# Patient Record
Sex: FEMALE | Race: WHITE | Employment: OTHER | URBAN - METROPOLITAN AREA
[De-identification: names, ages, dates, MRNs, and addresses within clinical notes are randomized per-mention and may not be internally consistent; named-entity substitution may affect disease eponyms.]

---

## 2017-11-06 ENCOUNTER — HOSPITAL ENCOUNTER (INPATIENT)
Age: 82
LOS: 9 days | Discharge: NURSING, REHAB OR LONG-TERM CARE | DRG: 469 | End: 2017-11-15
Attending: EMERGENCY MEDICINE | Admitting: INTERNAL MEDICINE
Payer: MEDICARE

## 2017-11-06 ENCOUNTER — APPOINTMENT (OUTPATIENT)
Dept: GENERAL RADIOLOGY | Age: 82
DRG: 469 | End: 2017-11-06
Attending: PHYSICIAN ASSISTANT
Payer: MEDICARE

## 2017-11-06 ENCOUNTER — ANESTHESIA EVENT (OUTPATIENT)
Dept: SURGERY | Age: 82
DRG: 469 | End: 2017-11-06
Payer: MEDICARE

## 2017-11-06 DIAGNOSIS — D61.818 PANCYTOPENIA (HCC): ICD-10-CM

## 2017-11-06 DIAGNOSIS — S72.001A CLOSED FRACTURE OF RIGHT HIP, INITIAL ENCOUNTER (HCC): Primary | ICD-10-CM

## 2017-11-06 PROBLEM — S72.009A HIP FRACTURE (HCC): Status: ACTIVE | Noted: 2017-11-06

## 2017-11-06 LAB
ALBUMIN SERPL-MCNC: 3.2 G/DL (ref 3.5–5)
ALBUMIN/GLOB SERPL: 0.8 {RATIO} (ref 1.1–2.2)
ALP SERPL-CCNC: 95 U/L (ref 45–117)
ALT SERPL-CCNC: 16 U/L (ref 12–78)
ANION GAP SERPL CALC-SCNC: 8 MMOL/L (ref 5–15)
APPEARANCE UR: CLEAR
AST SERPL-CCNC: 8 U/L (ref 15–37)
BACTERIA URNS QL MICRO: NEGATIVE /HPF
BASOPHILS # BLD: 0 K/UL
BASOPHILS NFR BLD: 0 %
BILIRUB SERPL-MCNC: 0.3 MG/DL (ref 0.2–1)
BILIRUB UR QL: NEGATIVE
BUN SERPL-MCNC: 21 MG/DL (ref 6–20)
BUN/CREAT SERPL: 20 (ref 12–20)
CALCIUM SERPL-MCNC: 8.5 MG/DL (ref 8.5–10.1)
CHLORIDE SERPL-SCNC: 104 MMOL/L (ref 97–108)
CO2 SERPL-SCNC: 24 MMOL/L (ref 21–32)
COLOR UR: ABNORMAL
CREAT SERPL-MCNC: 1.06 MG/DL (ref 0.55–1.02)
EOSINOPHIL # BLD: 0 K/UL
EOSINOPHIL NFR BLD: 0 %
EPITH CASTS URNS QL MICRO: ABNORMAL /LPF
ERYTHROCYTE [DISTWIDTH] IN BLOOD BY AUTOMATED COUNT: 16.2 % (ref 11.5–14.5)
GLOBULIN SER CALC-MCNC: 4.1 G/DL (ref 2–4)
GLUCOSE BLD STRIP.AUTO-MCNC: 144 MG/DL (ref 65–100)
GLUCOSE BLD STRIP.AUTO-MCNC: 174 MG/DL (ref 65–100)
GLUCOSE BLD STRIP.AUTO-MCNC: 270 MG/DL (ref 65–100)
GLUCOSE BLD STRIP.AUTO-MCNC: 284 MG/DL (ref 65–100)
GLUCOSE SERPL-MCNC: 274 MG/DL (ref 65–100)
GLUCOSE UR STRIP.AUTO-MCNC: >1000 MG/DL
HCT VFR BLD AUTO: 23.8 % (ref 35–47)
HGB BLD-MCNC: 8 G/DL (ref 11.5–16)
HGB UR QL STRIP: ABNORMAL
HYALINE CASTS URNS QL MICRO: ABNORMAL /LPF (ref 0–5)
KETONES UR QL STRIP.AUTO: NEGATIVE MG/DL
LEUKOCYTE ESTERASE UR QL STRIP.AUTO: NEGATIVE
LYMPHOCYTES # BLD: 0.3 K/UL
LYMPHOCYTES NFR BLD: 28 %
MCH RBC QN AUTO: 27.4 PG (ref 26–34)
MCHC RBC AUTO-ENTMCNC: 33.6 G/DL (ref 30–36.5)
MCV RBC AUTO: 81.5 FL (ref 80–99)
MONOCYTES # BLD: 0.2 K/UL
MONOCYTES NFR BLD: 17 %
NEUTS BAND NFR BLD MANUAL: 11 %
NEUTS SEG # BLD: 0.5 K/UL
NEUTS SEG NFR BLD: 44 %
NITRITE UR QL STRIP.AUTO: NEGATIVE
PATH REV BLD -IMP: NORMAL
PH UR STRIP: 6 [PH] (ref 5–8)
PLATELET # BLD AUTO: 105 K/UL (ref 150–400)
POTASSIUM SERPL-SCNC: 4 MMOL/L (ref 3.5–5.1)
PROT SERPL-MCNC: 7.3 G/DL (ref 6.4–8.2)
PROT UR STRIP-MCNC: NEGATIVE MG/DL
RBC # BLD AUTO: 2.92 M/UL (ref 3.8–5.2)
RBC #/AREA URNS HPF: ABNORMAL /HPF (ref 0–5)
RBC MORPH BLD: ABNORMAL
SERVICE CMNT-IMP: ABNORMAL
SODIUM SERPL-SCNC: 136 MMOL/L (ref 136–145)
SP GR UR REFRACTOMETRY: 1.02 (ref 1–1.03)
UA: UC IF INDICATED,UAUC: ABNORMAL
UROBILINOGEN UR QL STRIP.AUTO: 0.2 EU/DL (ref 0.2–1)
WBC # BLD AUTO: 1 K/UL (ref 3.6–11)
WBC MORPH BLD: ABNORMAL
WBC URNS QL MICRO: ABNORMAL /HPF (ref 0–4)

## 2017-11-06 PROCEDURE — 73560 X-RAY EXAM OF KNEE 1 OR 2: CPT

## 2017-11-06 PROCEDURE — 74011250636 HC RX REV CODE- 250/636: Performed by: EMERGENCY MEDICINE

## 2017-11-06 PROCEDURE — 65660000000 HC RM CCU STEPDOWN

## 2017-11-06 PROCEDURE — 74011250636 HC RX REV CODE- 250/636: Performed by: INTERNAL MEDICINE

## 2017-11-06 PROCEDURE — 74011250637 HC RX REV CODE- 250/637: Performed by: INTERNAL MEDICINE

## 2017-11-06 PROCEDURE — 74011636637 HC RX REV CODE- 636/637: Performed by: INTERNAL MEDICINE

## 2017-11-06 PROCEDURE — 94762 N-INVAS EAR/PLS OXIMTRY CONT: CPT

## 2017-11-06 PROCEDURE — 85025 COMPLETE CBC W/AUTO DIFF WBC: CPT | Performed by: EMERGENCY MEDICINE

## 2017-11-06 PROCEDURE — 82962 GLUCOSE BLOOD TEST: CPT

## 2017-11-06 PROCEDURE — 81001 URINALYSIS AUTO W/SCOPE: CPT | Performed by: EMERGENCY MEDICINE

## 2017-11-06 PROCEDURE — 96360 HYDRATION IV INFUSION INIT: CPT

## 2017-11-06 PROCEDURE — 74011250637 HC RX REV CODE- 250/637: Performed by: PHYSICIAN ASSISTANT

## 2017-11-06 PROCEDURE — 86923 COMPATIBILITY TEST ELECTRIC: CPT | Performed by: EMERGENCY MEDICINE

## 2017-11-06 PROCEDURE — 51702 INSERT TEMP BLADDER CATH: CPT

## 2017-11-06 PROCEDURE — 93005 ELECTROCARDIOGRAM TRACING: CPT

## 2017-11-06 PROCEDURE — 71010 XR CHEST PORT: CPT

## 2017-11-06 PROCEDURE — 99285 EMERGENCY DEPT VISIT HI MDM: CPT

## 2017-11-06 PROCEDURE — 86900 BLOOD TYPING SEROLOGIC ABO: CPT | Performed by: EMERGENCY MEDICINE

## 2017-11-06 PROCEDURE — 77030005514 HC CATH URETH FOL14 BARD -A

## 2017-11-06 PROCEDURE — 80053 COMPREHEN METABOLIC PANEL: CPT | Performed by: EMERGENCY MEDICINE

## 2017-11-06 PROCEDURE — 36415 COLL VENOUS BLD VENIPUNCTURE: CPT | Performed by: EMERGENCY MEDICINE

## 2017-11-06 RX ORDER — INSULIN LISPRO 100 [IU]/ML
INJECTION, SOLUTION INTRAVENOUS; SUBCUTANEOUS
Status: DISCONTINUED | OUTPATIENT
Start: 2017-11-06 | End: 2017-11-15 | Stop reason: HOSPADM

## 2017-11-06 RX ORDER — AMOXICILLIN 250 MG
2 CAPSULE ORAL 2 TIMES DAILY
Status: DISCONTINUED | OUTPATIENT
Start: 2017-11-06 | End: 2017-11-08

## 2017-11-06 RX ORDER — OXYCODONE HYDROCHLORIDE 5 MG/1
2.5 TABLET ORAL
Status: DISCONTINUED | OUTPATIENT
Start: 2017-11-06 | End: 2017-11-09

## 2017-11-06 RX ORDER — OXYCODONE HYDROCHLORIDE 5 MG/1
5 TABLET ORAL
Status: DISCONTINUED | OUTPATIENT
Start: 2017-11-06 | End: 2017-11-08

## 2017-11-06 RX ORDER — MORPHINE SULFATE 4 MG/ML
2 INJECTION INTRAVENOUS
Status: DISCONTINUED | OUTPATIENT
Start: 2017-11-06 | End: 2017-11-15 | Stop reason: HOSPADM

## 2017-11-06 RX ORDER — PREDNISONE 1 MG/1
TABLET ORAL
Status: ON HOLD | COMMUNITY
End: 2018-06-14

## 2017-11-06 RX ORDER — CARVEDILOL 6.25 MG/1
6.25 TABLET ORAL 2 TIMES DAILY WITH MEALS
COMMUNITY

## 2017-11-06 RX ORDER — DEXTROSE 50 % IN WATER (D50W) INTRAVENOUS SYRINGE
12.5-25 AS NEEDED
Status: DISCONTINUED | OUTPATIENT
Start: 2017-11-06 | End: 2017-11-15 | Stop reason: HOSPADM

## 2017-11-06 RX ORDER — ONDANSETRON 2 MG/ML
4 INJECTION INTRAMUSCULAR; INTRAVENOUS
Status: DISCONTINUED | OUTPATIENT
Start: 2017-11-06 | End: 2017-11-08

## 2017-11-06 RX ORDER — HYDRALAZINE HYDROCHLORIDE 20 MG/ML
10 INJECTION INTRAMUSCULAR; INTRAVENOUS
Status: DISCONTINUED | OUTPATIENT
Start: 2017-11-06 | End: 2017-11-15 | Stop reason: HOSPADM

## 2017-11-06 RX ORDER — SODIUM CHLORIDE 0.9 % (FLUSH) 0.9 %
5-10 SYRINGE (ML) INJECTION AS NEEDED
Status: DISCONTINUED | OUTPATIENT
Start: 2017-11-06 | End: 2017-11-08

## 2017-11-06 RX ORDER — CARVEDILOL 6.25 MG/1
6.25 TABLET ORAL 2 TIMES DAILY WITH MEALS
Status: DISCONTINUED | OUTPATIENT
Start: 2017-11-06 | End: 2017-11-15 | Stop reason: HOSPADM

## 2017-11-06 RX ORDER — FLUOXETINE HYDROCHLORIDE 20 MG/1
20 CAPSULE ORAL DAILY
Status: DISCONTINUED | OUTPATIENT
Start: 2017-11-06 | End: 2017-11-15 | Stop reason: HOSPADM

## 2017-11-06 RX ORDER — LEVOTHYROXINE SODIUM 88 UG/1
88 TABLET ORAL
Status: DISCONTINUED | OUTPATIENT
Start: 2017-11-06 | End: 2017-11-15 | Stop reason: HOSPADM

## 2017-11-06 RX ORDER — ACETAMINOPHEN 325 MG/1
650 TABLET ORAL EVERY 6 HOURS
Status: DISCONTINUED | OUTPATIENT
Start: 2017-11-06 | End: 2017-11-08

## 2017-11-06 RX ORDER — MAGNESIUM SULFATE 100 %
4 CRYSTALS MISCELLANEOUS AS NEEDED
Status: DISCONTINUED | OUTPATIENT
Start: 2017-11-06 | End: 2017-11-15 | Stop reason: HOSPADM

## 2017-11-06 RX ORDER — PANTOPRAZOLE SODIUM 40 MG/1
40 TABLET, DELAYED RELEASE ORAL
Status: DISCONTINUED | OUTPATIENT
Start: 2017-11-06 | End: 2017-11-15 | Stop reason: HOSPADM

## 2017-11-06 RX ORDER — SODIUM CHLORIDE 9 MG/ML
25 INJECTION, SOLUTION INTRAVENOUS CONTINUOUS
Status: DISCONTINUED | OUTPATIENT
Start: 2017-11-06 | End: 2017-11-08

## 2017-11-06 RX ORDER — CEFAZOLIN SODIUM IN 0.9 % NACL 2 G/100 ML
2 PLASTIC BAG, INJECTION (ML) INTRAVENOUS
Status: DISCONTINUED | OUTPATIENT
Start: 2017-11-06 | End: 2017-11-06

## 2017-11-06 RX ORDER — NALOXONE HYDROCHLORIDE 0.4 MG/ML
0.4 INJECTION, SOLUTION INTRAMUSCULAR; INTRAVENOUS; SUBCUTANEOUS AS NEEDED
Status: DISCONTINUED | OUTPATIENT
Start: 2017-11-06 | End: 2017-11-08

## 2017-11-06 RX ORDER — SODIUM CHLORIDE 0.9 % (FLUSH) 0.9 %
5-10 SYRINGE (ML) INJECTION EVERY 8 HOURS
Status: DISCONTINUED | OUTPATIENT
Start: 2017-11-06 | End: 2017-11-08

## 2017-11-06 RX ORDER — DOCUSATE SODIUM 100 MG/1
100 CAPSULE, LIQUID FILLED ORAL
Status: DISCONTINUED | OUTPATIENT
Start: 2017-11-06 | End: 2017-11-06 | Stop reason: SDUPTHER

## 2017-11-06 RX ADMIN — DOCUSATE SODIUM AND SENNOSIDES 2 TABLET: 8.6; 5 TABLET, FILM COATED ORAL at 09:28

## 2017-11-06 RX ADMIN — SODIUM CHLORIDE 50 ML/HR: 900 INJECTION, SOLUTION INTRAVENOUS at 23:28

## 2017-11-06 RX ADMIN — ACETAMINOPHEN 650 MG: 325 TABLET ORAL at 06:24

## 2017-11-06 RX ADMIN — LEVOTHYROXINE SODIUM 88 MCG: 100 TABLET ORAL at 09:29

## 2017-11-06 RX ADMIN — FLUOXETINE HYDROCHLORIDE 20 MG: 20 CAPSULE ORAL at 09:28

## 2017-11-06 RX ADMIN — PANTOPRAZOLE SODIUM 40 MG: 40 TABLET, DELAYED RELEASE ORAL at 09:28

## 2017-11-06 RX ADMIN — DOCUSATE SODIUM AND SENNOSIDES 2 TABLET: 8.6; 5 TABLET, FILM COATED ORAL at 17:47

## 2017-11-06 RX ADMIN — INSULIN LISPRO 5 UNITS: 100 INJECTION, SOLUTION INTRAVENOUS; SUBCUTANEOUS at 08:28

## 2017-11-06 RX ADMIN — OXYCODONE HYDROCHLORIDE 5 MG: 5 TABLET ORAL at 17:47

## 2017-11-06 RX ADMIN — CARVEDILOL 6.25 MG: 6.25 TABLET, FILM COATED ORAL at 09:28

## 2017-11-06 RX ADMIN — MORPHINE SULFATE 2 MG: 4 INJECTION INTRAVENOUS at 17:47

## 2017-11-06 RX ADMIN — ACETAMINOPHEN 650 MG: 325 TABLET ORAL at 17:47

## 2017-11-06 RX ADMIN — OXYCODONE HYDROCHLORIDE 5 MG: 5 TABLET ORAL at 07:20

## 2017-11-06 RX ADMIN — Medication 10 ML: at 22:57

## 2017-11-06 RX ADMIN — SODIUM CHLORIDE 75 ML/HR: 900 INJECTION, SOLUTION INTRAVENOUS at 04:29

## 2017-11-06 RX ADMIN — ACETAMINOPHEN 650 MG: 325 TABLET ORAL at 22:57

## 2017-11-06 RX ADMIN — INSULIN LISPRO 2 UNITS: 100 INJECTION, SOLUTION INTRAVENOUS; SUBCUTANEOUS at 17:48

## 2017-11-06 RX ADMIN — CARVEDILOL 6.25 MG: 6.25 TABLET, FILM COATED ORAL at 17:46

## 2017-11-06 RX ADMIN — TBO-FILGRASTIM 300 MCG: 300 INJECTION, SOLUTION SUBCUTANEOUS at 14:17

## 2017-11-06 RX ADMIN — INSULIN LISPRO 5 UNITS: 100 INJECTION, SOLUTION INTRAVENOUS; SUBCUTANEOUS at 11:27

## 2017-11-06 RX ADMIN — OXYCODONE HYDROCHLORIDE 5 MG: 5 TABLET ORAL at 11:33

## 2017-11-06 NOTE — CDMP QUERY
1.   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 Could this be further specified as possible cardiomyopathy due to chemotherapy? =>Cardiomyopathy due to chemotherapy in the setting of patient who has received chemo for Non-Hodgkins lymphoma and now reports her \"heart is weak due to chemo\". Being treated with continue po Coreg as per home regimen  =>Other Explanation of clinical findings  =>Unable to Determine (no explanation of clinical findings)    The medical record reflects the following clinical findings, treatment, and risk factors:    79 y/o female admitted for R hip fracture s/p fall at home. She reports a 2 year history of being treated for Non-Hodgkins lymphoma with chemo. She also reports that her \"heart muscles are  weak due to chemo\". No current or remote echocardiogram on chart. She is being continued on her home medication of coreg. Please clarify and document your clinical opinion in the progress notes and discharge summary including the definitive and/or presumptive diagnosis, (suspected or probable), related to the above clinical findings. Please include clinical findings supporting your diagnosis. Thanks for your time.     Eben Real RN, BSN  [phone]  [phone]

## 2017-11-06 NOTE — ED NOTES
TRANSFER - OUT REPORT:    Verbal report given to [facility] (name) on Elio Childs  being transferred to Ortho (unit) for routine progression of care       Report consisted of patients Situation, Background, Assessment and   Recommendations(SBAR). Information from the following report(s) SBAR, Kardex, ED Summary and MAR was reviewed with the receiving nurse. Lines:       Opportunity for questions and clarification was provided.       Patient transported with:   O2 @ 2 liters

## 2017-11-06 NOTE — H&P
Hospitalist Admission Note    NAME: Nayla Carpenter   :  1935   MRN:  [id number]     Date/Time:  2017 4:33 AM    Patient PCP: Hawa Barker MD  ________________________________________________________________________    My assessment of this patient's clinical condition and my plan of care is as follows. Assessment / Plan:  Hip Fracture after ground level fall  -ortho has been consulted    Pre-op cardiac risk assessment:  Pt evaluated using revised cardiac risk index and is felt to be moderate cardiovascular risk for intermediate risk surgery with a 1-2.4 risk for major complications based on these criteria. This risk has been discussed with the patient and pt wishes to proceed. Plan for surgery without further cardiac testing if this risk is acceptable per surgery and anesthesia. -patient's neutropenia puts her at high risk for infection perioperatively, will order a dose of neupogen now and will consult hematology for further monitoring perioperatively  Further risk reduction will involve medical management of other comorbid conditions in the perioperative period.     Non Hodgkin's Lymphoma complicated by neutropenia and anemia  -patient is receiving chemotherapy, last one a few weeks ago  -  -neutropenia does put patient at high risk for infections  -will order one dose of neupogen today - discussed with pharmacist they will try to find out what is on the formulary  -will consult hematology for further recommendations perioperatively    Chronic CHF due to chemotherapy  -patient says her \"heart muscles are weak\" thought to be due to chemotherapy, no history of CAD per patient, Cr>2, no cerebrovasc disease, no insulin use  -has never had episodes of exacerbation  -continue coreg    HTN  -coreg    DM2  -holding oral meds, SSI for now    Hypothyroidism  -continue levothyroxine    Code Status: full  Surrogate Decision Maker: tawny Irene [phone]    DVT Prophylaxis: per ortho  Baseline: independent        Subjective:   CHIEF COMPLAINT:   Chief Complaint   Patient presents with    Fracture     Pt transfered from [facility]. Pt had a GLF and has a fractured right hip. Pt transferred to [facility] because [facility] does not have Ortho.  Fall       HISTORY OF PRESENT ILLNESS:     Dorothy Musa is a 80 y.o.  female with history of non hodgkins lymphoma, CHF as a result of chemotherapy, DM who presents after ground level fall. Patient was trying to hang her clock when she fell onto her right side. She was unable to get up and was able to contact her grandson who lives on her property for help. XR showed R fem neck fracture she was transferred for orthopedic evaluation. Patient's most recent chemotherapy was a few weeks ago. Per patient her NHL is \"stable\"    We were asked to admit for work up and evaluation of the above problems. Past Medical History:   Diagnosis Date    CHF (congestive heart failure) (HCC)     thought to be result of chemotherapy    Depression     Diabetes ([address]     GERD (gastroesophageal reflux disease)     HTN (hypertension)     Non Hodgkin's lymphoma (HCC)     Rheumatoid arthritis(714.0)     Thyroid disease         Past Surgical History:   Procedure Laterality Date    HX APPENDECTOMY      HX CATARACT REMOVAL Bilateral     HX HYSTERECTOMY      HX UROLOGICAL      bladder tack x 2    VASCULAR SURGERY PROCEDURE UNLIST      varicose vein surgery x 2       Social History   Substance Use Topics    Smoking status: Never Smoker    Smokeless tobacco: Not on file    Alcohol use Yes      Comment: rare        No family hx of malignancy    Allergies   Allergen Reactions    Cephalexin Myalgia     Painful joints    Lisinopril Hives    Celebrex [Celecoxib] Hives    Ibuprofen Hives        Prior to Admission medications    Medication Sig Start Date End Date Taking?  Authorizing Provider   carvedilol (COREG) 6.25 mg tablet Take 6.25 mg by mouth two (2) times daily (with meals). Yes Historical Provider   predniSONE (DELTASONE) 1 mg tablet Take  by mouth daily (with breakfast). Yes Historical Provider   aspirin delayed-release 81 mg tablet Take  by mouth daily. Yes Historical Provider   metFORMIN (GLUCOPHAGE) 500 mg tablet Take 1,000 mg by mouth two (2) times daily (with meals). Yes Historical Provider   pantoprazole (PROTONIX) 40 mg tablet Take 40 mg by mouth daily. Yes Historical Provider   FLUoxetine (PROZAC) 20 mg capsule Take 20 mg by mouth daily. Yes Historical Provider   levothyroxine (SYNTHROID) 100 mcg tablet Take 88 mcg by mouth Daily (before breakfast).    Yes Historical Provider       REVIEW OF SYSTEMS:       Total of 12 systems reviewed as follows:       POSITIVE= underlined text  Negative = text not underlined  General:  fever, chills, sweats, generalized weakness, weight loss/gain,      loss of appetite   Eyes:    blurred vision, eye pain, loss of vision, double vision  ENT:    rhinorrhea, pharyngitis   Respiratory:   cough, sputum production, SOB, MUSE, wheezing, pleuritic pain   Cardiology:   chest pain, palpitations, orthopnea, PND, edema, syncope   Gastrointestinal:  abdominal pain , N/V, diarrhea, dysphagia, constipation, bleeding   Genitourinary:  frequency, urgency, dysuria, hematuria, incontinence   Muskuloskeletal :  arthralgia, myalgia, back pain  Hematology:  easy bruising, nose or gum bleeding, lymphadenopathy   Dermatological: rash, ulceration, pruritis, color change / jaundice  Endocrine:   hot flashes or polydipsia   Neurological:  headache, dizziness, confusion, focal weakness, paresthesia,     Speech difficulties, memory loss, gait difficulty  Psychological: Feelings of anxiety, depression, agitation    Objective:   VITALS:    Patient Vitals for the past 12 hrs:   Temp Pulse Resp BP SpO2   11/06/17 0407 98 °F (36.7 °C) 86 14 166/70 97 %         PHYSICAL EXAM:    General:    Alert, cooperative, no distress, appears stated age. HEENT: Atraumatic, anicteric sclerae, pink conjunctivae     No oral ulcers, oral mucosa very dry, throat clear, dentition fair  Neck:  Supple, symmetrical  Lungs:   Clear to auscultation bilaterally, no wheezing, rhonchi, or rales. Chest wall:  No tenderness, no accessory muscle use. Heart:   Regular rhythm, no murmur, no edema  Abdomen:   Soft, non-tender, not distended, bowel sounds normal  Extremities: No cyanosis, no clubbing, warm, well perfused, radial pulse 2+  Skin:     Not pale, not jaundiced, no rashes   Psych:  Good insight, not depressed, not anxious or agitated. Neurologic: EOMs intact, face symmetric, no aphasia or slurred speech, sensation grossly intact, alert and oriented x 4.     _______________________________________________________________________  Care Plan discussed with:    Comments   Patient x    Family      RN x    Care Manager                    Consultant:      _______________________________________________________________________  Expected  Disposition:   Home with Family    HH/PT/OT/RN    SNF/LTC x   [facility]    ________________________________________________________________________  TOTAL TIME:  61 Minutes    Critical Care Provided     Minutes non procedure based      Comments     Reviewed previous records   >50% of visit spent in counseling and coordination of care  Discussion with patient and/or family and questions answered       ________________________________________________________________________  Cuauhtemoc Shane MD    Procedures: see electronic medical records for all procedures/Xrays and details which were not copied into this note but were reviewed prior to creation of Plan. LAB DATA REVIEWED:    No results found for this or any previous visit (from the past 24 hour(s)).

## 2017-11-06 NOTE — IP AVS SNAPSHOT
[address] [facility] 83. 
[phone] Patient: Sadiq Pina MRN: [id number] :1935 About your hospitalization You were admitted on:  2017 You last received care in the:  [facility] 3 ORTHOPEDICS You were discharged on:  November 15, 2017 Why you were hospitalized Your primary diagnosis was:  Closed Right Hip Fracture (Hcc) Your diagnoses also included:  Hip Fracture (Hcc) Things You Need To Do (next 8 weeks) Follow up with Ulises Rankin DO in 2 week(s) Phone:  [phone] Where:  [place], [address] [facility] [address], [facility] [address] [address] Follow up with Osmar Burroughs MD in 1 week(s) Phone:  [phone] Where:  [address] [facility], [address] [place] [address] Monday Dec 11, 2017 Follow up with Lili Cooley MD  
2;00pm  hospital follow-up Phone:  [phone] Where:  [address], [address] [facility] Discharge Orders None A check tyrese indicates which time of day the medication should be taken. My Medications STOP taking these medications   
 aspirin delayed-release 81 mg tablet TAKE these medications as instructed Instructions Each Dose to Equal  
 Morning Noon Evening Bedtime  
 calcium-vitamin D 500 mg(1,250mg) -200 unit per tablet Commonly known as:  OYSTER SHELL Your last dose was: Your next dose is: Take 1 Tab by mouth three (3) times daily (with meals). 1 Tab  
    
   
   
   
  
 cefdinir 300 mg capsule Commonly known as:  OMNICEF Your last dose was: Your next dose is: Take 1 Cap by mouth two (2) times a day for 7 days. 300 mg COREG 6.25 mg tablet Generic drug:  carvedilol Your last dose was: Your next dose is: Take 6.25 mg by mouth two (2) times daily (with meals). 6.25 mg  
    
   
   
   
  
 enoxaparin 40 mg/0.4 mL Commonly known as:  LOVENOX Your last dose was: Your next dose is: 0.4 mL by SubCUTAneous route daily for 20 days. 40 mg FLUoxetine 20 mg capsule Commonly known as:  PROzac Your last dose was: Your next dose is: Take 20 mg by mouth daily. 20 mg HYDROcodone-acetaminophen 7.5-325 mg per tablet Commonly known as:  Benetta Pock Your last dose was: Your next dose is: Take 1 Tab by mouth every six (6) hours as needed. Max Daily Amount: 4 Tabs. 1 Tab  
    
   
   
   
  
 L. acidoph & paracasei- S therm- Bifido 8 billion cell Cap cap Commonly known as:  HERNANDO-Q/RISAQUAD Your last dose was: Your next dose is: Take 1 Cap by mouth daily. 1 Cap  
    
   
   
   
  
 levothyroxine 100 mcg tablet Commonly known as:  SYNTHROID Your last dose was: Your next dose is: Take 88 mcg by mouth Daily (before breakfast). 88 mcg  
    
   
   
   
  
 metFORMIN 500 mg tablet Commonly known as:  GLUCOPHAGE Your last dose was: Your next dose is: Take 1,000 mg by mouth two (2) times daily (with meals). 1000 mg  
    
   
   
   
  
 pantoprazole 40 mg tablet Commonly known as:  PROTONIX Your last dose was: Your next dose is: Take 40 mg by mouth daily. 40 mg  
    
   
   
   
  
 polyethylene glycol 17 gram packet Commonly known as:  Kaykay Clause Your last dose was: Your next dose is: Take 1 Packet by mouth daily as needed (constipation) for up to 15 days. 17 g  
    
   
   
   
  
 predniSONE 1 mg tablet Commonly known as:  Doc Okeefe Your last dose was: Your next dose is: Take  by mouth daily (with breakfast). * senna-docusate 8.6-50 mg per tablet Commonly known as:  Magdalene Folks Your last dose was: Your next dose is: Take 1 Tab by mouth two (2) times a day. 1 Tab * senna-docusate 8.6-50 mg per tablet Commonly known as:  Magdalene Folks Your last dose was: Your next dose is: Take 1 Tab by mouth daily. 1 Tab * Notice: This list has 2 medication(s) that are the same as other medications prescribed for you. Read the directions carefully, and ask your doctor or other care provider to review them with you. Where to Get Your Medications Information on where to get these meds will be given to you by the nurse or doctor. ! Ask your nurse or doctor about these medications  
  calcium-vitamin D 500 mg(1,250mg) -200 unit per tablet  
 cefdinir 300 mg capsule  
 enoxaparin 40 mg/0.4 mL HYDROcodone-acetaminophen 7.5-325 mg per tablet L. acidoph & paracasei- S therm- Bifido 8 billion cell Cap cap  
 polyethylene glycol 17 gram packet  
 senna-docusate 8.6-50 mg per tablet  
 senna-docusate 8.6-50 mg per tablet Discharge Instructions Follow up appointments Call [facility] at [phone] to schedule follow up appt with Dr. Ender Salas in  10 - 14 days. When to call your Orthopaedic Surgeon: 
-unrelieved pain 
-Signs of infection-if your incision is red; continues to have drainage; drainage has a foul odor or if you have a persistent fever over 101 degrees 
-Signs of a blood clot in your leg-calf pain, tenderness, redness, swelling of lower leg When to call your Primary Care Physician: 
-Concerns about medical conditions such as diabetes, high blood pressure, asthma, congestive heart failure 
-Call if blood sugars are elevated, persistent headache or dizziness, coughing or congestion, constipation or diarrhea, burning with urination, abnormal heart rate When to call 911and go to the nearest emergency room 
-acute onset of chest pain, shortness of breath, difficulty breathing Activity - weight bear as tolerated right lower extremity, maintain hip precautions. Incision Care- may remove dressing and shower in  3 days if there is no drainage, change dressing daily until then Preventing blood clots - Lovenox as directed. Pain management 
-take pain medication as prescribed; decrease the amount you use as your pain lessens 
-avoid alcoholic beverages while taking pain medication 
-Please be aware that many medications contain Tylenol. We do not want you to over medicate so please read the information below as a guide. Do not take more than 4 Grams of Tylenol in a 24 hour period. (There are 1000 milligrams in one Gram) Percocet contains 325 mg of Tylenol per tablet (do not take more than 12 tablets in 24 hours) Lortab contains 500 mg of Tylenol per tablet (do not take more than 8 tablets in 24 hours) Norco contains 325 mg of Tylenol per tablet (do not take more than 12 tablets in 24 hours). -You may place an ice bag on your affected extremity Diet 
-resume usual diet; drink plenty of fluids; eat foods high in fiber 
-you may want to take a stool softener (such as Senokot-S or Colace) to prevent constipation while you are taking pain medication. If constipation occurs, take a laxative (such as Dulcolax tablets, Milk of Magnesia, or a suppository) HOSPITALIST DISCHARGE INSTRUCTIONS 
 
NAME: Marleny Youngblood :  1935 MRN:  [id number] Date/Time:  11/15/2017 8:58 AM 
 
ADMIT DATE: 2017 DISCHARGE DATE: 11/15/2017 Attending Physician: Fred Ramos MD 
 
DISCHARGE DIAGNOSIS: 
 
Hip fracture DM HTN 
HF 
NHL on chemotherapy Medications: Per above medication reconciliation. Pain Management: per above medications Recommended diet: Diabetic Diet Recommended activity: Activity as tolerated and PT/OT Eval and Treat Wound care: See surgical/procedure care instructions Remove staples from back of the head 11/17-18 Indwelling devices: Larios catheter, voiding trial per SNF with care per routine in 1 week Supplemental Oxygen: None Required Lab work: Weekly BMP and Weekly CBC Glucose management:  Accucheck ACHS with sliding scale per SNF protocol Code status: Full Outside physician follow up: Follow-up Information Follow up With Details Comments Contact Info Anjel Portillo MD In 5 days  [address] [facility] [address] [place] [address] 
[phone] Ulises Rankin DO In 2 weeks  [facility] [address] [address] 
[phone] Osmar Burroughs MD In 1 week  [address] [facility] [address] [address] 
[phone] [address] [address] 
[phone] Skilled nursing facility/ SNF MD responsible for above on discharge. Information obtained by : 
I understand that if any problems occur once I am at home I am to contact my physician. I understand and acknowledge receipt of the instructions indicated above. Physician's or R.N.'s Signature                                                                  Date/Time Patient or Repres [facility] Announcement We are excited to announce that we are making your provider's discharge notes available to you in [facility].   You will see these notes when they are completed and signed by the physician that discharged you from your recent hospital stay. If you have any questions or concerns about any information you see in [employer], please call the Health Information Department where you were seen or reach out to your Primary Care Provider for more information about your plan of care. Introducing [facility] [place] [facility]! Meryl Garcia introduces [employer] patient portal. Now you can access parts of your medical record, email your doctor's office, and request medication refills online. 1. In your internet browser, go to [email or web address] 2. Click on the First Time User? Click Here link in the Sign In box. You will see the New Member Sign Up page. 3. Enter your Me[employer] Access Code exactly as it appears below. You will not need to use this code after youve completed the sign-up process. If you do not sign up before the expiration date, you must request a new code. · Me[employer] Access Code: [email or web address] Expires: 2/4/2018  4:21 AM 
 
4. Enter the last four digits of your Social Security Number (xxxx) and Date of Birth (mm/dd/yyyy) as indicated and click Submit. You will be taken to the next sign-up page. 5. Create a Meilishuo ID. This will be your Me[employer] login ID and cannot be changed, so think of one that is secure and easy to remember. 6. Create a Me[employer]uo password. You can change your password at any time. 7. Enter your Password Reset Question and Answer. This can be used at a later time if you forget your password. 8. Enter your e-mail address. You will receive e-mail notification when new information is available in [address] 9. Click Sign Up. You can now view and download portions of your medical record. 10. Click the Download Summary menu link to download a portable copy of your medical information. If you have questions, please visit the Frequently Asked Questions section of the Me[employer] website. Remember, Me[employer] is NOT to be used for urgent needs. For medical emergencies, dial 911. Now available from your iPhone and Android! Unresulted Labs-Please follow up with your PCP about these lab tests Order Current Status CULTURE, BLOOD Preliminary result Providers Seen During Your Hospitalization Provider Specialty Primary office phone April Bradley Mendieta MD Emergency Medicine [phone] Eldonna Bernheim, MD Internal Medicine [phone] Ritchie Winkler MD Internal Medicine [phone] Narendra Harrington MD Internal Medicine [phone] Immunizations Administered for This Admission Name Date Influenza Vaccine (Quad) PF 11/10/2017 Your Primary Care Physician (PCP) Primary Care Physician Office Phone Office Fax [address] [phone] You are allergic to the following Allergen Reactions Cephalexin Myalgia Painful joints Lisinopril Hives Celebrex (Celecoxib) Hives Ibuprofen Hives Recent Documentation Height Weight BMI OB Status Smoking Status 1.803 m 76.7 kg 23.57 kg/m2 Postmenopausal Never Smoker Emergency Contacts Name Discharge Info Relation Home Work Mobile JoelleMarielos N/A  AT THIS TIME [6] Child [2] [phone] Shantell Pompa  Other Relative [6] [phone] Patient Belongings The following personal items are in your possession at time of discharge: 
  Dental Appliances: None  Visual Aid: Glasses      Home Medications: None   Jewelry: Earrings  Clothing: At bedside    Other Valuables: None Discharge Instructions Attachments/References HEART FAILURE ZONES: GENERAL INFO (ENGLISH) Patient Handouts Learning About Heart Failure Zones What are heart failure zones? Heart failure zones give you an easy way to see changes in your heart failure symptoms. They also tell you when you need to get help. Check every day to see which zone you are in. Green zone. You are doing well. This is where you want to be. · Your weight is stable. This means it is not going up or down. · You breathe easily. · You are sleeping well. You are able to lie flat without shortness of breath. · You can do your usual activities. Yellow zone. Be careful. Your symptoms are changing. Call your doctor. · You have new or increased shortness of breath. · You are dizzy or lightheaded, or you feel like you may faint. · You have sudden weight gain, such as more than 2 to 3 pounds in a day or 5 pounds in a week. (Your doctor may suggest a different range of weight gain.) · You have increased swelling in your legs, ankles, or feet. · You are so tired or weak that you cannot do your usual activities. · You are not sleeping well. Shortness of breath wakes you up at night. You need extra pillows. Your doctor's name: ____________________________________________________________ Your doctor's contact information: _________________________________________________ Red zone. This is an emergency. Call 911. You have symptoms of sudden heart failure, such as: 
· You have severe trouble breathing. · You cough up pink, foamy mucus. · You have a new irregular or fast heartbeat. You have symptoms of a heart attack. These may include: · Chest pain or pressure, or a strange feeling in the chest. 
· Sweating. · Shortness of breath. · Nausea or vomiting. · Pain, pressure, or a strange feeling in the back, neck, jaw, or upper belly or in one or both shoulders or arms. · Lightheadedness or sudden weakness. · A fast or irregular heartbeat. If you have symptoms of a heart attack: After you call 911, the  may tell you to chew 1 adult-strength or 2 to 4 low-dose aspirin. Wait for an ambulance. Do not try to drive yourself. Follow-up care is a key part of your treatment and safety.  Be sure to make and go to all appointments, and call your doctor if you are having problems. It's also a good idea to know your test results and keep a list of the medicines you take. Where can you learn more? Go to http://pamela-nila.info/. Enter T174 in the search box to learn more about \"Learning About Heart Failure Zones. \" Current as of: September 21, 2016 Content Version: 11.4 © 7680-9297 Healthwise, Incorporated. Care instructions adapted under license by [employer]) (which disclaims liability or warranty for this information). If you have questions about a medical condition or this instruction, always ask your healthcare professional. [place] [address] any warranty or liability for your use of this information. Please provide this summary of care documentation to your next provider. Signatures-by signing, you are acknowledging that this After Visit Summary has been reviewed with you and you have received a copy. Patient Signature:  ____________________________________________________________ Date:  ____________________________________________________________  
  
Shazia Orozco Provider Signature:  ____________________________________________________________ Date:  ____________________________________________________________

## 2017-11-06 NOTE — ED TRIAGE NOTES
Pt transfered from [facility]. Pt had a GLF and has a fractured right hip.  Pt transferred to [facility] because [facility] does not have Ortho

## 2017-11-06 NOTE — PROGRESS NOTES
Hospitalist Progress Note    NAME: Sang Goldstein   :  1935   MRN:  [id number]       Assessment / Plan:  SUMMARY:     Sherif Quintana is a 80 y.o.  female with Hx HTN, Non Hodgkins lymphoma, s/p Chemo-Rx, CHF (due to chemotherapy), DM-2, Hypothyroidism, RA, Depression, who presented after ground level fall. Patient was trying to hang up her clock, when she fell onto her right side. She was unable to get up, but was able to contact her grandson, who lives on her property, via cell phone, for help. Initially taken via EMS to [address] [place] [address] ED, where X-Ray showed right femoral neck fracture. Transferred to ED [address] Admitted for further evaluation and management. Patient's most recent chemotherapy was ~ weeks ago. Per patient her NHL is \"stable\". She is s/p removal of \"mole left malar region on 17. PROBLEMS:     1. Mechanical fall/Right hip fracture: Patient reportedly, fell, while trying to hang up a clock. X-ray done at [address] [place] [address], confirmed right femoral neck Fx. Dr Nyla Aceves provided orthopedic consult, and right hip hemiarthroplasty is planned. Managing with analgesics. Otherwise, per orthopedics.      2. Non Hodgkin's Lymphoma: Patient is on active chemotherapy. Last chemo-Rx was a few weeks ago. Stable. Under care of Dr Kunal Wall, at [address] [place] [address]     3. Bicytopenia/Neurtopenia: Patient has anemia (HB 8.0), due to malignant disease, and Neutropenia ([address]), due to chemotherapy, on admission. Neupogen ordered. Placed on neutropenic precautions. Hem-Onc consult requested.     4. Chronic CHF: EF unknown at this time. 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 Fortunately, no clinical evidence of CHF decompensation at this time. Patient has no chest pain or SOB. CXR-acute findings. Continued on Coreg.     5. HTN: managing with Coreg/prn IV Hydralazine.      6. DM-2: Random glucose 274 on admission. Managing with Diet/SSI.      7. Hypothyroidism: On Synthroid.      8. Mild dehydration: Bun 21, creatinine 1.06 on admission (elevated BUN/creat ratio). Managing with gentle IV fluids. meticulous attention to fluid status, to avoid fluid overload, in view of # 4.      9. Recent skin lesion: Patient is s/p \"mole\" excision left malar region on 11/1/17. Healing nicely. Comment: Continue current care.        Code Status: full  Surrogate Decision Maker: tawny Larsen [phone]     DVT Prophylaxis: per ortho  Baseline: independent      Body mass index is 23.58 kg/(m^2). Subjective:     Chief Complaint / Reason for Physician Visit  Has mld right hip pain. Discussed with RN events overnight. Review of Systems:  Symptom Y/N Comments  Symptom Y/N Comments   Fever/Chills N   Chest Pain N    Poor Appetite N   Edema N    Cough N   Abdominal Pain N    Sputum N   Joint Pain Y    SOB/MUSE N   Pruritis/Rash N    Nausea/vomit N   Tolerating PT/OT     Diarrhea N   Tolerating Diet     Constipation N   Other       Could NOT obtain due to:      Objective:     VITALS:   Last 24hrs VS reviewed since prior progress note. Most recent are:  Patient Vitals for the past 24 hrs:   Temp Pulse Resp BP SpO2   11/06/17 0652 98.8 °F (37.1 °C) 79 18 170/81 99 %   11/06/17 0615 - - - 140/81 96 %   11/06/17 0600 - - - 139/71 96 %   11/06/17 0545 - - - 142/73 95 %   11/06/17 0530 - - - 143/70 95 %   11/06/17 0515 - - - 143/68 94 %   11/06/17 0500 - - - 131/70 94 %   11/06/17 0445 - - - 139/76 99 %   11/06/17 0430 - - - 135/72 94 %   11/06/17 0415 - - - 150/69 100 %   11/06/17 0409 - - - 166/70 -   11/06/17 0407 98 °F (36.7 °C) 86 14 166/70 97 %     No intake or output data in the 24 hours ending 11/06/17 0731     PHYSICAL EXAM:  General: WD, WN. Alert, cooperative, no acute distress    EENT:  EOMI. Anicteric sclerae. MMM  Resp:  CTA bilaterally, no wheezing or rales. No accessory muscle use  CV:  Heart sounds, normal, regular, no murmurs.  No edema  GI:  Soft, Non distended, Non tender.  +Bowel sounds  Neurologic:  Alert and oriented X 3, normal speech,   Psych:   Good insight. Not anxious nor agitated  Skin:  No rashes. No jaundice. MSK:               Faint bruising right hip./Shortening of right leg. Reviewed most current lab test results and cultures  YES  Reviewed most current radiology test results   YES  Review and summation of old records today    NO  Reviewed patient's current orders and MAR    YES  PMH/SH reviewed - no change compared to H&P  ________________________________________________________________________  Care Plan discussed with:    Comments   Patient X    Family      RN X    Care Manager     Consultant  X                      Multidiciplinary team rounds were held today with , nursing, pharmacist and clinical coordinator. Patient's plan of care was discussed; medications were reviewed and discharge planning was addressed. ________________________________________________________________________  Total NON critical care TIME:  <30   Minutes    Total CRITICAL CARE TIME Spent:   Minutes non procedure based      Comments   >50% of visit spent in counseling and coordination of care     ________________________________________________________________________  Lindsey Melendrez MD     Procedures: see electronic medical records for all procedures/Xrays and details which were not copied into this note but were reviewed prior to creation of Plan. LABS:  I reviewed today's most current labs and imaging studies.   Pertinent labs include:  Recent Labs      11/06/17 0428   WBC  1.0*   HGB  8.0*   HCT  23.8*   PLT  105*     Recent Labs      11/06/17 0428   NA  136   K  4.0   CL  104   CO2  24   GLU  274*   BUN  21*   CREA  1.06*   CA  8.5   ALB  3.2*   TBILI  0.3   SGOT  8*   ALT  16       Signed: Lindsey Melendrez MD

## 2017-11-06 NOTE — CONSULTS
Orthopedic CONSULT NOTE    Subjective:     Date of Consultation:  November 6, 2017      Elio Childs is a 80 y.o. female who is being seen for right hip pain and inability to ambulate after fall. Injury occurred  yesterday while Pt. was trying to hang a clock. Lost balance and fell . Workup has revealed right hip fracture . Patient's ambulatory status includes Cane, Type: Single [employer] and their living environment is home. Pt. Denies head trauma/LOC during injury. pmh significant for non hodgkin's lymphoma DX 2 years ago  Recent trouble with low wbc. Patient Active Problem List    Diagnosis Date Noted    Closed right hip fracture ([facility] [address] 11/06/2017    Osteoarthritis of CMC joint of thumb(s) 10/07/2014    Diabetic neuropathy ([facility] [facility] 09/15/2014    S/P lymph node biopsy 09/15/2014    Osteoarthritis of both knees 09/15/2014    Osteoarthritis, shoulder 09/15/2014    Rotator cuff arthropathy 09/15/2014    PMR (polymyalgia rheumatica) (recurrent) 07/14/2014    H/O polymyalgia rheumatica 06/03/2014    OA (osteoarthritis) 06/03/2014    Anemia, unspecified 06/03/2014    DM2 (diabetes mellitus, type 2) ([address] 06/03/2014    Hypovitaminosis D 06/03/2014    Gastroesophageal reflux disease with hiatal hernia 06/03/2014    Depression 06/03/2014     No family history on file. Social History   Substance Use Topics    Smoking status: Never Smoker    Smokeless tobacco: Not on file    Alcohol use Yes      Comment: rare     Past Medical History:   Diagnosis Date    Depression     Diabetes ([facility] [address] [facility]     GERD (gastroesophageal reflux disease)     Rheumatoid arthritis ([facility] [address] [facility]     Thyroid disease       Past Surgical History:   Procedure Laterality Date    HX APPENDECTOMY      HX CATARACT REMOVAL Bilateral     HX HYSTERECTOMY      HX UROLOGICAL      bladder tack x 2    VASCULAR SURGERY PROCEDURE UNLIST      varicose vein surgery x 2      Prior to Admission medications    Medication Sig Start Date End Date Taking? Authorizing Provider   lovastatin (MEVACOR) 10 mg tablet Take  by mouth nightly. Historical Provider   predniSONE (DELTASONE) 1 mg tablet Take 3 mg by mouth daily (with breakfast). Historical Provider   traMADol (ULTRAM) 50 mg tablet Take 1 Tab by mouth three (3) times daily as needed for Pain. 7/14/14   Marleen Ervin MD   cholecalciferol, vitamin D3, (VITAMIN D3) 2,000 unit tab Take  by mouth. Historical Provider   aspirin delayed-release 81 mg tablet Take  by mouth daily. Historical Provider   predniSONE (DELTASONE) 5 mg tablet Take 3 tabs each AM for 1 week, then 2 tabs each AM until return 6/3/14   Marleen Ervin MD   metFORMIN (GLUCOPHAGE) 500 mg tablet Take 1,000 mg by mouth two (2) times daily (with meals). Historical Provider   pantoprazole (PROTONIX) 40 mg tablet Take 40 mg by mouth daily. Historical Provider   FLUoxetine (PROZAC) 20 mg capsule Take 20 mg by mouth daily. Historical Provider   levothyroxine (SYNTHROID) 100 mcg tablet Take 88 mcg by mouth Daily (before breakfast). Historical Provider     Current Facility-Administered Medications   Medication Dose Route Frequency    0.9% sodium chloride infusion  75 mL/hr IntraVENous CONTINUOUS    acetaminophen (TYLENOL) tablet 650 mg  650 mg Oral Q6H    oxyCODONE IR (ROXICODONE) tablet 2.5 mg  2.5 mg Oral Q4H PRN    oxyCODONE IR (ROXICODONE) tablet 5 mg  5 mg Oral Q4H PRN    naloxone (NARCAN) injection 0.4 mg  0.4 mg IntraVENous PRN    senna-docusate (PERICOLACE) 8.6-50 mg per tablet 2 Tab  2 Tab Oral BID    ceFAZolin (ANCEF) 2g in 100 ml 0.9% NS IVPB  2 g IntraVENous ON CALL TO OR     Current Outpatient Prescriptions   Medication Sig    lovastatin (MEVACOR) 10 mg tablet Take  by mouth nightly.  predniSONE (DELTASONE) 1 mg tablet Take 3 mg by mouth daily (with breakfast).  traMADol (ULTRAM) 50 mg tablet Take 1 Tab by mouth three (3) times daily as needed for Pain.     cholecalciferol, vitamin D3, (VITAMIN D3) 2,000 unit tab Take  by mouth.  aspirin delayed-release 81 mg tablet Take  by mouth daily.  predniSONE (DELTASONE) 5 mg tablet Take 3 tabs each AM for 1 week, then 2 tabs each AM until return    metFORMIN (GLUCOPHAGE) 500 mg tablet Take 1,000 mg by mouth two (2) times daily (with meals).  pantoprazole (PROTONIX) 40 mg tablet Take 40 mg by mouth daily.  FLUoxetine (PROZAC) 20 mg capsule Take 20 mg by mouth daily.  levothyroxine (SYNTHROID) 100 mcg tablet Take 88 mcg by mouth Daily (before breakfast). Allergies   Allergen Reactions    Celebrex [Celecoxib] Hives    Ibuprofen Hives        Review of Systems:  A comprehensive review of systems was negative except for that written in the HPI. Mental Status: no dementia    Objective:     Patient Vitals for the past 8 hrs:   BP Temp Pulse Resp SpO2 Height Weight   17 0407 166/70 98 °F (36.7 °C) 86 14 97 % 5' 11\" (1.803 m) 76.7 kg (169 lb 1.5 oz)     Temp (24hrs), Av °F (36.7 °C), Min:98 °F (36.7 °C), Max:98 °F (36.7 °C)      EXAM: alert, cooperative, no distress, oriented, normal, normal mood,   Pt. Is TTP over right hip, ROM not done due to pain. Spine and Scalp w/o step off TTP or deformity. Capillary refill <2 secs in bilateral  Fingers and toes bilaterally, Sensation intact in bilateralFingers and toes bilaterally. Pulses 2+ in upper/lower extremities. Imaging Review: XRright femoral neck fracture        Labs:   Recent Results (from the past 24 hour(s))   CBC WITH AUTOMATED DIFF    Collection Time: 17  4:28 AM   Result Value Ref Range    WBC 1.0 (L) 3.6 - 11.0 K/uL    RBC 2.92 (L) 3.80 - 5.20 M/uL    HGB 8.0 (L) 11.5 - 16.0 g/dL    HCT 23.8 (L) 35.0 - 47.0 %    MCV 81.5 80.0 - 99.0 FL    MCH 27.4 26.0 - 34.0 PG    MCHC 33.6 30.0 - 36.5 g/dL    RDW 16.2 (H) 11.5 - 14.5 %    PLATELET 144 (L) 687 - 400 K/uL    NEUTROPHILS PENDING %    LYMPHOCYTES PENDING %    MONOCYTES PENDING %    EOSINOPHILS PENDING %    BASOPHILS PENDING %    ABS. NEUTROPHILS PENDING K/UL    ABS. LYMPHOCYTES PENDING K/UL    ABS. MONOCYTES PENDING K/UL    ABS. EOSINOPHILS PENDING K/UL    ABS. BASOPHILS PENDING K/UL    DF PENDING          Impression:     Patient Active Problem List    Diagnosis Date Noted    Closed right hip fracture ([facility] [address] 11/06/2017    Osteoarthritis of CMC joint of thumb(s) 10/07/2014    Diabetic neuropathy ([facility] [address] 09/15/2014    S/P lymph node biopsy 09/15/2014    Osteoarthritis of both knees 09/15/2014    Osteoarthritis, shoulder 09/15/2014    Rotator cuff arthropathy 09/15/2014    PMR (polymyalgia rheumatica) (recurrent) 07/14/2014    H/O polymyalgia rheumatica 06/03/2014    OA (osteoarthritis) 06/03/2014    Anemia, unspecified 06/03/2014    DM2 (diabetes mellitus, type 2) ([facility] [address] 06/03/2014    Hypovitaminosis D 06/03/2014    Gastroesophageal reflux disease with hiatal hernia 06/03/2014    Depression 06/03/2014     Principal Problem:    Closed right hip fracture ([facility] [address] (11/6/2017)        Plan:   Admitted to medicine  Npo  Consent for right hip rodrigo today if cleared  Wbc is 1 with 44%poly . medicine has ordered Neupogen and hematology to see, will recheck anc this afternoon, may need to delay surgery. Dr. Fabrice Montesinos aware and agree with above plan.       Sandro Jones [place]

## 2017-11-06 NOTE — CONSULTS
Impression: angioimmunoblastic lymphoma (T cell) - followed in the past by Dr. Hardeep Flor but had transportation issues and is currently receiving her oncologic care at [facility]. She is indeed neutropenic and I will go ahead and order daily granix in an effort to get her wbc to an acceptable level. Of note, her EF was 35% prior to starting chemotherapy and she never was given cardiotoxic drugs as a consequence. Thanks for the consult!  Tasia López MD FACP

## 2017-11-06 NOTE — CONSULTS
[address]   [address] [place] [address] Road       Name:  Dory Landau   MR#:  [id number]   :  1935   Account #:  [de-identified]    Date of Consultation:  2017   Date of Adm:  2017       REASON FOR CONSULTATION: Right hip fracture. CHIEF COMPLAINT: Right hip pain. HISTORY OF CHIEF COMPLAINT: The patient is an 80-year-old   female who presented to the hospital after a ground-level fall. She was   found to have right hip pain, and inability to bear weight on her right   lower extremity. Workup including x-rays revealed evidence of a   displaced femoral neck fracture. She has a history of lymphoma, and   was diagnosed approximately 2 years ago. She has been on   chemotherapy up until 3 weeks ago. She has had trouble with   neutropenia recently. She follows with an oncologist in the   [place]. She normally ambulates with a cane. She lives at   home with family. She denies any other injuries with the fall. REVIEW OF SYSTEMS: The patient denies any recent fever, chills,   nausea, vomiting, chest pain or shortness of breath. PAST MEDICAL HISTORY: Includes non-Hodgkin lymphoma,   depression, diabetes, gastroesophageal reflux disease, rheumatoid   arthritis and hypothyroidism. PAST SURGICAL HISTORY: Appendectomy, cataracts, hysterectomy,   urological procedure and vascular procedure. MEDICATIONS   1. Lovastatin. 2. Prednisone. 3. Tramadol. 4. Vitamin D supplement. 5. Metformin. 6. Protonix. 7. Prozac. 8. Synthroid. ALLERGIES   1. CELEBREX CAUSES HIVES. 2. IBUPROFEN CAUSES HIVES. FAMILY HISTORY: Noncontributory. SOCIAL HISTORY: Lives with family. PHYSICAL EXAMINATION   VITAL SIGNS: The patient is currently afebrile and vital signs are   stable. She is mildly tachycardic and hypertensive. GENERAL: The patient is awake, alert and oriented. She is in no acute   distress.    CHEST: Normal respirations. ABDOMEN: Soft, nontender to palpation. EXTREMITIES/MUSCULOSKELETAL: There is pain with attempted   range of motion of the right hip. Positive log roll. Positive Stinchfield   exam. She has mild tenderness to palpation at the knee. Previous total   knee incision is intact, with no evidence of erythema or drainage. No   pain with range of motion of the ankle. Mild swelling and ecchymosis of   the hip. LABORATORY DATA: White blood cell count 1, hemoglobin 8,   hematocrit 23.8, platelets 455. IMAGING: X-rays of the right hip show evidence of a displaced   transcervical femoral neck fracture. ASSESSMENT: An 80-year-old female with a right femoral neck   fracture. PLAN: The patient was admitted to the medical team for medical   optimization and clearance. She is currently n.p.o. However, after   discussion with the hospitalist, we will allow her to eat today as we   attempt to medically optimize her in preparation for a right hip   hemiarthroplasty. She has a very low white blood cell count, and this   neutropenia will put her at high risk for infection. She has been started   on Neupogen. We will have the hematologist/oncologist see her as   well. We will plan for the surgery tomorrow, and make her n.p.o. after   midnight, after she is medically optimized. We discussed risks and   benefits of right hip hemiarthroplasty. Risks of infection, blood loss,   neurovascular injury, anesthesia risk and risks secondary to the   patient's comorbidities, were described. She is currently bed rest, and   we will monitor her closely as we prepare for this procedure.         Paige Bhatti DO DD / [facility]   D:  11/06/2017   08:59   T:  11/06/2017   09:18   Job #:  [id number]

## 2017-11-06 NOTE — INTERDISCIPLINARY ROUNDS
Bedside interdisciplinary rounds were held today to discuss patient plan of care and outcomes. The following members were present: [address] [place] [address], Pharmacy, and Case Management. Plan:  Hematology consult pending. Plan for OR tomorrow to allow for additional optimization. Neutropenic precautions.

## 2017-11-06 NOTE — PROGRESS NOTES
Pharmacy review of tbo-filgrastim    Indication:  Neutropenia    Recent Labs      11/06/17   0428   WBC  1.0*   ANEU  0.5         Impression/Plan:   Pharmacy consult to dose neupogen 5 mcg/kg subcutaneous x 1 dose. Will substitute Granix per protocol (same dose). Have ordered Granix 360 mcg once. DASHA VargheseD    [email or web address] [email or web address]. [email or web address]

## 2017-11-06 NOTE — PROGRESS NOTES
OK to proceed with scheduled surgical procedure tomorrow, assuming no acute changes in clinical status or lab derangements overnight and patient remains NPO overnight. Do not se an EKG in [facility] - will order.

## 2017-11-06 NOTE — ED PROVIDER NOTES
[address]  EMERGENCY DEPARTMENT HISTORY AND PHYSICAL EXAM       Date of Service: 11/6/2017   Patient Name: Sadiq Pina   YOB: 1935  Medical Record Number: [id number]    History of Presenting Illness     Chief Complaint   Patient presents with    Fracture     Pt transfered from [facility]. Pt had a GLF and has a fractured right hip. Pt transferred to [facility] because [facility] does not have Ortho.  Fall        History Provided By:  patient and EMS    Additional History: Sadiq Pina is a 80 y.o. female, who presents via EMS as transferred from [facility] with R hip fracture and pancytopenia secondary to non-hodgkins lymphoma. Pt states she was attempting to hand her clock on the wall when she fell landing on her right side. Pt had x-[facility] at [facility] that notes a R femoral neck fracture. Pt transferred to [facility] ED for \"blood bank capabilities. \" Will continue to monitor. Given pt is currently asymptomatic and without complaint there is no applicable quality, duration, severity, timing, context, associated sxs, additional context, or modifying factors. PMHx: Significant for DM, RA, GERD, depression, thyroid disease  PSHx: Significant for hysterectomy, bladder tack, appendectomy  Social Hx: -tobacco, +EtOH (rarely), -Illicit Drugs    There are no other complaints, changes, or physical findings at this time.      Primary Care Provider: Lili Cooley MD     Specialist:  Oncology: Dr. Mynor Ramírez  Cardiology: Dr. Yary Matute    Past History       Past Medical History:   Past Medical History:   Diagnosis Date    Depression     Diabetes ([facility]     GERD (gastroesophageal reflux disease)     Rheumatoid arthritis ([facility]     Thyroid disease         Past Surgical History:   Past Surgical History:   Procedure Laterality Date    HX APPENDECTOMY      HX CATARACT REMOVAL Bilateral     HX HYSTERECTOMY      HX UROLOGICAL      bladder tack x 2    VASCULAR SURGERY PROCEDURE UNLIST      varicose vein surgery x 2        Family History:   No family history on file. Social History:   Social History   Substance Use Topics    Smoking status: Never Smoker    Smokeless tobacco: Not on file    Alcohol use Yes      Comment: rare        Allergies: Allergies   Allergen Reactions    Celebrex [Celecoxib] Hives    Ibuprofen Hives          Review of Systems     Review of Systems   Constitutional: Negative for chills and fever. HENT: Negative for congestion, ear pain, rhinorrhea and sore throat. Respiratory: Negative for cough and shortness of breath. Cardiovascular: Negative for chest pain, palpitations and leg swelling. Gastrointestinal: Negative for abdominal pain, constipation, diarrhea, nausea and vomiting. No melena  No hematochezia   Endocrine: Negative for polyuria. Genitourinary: Negative for dysuria, frequency and hematuria. Musculoskeletal: Positive for arthralgias (known R hip fracture). Neurological: Negative for dizziness, weakness, light-headedness, numbness and headaches. No tingling   All other systems reviewed and are negative.       Physical Exam    General appearance - well nourished, well appearing, and in no distress  Eyes - pupils equal and reactive, extraocular eye movements intact  ENT - mucous membranes moist, pharynx normal without lesions  Neck - supple, no significant adenopathy; non-tender to palpation  Chest - clear to auscultation, no wheezes, rales or rhonchi; non-tender to palpation  Heart - normal rate and regular rhythm, S1 and S2 normal, no murmurs noted  Abdomen - soft, nontender, nondistended, no masses or organomegaly  Musculoskeletal - no joint swelling; tenderness over her R hip which is shortened and externally rotated  Extremities - peripheral pulses normal, no pedal edema  Skin - normal coloration and turgor, no rashes  Neurological - alert, oriented x3, normal speech, no focal findings or movement disorder noted  Written by Dimas Essex, ED Scribe, as dictated by Arnaud Toure MD      Provider Notes / MDM:     DDx: fracture, pancytopenia      Medical Decision Making    I am the first provider for this patient. I reviewed the vital signs, available nursing notes, past medical history, past surgical history, family history and social history. ED Course:   4:10 AM   Initial assessment performed. The patients presenting problems have been discussed, and they are in agreement with the care plan formulated and outlined with them. I have encouraged them to ask questions as they arise throughout their visit. 4:10 AM  Pulse Oximetry Analysis - 96% on RA    Cardiac Monitor:   Rate: 90bpm   Rhythm: Normal Sinus Rhythm      Progress Notes:    CONSULT NOTE:   4:27 AM  Rizwana Horan MD spoke with Dr. Santo Sanders,   Specialty: Orthopedic Surgery  Discussed pt's hx, disposition, and available diagnostic and imaging results. Reviewed care plans. Consultant states orthopedic PA will assess the pt. Written by Dimas Essex, ED Scribaracelis, as dictated by Arnaud Toure MD.    CONSULT NOTE:   4:46 AM  Arnaud Toure MD spoke with Dr. Iraida Chairez,   Specialty: Hospitalist  Discussed pt's hx, disposition, and available diagnostic and imaging results. Reviewed care plans. Consultant will evaluate pt for admission.   Written by Dimas Essex, ED Scribe, as dictated by Rizwana Coppola MD      Diagnostic Study Results    Labs        Recent Results (from the past 12 hour(s))   URINALYSIS W/ REFLEX CULTURE    Collection Time: 11/06/17  4:28 AM   Result Value Ref Range    Color YELLOW/STRAW      Appearance CLEAR CLEAR      Specific gravity 1.025 1.003 - 1.030      pH (UA) 6.0 5.0 - 8.0      Protein NEGATIVE  NEG mg/dL    Glucose >1000 (A) NEG mg/dL    Ketone NEGATIVE  NEG mg/dL    Bilirubin NEGATIVE  NEG      Blood MODERATE (A) NEG      Urobilinogen 0.2 0.2 - 1.0 EU/dL    Nitrites NEGATIVE  NEG      Leukocyte Esterase NEGATIVE  NEG      WBC 0-4 0 - 4 /hpf    RBC 10-20 0 - 5 /hpf    Epithelial cells FEW FEW /lpf    Bacteria NEGATIVE  NEG /hpf    UA:UC IF INDICATED CULTURE NOT INDICATED BY UA RESULT CNI      Hyaline cast 0-2 0 - 5 /lpf   CBC WITH AUTOMATED DIFF    Collection Time: 11/06/17  4:28 AM   Result Value Ref Range    WBC 1.0 (L) 3.6 - 11.0 K/uL    RBC 2.92 (L) 3.80 - 5.20 M/uL    HGB 8.0 (L) 11.5 - 16.0 g/dL    HCT 23.8 (L) 35.0 - 47.0 %    MCV 81.5 80.0 - 99.0 FL    MCH 27.4 26.0 - 34.0 PG    MCHC 33.6 30.0 - 36.5 g/dL    RDW 16.2 (H) 11.5 - 14.5 %    PLATELET 500 (L) 082 - 400 K/uL    NEUTROPHILS 44 %    BAND NEUTROPHILS 11 %    LYMPHOCYTES 28 %    MONOCYTES 17 %    EOSINOPHILS 0 %    BASOPHILS 0 %    ABS. NEUTROPHILS 0.5 K/UL    ABS. LYMPHOCYTES 0.3 K/UL    ABS. MONOCYTES 0.2 K/UL    ABS. EOSINOPHILS 0.0 K/UL    ABS. BASOPHILS 0.0 K/UL    RBC COMMENTS NORMOCYTIC, NORMOCHROMIC      WBC COMMENTS TOXIC GRANULATION     METABOLIC PANEL, COMPREHENSIVE    Collection Time: 11/06/17  4:28 AM   Result Value Ref Range    Sodium 136 136 - 145 mmol/L    Potassium 4.0 3.5 - 5.1 mmol/L    Chloride 104 97 - 108 mmol/L    CO2 24 21 - 32 mmol/L    Anion gap 8 5 - 15 mmol/L    Glucose 274 (H) 65 - 100 mg/dL    BUN 21 (H) 6 - 20 MG/DL    Creatinine 1.06 (H) 0.55 - 1.02 MG/DL    BUN/Creatinine ratio 20 12 - 20      GFR est AA >60 >60 ml/min/1.73m2    GFR est non-AA 50 (L) >60 ml/min/1.73m2    Calcium 8.5 8.5 - 10.1 MG/DL    Bilirubin, total 0.3 0.2 - 1.0 MG/DL    ALT (SGPT) 16 12 - 78 U/L    AST (SGOT) 8 (L) 15 - 37 U/L    Alk. phosphatase 95 45 - 117 U/L    Protein, total 7.3 6.4 - 8.2 g/dL    Albumin 3.2 (L) 3.5 - 5.0 g/dL    Globulin 4.1 (H) 2.0 - 4.0 g/dL    A-G Ratio 0.8 (L) 1.1 - 2.2           Radiology - The following have been ordered and reviewed:    CXR Results  (Last 48 hours)               11/06/17 0511  XR CHEST PORT Final result    Impression:  IMPRESSION:   No acute process.             Narrative:  INDICATION:   pre op       EXAM:  AP CHEST RADIOGRAPH       COMPARISON: April 10, 2009       FINDINGS:       AP portable view of the chest demonstrates a right internal jugular Port-A-Cath   with tip over the distal SVC. Heart size upper limits of normal. The lungs are   adequately expanded. There is no edema, effusion, consolidation, or   pneumothorax. The osseous structures are unremarkable. Vital Signs - Reviewed the patient's vital signs. Patient Vitals for the past 12 hrs:   Temp Pulse Resp BP SpO2   11/06/17 0445 - - - 139/76 99 %   11/06/17 0430 - - - 135/72 94 %   11/06/17 0415 - - - 150/69 100 %   11/06/17 0409 - - - 166/70 -   11/06/17 0407 98 °F (36.7 °C) 86 14 166/70 97 %       Medications Given in the ED:    Medications   0.9% sodium chloride infusion (75 mL/hr IntraVENous New Bag 11/6/17 0429)   acetaminophen (TYLENOL) tablet 650 mg (not administered)   oxyCODONE IR (ROXICODONE) tablet 2.5 mg (not administered)   oxyCODONE IR (ROXICODONE) tablet 5 mg (not administered)   naloxone (NARCAN) injection 0.4 mg (not administered)   senna-docusate (PERICOLACE) 8.6-50 mg per tablet 2 Tab (not administered)   ceFAZolin (ANCEF) 2g in 100 ml 0.9% NS IVPB (not administered)         Diagnosis:  Clinical Impression:     1. Closed fracture of right hip, initial encounter ([facility]    2. Pancytopenia ([facility] [address]         Plan:  1. Admit to hospitalist    Disposition:    ADMISSION NOTE:  4:46 AM  Patient is being admitted to the hospital by Dr. Angelina Quiles. The results of their tests and reasons for their admission have been discussed with them and/or available family. They convey agreement and understanding for the need to be admitted and for their admission diagnosis.    Written by Ramon Nair, ED Scribe, as dictated by Lucio Gupta MD.            This note is prepared by Ramo Figueroa, acting as Scribe for MD Rizwana Dumont MD : The scribe's documentation has been prepared under my direction and personally reviewed by me in its entirety. I confirm that the note above accurately reflects all work, treatment, procedures, and medical decision making performed by me. This note will not be viewable in [address]

## 2017-11-07 ENCOUNTER — ANESTHESIA (OUTPATIENT)
Dept: SURGERY | Age: 82
DRG: 469 | End: 2017-11-07
Payer: MEDICARE

## 2017-11-07 ENCOUNTER — APPOINTMENT (OUTPATIENT)
Dept: GENERAL RADIOLOGY | Age: 82
DRG: 469 | End: 2017-11-07
Attending: ORTHOPAEDIC SURGERY
Payer: MEDICARE

## 2017-11-07 LAB
ANION GAP SERPL CALC-SCNC: 6 MMOL/L (ref 5–15)
ATRIAL RATE: 89 BPM
BASOPHILS # BLD: 0 K/UL
BASOPHILS NFR BLD: 0 %
BUN SERPL-MCNC: 15 MG/DL (ref 6–20)
BUN/CREAT SERPL: 18 (ref 12–20)
CALCIUM SERPL-MCNC: 8.3 MG/DL (ref 8.5–10.1)
CALCULATED P AXIS, ECG09: -14 DEGREES
CALCULATED R AXIS, ECG10: -21 DEGREES
CALCULATED T AXIS, ECG11: 42 DEGREES
CHLORIDE SERPL-SCNC: 103 MMOL/L (ref 97–108)
CO2 SERPL-SCNC: 26 MMOL/L (ref 21–32)
CREAT SERPL-MCNC: 0.82 MG/DL (ref 0.55–1.02)
DIAGNOSIS, 93000: NORMAL
DIFFERENTIAL METHOD BLD: ABNORMAL
EOSINOPHIL # BLD: 0 K/UL
EOSINOPHIL NFR BLD: 0 %
ERYTHROCYTE [DISTWIDTH] IN BLOOD BY AUTOMATED COUNT: 16.6 % (ref 11.5–14.5)
GLUCOSE BLD STRIP.AUTO-MCNC: 155 MG/DL (ref 65–100)
GLUCOSE BLD STRIP.AUTO-MCNC: 188 MG/DL (ref 65–100)
GLUCOSE BLD STRIP.AUTO-MCNC: 202 MG/DL (ref 65–100)
GLUCOSE BLD STRIP.AUTO-MCNC: 275 MG/DL (ref 65–100)
GLUCOSE BLD STRIP.AUTO-MCNC: 294 MG/DL (ref 65–100)
GLUCOSE SERPL-MCNC: 237 MG/DL (ref 65–100)
HCT VFR BLD AUTO: 25.8 % (ref 35–47)
HGB BLD-MCNC: 8.5 G/DL (ref 11.5–16)
LYMPHOCYTES # BLD: 0.2 K/UL
LYMPHOCYTES NFR BLD: 12 %
MCH RBC QN AUTO: 27.1 PG (ref 26–34)
MCHC RBC AUTO-ENTMCNC: 32.9 G/DL (ref 30–36.5)
MCV RBC AUTO: 82.2 FL (ref 80–99)
METAMYELOCYTES NFR BLD MANUAL: 1 %
MONOCYTES # BLD: 0.2 K/UL
MONOCYTES NFR BLD: 13 %
NEUTS BAND NFR BLD MANUAL: 13 %
NEUTS SEG # BLD: 1.3 K/UL
NEUTS SEG NFR BLD: 61 %
P-R INTERVAL, ECG05: 162 MS
PLATELET # BLD AUTO: 89 K/UL (ref 150–400)
POTASSIUM SERPL-SCNC: 3.9 MMOL/L (ref 3.5–5.1)
Q-T INTERVAL, ECG07: 362 MS
QRS DURATION, ECG06: 88 MS
QTC CALCULATION (BEZET), ECG08: 440 MS
RBC # BLD AUTO: 3.14 M/UL (ref 3.8–5.2)
RBC MORPH BLD: ABNORMAL
RBC MORPH BLD: ABNORMAL
SERVICE CMNT-IMP: ABNORMAL
SODIUM SERPL-SCNC: 135 MMOL/L (ref 136–145)
VENTRICULAR RATE, ECG03: 89 BPM
WBC # BLD AUTO: 1.7 K/UL (ref 3.6–11)

## 2017-11-07 PROCEDURE — 74011250636 HC RX REV CODE- 250/636

## 2017-11-07 PROCEDURE — 74011250637 HC RX REV CODE- 250/637: Performed by: INTERNAL MEDICINE

## 2017-11-07 PROCEDURE — 74011250637 HC RX REV CODE- 250/637: Performed by: ORTHOPAEDIC SURGERY

## 2017-11-07 PROCEDURE — 77030020061 HC IV BLD WRMR ADMIN SET 3M -B: Performed by: ANESTHESIOLOGY

## 2017-11-07 PROCEDURE — 74011000272 HC RX REV CODE- 272: Performed by: ORTHOPAEDIC SURGERY

## 2017-11-07 PROCEDURE — 77030008467 HC STPLR SKN COVD -B: Performed by: ORTHOPAEDIC SURGERY

## 2017-11-07 PROCEDURE — 74011636637 HC RX REV CODE- 636/637: Performed by: INTERNAL MEDICINE

## 2017-11-07 PROCEDURE — 0SRR0J9 REPLACEMENT OF RIGHT HIP JOINT, FEMORAL SURFACE WITH SYNTHETIC SUBSTITUTE, CEMENTED, OPEN APPROACH: ICD-10-PCS | Performed by: ORTHOPAEDIC SURGERY

## 2017-11-07 PROCEDURE — 77030018547 HC SUT ETHBND1 J&J -B: Performed by: ORTHOPAEDIC SURGERY

## 2017-11-07 PROCEDURE — 88311 DECALCIFY TISSUE: CPT | Performed by: ORTHOPAEDIC SURGERY

## 2017-11-07 PROCEDURE — 77030032490 HC SLV COMPR SCD KNE COVD -B: Performed by: ORTHOPAEDIC SURGERY

## 2017-11-07 PROCEDURE — 73501 X-RAY EXAM HIP UNI 1 VIEW: CPT

## 2017-11-07 PROCEDURE — C1713 ANCHOR/SCREW BN/BN,TIS/BN: HCPCS | Performed by: ORTHOPAEDIC SURGERY

## 2017-11-07 PROCEDURE — 77030011640 HC PAD GRND REM COVD -A: Performed by: ORTHOPAEDIC SURGERY

## 2017-11-07 PROCEDURE — 77010033678 HC OXYGEN DAILY

## 2017-11-07 PROCEDURE — 77030031139 HC SUT VCRL2 J&J -A: Performed by: ORTHOPAEDIC SURGERY

## 2017-11-07 PROCEDURE — 77030018846 HC SOL IRR STRL H20 ICUM -A: Performed by: ORTHOPAEDIC SURGERY

## 2017-11-07 PROCEDURE — 80048 BASIC METABOLIC PNL TOTAL CA: CPT | Performed by: INTERNAL MEDICINE

## 2017-11-07 PROCEDURE — 77030013079 HC BLNKT BAIR HGGR 3M -A: Performed by: ANESTHESIOLOGY

## 2017-11-07 PROCEDURE — 74011000250 HC RX REV CODE- 250

## 2017-11-07 PROCEDURE — 85025 COMPLETE CBC W/AUTO DIFF WBC: CPT | Performed by: INTERNAL MEDICINE

## 2017-11-07 PROCEDURE — 74011250637 HC RX REV CODE- 250/637: Performed by: PHYSICIAN ASSISTANT

## 2017-11-07 PROCEDURE — 88305 TISSUE EXAM BY PATHOLOGIST: CPT | Performed by: ORTHOPAEDIC SURGERY

## 2017-11-07 PROCEDURE — 76010000131 HC OR TIME 2 TO 2.5 HR: Performed by: ORTHOPAEDIC SURGERY

## 2017-11-07 PROCEDURE — 76210000016 HC OR PH I REC 1 TO 1.5 HR: Performed by: ORTHOPAEDIC SURGERY

## 2017-11-07 PROCEDURE — 77030013708 HC HNDPC SUC IRR PULS STRY –B: Performed by: ORTHOPAEDIC SURGERY

## 2017-11-07 PROCEDURE — 36415 COLL VENOUS BLD VENIPUNCTURE: CPT | Performed by: INTERNAL MEDICINE

## 2017-11-07 PROCEDURE — 77030026438 HC STYL ET INTUB CARD -A: Performed by: NURSE ANESTHETIST, CERTIFIED REGISTERED

## 2017-11-07 PROCEDURE — 77030037470 HC CNST HIP BSV SMMT DEPY-H-FRAC J&J: Performed by: ORTHOPAEDIC SURGERY

## 2017-11-07 PROCEDURE — 74011250636 HC RX REV CODE- 250/636: Performed by: INTERNAL MEDICINE

## 2017-11-07 PROCEDURE — C1776 JOINT DEVICE (IMPLANTABLE): HCPCS | Performed by: ORTHOPAEDIC SURGERY

## 2017-11-07 PROCEDURE — 77030006835 HC BLD SAW SAG STRY -B: Performed by: ORTHOPAEDIC SURGERY

## 2017-11-07 PROCEDURE — 74011000250 HC RX REV CODE- 250: Performed by: ORTHOPAEDIC SURGERY

## 2017-11-07 PROCEDURE — 76060000035 HC ANESTHESIA 2 TO 2.5 HR: Performed by: ORTHOPAEDIC SURGERY

## 2017-11-07 PROCEDURE — P9045 ALBUMIN (HUMAN), 5%, 250 ML: HCPCS

## 2017-11-07 PROCEDURE — 77030018779 HC MIX CEM PRSM J&J -B: Performed by: ORTHOPAEDIC SURGERY

## 2017-11-07 PROCEDURE — 77030018836 HC SOL IRR NACL ICUM -A: Performed by: ORTHOPAEDIC SURGERY

## 2017-11-07 PROCEDURE — 77030020788: Performed by: ORTHOPAEDIC SURGERY

## 2017-11-07 PROCEDURE — 65660000000 HC RM CCU STEPDOWN

## 2017-11-07 PROCEDURE — 74011250636 HC RX REV CODE- 250/636: Performed by: ORTHOPAEDIC SURGERY

## 2017-11-07 PROCEDURE — 77030008684 HC TU ET CUF COVD -B: Performed by: NURSE ANESTHETIST, CERTIFIED REGISTERED

## 2017-11-07 PROCEDURE — 77030035236 HC SUT PDS STRATFX BARB J&J -B: Performed by: ORTHOPAEDIC SURGERY

## 2017-11-07 PROCEDURE — 82962 GLUCOSE BLOOD TEST: CPT

## 2017-11-07 DEVICE — STEM FEM SZ 4 L114MM NK L34MM 40MM OFFSET 130DEG CALCAR HIP: Type: IMPLANTABLE DEVICE | Site: FEMUR | Status: FUNCTIONAL

## 2017-11-07 DEVICE — CENTRALIZER STEM SZ 5 L120MM DIA13MM DST FEM HIP CEM MOLD: Type: IMPLANTABLE DEVICE | Site: FEMUR | Status: FUNCTIONAL

## 2017-11-07 DEVICE — HEAD FEM DIA28MM +8.5MM OFFSET HIP ULT STD ARTC EZ 12/14: Type: IMPLANTABLE DEVICE | Site: FEMUR | Status: FUNCTIONAL

## 2017-11-07 DEVICE — CENTRALIZER STEM CEM HIP: Type: IMPLANTABLE DEVICE | Status: FUNCTIONAL

## 2017-11-07 DEVICE — STEM FEM CEM HIP UPLR HD PART POROUS HA SUMMIT: Type: IMPLANTABLE DEVICE | Status: FUNCTIONAL

## 2017-11-07 DEVICE — CEMENT BNE GENTAMICIN 40 GM SMARTSET GMV: Type: IMPLANTABLE DEVICE | Site: FEMUR | Status: FUNCTIONAL

## 2017-11-07 DEVICE — HEAD BPLR OD48MM ID28MM FEM HIP SELF CNTR: Type: IMPLANTABLE DEVICE | Site: FEMUR | Status: FUNCTIONAL

## 2017-11-07 DEVICE — KIT BNE CEM PREP PLUG BRSH CURET SPNG W/ RESTRIC FOR FEM: Type: IMPLANTABLE DEVICE | Site: FEMUR | Status: FUNCTIONAL

## 2017-11-07 RX ORDER — ONDANSETRON 2 MG/ML
INJECTION INTRAMUSCULAR; INTRAVENOUS AS NEEDED
Status: DISCONTINUED | OUTPATIENT
Start: 2017-11-07 | End: 2017-11-07 | Stop reason: HOSPADM

## 2017-11-07 RX ORDER — GLYCOPYRROLATE 0.2 MG/ML
INJECTION INTRAMUSCULAR; INTRAVENOUS AS NEEDED
Status: DISCONTINUED | OUTPATIENT
Start: 2017-11-07 | End: 2017-11-07 | Stop reason: HOSPADM

## 2017-11-07 RX ORDER — FERROUS SULFATE, DRIED 160(50) MG
1 TABLET, EXTENDED RELEASE ORAL
Status: DISCONTINUED | OUTPATIENT
Start: 2017-11-08 | End: 2017-11-15 | Stop reason: HOSPADM

## 2017-11-07 RX ORDER — CLINDAMYCIN PHOSPHATE 900 MG/50ML
900 INJECTION INTRAVENOUS ONCE
Status: DISCONTINUED | OUTPATIENT
Start: 2017-11-07 | End: 2017-11-07 | Stop reason: HOSPADM

## 2017-11-07 RX ORDER — ALBUMIN HUMAN 50 G/1000ML
SOLUTION INTRAVENOUS AS NEEDED
Status: DISCONTINUED | OUTPATIENT
Start: 2017-11-07 | End: 2017-11-07 | Stop reason: HOSPADM

## 2017-11-07 RX ORDER — SODIUM CHLORIDE 9 MG/ML
INJECTION, SOLUTION INTRAVENOUS
Status: DISCONTINUED | OUTPATIENT
Start: 2017-11-07 | End: 2017-11-07 | Stop reason: HOSPADM

## 2017-11-07 RX ORDER — ROCURONIUM BROMIDE 10 MG/ML
INJECTION, SOLUTION INTRAVENOUS AS NEEDED
Status: DISCONTINUED | OUTPATIENT
Start: 2017-11-07 | End: 2017-11-07 | Stop reason: HOSPADM

## 2017-11-07 RX ORDER — SODIUM CHLORIDE 0.9 % (FLUSH) 0.9 %
5-10 SYRINGE (ML) INJECTION AS NEEDED
Status: DISCONTINUED | OUTPATIENT
Start: 2017-11-07 | End: 2017-11-15 | Stop reason: HOSPADM

## 2017-11-07 RX ORDER — ACETAMINOPHEN 325 MG/1
650 TABLET ORAL EVERY 6 HOURS
Status: DISCONTINUED | OUTPATIENT
Start: 2017-11-08 | End: 2017-11-15 | Stop reason: HOSPADM

## 2017-11-07 RX ORDER — SODIUM CHLORIDE, SODIUM LACTATE, POTASSIUM CHLORIDE, CALCIUM CHLORIDE 600; 310; 30; 20 MG/100ML; MG/100ML; MG/100ML; MG/100ML
25 INJECTION, SOLUTION INTRAVENOUS CONTINUOUS
Status: DISCONTINUED | OUTPATIENT
Start: 2017-11-07 | End: 2017-11-07 | Stop reason: HOSPADM

## 2017-11-07 RX ORDER — PROPOFOL 10 MG/ML
INJECTION, EMULSION INTRAVENOUS AS NEEDED
Status: DISCONTINUED | OUTPATIENT
Start: 2017-11-07 | End: 2017-11-07 | Stop reason: HOSPADM

## 2017-11-07 RX ORDER — LIDOCAINE HYDROCHLORIDE 10 MG/ML
0.1 INJECTION, SOLUTION EPIDURAL; INFILTRATION; INTRACAUDAL; PERINEURAL AS NEEDED
Status: DISCONTINUED | OUTPATIENT
Start: 2017-11-07 | End: 2017-11-07 | Stop reason: HOSPADM

## 2017-11-07 RX ORDER — ACETAMINOPHEN 10 MG/ML
INJECTION, SOLUTION INTRAVENOUS AS NEEDED
Status: DISCONTINUED | OUTPATIENT
Start: 2017-11-07 | End: 2017-11-07 | Stop reason: HOSPADM

## 2017-11-07 RX ORDER — CEFAZOLIN SODIUM IN 0.9 % NACL 2 G/100 ML
2 PLASTIC BAG, INJECTION (ML) INTRAVENOUS
Status: DISCONTINUED | OUTPATIENT
Start: 2017-11-07 | End: 2017-11-07 | Stop reason: ALTCHOICE

## 2017-11-07 RX ORDER — ONDANSETRON 4 MG/1
4 TABLET, ORALLY DISINTEGRATING ORAL
Status: DISCONTINUED | OUTPATIENT
Start: 2017-11-07 | End: 2017-11-15 | Stop reason: HOSPADM

## 2017-11-07 RX ORDER — AMOXICILLIN 250 MG
1 CAPSULE ORAL 2 TIMES DAILY
Status: DISCONTINUED | OUTPATIENT
Start: 2017-11-07 | End: 2017-11-15 | Stop reason: HOSPADM

## 2017-11-07 RX ORDER — CLINDAMYCIN PHOSPHATE 900 MG/50ML
INJECTION INTRAVENOUS AS NEEDED
Status: DISCONTINUED | OUTPATIENT
Start: 2017-11-07 | End: 2017-11-07 | Stop reason: HOSPADM

## 2017-11-07 RX ORDER — ENOXAPARIN SODIUM 100 MG/ML
40 INJECTION SUBCUTANEOUS DAILY
Status: DISCONTINUED | OUTPATIENT
Start: 2017-11-08 | End: 2017-11-15 | Stop reason: HOSPADM

## 2017-11-07 RX ORDER — SODIUM CHLORIDE 9 MG/ML
125 INJECTION, SOLUTION INTRAVENOUS CONTINUOUS
Status: DISPENSED | OUTPATIENT
Start: 2017-11-07 | End: 2017-11-08

## 2017-11-07 RX ORDER — POLYETHYLENE GLYCOL 3350 17 G/17G
17 POWDER, FOR SOLUTION ORAL DAILY
Status: DISCONTINUED | OUTPATIENT
Start: 2017-11-08 | End: 2017-11-15 | Stop reason: HOSPADM

## 2017-11-07 RX ORDER — ONDANSETRON 2 MG/ML
4 INJECTION INTRAMUSCULAR; INTRAVENOUS
Status: ACTIVE | OUTPATIENT
Start: 2017-11-07 | End: 2017-11-08

## 2017-11-07 RX ORDER — OXYCODONE HYDROCHLORIDE 5 MG/1
5 TABLET ORAL
Status: DISCONTINUED | OUTPATIENT
Start: 2017-11-07 | End: 2017-11-09

## 2017-11-07 RX ORDER — NALOXONE HYDROCHLORIDE 0.4 MG/ML
0.4 INJECTION, SOLUTION INTRAMUSCULAR; INTRAVENOUS; SUBCUTANEOUS AS NEEDED
Status: DISCONTINUED | OUTPATIENT
Start: 2017-11-07 | End: 2017-11-15 | Stop reason: HOSPADM

## 2017-11-07 RX ORDER — DIPHENHYDRAMINE HYDROCHLORIDE 50 MG/ML
12.5 INJECTION, SOLUTION INTRAMUSCULAR; INTRAVENOUS AS NEEDED
Status: DISCONTINUED | OUTPATIENT
Start: 2017-11-07 | End: 2017-11-07 | Stop reason: HOSPADM

## 2017-11-07 RX ORDER — SODIUM CHLORIDE 0.9 % (FLUSH) 0.9 %
5-10 SYRINGE (ML) INJECTION EVERY 8 HOURS
Status: DISCONTINUED | OUTPATIENT
Start: 2017-11-08 | End: 2017-11-15 | Stop reason: HOSPADM

## 2017-11-07 RX ORDER — LIDOCAINE HYDROCHLORIDE 20 MG/ML
INJECTION, SOLUTION EPIDURAL; INFILTRATION; INTRACAUDAL; PERINEURAL AS NEEDED
Status: DISCONTINUED | OUTPATIENT
Start: 2017-11-07 | End: 2017-11-07 | Stop reason: HOSPADM

## 2017-11-07 RX ORDER — HYDROMORPHONE HYDROCHLORIDE 1 MG/ML
0.2 INJECTION, SOLUTION INTRAMUSCULAR; INTRAVENOUS; SUBCUTANEOUS
Status: DISCONTINUED | OUTPATIENT
Start: 2017-11-07 | End: 2017-11-07 | Stop reason: HOSPADM

## 2017-11-07 RX ORDER — SODIUM CHLORIDE 9 MG/ML
250 INJECTION, SOLUTION INTRAVENOUS AS NEEDED
Status: DISCONTINUED | OUTPATIENT
Start: 2017-11-07 | End: 2017-11-08

## 2017-11-07 RX ORDER — SODIUM CHLORIDE 0.9 % (FLUSH) 0.9 %
5-10 SYRINGE (ML) INJECTION AS NEEDED
Status: DISCONTINUED | OUTPATIENT
Start: 2017-11-07 | End: 2017-11-07 | Stop reason: HOSPADM

## 2017-11-07 RX ORDER — SUCCINYLCHOLINE CHLORIDE 20 MG/ML
INJECTION INTRAMUSCULAR; INTRAVENOUS AS NEEDED
Status: DISCONTINUED | OUTPATIENT
Start: 2017-11-07 | End: 2017-11-07 | Stop reason: HOSPADM

## 2017-11-07 RX ORDER — FACIAL-BODY WIPES
10 EACH TOPICAL DAILY PRN
Status: DISCONTINUED | OUTPATIENT
Start: 2017-11-09 | End: 2017-11-15 | Stop reason: HOSPADM

## 2017-11-07 RX ORDER — HYDROMORPHONE HYDROCHLORIDE 2 MG/ML
INJECTION, SOLUTION INTRAMUSCULAR; INTRAVENOUS; SUBCUTANEOUS AS NEEDED
Status: DISCONTINUED | OUTPATIENT
Start: 2017-11-07 | End: 2017-11-07 | Stop reason: HOSPADM

## 2017-11-07 RX ORDER — FENTANYL CITRATE 50 UG/ML
INJECTION, SOLUTION INTRAMUSCULAR; INTRAVENOUS AS NEEDED
Status: DISCONTINUED | OUTPATIENT
Start: 2017-11-07 | End: 2017-11-07 | Stop reason: HOSPADM

## 2017-11-07 RX ORDER — NEOSTIGMINE METHYLSULFATE 1 MG/ML
INJECTION INTRAVENOUS AS NEEDED
Status: DISCONTINUED | OUTPATIENT
Start: 2017-11-07 | End: 2017-11-07 | Stop reason: HOSPADM

## 2017-11-07 RX ORDER — OXYCODONE HYDROCHLORIDE 5 MG/1
2.5 TABLET ORAL
Status: DISCONTINUED | OUTPATIENT
Start: 2017-11-07 | End: 2017-11-09

## 2017-11-07 RX ORDER — FENTANYL CITRATE 50 UG/ML
25 INJECTION, SOLUTION INTRAMUSCULAR; INTRAVENOUS
Status: DISCONTINUED | OUTPATIENT
Start: 2017-11-07 | End: 2017-11-07 | Stop reason: HOSPADM

## 2017-11-07 RX ORDER — PHENYLEPHRINE HCL IN 0.9% NACL 0.4MG/10ML
SYRINGE (ML) INTRAVENOUS AS NEEDED
Status: DISCONTINUED | OUTPATIENT
Start: 2017-11-07 | End: 2017-11-07 | Stop reason: HOSPADM

## 2017-11-07 RX ORDER — SODIUM CHLORIDE 0.9 % (FLUSH) 0.9 %
5-10 SYRINGE (ML) INJECTION EVERY 8 HOURS
Status: DISCONTINUED | OUTPATIENT
Start: 2017-11-07 | End: 2017-11-07 | Stop reason: HOSPADM

## 2017-11-07 RX ADMIN — CARVEDILOL 6.25 MG: 6.25 TABLET, FILM COATED ORAL at 08:33

## 2017-11-07 RX ADMIN — HYDROMORPHONE HYDROCHLORIDE 0.2 MG: 2 INJECTION, SOLUTION INTRAMUSCULAR; INTRAVENOUS; SUBCUTANEOUS at 17:55

## 2017-11-07 RX ADMIN — PANTOPRAZOLE SODIUM 40 MG: 40 TABLET, DELAYED RELEASE ORAL at 08:42

## 2017-11-07 RX ADMIN — TBO-FILGRASTIM 480 MCG: 480 INJECTION, SOLUTION SUBCUTANEOUS at 10:19

## 2017-11-07 RX ADMIN — ONDANSETRON 4 MG: 2 INJECTION INTRAMUSCULAR; INTRAVENOUS at 17:17

## 2017-11-07 RX ADMIN — GLYCOPYRROLATE 0.4 MG: 0.2 INJECTION INTRAMUSCULAR; INTRAVENOUS at 17:55

## 2017-11-07 RX ADMIN — OXYCODONE HYDROCHLORIDE 5 MG: 5 TABLET ORAL at 22:40

## 2017-11-07 RX ADMIN — LIDOCAINE HYDROCHLORIDE 60 MG: 20 INJECTION, SOLUTION EPIDURAL; INFILTRATION; INTRACAUDAL; PERINEURAL at 16:20

## 2017-11-07 RX ADMIN — LEVOTHYROXINE SODIUM 88 MCG: 100 TABLET ORAL at 08:43

## 2017-11-07 RX ADMIN — INSULIN LISPRO 3 UNITS: 100 INJECTION, SOLUTION INTRAVENOUS; SUBCUTANEOUS at 22:49

## 2017-11-07 RX ADMIN — ALBUMIN HUMAN 250 ML: 50 SOLUTION INTRAVENOUS at 16:49

## 2017-11-07 RX ADMIN — ALBUMIN HUMAN 250 ML: 50 SOLUTION INTRAVENOUS at 17:23

## 2017-11-07 RX ADMIN — Medication 10 ML: at 08:45

## 2017-11-07 RX ADMIN — PROPOFOL 100 MG: 10 INJECTION, EMULSION INTRAVENOUS at 16:20

## 2017-11-07 RX ADMIN — SUCCINYLCHOLINE CHLORIDE 140 MG: 20 INJECTION INTRAMUSCULAR; INTRAVENOUS at 16:20

## 2017-11-07 RX ADMIN — TBO-FILGRASTIM 480 MCG: 480 INJECTION, SOLUTION SUBCUTANEOUS at 10:27

## 2017-11-07 RX ADMIN — ROCURONIUM BROMIDE 20 MG: 10 INJECTION, SOLUTION INTRAVENOUS at 16:28

## 2017-11-07 RX ADMIN — INSULIN LISPRO 5 UNITS: 100 INJECTION, SOLUTION INTRAVENOUS; SUBCUTANEOUS at 08:46

## 2017-11-07 RX ADMIN — VANCOMYCIN HYDROCHLORIDE 1000 MG: 1 INJECTION, POWDER, LYOPHILIZED, FOR SOLUTION INTRAVENOUS at 16:06

## 2017-11-07 RX ADMIN — Medication 80 MCG: at 17:26

## 2017-11-07 RX ADMIN — FENTANYL CITRATE 50 MCG: 50 INJECTION, SOLUTION INTRAMUSCULAR; INTRAVENOUS at 16:47

## 2017-11-07 RX ADMIN — ACETAMINOPHEN 650 MG: 325 TABLET ORAL at 22:20

## 2017-11-07 RX ADMIN — FLUOXETINE HYDROCHLORIDE 20 MG: 20 CAPSULE ORAL at 08:32

## 2017-11-07 RX ADMIN — Medication 80 MCG: at 16:25

## 2017-11-07 RX ADMIN — SODIUM CHLORIDE: 9 INJECTION, SOLUTION INTRAVENOUS at 16:00

## 2017-11-07 RX ADMIN — Medication 80 MCG: at 16:41

## 2017-11-07 RX ADMIN — SODIUM CHLORIDE 50 ML/HR: 900 INJECTION, SOLUTION INTRAVENOUS at 10:00

## 2017-11-07 RX ADMIN — CLINDAMYCIN PHOSPHATE 900 MG: 900 INJECTION INTRAVENOUS at 16:15

## 2017-11-07 RX ADMIN — Medication 10 ML: at 22:23

## 2017-11-07 RX ADMIN — HYDROMORPHONE HYDROCHLORIDE 0.2 MG: 2 INJECTION, SOLUTION INTRAMUSCULAR; INTRAVENOUS; SUBCUTANEOUS at 17:40

## 2017-11-07 RX ADMIN — SODIUM CHLORIDE: 9 INJECTION, SOLUTION INTRAVENOUS at 16:30

## 2017-11-07 RX ADMIN — OXYCODONE HYDROCHLORIDE 5 MG: 5 TABLET ORAL at 08:32

## 2017-11-07 RX ADMIN — HYDROMORPHONE HYDROCHLORIDE 0.2 MG: 2 INJECTION, SOLUTION INTRAMUSCULAR; INTRAVENOUS; SUBCUTANEOUS at 17:08

## 2017-11-07 RX ADMIN — ROCURONIUM BROMIDE 20 MG: 10 INJECTION, SOLUTION INTRAVENOUS at 17:00

## 2017-11-07 RX ADMIN — Medication 40 MCG: at 17:45

## 2017-11-07 RX ADMIN — DOCUSATE SODIUM AND SENNOSIDES 1 TABLET: 8.6; 5 TABLET, FILM COATED ORAL at 22:20

## 2017-11-07 RX ADMIN — MORPHINE SULFATE 2 MG: 4 INJECTION INTRAVENOUS at 10:37

## 2017-11-07 RX ADMIN — ROCURONIUM BROMIDE 10 MG: 10 INJECTION, SOLUTION INTRAVENOUS at 16:20

## 2017-11-07 RX ADMIN — DOCUSATE SODIUM AND SENNOSIDES 2 TABLET: 8.6; 5 TABLET, FILM COATED ORAL at 08:41

## 2017-11-07 RX ADMIN — ACETAMINOPHEN 1000 MG: 10 INJECTION, SOLUTION INTRAVENOUS at 17:15

## 2017-11-07 RX ADMIN — NEOSTIGMINE METHYLSULFATE 3 MG: 1 INJECTION INTRAVENOUS at 17:55

## 2017-11-07 RX ADMIN — FENTANYL CITRATE 50 MCG: 50 INJECTION, SOLUTION INTRAMUSCULAR; INTRAVENOUS at 16:20

## 2017-11-07 RX ADMIN — Medication 80 MCG: at 17:40

## 2017-11-07 NOTE — PROGRESS NOTES
Courtesy note. In preop holding area. On granix. Wbc trending up today to 1700 with most importantly 1300 granulocytes! Continue with same as this will help considerably in preventing and fighting infection.  Elena Arriaga MD

## 2017-11-07 NOTE — PROGRESS NOTES
Occupational Therapy Screening:  Services are indicated. Order is required once stable from surgery. An InBasket screening referral was triggered for occupational therapy based on results obtained during the nursing admission assessment. The patients chart was reviewed and the patient is appropriate for a skilled therapy evaluation. Please order a consult for occupational therapy if you are in agreement and would like an evaluation to be completed. Thank you.     Jarad Chandler OTR/NANDINI

## 2017-11-07 NOTE — BRIEF OP NOTE
BRIEF OPERATIVE NOTE    Date of Procedure: 11/7/2017   Preoperative Diagnosis: right femoral neck fracture , non hodgkins lymphoma  Postoperative Diagnosis: right femoral neck fracture , non hodgkins lymphoma    Procedure(s):  RIGHT HIP HEMIARTHROPLASTY  Surgeon(s) and Role:     * Hayede Bedolla DO - Primary         Assistant Staff:       Surgical Staff:  Circ-1: Laila Burgos RN  Scrub Tech-1: Ha Fox  Scrub Tech-Relief: Tae Denney  Surg Asst-1: Juan Martin  Event Time In   Incision Start 1650   Incision Close 1805     Anesthesia: General   Estimated Blood Loss: 300cc  Specimens:   ID Type Source Tests Collected by Time Destination   1 : Right femoral head Preservative Femoral, right  Haydee Bedolla DO 11/7/2017 1714 Pathology      Findings: fem neck fx   Complications: none  Implants:   Implant Name Type Inv.  Item Serial No.  Lot No. LRB No. Used Action   PREP KT PILAR FEM W/RSTRCTR --  - [id number]  PREP KT PILAR FEM W/RSTRCTR --  [id number] [facility] [employer] [id number] Right 1 Implanted   CEMENT BNE GENTAMC MV 40GM -- [employer]URANCE - [id number]  CEMENT BNE GENTAMC MV 40GM -- SMARTSET ENDURANCE [id number] [facility] [employer] [id number] Right 2 Implanted   STEM FEM PILAR 12/14 TAPR 4 -- [place] - [id number]  STEM FEM PILAR 12/14 TAPR 4 -- [place] [id number] [facility] [id number] Right 1 Implanted   HEAD FEM SLF-CENTER 48X28 BRN --  - [id number]  HEAD FEM SLF-CENTER 48X28 BRN --  [id number] [facility] [employer] [id number] Right 1 Implanted   CNTRLZR FEM STEM DSTL 13MM --  - [id number]  CNTRLZR FEM STEM DSTL 13MM --  [id number] [facility] [employer] [id number] Right 1 Implanted   HEAD FEM 28MM +8.5MM NK --  - [id number]   HEAD FEM 28MM +8.5MM NK --  [id number] [facility] [employer] [id number] Right 1 Implanted

## 2017-11-07 NOTE — PERIOP NOTES
1820-Handoff Report from Operating Room to PACU    Report received from [address] [place] [address] and Michi Campos CRNA regarding Lydia Marks. Surgeon(s):  Steven Reyes DO  And Procedure(s) (LRB):  RIGHT FEMORAL HEMIARTHROPLASTY (Right)  confirmed   with allergies, drains and dressings discussed. Anesthesia type, drugs, patient history, complications, estimated blood loss, vital signs, intake and output, and last pain medication, lines, reversal medications and temperature were reviewed. 1900- TRANSFER - OUT REPORT:    Verbal report given to Daniela CROWLEY(name) on Lydia Marks  being transferred to ortho(unit) for routine post - op       Report consisted of patients Situation, Background, Assessment and   Recommendations(SBAR). Information from the following report(s) SBAR, Kardex, OR Summary, Procedure Summary, Intake/Output, MAR and Recent Results was reviewed with the receiving nurse. Opportunity for questions and clarification was provided. Patient transported with:   Monitor  O2 @ 3 liters  Registered Nurse  Tech     Agreed to hold patient in PACU until after 1930 for shift change.

## 2017-11-07 NOTE — PROGRESS NOTES
Spiritual Care Partner Volunteer visited patient in [place] on 11/7/17. Documented by:  Suni Rothman M.Div.    Paging Service [phone])

## 2017-11-07 NOTE — PROGRESS NOTES
Hospitalist Progress Note    NAME: Carie Ospina   :  1935   MRN:  [id number]       Assessment / Plan:  R hip fracture from mechanical fall  -pt at high risk for infection perioperatively given he neutropenia. Will need to monitor closely. Pt on neutropenic precaution  -pain and DVT prophylaxis per ortho  -NPO for surgery today  -ortho following    NHL  Neutropenia  -chemo therapy 3 weeks ago  -counts improved with Granix, will cont' daily   -appreciate oncology following    HTN  Chronic systolic heart failure  -on gentle IVF at 50cc/hr, can discontinue post op. Clinically euvolemic.  -cont' coreg, hydralazine prn     T2DM  -cont' SSI    Hypothyroidism  -cont' synthroid    Recent skin lesion  -s/p \"mole\" excision left malar region on 17. Healing nicely. Body mass index is 23.58 kg/(m^2).       Code Status: full  Surrogate Decision Maker: tawny Hyman [phone]   DVT Prophylaxis: per ortho  Baseline: independent     Subjective:     Chief Complaint / Reason for Physician Visit  Pt seen at bedside. No complaints. Discussed with RN events overnight. Review of Systems:  Symptom Y/N Comments  Symptom Y/N Comments   Fever/Chills n   Chest Pain n    Poor Appetite    Edema     Cough    Abdominal Pain n    Sputum    Joint Pain y    SOB/MUSE n   Pruritis/Rash     Nausea/vomit    Tolerating PT/OT     Diarrhea    Tolerating Diet     Constipation    Other       Could NOT obtain due to:      Objective:     VITALS:   Last 24hrs VS reviewed since prior progress note.  Most recent are:  Patient Vitals for the past 24 hrs:   Temp Pulse Resp BP SpO2   17 0156 98.3 °F (36.8 °C) 77 18 142/77 96 %   17 2151 98.7 °F (37.1 °C) 76 18 126/59 97 %   17 1903 98.1 °F (36.7 °C) 81 18 129/61 96 %   17 1429 98.6 °F (37 °C) 88 19 132/71 100 %   17 1021 98.6 °F (37 °C) 82 18 127/75 99 %       Intake/Output Summary (Last 24 hours) at 17 0832  Last data filed at 17 1420 Gross per 24 hour   Intake                0 ml   Output              650 ml   Net             -650 ml        PHYSICAL EXAM:  General: WD, WN. Alert, cooperative, no acute distress    EENT:  EOMI. Anicteric sclerae. MMM  Resp:  CTA bilaterally, no wheezing or rales. No accessory muscle use  CV:  Regular  rhythm,  No edema  GI:  Soft, Non distended, Non tender.  +Bowel sounds  Neurologic:  Alert and oriented X 3, normal speech,   Psych:   Good insight. Not anxious nor agitated  Skin:  No rashes. No jaundice    Reviewed most current lab test results and cultures  YES  Reviewed most current radiology test results   YES  Review and summation of old records today    NO  Reviewed patient's current orders and MAR    YES  PMH/SH reviewed - no change compared to H&P  ________________________________________________________________________  Care Plan discussed with:    Comments   Patient x    Family      RN x    Care Manager     Consultant                        Multidiciplinary team rounds were held today with , nursing, pharmacist and clinical coordinator. Patient's plan of care was discussed; medications were reviewed and discharge planning was addressed. ________________________________________________________________________  Total NON critical care TIME:  35   Minutes    Total CRITICAL CARE TIME Spent:   Minutes non procedure based      Comments   >50% of visit spent in counseling and coordination of care     ________________________________________________________________________  Meenakshi Interiano MD     Procedures: see electronic medical records for all procedures/Xrays and details which were not copied into this note but were reviewed prior to creation of Plan. LABS:  I reviewed today's most current labs and imaging studies.   Pertinent labs include:  Recent Labs      11/07/17   0612  11/06/17   0428   WBC  1.7*  1.0*   HGB  8.5*  8.0*   HCT  25.8*  23.8*   PLT  89*  105*     Recent Labs 11/07/17   0612  11/06/17   0428   NA  135*  136   K  3.9  4.0   CL  103  104   CO2  26  24   GLU  237*  274*   BUN  15  21*   CREA  0.82  1.06*   CA  8.3*  8.5   ALB   --   3.2*   TBILI   --   0.3   SGOT   --   8*   ALT   --   16       Signed: Diya Luke MD

## 2017-11-07 NOTE — CONSULTS
Kashmir Hansenesa   [address] [place] [address], [address] [place] [address] Ave    [address]       Name:  Latasha Sebastian   MR#:  [id number]   :  1935   Account #:  [de-identified]    Date of Consultation:  2017   Date of Adm:  2017       IMPRESSION: Angioimmunoblastic lymphoma, T-cell, followed in the   past by Dr. Ifeanyi Reed and treated with CVP chemotherapy, but she has   had transportation issues and is currently receiving her oncologic care   at [facility]. She indicates she has received no   chemotherapy recently, but she is notably neutropenic. I will go ahead   and order daily granix in an effort to get her white count up to a more   acceptable level. She is going to be at increased risk for infection and   so will need to be monitored very closely while she is neutropenic. I do   very much appreciate this consult. DISCUSSION: The patient is a very pleasant 61-year-old female who   had a ground-level fall and broke her right hip. She was unable to get   up. X-rays showed a right femoral neck fracture. She was transferred   from [facility] to this facility. Her last chemotherapy   was actually 2017. She got 1 mg of vincristine, 1380 mg of   Cytoxan, 60 mg of prednisone and Neulasta via the Onpro device. This   was cycle 16 for her on 2017. She has not received any further   cycles and tells me that her doctor in [facility] has   indicated to her that she is unable to find active cancer and is thus not   treating her. It should be noted that she does have a past medical   history significant for congestive heart failure, depression, diabetes,   GE reflux, hypertension, non-Hodgkin's lymphoma, specific   angioimmunoblastic T-cell, rheumatoid arthritis, thyroid disease. She   has had an appendectomy, cataract removal, hysterectomy, bladder   tack x2 and varicose vein surgery.  It should be noted her ejection   fraction was 35% prior to ever starting chemotherapy, as a   consequence, she was never given cardiotoxic drugs. Her congestive   heart failure is thus not believed actually to be due to her CVP   chemotherapy. MEDICATIONS PRIOR TO ADMISSION   Included:   1. Coreg 6.25 mg b.i.d.   2. Prednisone 1 mg daily with breakfast.   3. Aspirin 81 mg daily. 4. Glucophage 1000 mg daily. 5. Protonix 40 mg daily. 6. Prozac 20 mg daily. 7. Synthroid 100 mcg daily. ALLERGIES   SHE IS ALLERGIC TO:   1. CEPHALEXIN, WHICH CAUSES MYALGIAS. 2. LISINOPRIL CAUSES HIVES. 3. CELEBREX CAUSES HIVES. 4. IBUPROFEN CAUSES HIVES. SOCIAL HISTORY: She does not smoke. She drinks rarely. She is   retired. She is . REVIEW OF SYSTEMS: Negative for 11 organ systems beyond rather   severe right hip pain,     Her cbc reveal a white count of 1000, hemoglobin 8.0, platelet count   093,850, neutrophil count 500. CMP reviewed. PHYSICAL EXAMINATION     HEENT: Pupils to be round and reactive. Extraocular eye movements   intact. Oropharynx without lesion. NECK: Supple, without adenopathy. LUNGS: Clear to auscultation. CARDIOVASCULAR: Regular rate and rhythm. ABDOMEN: Soft, nontender with normoactive bowel sounds. No   obvious hepatosplenomegaly appreciated. EXTREMITIES: Currently unremarkable. I have not and tried to move her, as she is in excruciating pain with any   Movement. NEURO: non focal  PSYCH: alert, appropriate, anxious re pain. GABRIEL Salinas MD      [facility] / Brittny Gerber   D:  11/06/2017   18:12   T:  11/06/2017   21:59   Job #:  [id number]

## 2017-11-07 NOTE — ROUTINE PROCESS
sbar in note - pt to holding area  Identifies self and procedure for today. Npo status  verified with pt daughter rodger and floor nurse as pt is intermittently confused as to time occurrences. Daughter states she was with pt all this morning and had nothing but her medications . Dr. Perla Castorena into see pt requests blood bank 2 units irridated blood order  In.   Neutropenic precautions followed as per orders

## 2017-11-07 NOTE — INTERDISCIPLINARY ROUNDS
Bedside interdisciplinary rounds were held today to discuss patient plan of care and outcomes. The following members were present: Physician, Nurse, Clinical Care Leader, Pharmacy, Physical Therapy, and Case Management. Plan:  Plan for patient to go to OR today.

## 2017-11-07 NOTE — ANESTHESIA PREPROCEDURE EVALUATION
Anesthetic History   No history of anesthetic complications            Review of Systems / Medical History  Patient summary reviewed, nursing notes reviewed and pertinent labs reviewed    Pulmonary  Within defined limits                 Neuro/Psych         Psychiatric history (depression)     Cardiovascular    Hypertension              Exercise tolerance: <4 METS: Limited by knee pain     GI/Hepatic/Renal     GERD: well controlled           Endo/Other    Diabetes (runs 100-150's at home)  Hypothyroidism  Arthritis (Rheumatoid; affects knees, fingers, neck) and anemia     Other Findings   Comments: Hip fx  Hgb 8.5  Platelets 89         Physical Exam    Airway  Mallampati: I  TM Distance: < 4 cm  Neck ROM: decreased range of motion   Mouth opening: Normal    Comments: Pain in neck looking right & left Cardiovascular    Rhythm: regular  Rate: normal      Pertinent negatives: No murmur   Dental  No notable dental hx       Pulmonary  Breath sounds clear to auscultation               Abdominal  GI exam deferred       Other Findings            Anesthetic Plan    ASA: 3  Anesthesia type: general    Monitoring Plan: BIS      Induction: Intravenous  Anesthetic plan and risks discussed with: Patient      preop glucose 130

## 2017-11-07 NOTE — PROGRESS NOTES
Bedside and Verbal shift change report given to Daniela CROWLEY (oncoming nurse) by Derick Romano (offgoing nurse). Report included the following information SBAR, Kardex, OR Summary, Procedure Summary, Intake/Output, MAR, Accordion and Recent Results.

## 2017-11-07 NOTE — PROGRESS NOTES
Consents completed, family at bedside. earrings and ring given to family. Larios intact and emptied. Patient to OR via bed with nurse assistance due to telemetry monitoring.       Fabby Avalos RN

## 2017-11-07 NOTE — DIABETES MGMT
DTC Progress Note    Recommendations/ Comments: Please consider beginning lantus 15units daily to aid in glucose control and checking a current a1c. Chart reviewed on [facility] Robledo. Patient is a 80 y.o. female with known Type 2 Diabetes on metformin 1000mg ac b/d at home. A1c:   No results found for: HBA1C, HGBE8, ZLY9VLAN    Recent Glucose Results:   Lab Results   Component Value Date/Time     (H) 11/07/2017 06:12 AM    GLUCPOC 188 (H) 11/07/2017 11:46 AM    GLUCPOC 294 (H) 11/07/2017 07:47 AM    GLUCPOC 174 (H) 11/06/2017 09:53 PM        Lab Results   Component Value Date/Time    Creatinine 0.82 11/07/2017 06:12 AM     Estimated Creatinine Clearance: 59.1 mL/min (based on Cr of 0.82). Active Orders   Diet    DIET NPO        PO intake: No data found. Current hospital DM medication: humalog correction    Will continue to follow as needed.     Thank you  GET Carvalho, RN, [address]

## 2017-11-08 LAB
ANION GAP SERPL CALC-SCNC: 6 MMOL/L (ref 5–15)
APPEARANCE UR: ABNORMAL
BACTERIA URNS QL MICRO: NEGATIVE /HPF
BASOPHILS # BLD: 0 K/UL
BASOPHILS NFR BLD: 0 %
BILIRUB UR QL CFM: NEGATIVE
BUN SERPL-MCNC: 23 MG/DL (ref 6–20)
BUN/CREAT SERPL: 22 (ref 12–20)
CALCIUM SERPL-MCNC: 7.8 MG/DL (ref 8.5–10.1)
CHLORIDE SERPL-SCNC: 103 MMOL/L (ref 97–108)
CO2 SERPL-SCNC: 25 MMOL/L (ref 21–32)
COLOR UR: ABNORMAL
CREAT SERPL-MCNC: 1.04 MG/DL (ref 0.55–1.02)
DIFFERENTIAL METHOD BLD: ABNORMAL
EOSINOPHIL # BLD: 0 K/UL
EOSINOPHIL NFR BLD: 0 %
EPITH CASTS URNS QL MICRO: ABNORMAL /LPF
ERYTHROCYTE [DISTWIDTH] IN BLOOD BY AUTOMATED COUNT: 17 % (ref 11.5–14.5)
GLUCOSE BLD STRIP.AUTO-MCNC: 212 MG/DL (ref 65–100)
GLUCOSE BLD STRIP.AUTO-MCNC: 248 MG/DL (ref 65–100)
GLUCOSE BLD STRIP.AUTO-MCNC: 255 MG/DL (ref 65–100)
GLUCOSE BLD STRIP.AUTO-MCNC: 317 MG/DL (ref 65–100)
GLUCOSE BLD STRIP.AUTO-MCNC: 339 MG/DL (ref 65–100)
GLUCOSE SERPL-MCNC: 188 MG/DL (ref 65–100)
GLUCOSE UR STRIP.AUTO-MCNC: >1000 MG/DL
HCT VFR BLD AUTO: 20.7 % (ref 35–47)
HGB BLD-MCNC: 6.9 G/DL (ref 11.5–16)
HGB UR QL STRIP: NEGATIVE
HYALINE CASTS URNS QL MICRO: ABNORMAL /LPF (ref 0–5)
KETONES UR QL STRIP.AUTO: 40 MG/DL
LEUKOCYTE ESTERASE UR QL STRIP.AUTO: NEGATIVE
LYMPHOCYTES # BLD: 0.3 K/UL
LYMPHOCYTES NFR BLD: 19 %
MCH RBC QN AUTO: 27.9 PG (ref 26–34)
MCHC RBC AUTO-ENTMCNC: 33.3 G/DL (ref 30–36.5)
MCV RBC AUTO: 83.8 FL (ref 80–99)
MONOCYTES # BLD: 0.2 K/UL
MONOCYTES NFR BLD: 14 %
NEUTS BAND NFR BLD MANUAL: 8 %
NEUTS SEG # BLD: 1.1 K/UL
NEUTS SEG NFR BLD: 59 %
NITRITE UR QL STRIP.AUTO: NEGATIVE
PH UR STRIP: 5.5 [PH] (ref 5–8)
PLATELET # BLD AUTO: 81 K/UL (ref 150–400)
POTASSIUM SERPL-SCNC: 4.1 MMOL/L (ref 3.5–5.1)
PROT UR STRIP-MCNC: 30 MG/DL
RBC # BLD AUTO: 2.47 M/UL (ref 3.8–5.2)
RBC #/AREA URNS HPF: ABNORMAL /HPF (ref 0–5)
RBC MORPH BLD: ABNORMAL
SERVICE CMNT-IMP: ABNORMAL
SODIUM SERPL-SCNC: 134 MMOL/L (ref 136–145)
SP GR UR REFRACTOMETRY: 1.02 (ref 1–1.03)
UA: UC IF INDICATED,UAUC: ABNORMAL
UROBILINOGEN UR QL STRIP.AUTO: 0.2 EU/DL (ref 0.2–1)
WBC # BLD AUTO: 1.6 K/UL (ref 3.6–11)
WBC URNS QL MICRO: ABNORMAL /HPF (ref 0–4)

## 2017-11-08 PROCEDURE — 74011250637 HC RX REV CODE- 250/637: Performed by: PHYSICIAN ASSISTANT

## 2017-11-08 PROCEDURE — 87040 BLOOD CULTURE FOR BACTERIA: CPT | Performed by: INTERNAL MEDICINE

## 2017-11-08 PROCEDURE — P9040 RBC LEUKOREDUCED IRRADIATED: HCPCS | Performed by: EMERGENCY MEDICINE

## 2017-11-08 PROCEDURE — 85025 COMPLETE CBC W/AUTO DIFF WBC: CPT | Performed by: INTERNAL MEDICINE

## 2017-11-08 PROCEDURE — 65660000000 HC RM CCU STEPDOWN

## 2017-11-08 PROCEDURE — 77030034849

## 2017-11-08 PROCEDURE — 36415 COLL VENOUS BLD VENIPUNCTURE: CPT | Performed by: INTERNAL MEDICINE

## 2017-11-08 PROCEDURE — 36430 TRANSFUSION BLD/BLD COMPNT: CPT

## 2017-11-08 PROCEDURE — 97530 THERAPEUTIC ACTIVITIES: CPT

## 2017-11-08 PROCEDURE — 74011250637 HC RX REV CODE- 250/637: Performed by: ORTHOPAEDIC SURGERY

## 2017-11-08 PROCEDURE — 82962 GLUCOSE BLOOD TEST: CPT

## 2017-11-08 PROCEDURE — 74011250636 HC RX REV CODE- 250/636: Performed by: INTERNAL MEDICINE

## 2017-11-08 PROCEDURE — 97161 PT EVAL LOW COMPLEX 20 MIN: CPT

## 2017-11-08 PROCEDURE — 81001 URINALYSIS AUTO W/SCOPE: CPT | Performed by: INTERNAL MEDICINE

## 2017-11-08 PROCEDURE — 74011250636 HC RX REV CODE- 250/636

## 2017-11-08 PROCEDURE — 51798 US URINE CAPACITY MEASURE: CPT

## 2017-11-08 PROCEDURE — 80048 BASIC METABOLIC PNL TOTAL CA: CPT | Performed by: INTERNAL MEDICINE

## 2017-11-08 PROCEDURE — 74011250637 HC RX REV CODE- 250/637: Performed by: INTERNAL MEDICINE

## 2017-11-08 PROCEDURE — 74011250636 HC RX REV CODE- 250/636: Performed by: ORTHOPAEDIC SURGERY

## 2017-11-08 PROCEDURE — 30233N1 TRANSFUSION OF NONAUTOLOGOUS RED BLOOD CELLS INTO PERIPHERAL VEIN, PERCUTANEOUS APPROACH: ICD-10-PCS | Performed by: HOSPITALIST

## 2017-11-08 PROCEDURE — 74011636637 HC RX REV CODE- 636/637: Performed by: INTERNAL MEDICINE

## 2017-11-08 RX ORDER — SODIUM CHLORIDE 9 MG/ML
250 INJECTION, SOLUTION INTRAVENOUS AS NEEDED
Status: DISCONTINUED | OUTPATIENT
Start: 2017-11-08 | End: 2017-11-12 | Stop reason: SDUPTHER

## 2017-11-08 RX ORDER — SODIUM CHLORIDE 9 MG/ML
50 INJECTION, SOLUTION INTRAVENOUS CONTINUOUS
Status: DISCONTINUED | OUTPATIENT
Start: 2017-11-08 | End: 2017-11-09

## 2017-11-08 RX ORDER — DIPHENHYDRAMINE HYDROCHLORIDE 50 MG/ML
INJECTION, SOLUTION INTRAMUSCULAR; INTRAVENOUS
Status: COMPLETED
Start: 2017-11-08 | End: 2017-11-08

## 2017-11-08 RX ORDER — DIPHENHYDRAMINE HCL 25 MG
25 CAPSULE ORAL
Status: DISCONTINUED | OUTPATIENT
Start: 2017-11-08 | End: 2017-11-15 | Stop reason: HOSPADM

## 2017-11-08 RX ADMIN — ACETAMINOPHEN 650 MG: 500 TABLET ORAL at 11:27

## 2017-11-08 RX ADMIN — DOCUSATE SODIUM AND SENNOSIDES 1 TABLET: 8.6; 5 TABLET, FILM COATED ORAL at 18:42

## 2017-11-08 RX ADMIN — FLUOXETINE HYDROCHLORIDE 20 MG: 20 CAPSULE ORAL at 08:28

## 2017-11-08 RX ADMIN — SODIUM CHLORIDE 50 ML/HR: 900 INJECTION, SOLUTION INTRAVENOUS at 16:17

## 2017-11-08 RX ADMIN — ACETAMINOPHEN 650 MG: 500 TABLET ORAL at 23:28

## 2017-11-08 RX ADMIN — INSULIN LISPRO 3 UNITS: 100 INJECTION, SOLUTION INTRAVENOUS; SUBCUTANEOUS at 08:27

## 2017-11-08 RX ADMIN — Medication 10 ML: at 16:21

## 2017-11-08 RX ADMIN — TBO-FILGRASTIM 480 MCG: 480 INJECTION, SOLUTION SUBCUTANEOUS at 16:23

## 2017-11-08 RX ADMIN — DOCUSATE SODIUM AND SENNOSIDES 1 TABLET: 8.6; 5 TABLET, FILM COATED ORAL at 08:28

## 2017-11-08 RX ADMIN — PANTOPRAZOLE SODIUM 40 MG: 40 TABLET, DELAYED RELEASE ORAL at 08:28

## 2017-11-08 RX ADMIN — ACETAMINOPHEN 650 MG: 325 TABLET ORAL at 05:14

## 2017-11-08 RX ADMIN — ACETAMINOPHEN 650 MG: 500 TABLET ORAL at 18:42

## 2017-11-08 RX ADMIN — CALCIUM CARBONATE 500 MG (1,250 MG)-VITAMIN D3 200 UNIT TABLET 1 TABLET: at 08:28

## 2017-11-08 RX ADMIN — INSULIN LISPRO 2 UNITS: 100 INJECTION, SOLUTION INTRAVENOUS; SUBCUTANEOUS at 22:03

## 2017-11-08 RX ADMIN — LEVOTHYROXINE SODIUM 88 MCG: 100 TABLET ORAL at 08:28

## 2017-11-08 RX ADMIN — CARVEDILOL 6.25 MG: 6.25 TABLET, FILM COATED ORAL at 08:28

## 2017-11-08 RX ADMIN — Medication 10 ML: at 22:04

## 2017-11-08 RX ADMIN — CALCIUM CARBONATE 500 MG (1,250 MG)-VITAMIN D3 200 UNIT TABLET 1 TABLET: at 16:20

## 2017-11-08 RX ADMIN — DIPHENHYDRAMINE HYDROCHLORIDE 25 MG: 50 INJECTION, SOLUTION INTRAMUSCULAR; INTRAVENOUS at 11:26

## 2017-11-08 RX ADMIN — OXYCODONE HYDROCHLORIDE 5 MG: 5 TABLET ORAL at 05:14

## 2017-11-08 RX ADMIN — CARVEDILOL 6.25 MG: 6.25 TABLET, FILM COATED ORAL at 16:20

## 2017-11-08 RX ADMIN — POLYETHYLENE GLYCOL 3350 17 G: 17 POWDER, FOR SOLUTION ORAL at 08:25

## 2017-11-08 RX ADMIN — Medication 10 ML: at 05:13

## 2017-11-08 RX ADMIN — OXYCODONE HYDROCHLORIDE 5 MG: 5 TABLET ORAL at 08:28

## 2017-11-08 RX ADMIN — INSULIN LISPRO 7 UNITS: 100 INJECTION, SOLUTION INTRAVENOUS; SUBCUTANEOUS at 14:36

## 2017-11-08 RX ADMIN — Medication 10 ML: at 11:33

## 2017-11-08 RX ADMIN — CALCIUM CARBONATE 500 MG (1,250 MG)-VITAMIN D3 200 UNIT TABLET 1 TABLET: at 11:28

## 2017-11-08 RX ADMIN — VANCOMYCIN HYDROCHLORIDE 1000 MG: 1 INJECTION, POWDER, LYOPHILIZED, FOR SOLUTION INTRAVENOUS at 05:14

## 2017-11-08 RX ADMIN — OXYCODONE HYDROCHLORIDE 2.5 MG: 5 TABLET ORAL at 21:44

## 2017-11-08 RX ADMIN — ENOXAPARIN SODIUM 40 MG: 40 INJECTION SUBCUTANEOUS at 08:27

## 2017-11-08 RX ADMIN — INSULIN LISPRO 5 UNITS: 100 INJECTION, SOLUTION INTRAVENOUS; SUBCUTANEOUS at 18:41

## 2017-11-08 NOTE — ANESTHESIA POSTPROCEDURE EVALUATION
Post-Anesthesia Evaluation and Assessment    Patient: Zeina Matamoros MRN: [id number]  SSN: [id number]    YOB: 1935  Age: 80 y.o. Sex: female       Cardiovascular Function/Vital Signs  Visit Vitals    /55    Pulse 67    Temp 36.8 °C (98.2 °F)    Resp 10    Ht 5' 11\" (1.803 m)    Wt 76.7 kg (169 lb 1.5 oz)    SpO2 96%    BMI 23.58 kg/m2       Patient is status post general anesthesia for Procedure(s):  RIGHT FEMORAL HEMIARTHROPLASTY. Nausea/Vomiting: None    Postoperative hydration reviewed and adequate. Pain:  Pain Scale 1: FLACC (11/07/17 1900)  Pain Intensity 1: 0 (11/07/17 1900)   Managed    Neurological Status:   Neuro (WDL): Exceptions to WDL (11/07/17 1820)  Neuro  Neurologic State: Drowsy (11/07/17 1820)  Orientation Level: Appropriate for age (11/07/17 1820)  Cognition: Follows commands (11/07/17 1820)  Speech: Clear (11/07/17 1820)  LUE Motor Response: Purposeful (11/07/17 1820)  LLE Motor Response: Purposeful (11/07/17 1820)  RUE Motor Response: Purposeful (11/07/17 1820)  RLE Motor Response: Purposeful (11/07/17 1820)   At baseline    Mental Status and Level of Consciousness: Arousable    Pulmonary Status:   O2 Device: Nasal cannula (11/07/17 1900)   Adequate oxygenation and airway patent    Complications related to anesthesia: None    Post-anesthesia assessment completed.  No concerns    Signed By: Dixie Thakur MD     November 7, 2017

## 2017-11-08 NOTE — INTERDISCIPLINARY ROUNDS
Bedside interdisciplinary rounds were held today to discuss patient plan of care and outcomes. The following members were present: Physician, Nurse, Clinical Care Leader, Pharmacy, Physical Therapy, and Case Management. Plan:  Patient transfusing 1 unit PRBC's today - current VS discussed (T 100.8) with attending physician - orders given for Tylenol, Benadryl, and continue transfusion. Lovenox and Protonix for prophylaxis. PT recommending SNF following AM session, holding PM session.

## 2017-11-08 NOTE — PROGRESS NOTES
Dr. Carmen Kothari states to hold the patient's oxycodone/narcotics for the night, considering the patient's mental state/drowsiness. Only give the oxycodone if the schedule tylenol is not covering the patient's pain and the patient requests the oxycodone, do not hold the scheduled tylenol. Dr. Carmen Kothari proposes the mental state/drowsiness is due to the patient's acute process/ illness.

## 2017-11-08 NOTE — PROGRESS NOTES
Hospitalist Progress Note    NAME: Nikhil Lambert   :  1935   MRN:  [id number]       Assessment / Plan:  R hip fracture from mechanical fall  -pt at high risk for infection perioperatively given he neutropenia. Will need to monitor closely. Pt on neutropenic precaution  -s/p R femoral hemiarthroplasty  -pain and DVT prophylaxis per ortho  -PT/OT, likely SNF on discharge  -ortho following    NHL  Neutropenia  Anemia  -chemo therapy 3 weeks ago  -transfusing 1 unit PRBC, cont' Granix   -appreciate oncology following  Addendum:   Fever 100.8 during blood transfusion. Prior to transfusion, pt's T 99.2  Will give tylenol/benardryl. If fever does not improve, can stop transfusion and follow transfusion reaction protocol. Obtain UA and BCx    HTN  Chronic systolic heart failure  -stop IVF, encourage PO intake  -cont' coreg, hydralazine prn     T2DM  -cont' SSI    Hypothyroidism  -cont' synthroid    Recent skin lesion  -s/p \"mole\" excision left malar region on 17. Healing nicely. Body mass index is 23.57 kg/(m^2).       Code Status: full  Surrogate Decision Maker: tawny Francisco [facility] [phone]   DVT Prophylaxis: per ortho  Baseline: independent     Subjective:     Chief Complaint / Reason for Physician Visit  Pt seen at bedside. No complaints. Discussed with RN events overnight. Review of Systems:  Symptom Y/N Comments  Symptom Y/N Comments   Fever/Chills n   Chest Pain n    Poor Appetite    Edema     Cough    Abdominal Pain n    Sputum    Joint Pain y    SOB/MUSE n   Pruritis/Rash     Nausea/vomit    Tolerating PT/OT     Diarrhea    Tolerating Diet     Constipation    Other       Could NOT obtain due to:      Objective:     VITALS:   Last 24hrs VS reviewed since prior progress note.  Most recent are:  Patient Vitals for the past 24 hrs:   Temp Pulse Resp BP SpO2   17 0937 - 95 - 127/55 -   17 0623 97.5 °F (36.4 °C) 97 16 136/51 100 %   17 0232 97.7 °F (36.5 °C) 83 16 109/51 100 %   11/07/17 2237 98.1 °F (36.7 °C) 80 18 98/51 100 %   11/07/17 1953 98.7 °F (37.1 °C) 66 16 103/56 97 %   11/07/17 1930 98.7 °F (37.1 °C) 75 10 99/50 95 %   11/07/17 1915 - 79 10 103/48 94 %   11/07/17 1900 - 67 10 101/55 96 %   11/07/17 1846 - 80 12 98/51 94 %   11/07/17 1835 - 84 16 104/58 95 %   11/07/17 1830 - 82 12 95/42 95 %   11/07/17 1826 98.2 °F (36.8 °C) 86 12 102/45 95 %   11/07/17 1825 - 81 12 102/45 96 %   11/07/17 1820 - - - 92/58 -   11/07/17 1420 99.4 °F (37.4 °C) 88 18 144/60 96 %   11/07/17 1343 98.7 °F (37.1 °C) 85 18 123/67 97 %   11/07/17 1020 98.7 °F (37.1 °C) 86 18 129/59 93 %       Intake/Output Summary (Last 24 hours) at 11/08/17 1005  Last data filed at 11/08/17 0600   Gross per 24 hour   Intake             1400 ml   Output             1400 ml   Net                0 ml        PHYSICAL EXAM:  General: WD, WN. Alert, cooperative, no acute distress    EENT:  EOMI. Anicteric sclerae. MMM  Resp:  CTA bilaterally, no wheezing or rales. No accessory muscle use  CV:  Regular  rhythm,  No edema  GI:  Soft, Non distended, Non tender.  +Bowel sounds  Neurologic:  Alert and oriented X 3, normal speech,   Psych:   Good insight. Not anxious nor agitated  Skin:  No rashes. No jaundice    Reviewed most current lab test results and cultures  YES  Reviewed most current radiology test results   YES  Review and summation of old records today    NO  Reviewed patient's current orders and MAR    YES  PMH/SH reviewed - no change compared to H&P  ________________________________________________________________________  Care Plan discussed with:    Comments   Patient x    Family      RN x    Care Manager     Consultant                        Multidiciplinary team rounds were held today with , nursing, pharmacist and clinical coordinator. Patient's plan of care was discussed; medications were reviewed and discharge planning was addressed. ________________________________________________________________________  Total NON critical care TIME:  35   Minutes    Total CRITICAL CARE TIME Spent:   Minutes non procedure based      Comments   >50% of visit spent in counseling and coordination of care     ________________________________________________________________________  Tricia Medeiros MD     Procedures: see electronic medical records for all procedures/Xrays and details which were not copied into this note but were reviewed prior to creation of Plan. LABS:  I reviewed today's most current labs and imaging studies.   Pertinent labs include:  Recent Labs      11/08/17 0245 11/07/17 0612 11/06/17 0428   WBC  1.6*  1.7*  1.0*   HGB  6.9*  8.5*  8.0*   HCT  20.7*  25.8*  23.8*   PLT  81*  89*  105*     Recent Labs      11/08/17 0245 11/07/17 0612 11/06/17 0428   NA  134*  135*  136   K  4.1  3.9  4.0   CL  103  103  104   CO2  25  26  24   GLU  188*  237*  274*   BUN  23*  15  21*   CREA  1.04*  0.82  1.06*   CA  7.8*  8.3*  8.5   ALB   --    --   3.2*   TBILI   --    --   0.3   SGOT   --    --   8*   ALT   --    --   16       Signed: Tricia Medeiros MD

## 2017-11-08 NOTE — PROGRESS NOTES
Initial Nutrition Assessment:    INTERVENTIONS/RECOMMENDATIONS:   · Continue consistent carb diet for BG control  · Glucerna shakes TID    ASSESSMENT:   Chart reviewed, medically noted for s/p RIGHT HIP HEMIARTHROPLASTY,  angioimmunoblastic lymphoma s/p chemo and PMH shown below. Pt reports that she may have recently lost weight but unsure of amount. She notes of some altered taste, likely due to chemo. , however reports eating well PTA. We discussed the role that nutrition plays in healing after a fracture/surgery, specifically focusing on protein sources at each meal as well as nutrition supplements to help meet pt protein needs. She agreed to receive glucerna shakes. Will monitor PO intake. Past Medical History:   Diagnosis Date    CHF (congestive heart failure) ([facility]     thought to be result of chemotherapy    Depression     Diabetes ([facility]     GERD (gastroesophageal reflux disease)     HTN (hypertension)     Non Hodgkin's lymphoma (HCC)     Rheumatoid arthritis(714.0)     Thyroid disease        Diet Order: Consistent carb  % Eaten:  No data found.     Pertinent Medications: [x]Reviewed: NS, os-morgan, insulin, synthroid, PPI, miralax, senna-docusate  Pertinent Labs: [x]Reviewed: B, 275, 248;   Food Allergies: [x]NKFA  []Other   Last BM:   Edema:        []RUE   []LUE   []RLE   []LLE      Pressure Ulcer:      [] Stage I   [] Stage II   [] Stage III   [] Stage IV      Wt Readings from Last 30 Encounters:   17 76.7 kg (169 lb)   14 76.7 kg (169 lb)   10/07/14 78.5 kg (173 lb)   09/15/14 78.5 kg (173 lb)   14 77.6 kg (171 lb)   14 79.8 kg (176 lb)   14 78.9 kg (174 lb)   14 76.7 kg (169 lb)       Anthropometrics:   Height: 5' 11\" (180.3 cm) Weight: 76.7 kg (169 lb)   IBW (%IBW):   ( ) UBW (%UBW):   (  %)   Last Weight Metrics:  Weight Loss Metrics 2017 2014 10/7/2014 9/15/2014 2014 2014 6/3/2014   Today's Wt 169 lb 169 lb 173 lb 173 lb 171 lb 176 lb 174 lb   BMI 23.57 kg/m2 23.58 kg/m2 24.14 kg/m2 24.14 kg/m2 23.86 kg/m2 24.56 kg/m2 24.28 kg/m2       BMI: Body mass index is 23.57 kg/(m^2). This BMI is indicative of:   []Underweight    [x]Normal    []Overweight    [] Obesity   [] Extreme Obesity (BMI>40)     Estimated Nutrition Needs (Based on):   1720 Kcals/day (BMR: 1325 x 1.3) , 90 g (1.2 g/kg) Protein  Carbohydrate: At Least 130 g/day  Fluids: 1720 mL/day (1ml/kcal) or per primary team    NUTRITION DIAGNOSES:   Problem:  Increased nutrient needs (protein )      Etiology: related to fracture healing     Signs/Symptoms: as evidenced by s/p RIGHT HIP HEMIARTHROPLASTY      NUTRITION INTERVENTIONS:  Meals/Snacks: General/healthful diet   Supplements: Commercial supplement              GOAL:   consume >75% of meals and ONS in 3-5 days    LEARNING NEEDS (Diet, Food/Nutrient-Drug Interaction):    [] None Identified   [x] Identified and Education Provided/Documented   [] Identified and Pt declined/was not appropriate     Cultureal, Faith, OR Ethnic Dietary Needs:    [x] None Identified   [] Identified and Addressed     [x] Interdisciplinary Care Plan Reviewed/Documented    [x] Discharge Planning:   Consistent carb diet with adequate kcal and protein to promote fracture healing     MONITORING /EVALUATION:      Food/Nutrient Intake Outcomes:  Total energy intake  Physical Signs/Symptoms Outcomes: Weight/weight change, Electrolyte and renal profile, GI profile, Glucose profile, GI    NUTRITION RISK:    [] High              [x] Moderate           []  Low  []  Minimal/Uncompromised    PT SEEN FOR:    [x]  MD Consult: []Calorie Count      []Diabetic Diet Education        []Diet Education     []Electrolyte Management     [x]General Nutrition Management and Supplements     []Management of Tube Feeding     []TPN Recommendations    []  RN Referral:  []MST score >=2     []Enteral/Parenteral Nutrition PTA     []Pregnant: Gestational DM or Multigestation     []Pressure Ulcer/Wound Care needs        []  Low BMI  []  DTR Referral       Maria Eugenia Lubin RDN  Pager [phone]  Weekend Pager [phone]

## 2017-11-08 NOTE — DIABETES MGMT
DTC Progress Note    Recommendations/ Comments: Please consider beginning lantus 15units daily to aid in glucose control and checking a current a1c. Chart reviewed on Ashwini Ordonez. Patient is a 80 y.o. female with known Type 2 Diabetes on metformin 1000mg ac b/d at home. A1c:   No results found for: HBA1C, HGBE8, GZO1ZTFI, CQE1LTFP    Recent Glucose Results:   Lab Results   Component Value Date/Time     (H) 11/08/2017 02:45 AM    GLUCPOC 339 (H) 11/08/2017 01:09 PM    GLUCPOC 317 (H) 11/08/2017 11:27 AM    GLUCPOC 248 (H) 11/08/2017 07:17 AM        Lab Results   Component Value Date/Time    Creatinine 1.04 11/08/2017 02:45 AM     Estimated Creatinine Clearance: 46.6 mL/min (based on Cr of 1.04). Active Orders   Diet    DIET DIABETIC CONSISTENT CARB Regular        PO intake: No data found. Current hospital DM medication: humalog correction    Will continue to follow as needed.     Thank you    Alis Sutton, 66 [address], [address] [facility]  Office: [phone]

## 2017-11-08 NOTE — PROGRESS NOTES
Physical Therapy:    Discussed with hospitalist during rounds and recommended to hold therapy for the afternoon. Patient is receiving blood transfusion. Will hold and follow up tomorrow to resume therapy as appropriate.  Thank you    Joelle Aguilar, PT, DPT

## 2017-11-08 NOTE — PROGRESS NOTES
OT referral received, chart reviewed and spoke with PT who evaluated patient this morning. Per PT patient demonstrated poor tolerance for therapy session and after discussing this with patient's hospitalist it was recommended that therapy be deferred for the remainder of the day. Patient also receiving blood transfusion today. OT will defer and follow back tomorrow to evaluate if patient is appropriate.

## 2017-11-08 NOTE — PROGRESS NOTES
1106 Blood Transfusion Note:    Patient denies Chest pain, Shortness of breath, nausea or any discomfort or distress. Patient's Oral temperature at the start of the transfusion: 99.2 F. Patient's Oral temperature 15 minutes into the blood transfusion: 100.8 F. Informed Dr. Niharika Dias during interdisciplinary rounds, Dr. Niharika Dias states to administer the scheduled tylenol, 25 mg of IV benadryl and continue to monitor the patient closely. Dr. Niharika Dias states to inform her if the patient's temperature continues to increase. 1122 Received verbal order to bladder scan patient, bladder scan revealed 495 ml.    1146 Paging Dr. Niharika Dias to [phone] to inform her of the patient's bladder scan results. Dr. Niharika Dias states to reinsert her walters. 1320 Blood cultures sent, walters catheter placed.

## 2017-11-08 NOTE — PROGRESS NOTES
Hematology Oncology Progress Note           Follow up for: angioimmunoblastic lymphoma, T cell type    Chart notes reviewed since last visit. Case discussed with following: Dr. Quita Olvera, patient and family member present in room. .    Patient complains of the following: in less pain that prior to surgery. Was able to finish the blood transfusion. Thus far, no further issues. Additional concerns noted by the staff:     Patient Vitals for the past 24 hrs:   BP Temp Pulse Resp SpO2 Weight   11/08/17 1407 135/61 99.8 °F (37.7 °C) 86 17 100 % -   11/08/17 1304 127/59 99.9 °F (37.7 °C) 83 16 99 % -   11/08/17 1230 128/63 98.7 °F (37.1 °C) 87 16 100 % -   11/08/17 1151 141/57 99.8 °F (37.7 °C) 85 17 100 % -   11/08/17 1129 141/57 98.2 °F (36.8 °C) 87 16 100 % -   11/08/17 1059 118/54 (!) 100.8 °F (38.2 °C) 85 16 97 % -   11/08/17 1043 117/52 98.9 °F (37.2 °C) 88 16 97 % -   11/08/17 1011 120/54 99.2 °F (37.3 °C) 91 16 97 % -   11/08/17 0937 127/55 - 95 - - -   11/08/17 0623 136/51 97.5 °F (36.4 °C) 97 16 100 % -   11/08/17 0232 109/51 97.7 °F (36.5 °C) 83 16 100 % -   11/07/17 2237 98/51 98.1 °F (36.7 °C) 80 18 100 % -   11/07/17 2014 - - - - - 76.7 kg (169 lb)   11/07/17 1953 103/56 98.7 °F (37.1 °C) 66 16 97 % -   11/07/17 1930 99/50 98.7 °F (37.1 °C) 75 10 95 % -   11/07/17 1915 103/48 - 79 10 94 % -   11/07/17 1900 101/55 - 67 10 96 % -   11/07/17 1846 98/51 - 80 12 94 % -   11/07/17 1835 104/58 - 84 16 95 % -   11/07/17 1830 95/42 - 82 12 95 % -   11/07/17 1826 102/45 98.2 °F (36.8 °C) 86 12 95 % -   11/07/17 1825 102/45 - 81 12 96 % -   11/07/17 1820 92/58 - - - - -       ROS negative for 10 organ systems except pain related to hip fracture. Mild weakness. Physical Examination:  Constitutional Alert, cooperative, oriented. Mood and affect appropriate. Appears close to chronological age. Well nourished. Well developed. Head Normocephalic; no scars   Eyes Conjunctivae and sclerae are clear and without icterus. Pupils are reactive and equal.   ENMT Sinuses are nontender. No oral exudates, ulcers, masses, thrush or mucositis. Oropharynx clear. Tongue normal.   Neck Supple without masses or thyromegaly. No jugular venous distension. Hematologic/Lymphatic No petechiae or purpura. No tender or palpable lymph nodes noted. Respiratory Lungs are clear to auscultation without rhonchi or wheezing. Cardiovascular Regular rate and rhythm of heart without murmurs, gallops or rubs. Chest / Line Site Chest is symmetric with no chest wall deformities. Abdomen Non-tender, non-distended, no masses, ascites or hepatosplenomegaly. Good bowel sounds. No guarding or rebound tenderness. No pulsatile masses. Musculoskeletal No tenderness or swelling, normal range of motion without obvious weakness. Extremities No visible deformities, no cyanosis, clubbing or edema. Bandage over right upper hip region   Skin No rashes, scars, or lesions suggestive of malignancy. No petechiae, purpura, or ecchymoses. No excoriations. Neurologic No sensory or motor deficits noted. Psychiatric Alert and oriented . Coherent speech. Verbalizes understanding of our discussions today.        Labs:  Recent Results (from the past 24 hour(s))   GLUCOSE, POC    Collection Time: 11/07/17  6:26 PM   Result Value Ref Range    Glucose (POC) 202 (H) 65 - 100 mg/dL    Performed by Andrea Daniel    GLUCOSE, POC    Collection Time: 11/07/17 10:44 PM   Result Value Ref Range    Glucose (POC) 275 (H) 65 - 100 mg/dL    Performed by Ana Parada    CBC WITH AUTOMATED DIFF    Collection Time: 11/08/17  2:45 AM   Result Value Ref Range    WBC 1.6 (L) 3.6 - 11.0 K/uL    RBC 2.47 (L) 3.80 - 5.20 M/uL    HGB 6.9 (L) 11.5 - 16.0 g/dL    HCT 20.7 (L) 35.0 - 47.0 %    MCV 83.8 80.0 - 99.0 FL    MCH 27.9 26.0 - 34.0 PG    MCHC 33.3 30.0 - 36.5 g/dL    RDW 17.0 (H) 11.5 - 14.5 %    PLATELET 81 (L) 377 - 400 K/uL    NEUTROPHILS 59 %    BAND NEUTROPHILS 8 % LYMPHOCYTES 19 %    MONOCYTES 14 %    EOSINOPHILS 0 %    BASOPHILS 0 %    ABS. NEUTROPHILS 1.1 K/UL    ABS. LYMPHOCYTES 0.3 K/UL    ABS. MONOCYTES 0.2 K/UL    ABS. EOSINOPHILS 0.0 K/UL    ABS.  BASOPHILS 0.0 K/UL    RBC COMMENTS ANISOCYTOSIS  1+        DF MANUAL     METABOLIC PANEL, BASIC    Collection Time: 11/08/17  2:45 AM   Result Value Ref Range    Sodium 134 (L) 136 - 145 mmol/L    Potassium 4.1 3.5 - 5.1 mmol/L    Chloride 103 97 - 108 mmol/L    CO2 25 21 - 32 mmol/L    Anion gap 6 5 - 15 mmol/L    Glucose 188 (H) 65 - 100 mg/dL    BUN 23 (H) 6 - 20 MG/DL    Creatinine 1.04 (H) 0.55 - 1.02 MG/DL    BUN/Creatinine ratio 22 (H) 12 - 20      GFR est AA >60 >60 ml/min/1.73m2    GFR est non-AA 51 (L) >60 ml/min/1.73m2    Calcium 7.8 (L) 8.5 - 10.1 MG/DL   GLUCOSE, POC    Collection Time: 11/08/17  7:17 AM   Result Value Ref Range    Glucose (POC) 248 (H) 65 - 100 mg/dL    Performed by Monse Eid    GLUCOSE, POC    Collection Time: 11/08/17 11:27 AM   Result Value Ref Range    Glucose (POC) 317 (H) 65 - 100 mg/dL    Performed by Monse Eid    URINALYSIS W/ REFLEX CULTURE    Collection Time: 11/08/17 12:48 PM   Result Value Ref Range    Color YELLOW/STRAW      Appearance CLOUDY (A) CLEAR      Specific gravity 1.024 1.003 - 1.030      pH (UA) 5.5 5.0 - 8.0      Protein 30 (A) NEG mg/dL    Glucose >1000 (A) NEG mg/dL    Ketone 40 (A) NEG mg/dL    Blood NEGATIVE  NEG      Urobilinogen 0.2 0.2 - 1.0 EU/dL    Nitrites NEGATIVE  NEG      Leukocyte Esterase NEGATIVE  NEG      WBC 0-4 0 - 4 /hpf    RBC 0-5 0 - 5 /hpf    Epithelial cells FEW FEW /lpf    Bacteria NEGATIVE  NEG /hpf    UA:UC IF INDICATED CULTURE NOT INDICATED BY UA RESULT CNI      Hyaline cast 2-5 0 - 5 /lpf   BILIRUBIN, CONFIRM    Collection Time: 11/08/17 12:48 PM   Result Value Ref Range    Bilirubin UA, confirm NEGATIVE  NEG     GLUCOSE, POC    Collection Time: 11/08/17  1:09 PM   Result Value Ref Range    Glucose (POC) 339 (H) 65 - 100 mg/dL Performed by Tigre Mccray, POC    Collection Time: 11/08/17  4:24 PM   Result Value Ref Range    Glucose (POC) 255 (H) 65 - 100 mg/dL    Performed by Chaya Barry and Plan:   NHL, 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 Cytopenias - she has not received chemo since August so current low counts are worrisome. On granix to boost white count. Got one unit PRBCs - will see how she responds    Right hip fracture s/pright hip hemiarthroplasty using [employer]uy [employer] bipolar device. Fever during transfusion    HTN    Chronic CHF - coreg, hydralazine as needed.      Type 2 DM - SSI ordered

## 2017-11-08 NOTE — PROGRESS NOTES
Pressure Ulcer Prevention Alert Received for Rubio < 14 (moderate risk).        Care Plan/Interventions for Nursin. Complete Rubio Pressure Ulcer Risk Scale and use sub scores to identify appropriate interventions. 2. Perform Assessment: skin, changes in LOC, visual cues for pain, monitor skin under medical devices  3. Respond to Reduced Sensory Perception: changes in LOC, check visual cues for pain, float heels, suspension boots, pressure redistribution bed/mattress/chair cushion, turning and reposition approximately every 2 hours (pillows & wedges), pad between skin to skin, turn & reposition  4. Manage Moisture: absorbent under pads, internal / external urinary device, internal /  external fecal device, minimize layers, contain wound drainage, access need for specialty bed, limit adult briefs, maintain skin hydration (lotion/cream), moisture barrier, offer toileting every hour  5. Promote Activity: increase time out of bed, chair cushion, PT/OT evaluation, trapeze to reposition, pressure redistribution bed/mattress/chair  6. Address Reduced Mobility: float heels / suspension boot, HOB 30 degrees or less, pressure redistribution bed/mattress/cushion, PT / OT evaluation, turn and reposition approximately every 2 hours (pillows & wedges)  7. Promote Nutrition: document food / fluid / supplement intake, encourage/assist with meals as needed  8. Reduce Friction and Shear: transferring/repositioning devices (lift/draw sheet), lift team/ patient mobility team, feet elevated on foot rest, minimize layers, foam dressing / transparent film / skin sealants, protective barrier creams and emollients, transfer aides (board, Alvaro lift, ceiling lift, stand assist), HOB 30 degrees or less, trapeze to reposition.   Wound Care Team

## 2017-11-08 NOTE — PROGRESS NOTES
POD 1 Day Post-Op    Procedure:R HIP HEMIARTHROPLASTY    Subjective:     Patient doing well, complaining of appropriate post-op pain. Denies f/c/n/v/cp/sob    Objective:     Blood pressure 136/51, pulse 97, temperature 97.5 °F (36.4 °C), resp. rate 16, height 5' 11\" (1.803 m), weight 76.7 kg (169 lb), SpO2 100 %. Temp (24hrs), Av.5 °F (36.9 °C), Min:97.5 °F (36.4 °C), Max:99.5 °F (37.5 °C)      Physical Exam:  Examination of the right hip reveals that the incision is clean, dry and intact. Sensation is intact to light touch.  mild swelling. No calf pain.   NVSI    Labs:   Lab Results   Component Value Date/Time    HGB 6.9 2017 02:45 AM         Assessment:     Principal Problem:    Closed right hip fracture ([address] (2017)    Active Problems:    Hip fracture ([address] (2017)        Procedure(s):  RIGHT FEMORAL HEMIARTHROPLASTY    Plan/Recommendations/Medical Decision Making:     - pain control - oxy  - ice   - pt/ot - wbat RLE, hip precautions  - dvt prophylaxis -  lovenox  - recommend transfusing 1 unit prbc  - d/c planning - likely snf/rehab        Favio Dears,

## 2017-11-08 NOTE — OP NOTES
[address], [facility]   OP NOTE       Name:  Yolanda Burnette   MR#:  [id number]   :  1935   Account #:  [de-identified]    Surgery Date:  2017   Date of Adm:  2017       PREOPERATIVE DIAGNOSIS: Displaced right femoral neck fracture. POSTOPERATIVE DIAGNOSIS: Displaced right femoral neck fracture. PROCEDURES PERFORMED: Right hip hemiarthroplasty using a   DePuy cemented [place] bipolar device. SURGEON: Matt Atkins MD     [id number]. ANESTHESIA: General.    ESTIMATED BLOOD LOSS: 300 mL. COMPLICATIONS: None. SPECIMENS REMOVED: None. DISPOSITION: Stable to PACU. INDICATIONS FOR PROCEDURE: The patient is an 69-year-old   female who presented to [facility] after a fall   at home. She was found to have a displaced femoral neck fracture. Of   note, she has a history of non-Hodgkin lymphoma and has been   undergoing chemotherapy recently. She was found to be severely   neutropenic on admission and anemic. We had consults with the   medical team and the oncologist. She was admitted to the hospital by   the medical team and started on medications and medically optimized   her prior to the procedure. We discussed risks and benefits of   nonoperative versus surgical treatment of her right hip fracture. She   elected to proceed with right hip hemiarthroplasty after we explained   the risks of infection, blood loss, neurovascular injury, and anesthesia   risks. We also discussed the high risk of infection secondary to her   neutropenia. We discussed following her hemoglobin levels closely as   she was anemic. She was medically optimized and cleared for surgery   on day 2 of her stay. She was seen in the preoperative holding area   prior to her procedure, and her operative extremity was marked by   Orthopedics.     OPERATION: The patient was taken to the OR, and she was given   sedation and intubated by Anesthesia. She was then transferred to the   operating room table and placed in the lateral decubitus position with   the right hip facing upward. All prominences were carefully padded. Sterile prepping and draping was performed. After sterile prepping and   draping, a time-out was called. The patient was identified by name,   date of birth, operative site, and operative procedure. After all were in agreement that the right hip was the operative   extremity, an incision was made for a posterior approach to the hip. We took care to avoid neurovascular structures and identified the   tensor fascial layer. This was split and carried into the gluteal   musculature. We then identified the trochanteric bursa, which was   removed posteriorly. We then began incising the external rotators from   the greater trochanter. The capsule was taken with this layer as well. We tagged it for later closure. We performed a thorough irrigation to   visualize our fracture site. An oscillating saw was used to clean the cut. We then used a corkscrew to remove the femoral head. This was   removed without difficulty. We carefully irrigated the acetabulum at this   point. We measured our femoral head and templated. We then began   preparing the femoral canal. We started with a box osteotome and   moved to a canal finder. We then used a lateralizing reamer. We then   began broaching. We broached to a size 4 broach. This broach gave   us appropriate size and fit in the canal. We then trialed our   components. A size 48 bipolar femoral head on a standard neck and   size 4 stem were used. This gave us adequate stability, but we did   seem slightly short on length. We sized up to a size +5 neck. This was   chosen to be our final component. We then began preparing the   femoral canal. The broach was removed from trialing. We thoroughly   irrigated the canal and prepared it in standard technique.  We then   mixed our antibiotic cement. We chose to cement this hemiarthroplasty   as the patient has a recent history of chemotherapy and cancer. We   had antibiotics in the cement due to her severe neutropenia. We then   placed our cement restrictor and centralizing tag onto the stem. We   placed the cement into the canal and then placed the stem down the   canal. We held this in place with appropriate depth and rotation as the   cement hardened. We took care to clear any excess cement. We then   trialed once the cement was dry. Again, a size 48 +8.5 bipolar head   gave us excellent restoration of stability and length. We chose this for   our final component and the bipolar head was placed. After this was   performed, we again took her through range of motion, which   confirmed stability. We performed a thorough irrigation of the hip. After   the thorough irrigation, closure of the capsule and external rotator layer   was performed with an Ethibond suture. After this #5 Ethibond suture was placed, we closed the tensor fascial   layer with a combination of #1 Vicryl and running Stratafix. We then   closed the subcutaneous layer with 2-0 Vicryl and staples. Sterile   dressings were applied, and the patient was transferred to the PACU in   stable condition.  She was stable in PACU and will be followed closely   while in the hospital.        Elio Wallace DO DD / BERNICE   D:  11/07/2017   20:08   T:  11/08/2017   09:45   Job #:  [id number]

## 2017-11-08 NOTE — PROGRESS NOTES
Problem: Mobility Impaired (Adult and Pediatric)  Goal: *Acute Goals and Plan of Care (Insert Text)  Physical Therapy Goals  Initiated 11/8/2017    1. Patient will move from supine to sit and sit to supine  in bed with maximal assistance within 4 days. 2. Patient will perform sit to stand with maximal assistance within 4 days. 3. Patient will ambulate with maximal assistance for 5 feet with the least restrictive device within 4 days. 4. Patient will verbalize and demonstrate understanding of total hip precautions per protocol within 4 days. 5. Patient will perform LE home exercise program per protocol with supervision/set-up within 4 days. physical Therapy EVALUATION  Patient: Selwyn Robledo (60 y.o. female)  Date: 11/8/2017  Primary Diagnosis: Hip fracture (HCC)  Right Hip Fracture  right femoral neck fracture   Procedure(s) (LRB):  RIGHT FEMORAL HEMIARTHROPLASTY (Right) 1 Day Post-Op   Precautions:   Fall, WBAT, posterior hip (rodrigo-hip)    ASSESSMENT :  Based on the objective data described below, the patient presents with decreased ROM and strength RLE, decreased functional mobility, decreased tolerance to activity tolerance due to increased pain and resistant to mobility. Patient s/p R hip fracture s/p R femoral hemiarthroplasty--posterior approach. Educated patient on posterior hip precautions, however due to lethargy, will need reinforcement next sessions. Patient received supine in bed and agreeable to therapy. Patient reported she was MOD I with a cane, lives alone in a 1 story home with 4 steps to enter. Patient required total assist for supine to sit edge of bed; unable to achieve sitting edge of bed due to significant pain and resistance to therapist assist. Patient required max encouragement to allow therapist to assist, but patient continued to resist/guard against mobility. Patient declined further mobility. Patient educated at length the benefits of mobility and risks of immobility.  Patient returned to supine position in bed with total assist. Patient practiced rolling L and R, scooting up in bed with total assist. Patient demonstrated minimal effort to assist with mobility during session. Patient will likely need SNF placement upon discharge. Patient will benefit from skilled intervention to address the above impairments. Patients rehabilitation potential is considered to be Fair  Factors which may influence rehabilitation potential include:   []         None noted  []         Mental ability/status  [x]         Medical condition  []         Home/family situation and support systems  []         Safety awareness  [x]         Pain tolerance/management  []         Other:      PLAN :  Recommendations and Planned Interventions:  [x]           Bed Mobility Training             [x]    Neuromuscular Re-Education  [x]           Transfer Training                   []    Orthotic/Prosthetic Training  [x]           Gait Training                         []    Modalities  [x]           Therapeutic Exercises           []    Edema Management/Control  [x]           Therapeutic Activities            []    Patient and Family Training/Education  []           Other (comment):    Frequency/Duration: Patient will be followed by physical therapy  twice daily to address goals. Discharge Recommendations: Skilled Nursing Facility  Further Equipment Recommendations for Discharge: TBD at SNF     SUBJECTIVE:   Patient stated Oh me! Why me? Fernanda Culver    OBJECTIVE DATA SUMMARY:   HISTORY:    Past Medical History:   Diagnosis Date    CHF [facility]congestive [facility] failure) [facility] [address]     thought to be result of chemotherapy    Depression     Diabetes [facility] [address]     GERD (gastroesophageal reflux disease)     HTN (hypertension)     Non Hodgkin's lymphoma (HCC)     Rheumatoid arthritis(714.0)     Thyroid disease      Past Surgical History:   Procedure Laterality Date    HX APPENDECTOMY      HX CATARACT REMOVAL Bilateral     HX HYSTERECTOMY      HX UROLOGICAL      bladder tack x 2    VASCULAR SURGERY PROCEDURE UNLIST      varicose vein surgery x 2     Prior Level of Function/Home Situation: mod I, ambulates with a cane, lives alone in a 1 story home  Personal factors and/or comorbidities impacting plan of care:     Home Situation  Home Environment: Private residence  # Steps to Enter: 2  Rails to Enter: No  One/Two Story Residence: One story  Living Alone: Yes  Support Systems: Child(hunter), Family member(s)  Patient Expects to be Discharged to[de-identified] Rehabilitation facility  Current DME Used/Available at Home: [address], [place], straight, Walker, rolling, Grab bars, Shower chair    EXAMINATION/PRESENTATION/DECISION MAKING:   Critical Behavior:  Neurologic State: Alert, Appropriate for age  Orientation Level: Oriented to place, Oriented to situation, Oriented to time, Disoriented to person (couldn't properly state the year she was born)  Cognition: Appropriate decision making, Appropriate for age attention/concentration, Appropriate safety awareness, Follows commands     Hearing: Auditory  Auditory Impairment: None  Skin:    Edema:   Range Of Motion:  AROM: Within functional limits (except R LE limited due to pain)           PROM: Within functional limits (except RLE limited due to pain/guarding)           Strength:    Strength: Within functional limits (except RLE limited by pain)                 Functional Mobility:  Bed Mobility:  Rolling: Total assistance  Supine to Sit: Total assistance  Sit to Supine: Total assistance  Scooting: Total assistance  Transfers:                             Balance:      Ambulation/Gait Training:                                                         Stairs:               Therapeutic Exercises:       Functional Measure:  Barthel Index:    Bathin  Bladder: 0  Bowels: 0  Groomin  Dressin  Feedin  Mobility: 0  Stairs: 0  Toilet Use: 0  Transfer (Bed to Chair and Back): 0  Total: 5       Barthel and G-code impairment scale:  Percentage of impairment CH  0% CI  1-19% CJ  20-39% CK  40-59% CL  60-79% CM  80-99% CN  100%   Barthel Score 0-100 100 99-80 79-60 59-40 20-39 1-19   0   Barthel Score 0-20 20 17-19 13-16 9-12 5-8 1-4 0      The Barthel ADL Index: Guidelines  1. The index should be used as a record of what a patient does, not as a record of what a patient could do. 2. The main aim is to establish degree of independence from any help, physical or verbal, however minor and for whatever reason. 3. The need for supervision renders the patient not independent. 4. A patient's performance should be established using the best available evidence. Asking the patient, friends/relatives and nurses are the usual sources, but direct observation and common sense are also important. However direct testing is not needed. 5. Usually the patient's performance over the preceding 24-48 hours is important, but occasionally longer periods will be relevant. 6. Middle categories imply that the patient supplies over 50 per cent of the effort. 7. Use of aids to be independent is allowed. Earl Aguilar., Barthel, DDestinyW. (3712). Functional evaluation: the Barthel Index. [facility] (14)2. RENEE Law, Kaila Chu., Oliverio Fonseca, [place], [address] [place] [address] (1999). Measuring the change indisability after inpatient rehabilitation; comparison of the responsiveness of the Barthel Index and Functional [place] Measure. Journal of Neurology, Neurosurgery, and Psychiatry, 66(4), 686-506. Isaias Kruse, N.J.A, CHIDI Edmonds, & Elizabeth Burnett MDestinyA. (2004.) Assessment of post-stroke quality of life in cost-effectiveness studies: The usefulness of the Barthel Index and the EuroQoL-5D. Quality of Life Research, 13, 546-15         G codes: In compliance with CMSs Claims Based Outcome Reporting, the following G-code set was chosen for this patient based on their primary functional limitation being treated:     The outcome measure chosen to determine the severity of the functional limitation was the Barthel Index with a score of 5/100 which was correlated with the impairment scale. ? Mobility - Walking and Moving Around:     - CURRENT STATUS: CM - 80%-99% impaired, limited or restricted    - GOAL STATUS: CL - 60%-79% impaired, limited or restricted    - D/C STATUS:  ---------------To be determined---------------       Based on the above components, the patient evaluation is determined to be of the following complexity level: LOW     Pain:  Pain Scale 1: Numeric (0 - 10)  Pain Intensity 1: 2  Pain Location 1: Hip  Pain Orientation 1: Right  Pain Description 1: Aching  Pain Intervention(s) 1: Ice;Distraction;Medication (see MAR); Rest;Cold pack; Position  Activity Tolerance:   Fair. VSS. Limited by pain  Please refer to the flowsheet for vital signs taken during this treatment. After treatment:   []         Patient left in no apparent distress sitting up in chair  [x]         Patient left in no apparent distress in bed  [x]         Call bell left within reach  [x]         Nursing notified  [x]         Caregiver present  []         Bed alarm activated    COMMUNICATION/EDUCATION:   The patients plan of care was discussed with: Occupational Therapist and Physician. [x]         Fall prevention education was provided and the patient/caregiver indicated understanding. [x]         Patient/family have participated as able in goal setting and plan of care. [x]         Patient/family agree to work toward stated goals and plan of care. []         Patient understands intent and goals of therapy, but is neutral about his/her participation. []         Patient is unable to participate in goal setting and plan of care.     Thank you for this referral.  Raquel Coffman, PT , DPT   Time Calculation: 25 mins

## 2017-11-08 NOTE — PROGRESS NOTES
Problem: Pain  Goal: *Control of Pain  Patient will report a pain score of 3 or below by time of discharge. Outcome: Progressing Towards Goal  Pt is currently three hours postop. Reporting a pain score of 5/10. Medicated with Roxicodone 5mg. Resting in bed with son at bedside. Will continue to monitor.

## 2017-11-09 ENCOUNTER — APPOINTMENT (OUTPATIENT)
Dept: GENERAL RADIOLOGY | Age: 82
DRG: 469 | End: 2017-11-09
Attending: PHYSICIAN ASSISTANT
Payer: MEDICARE

## 2017-11-09 LAB
ANION GAP SERPL CALC-SCNC: 6 MMOL/L (ref 5–15)
ATRIAL RATE: 113 BPM
BASOPHILS # BLD: 0 K/UL
BASOPHILS NFR BLD: 0 %
BUN SERPL-MCNC: 19 MG/DL (ref 6–20)
BUN/CREAT SERPL: 23 (ref 12–20)
CALCIUM SERPL-MCNC: 8 MG/DL (ref 8.5–10.1)
CALCULATED P AXIS, ECG09: 59 DEGREES
CALCULATED R AXIS, ECG10: -30 DEGREES
CALCULATED T AXIS, ECG11: 48 DEGREES
CHLORIDE SERPL-SCNC: 104 MMOL/L (ref 97–108)
CO2 SERPL-SCNC: 26 MMOL/L (ref 21–32)
CREAT SERPL-MCNC: 0.84 MG/DL (ref 0.55–1.02)
DIAGNOSIS, 93000: NORMAL
DIFFERENTIAL METHOD BLD: ABNORMAL
EOSINOPHIL # BLD: 0 K/UL
EOSINOPHIL NFR BLD: 0 %
ERYTHROCYTE [DISTWIDTH] IN BLOOD BY AUTOMATED COUNT: 16.6 % (ref 11.5–14.5)
GLUCOSE BLD STRIP.AUTO-MCNC: 189 MG/DL (ref 65–100)
GLUCOSE BLD STRIP.AUTO-MCNC: 230 MG/DL (ref 65–100)
GLUCOSE BLD STRIP.AUTO-MCNC: 232 MG/DL (ref 65–100)
GLUCOSE BLD STRIP.AUTO-MCNC: 343 MG/DL (ref 65–100)
GLUCOSE SERPL-MCNC: 134 MG/DL (ref 65–100)
HCT VFR BLD AUTO: 23.6 % (ref 35–47)
HGB BLD-MCNC: 7.8 G/DL (ref 11.5–16)
LYMPHOCYTES # BLD: 0.1 K/UL
LYMPHOCYTES NFR BLD: 8 %
MAGNESIUM SERPL-MCNC: 2 MG/DL (ref 1.6–2.4)
MCH RBC QN AUTO: 27.2 PG (ref 26–34)
MCHC RBC AUTO-ENTMCNC: 33.1 G/DL (ref 30–36.5)
MCV RBC AUTO: 82.2 FL (ref 80–99)
METAMYELOCYTES NFR BLD MANUAL: 1 %
MONOCYTES # BLD: 0.2 K/UL
MONOCYTES NFR BLD: 15 %
NEUTS BAND NFR BLD MANUAL: 9 %
NEUTS SEG # BLD: 1.2 K/UL
NEUTS SEG NFR BLD: 67 %
P-R INTERVAL, ECG05: 180 MS
PLATELET # BLD AUTO: 71 K/UL (ref 150–400)
POTASSIUM SERPL-SCNC: 3.8 MMOL/L (ref 3.5–5.1)
Q-T INTERVAL, ECG07: 330 MS
QRS DURATION, ECG06: 86 MS
QTC CALCULATION (BEZET), ECG08: 452 MS
RBC # BLD AUTO: 2.87 M/UL (ref 3.8–5.2)
RBC MORPH BLD: ABNORMAL
SERVICE CMNT-IMP: ABNORMAL
SODIUM SERPL-SCNC: 136 MMOL/L (ref 136–145)
VENTRICULAR RATE, ECG03: 113 BPM
WBC # BLD AUTO: 1.6 K/UL (ref 3.6–11)
WBC MORPH BLD: ABNORMAL

## 2017-11-09 PROCEDURE — 83735 ASSAY OF MAGNESIUM: CPT | Performed by: PHYSICIAN ASSISTANT

## 2017-11-09 PROCEDURE — G8988 SELF CARE GOAL STATUS: HCPCS

## 2017-11-09 PROCEDURE — 73030 X-RAY EXAM OF SHOULDER: CPT

## 2017-11-09 PROCEDURE — 82962 GLUCOSE BLOOD TEST: CPT

## 2017-11-09 PROCEDURE — 93005 ELECTROCARDIOGRAM TRACING: CPT

## 2017-11-09 PROCEDURE — 74011250636 HC RX REV CODE- 250/636: Performed by: INTERNAL MEDICINE

## 2017-11-09 PROCEDURE — 97165 OT EVAL LOW COMPLEX 30 MIN: CPT

## 2017-11-09 PROCEDURE — 74011636637 HC RX REV CODE- 636/637: Performed by: INTERNAL MEDICINE

## 2017-11-09 PROCEDURE — 97530 THERAPEUTIC ACTIVITIES: CPT

## 2017-11-09 PROCEDURE — 74011250637 HC RX REV CODE- 250/637: Performed by: PHYSICIAN ASSISTANT

## 2017-11-09 PROCEDURE — 74011250637 HC RX REV CODE- 250/637: Performed by: ORTHOPAEDIC SURGERY

## 2017-11-09 PROCEDURE — G8987 SELF CARE CURRENT STATUS: HCPCS

## 2017-11-09 PROCEDURE — 74011250637 HC RX REV CODE- 250/637: Performed by: INTERNAL MEDICINE

## 2017-11-09 PROCEDURE — 77030027138 HC INCENT SPIROMETER -A

## 2017-11-09 PROCEDURE — 65660000000 HC RM CCU STEPDOWN

## 2017-11-09 PROCEDURE — 36415 COLL VENOUS BLD VENIPUNCTURE: CPT | Performed by: INTERNAL MEDICINE

## 2017-11-09 PROCEDURE — 97116 GAIT TRAINING THERAPY: CPT

## 2017-11-09 PROCEDURE — 74011250636 HC RX REV CODE- 250/636: Performed by: ORTHOPAEDIC SURGERY

## 2017-11-09 PROCEDURE — 74011000250 HC RX REV CODE- 250: Performed by: INTERNAL MEDICINE

## 2017-11-09 PROCEDURE — 80048 BASIC METABOLIC PNL TOTAL CA: CPT | Performed by: INTERNAL MEDICINE

## 2017-11-09 PROCEDURE — 85025 COMPLETE CBC W/AUTO DIFF WBC: CPT | Performed by: INTERNAL MEDICINE

## 2017-11-09 PROCEDURE — 97110 THERAPEUTIC EXERCISES: CPT

## 2017-11-09 RX ORDER — OXYCODONE AND ACETAMINOPHEN 5; 325 MG/1; MG/1
1-2 TABLET ORAL
Qty: 60 TAB | Refills: 0 | Status: SHIPPED | OUTPATIENT
Start: 2017-11-09 | End: 2017-11-15

## 2017-11-09 RX ORDER — HYDROCODONE BITARTRATE AND ACETAMINOPHEN 7.5; 325 MG/1; MG/1
1 TABLET ORAL
Status: DISCONTINUED | OUTPATIENT
Start: 2017-11-09 | End: 2017-11-15 | Stop reason: HOSPADM

## 2017-11-09 RX ORDER — METOPROLOL TARTRATE 5 MG/5ML
2.5 INJECTION INTRAVENOUS
Status: DISCONTINUED | OUTPATIENT
Start: 2017-11-09 | End: 2017-11-15 | Stop reason: HOSPADM

## 2017-11-09 RX ADMIN — POLYETHYLENE GLYCOL 3350 17 G: 17 POWDER, FOR SOLUTION ORAL at 09:39

## 2017-11-09 RX ADMIN — ACETAMINOPHEN 650 MG: 500 TABLET ORAL at 12:22

## 2017-11-09 RX ADMIN — DOCUSATE SODIUM AND SENNOSIDES 1 TABLET: 8.6; 5 TABLET, FILM COATED ORAL at 17:27

## 2017-11-09 RX ADMIN — CALCIUM CARBONATE 500 MG (1,250 MG)-VITAMIN D3 200 UNIT TABLET 1 TABLET: at 08:11

## 2017-11-09 RX ADMIN — Medication 10 ML: at 21:50

## 2017-11-09 RX ADMIN — HYDROCODONE BITARTRATE AND ACETAMINOPHEN 1 TABLET: 7.5; 325 TABLET ORAL at 09:40

## 2017-11-09 RX ADMIN — LEVOTHYROXINE SODIUM 88 MCG: 100 TABLET ORAL at 08:00

## 2017-11-09 RX ADMIN — Medication 10 ML: at 06:12

## 2017-11-09 RX ADMIN — ACETAMINOPHEN 650 MG: 500 TABLET ORAL at 17:27

## 2017-11-09 RX ADMIN — DOCUSATE SODIUM AND SENNOSIDES 1 TABLET: 8.6; 5 TABLET, FILM COATED ORAL at 09:41

## 2017-11-09 RX ADMIN — INSULIN LISPRO 2 UNITS: 100 INJECTION, SOLUTION INTRAVENOUS; SUBCUTANEOUS at 08:00

## 2017-11-09 RX ADMIN — INSULIN LISPRO 2 UNITS: 100 INJECTION, SOLUTION INTRAVENOUS; SUBCUTANEOUS at 22:00

## 2017-11-09 RX ADMIN — ENOXAPARIN SODIUM 40 MG: 40 INJECTION SUBCUTANEOUS at 09:39

## 2017-11-09 RX ADMIN — CALCIUM CARBONATE 500 MG (1,250 MG)-VITAMIN D3 200 UNIT TABLET 1 TABLET: at 17:26

## 2017-11-09 RX ADMIN — Medication 10 ML: at 16:06

## 2017-11-09 RX ADMIN — TBO-FILGRASTIM 480 MCG: 480 INJECTION, SOLUTION SUBCUTANEOUS at 18:11

## 2017-11-09 RX ADMIN — FLUOXETINE HYDROCHLORIDE 20 MG: 20 CAPSULE ORAL at 09:41

## 2017-11-09 RX ADMIN — METOPROLOL TARTRATE 2.5 MG: 5 INJECTION INTRAVENOUS at 08:11

## 2017-11-09 RX ADMIN — CARVEDILOL 6.25 MG: 6.25 TABLET, FILM COATED ORAL at 08:11

## 2017-11-09 RX ADMIN — INSULIN LISPRO 7 UNITS: 100 INJECTION, SOLUTION INTRAVENOUS; SUBCUTANEOUS at 16:47

## 2017-11-09 RX ADMIN — INSULIN LISPRO 3 UNITS: 100 INJECTION, SOLUTION INTRAVENOUS; SUBCUTANEOUS at 12:22

## 2017-11-09 RX ADMIN — HYDROCODONE BITARTRATE AND ACETAMINOPHEN 1 TABLET: 7.5; 325 TABLET ORAL at 21:50

## 2017-11-09 RX ADMIN — ACETAMINOPHEN 650 MG: 500 TABLET ORAL at 06:11

## 2017-11-09 RX ADMIN — CARVEDILOL 6.25 MG: 6.25 TABLET, FILM COATED ORAL at 17:26

## 2017-11-09 RX ADMIN — HYDROCODONE BITARTRATE AND ACETAMINOPHEN 1 TABLET: 7.5; 325 TABLET ORAL at 16:00

## 2017-11-09 RX ADMIN — PANTOPRAZOLE SODIUM 40 MG: 40 TABLET, DELAYED RELEASE ORAL at 08:00

## 2017-11-09 RX ADMIN — CALCIUM CARBONATE 500 MG (1,250 MG)-VITAMIN D3 200 UNIT TABLET 1 TABLET: at 12:22

## 2017-11-09 NOTE — PROGRESS NOTES
Hospitalist Progress Note    NAME: Austen Michaels   :  1935   MRN:  [id number]       Assessment / Plan:  R hip fracture from mechanical fall  -pt at high risk for infection perioperatively given he neutropenia. Will need to monitor closely. Pt on neutropenic precaution  -s/p R femoral hemiarthroplasty  -pain and DVT prophylaxis per ortho (pain med reduced)  -PT/OT, likely SNF on discharge  -ortho following    PSVT  -likely induced by stress, and acute illness  -resolved with low dose BB  -resume home med coreg    NHL  Neutropenia  Anemia  Fever, resolved  -chemotherapy outpt several week sago  -s/p 1 unit PRBC (fever during transfusion), cont' Granix   -UA neg, Bcx obtained during fever ntd  -appreciate oncology following    HTN  Chronic systolic heart failure  -stop IVF, encourage PO intake  -cont' coreg, hydralazine prn     T2DM  -cont' SSI    Hypothyroidism  -cont' synthroid    Recent skin lesion  -s/p \"mole\" excision left malar region on 17. Healing nicely. Body mass index is 23.57 kg/(m^2).       Code Status: full  Surrogate Decision Maker: tawny Jessica [phone]   DVT Prophylaxis: per ortho  Baseline: independent     Subjective:     Chief Complaint / Reason for Physician Visit  Pt seen at bedside with her daughter Sherron Spivey. She appears to be improving, looking and feeling better. Not lethargic this morning. Afebrile overnight. Discussed PT/OT today with plans of walters removal.    Discussed with RN events overnight. Review of Systems:  Symptom Y/N Comments  Symptom Y/N Comments   Fever/Chills n   Chest Pain n    Poor Appetite    Edema     Cough    Abdominal Pain n    Sputum    Joint Pain y    SOB/MUSE n   Pruritis/Rash     Nausea/vomit    Tolerating PT/OT     Diarrhea    Tolerating Diet     Constipation    Other       Could NOT obtain due to:      Objective:     VITALS:   Last 24hrs VS reviewed since prior progress note.  Most recent are:  Patient Vitals for the past 24 hrs: Temp Pulse Resp BP SpO2   11/09/17 1013 98.9 °F (37.2 °C) 83 18 112/62 98 %   11/09/17 0741 98.2 °F (36.8 °C) 94 20 110/60 98 %   11/09/17 0500 99 °F (37.2 °C) (!) 110 20 152/75 100 %   11/09/17 0030 99.1 °F (37.3 °C) 97 18 135/67 100 %   11/08/17 2040 98.9 °F (37.2 °C) 92 18 118/52 98 %   11/08/17 1823 98.8 °F (37.1 °C) 88 16 144/57 100 %   11/08/17 1407 99.8 °F (37.7 °C) 86 17 135/61 100 %   11/08/17 1304 99.9 °F (37.7 °C) 83 16 127/59 99 %   11/08/17 1230 98.7 °F (37.1 °C) 87 16 128/63 100 %   11/08/17 1151 99.8 °F (37.7 °C) 85 17 141/57 100 %   11/08/17 1129 98.2 °F (36.8 °C) 87 16 141/57 100 %       Intake/Output Summary (Last 24 hours) at 11/09/17 1122  Last data filed at 11/09/17 0307   Gross per 24 hour   Intake           1012.5 ml   Output             1125 ml   Net           -112.5 ml        PHYSICAL EXAM:  General: WD, WN. Alert, cooperative, no acute distress    EENT:  EOMI. Anicteric sclerae. MMM  Resp:  CTA bilaterally, no wheezing or rales. No accessory muscle use  CV:  Regular  rhythm,  No edema  GI:  Soft, Non distended, Non tender.  +Bowel sounds  Neurologic:  Alert and oriented X 3, normal speech,   Psych:   Good insight. Not anxious nor agitated  Skin:  No rashes. No jaundice    Reviewed most current lab test results and cultures  YES  Reviewed most current radiology test results   YES  Review and summation of old records today    NO  Reviewed patient's current orders and MAR    YES  PMH/SH reviewed - no change compared to H&P  ________________________________________________________________________  Care Plan discussed with:    Comments   Patient x    Family  x daughter   RN x    Care Manager x    Consultant                       x Multidiciplinary team rounds were held today with , nursing, pharmacist and clinical coordinator. Patient's plan of care was discussed; medications were reviewed and discharge planning was addressed. ________________________________________________________________________  Total NON critical care TIME:  35   Minutes    Total CRITICAL CARE TIME Spent:   Minutes non procedure based      Comments   >50% of visit spent in counseling and coordination of care     ________________________________________________________________________  Keyanna Rodrigues MD     Procedures: see electronic medical records for all procedures/Xrays and details which were not copied into this note but were reviewed prior to creation of Plan. LABS:  I reviewed today's most current labs and imaging studies.   Pertinent labs include:  Recent Labs      11/09/17 0514 11/08/17 0245 11/07/17 0612   WBC  1.6*  1.6*  1.7*   HGB  7.8*  6.9*  8.5*   HCT  23.6*  20.7*  25.8*   PLT  71*  81*  89*     Recent Labs      11/09/17 0514 11/08/17 0245 11/07/17 0612   NA  136  134*  135*   K  3.8  4.1  3.9   CL  104  103  103   CO2  26  25  26   GLU  134*  188*  237*   BUN  19  23*  15   CREA  0.84  1.04*  0.82   CA  8.0*  7.8*  8.3*   MG  2.0   --    --        Signed: Keyanna Rodrigues MD

## 2017-11-09 NOTE — PROGRESS NOTES
Problem: Hip Fracture: Post-Op Day 1  Goal: *Hemodynamically stable  Outcome: Progressing Towards Goal  Required 1 unit PRBCs

## 2017-11-09 NOTE — PROGRESS NOTES
ORTHO - Progress Note  Post Op day: 2 Days Post-Op    Selwyn Robledo     [id number]  female    80 y.o.    1935    Admit date:  11/6/2017  Date of Surgery:  11/7/2017   Procedures:  Procedure(s):  RIGHT FEMORAL HEMIARTHROPLASTY  Admitting Physician:  Lisa Alfredo MD   Surgeon:  Americo Griffith) and Role:     * Laura Yang DO - Primary    Consulting Physician(s): Treatment Team: Attending Provider: Mychal Ricketts MD; Consulting Provider: Laura Yang DO; Consulting Provider: Lisa Alfredo MD; Consulting Provider: Manuel Garnett MD; Utilization Review: Nela Contreras RN; Consulting Provider: Ninfa Gomez MD    SUBJECTIVE:     Selwyn Robledo is a 80 y.o. female s/p a  RIGHT FEMORAL HEMIARTHROPLASTY resting in the bed. Patient has complaints of pain  Right groin and NEW C/O of right shoulder pain. States she didn't think it was a priority with the way her hip was broken . Additionally feels like her heart is \"struggling to kick in\"     Denies nausea, vomiting, dizziness, lightheadedness, chest pain, or shortness of breath. OBJECTIVE:       Physical Exam:  General: alert, cooperative, no distress. Gastrointestinal:  Soft, non-tender. Cardiovascular: equal pulses in the upper lower extremities(90-100s), PSVT w/auscultation(130+)    Brisk cap refill in all distal extremities   Genitourinary: Larios     Respiratory: No respiratory distress   Neurological:Neurovascular exam within normal limits. Senstion intact: LE bilat    Motor: + DF/PF/EHL. Musculoskeletal: Maynor's sign negative in bilateral lower extremities. Calves soft, supple, non-tender upon palpation or with passive stretch. Right shoulder - Nontender, mild crepitus with passive motion. elevation 120, neg lag. 4/5 with ext rotation testing , 5/5 with MMT(bicep/tri/wrist ext/finger flex)  Skin: NWD  Dressing/Wound:  Clean, dry and intact. No significant erythema or swelling.     Vital Signs:       Patient Vitals for the past 8 hrs:   BP Temp Pulse Resp SpO2   17 0741 110/60 - 94 20 98 %   17 0500 152/75 99 °F (37.2 °C) (!) 110 20 100 %   17 0030 135/67 99.1 °F (37.3 °C) 97 18 100 %                                          Temp (24hrs), Av.3 °F (37.4 °C), Min:98.2 °F (36.8 °C), Max:100.8 °F (38.2 °C)       Labs:        Recent Labs      17   0514   HCT  23.6*   HGB  7.8*     PT/OT:              ASSESSMENT / PLAN:   Principal Problem:    Closed right hip fracture (HCC) (2017)    Active Problems:    Hip fracture (HCC) (2017)    right shoulder xrays ordered   Pain meds changed to tylenol, PRN norco 1/2-1 tab  New onset tachycardia -PSVT- cardiology consult - lopressor 2.5 given this am  Lymphoma - leukopenia - heme/hospitalist following   Larios re-inserted due to retention   PT- WBAT on the RLE- hip precautions! !   Lovenox for DVT prophylaxis     Signed By: Hal Silvestre PA-C

## 2017-11-09 NOTE — PROGRESS NOTES
Problem: Self Care Deficits Care Plan (Adult)  Goal: *Acute Goals and Plan of Care (Insert Text)  Occupational Therapy Goals  Initiated 11/9/2017     1. Patient will perform lower body dressing using Reacher Stocking Aid and Dressing Stick at minimal assistance/contact guard assist level within 7 days. 2. Patient will perform toilet transfers at moderate assistance  level using Walkers, Type: Rolling Walker  within 7 days. 3. Patient will perform all aspects of toileting at minimal assistance/contact guard assist level within 7 days. 4. Patient will demonstrate 3/3 hip precautions without cues within 7 days. Occupational Therapy EVALUATION  Patient: Maya Duong (15 y.o. female)  Date: 11/9/2017  Primary Diagnosis: Hip fracture (HCC)  Right Hip Fracture  right femoral neck fracture   Procedure(s) (LRB):  RIGHT FEMORAL HEMIARTHROPLASTY (Right) 2 Days Post-Op   Precautions: Total hip    ASSESSMENT :  Based on the objective data described below, the patient presents with anxiety with movement, fear of falling decreased strength, generalized  weakness and limited ROM/strength R UE/LE s/p fall at home with R femoral hemiarthroplasty POD 1. Pt educated on 3/3 posterior hip precautions and importance of increasing activity. Pt required max to total A x 2 for all aspects of mobility with impaired static sitting balance. Pt demonstrated decreased WB through R hip while EOB due to pain. Attempted 4 trials of sit to stand, able to stand successfully x 1 trial with max A x 2 from elevated bed height and completed static standing x 1 minute with max verbal and manual cues to weight shifting to R and upright posture. Pt is most limited by her anxiety and fear of falling, reports taking anti anxiety medication at home. She is functioning well below her ADL baseline, needing min A for EOB grooming, mod to total A for bathing, dressing and toileting. Pt will need SNF rehab prior to returning home.       Patient will benefit from skilled intervention to address the above impairments. Patients rehabilitation potential is considered to be Good  Factors which may influence rehabilitation potential include:   []             None noted  [x]             Mental ability/status: anxiety  []             Medical condition  []             Home/family situation and support systems  []             Safety awareness  []             Pain tolerance/management  []             Other:      PLAN :  Recommendations and Planned Interventions:  [x]               Self Care Training                  [x]        Therapeutic Activities  [x]               Functional Mobility Training    []        Cognitive Retraining  [x]               Therapeutic Exercises           [x]        Endurance Activities  [x]               Balance Training                   []        Neuromuscular Re-Education  []               Visual/Perceptual Training     [x]   Home Safety Training  [x]               Patient Education                 [x]        Family Training/Education  []               Other (comment):    Frequency/Duration: Patient will be followed by occupational therapy 5 times a week to address goals. Discharge Recommendations: Skilled Nursing Facility  Further Equipment Recommendations for Discharge: TBD     SUBJECTIVE:   Patient stated Oh please don't let me fall!     OBJECTIVE DATA SUMMARY:   HISTORY:   Past Medical History:   Diagnosis Date    CHF (congestive heart failure) ([facility]     thought to be result of chemotherapy    Depression     [facility]     GERD (gastroesophageal reflux disease)     HTN (hypertension)     Non Hodgkin's lymphoma (HCC)     Rheumatoid arthritis(714.0)     Thyroid disease      Past Surgical History:   Procedure Laterality Date    HX APPENDECTOMY      HX CATARACT REMOVAL Bilateral     HX HYSTERECTOMY      HX UROLOGICAL      bladder tack x 2    VASCULAR SURGERY PROCEDURE UNLIST      varicose vein surgery x 2       Prior Level of Function/Environment/Context: lives home alone, mod I with SPC and for ADLs  Occupations in which the patient is/was successful, what are the barriers preventing that success: anxiety  Performance Patterns (routines, roles, habits, and rituals): lives alone, driving  Personal Interests and/or values:   Expanded or extensive additional review of patient history:     Home Situation  Home Environment: Private residence  # Steps to Enter: 2  Rails to Enter: No  One/Two Story Residence: One story  Living Alone: Yes  Support Systems: Child(hunter), Family member(s)  Patient Expects to be Discharged to[de-identified] Rehabilitation facility (SNF)  Current DME Used/Available at Home: Tauna Sanders, straight, Walker, rolling, Grab bars, Shower chair  [x]  Right hand dominant   []  Left hand dominant    EXAMINATION OF PERFORMANCE DEFICITS:  Cognitive/Behavioral Status:  Neurologic State: Alert  Orientation Level: Oriented X4  Cognition: Follows commands  Perception: Appears intact  Perseveration: No perseveration noted  Safety/Judgement: Awareness of environment; Fall prevention    Skin: R hip bandage C/D/I    Edema: R hip, ice applied post session    Hearing: Auditory  Auditory Impairment: None    Vision/Perceptual:    Tracking: Able to track stimulus in all quadrants w/o difficulty                      Acuity: Within Defined Limits    Corrective Lenses: Reading glasses    Range of Motion:    AROM: Within functional limits (except R hip. R UE shoulder flexion 2* pain)                         Strength:    Strength: Generally decreased, functional                Coordination:     Fine Motor Skills-Upper: Left Intact; Right Intact    Gross Motor Skills-Upper: Left Intact; Right Intact    Tone & Sensation:  Intact to light touch                            Balance:  Sitting: Intact; Without support (with L lateral lean to avoid R hip weight bearing )  Standing: Impaired; With support  Standing - Static: Poor;Constant support  Standing - Dynamic : (NT)    Functional Mobility and Transfers for ADLs:  Bed Mobility:  Rolling: Maximum assistance;Assist x2; Additional time  Supine to Sit: Maximum assistance;Assist x2; Additional time  Sit to Supine: Total assistance;Assist x2  Scooting: Assist x1;Additional time; Moderate assistance    Transfers:  Sit to Stand: Total assistance;Assist x2 (stood x1 for 1' total; unable to clear bed other 3 attempts)  Stand to Sit: Total assistance;Assist x2  Bed to Chair:  (unable at this time)    ADL Assessment:  Feeding: Independent    Oral Facial Hygiene/Grooming: Modified Independent (setup supported, min A seated EOB)    Bathing: Moderate assistance    Upper Body Dressing: Minimum assistance    Lower Body Dressing: Maximum assistance    Toileting: Total assistance                ADL Intervention and task modifications:     Pt educated on 3/3 posterior hip precautions and importance of increasing activity. Pt required max to total A x 2 for all aspects of mobility with impaired static sitting balance. Pt demonstrated decreased WB through R hip while EOB. Attempted 4 trials of sit to stand, able to stand successfully x 1 trial with max A x 2 and completed static standing x 1 minute with max verbal and manual cues to weight shifting to R and upright posture. Attempted 3 more trials of sit to stand with total A x 2, unable to clear hips from EOB and unsafe to transfer to chair. Cognitive Retraining  Safety/Judgement: Awareness of environment; Fall prevention      Functional Measure:  Barthel Index:    Bathin  Bladder: 0  Bowels: 5  Groomin  Dressin  Feeding: 10  Mobility: 0  Stairs: 0  Toilet Use: 0  Transfer (Bed to Chair and Back): 5  Total: 20       Barthel and G-code impairment scale:  Percentage of impairment CH  0% CI  1-19% CJ  20-39% CK  40-59% CL  60-79% CM  80-99% CN  100%   Barthel Score 0-100 100 99-80 79-60 59-40 20-39 1-19   0   Barthel Score 0-20 20 17-19 13-16 9-12 5-8 1-4 0 The Barthel ADL Index: Guidelines  1. The index should be used as a record of what a patient does, not as a record of what a patient could do. 2. The main aim is to establish degree of independence from any help, physical or verbal, however minor and for whatever reason. 3. The need for supervision renders the patient not independent. 4. A patient's performance should be established using the best available evidence. Asking the patient, friends/relatives and nurses are the usual sources, but direct observation and common sense are also important. However direct testing is not needed. 5. Usually the patient's performance over the preceding 24-48 hours is important, but occasionally longer periods will be relevant. 6. Middle categories imply that the patient supplies over 50 per cent of the effort. 7. Use of aids to be independent is allowed. Ana Oconnell., Barthel, DKASSIE. (7389). Functional evaluation: the Barthel Index. [address] [facility] (14)2. Sandria Cogan markos RENEE Acevedo, [place], Jeny Metzger, [place], [address] [place] [address] (1999). Measuring the change indisability after inpatient rehabilitation; comparison of the responsiveness of the Barthel Index and Functional Travis Measure. Journal of Neurology, Neurosurgery, and Psychiatry, 66(4), 096-386. Becky Tierney, N.J.A, CHIDI Edmonds, & Tessa Napoles M.A. (2004.) Assessment of post-stroke quality of life in cost-effectiveness studies: The usefulness of the Barthel Index and the EuroQoL-5D. Quality of Life Research, 13, 623-09         G codes: In compliance with CMSs Claims Based Outcome Reporting, the following G-code set was chosen for this patient based on their primary functional limitation being treated: The outcome measure chosen to determine the severity of the functional limitation was the barthel index with a score of 20/100 which was correlated with the impairment scale. ?  Self Care:     - CURRENT STATUS: CL - 60%-79% impaired, limited or restricted    - GOAL STATUS: CK - 40%-59% impaired, limited or restricted    - D/C STATUS:  ---------------To be determined---------------     Occupational Therapy Evaluation Charge Determination   History Examination Decision-Making   LOW Complexity : Brief history review  LOW Complexity : 1-3 performance deficits relating to physical, cognitive , or psychosocial skils that result in activity limitations and / or participation restrictions  LOW Complexity : No comorbidities that affect functional and no verbal or physical assistance needed to complete eval tasks       Based on the above components, the patient evaluation is determined to be of the following complexity level: LOW   Pain:  Pain Scale 1: Numeric (0 - 10)  Pain Intensity 1: 4  Pain Location 1: Hip  Pain Orientation 1: Right  Pain Description 1: Aching  Pain Intervention(s) 1: Cold pack;Ice;Repositioned  Activity Tolerance:   VSS  Please refer to the flowsheet for vital signs taken during this treatment. After treatment:   [] Patient left in no apparent distress sitting up in chair  [x] Patient left in no apparent distress in bed  [x] Call bell left within reach  [x] Nursing notified  [] Caregiver present  [] Bed alarm activated    COMMUNICATION/EDUCATION:   The patients plan of care was discussed with: Physical Therapist and Registered Nurse.  [] Home safety education was provided and the patient/caregiver indicated understanding. [] Patient/family have participated as able in goal setting and plan of care. [] Patient/family agree to work toward stated goals and plan of care. [] Patient understands intent and goals of therapy, but is neutral about his/her participation. [] Patient is unable to participate in goal setting and plan of care. This patients plan of care is appropriate for delegation to [facility].     Thank you for this referral.  Xavi Rai OT  Time Calculation: 40 mins

## 2017-11-09 NOTE — DIABETES MGMT
DTC Progress Note    Recommendations/ Comments: Please consider beginning lantus 15units daily to aid in glucose control and checking a current a1c. Pt BG remains elevated and has required 19 units of correction over the past 24 hours. Chart reviewed on [facility]. Patient is a 80 y.o. female with known Type 2 Diabetes on metformin 1000mg ac b/d at home. A1c:   No results found for: HBA1C, HGBE8, NNE4UXIT, HNE8RNXW    Recent Glucose Results:   Lab Results   Component Value Date/Time     (H) 11/09/2017 05:14 AM    GLUCPOC 230 (H) 11/09/2017 11:24 AM    GLUCPOC 189 (H) 11/09/2017 07:36 AM    GLUCPOC 212 (H) 11/08/2017 08:38 PM        Lab Results   Component Value Date/Time    Creatinine 0.84 11/09/2017 05:14 AM     Estimated Creatinine Clearance: 57.7 mL/min (based on Cr of 0.84). Active Orders   Diet    DIET DIABETIC CONSISTENT CARB Regular; Neutropenic        PO intake:   Patient Vitals for the past 72 hrs:   % Diet Eaten   11/08/17 1842 45 %   11/08/17 1300 20 %       Current hospital DM medication: humalog correction    Will continue to follow as needed.     Thank you    Babatunde Dao, [address], [address]  Office: [phone]

## 2017-11-09 NOTE — PROGRESS NOTES
Pt is an 79 y/o  female admitted with right femoral hemiarthroplasty. Pt resides in her own home. She is independent to ADL's and IADL's to include driving. She will need SNF at discharge. Pt first choice was [facility]. SW contacted SNF and admissions stated they do not have a female bed and no pending female discharges. Pt 2nd choice was [facility]. Referral sent for their review. [facility] is able to accept. [facility] will continue to follow and assist with additional discharge needs. Care Management Interventions  PCP Verified by CM:  Yes  Mode of Transport at Discharge: BLS (Pt will need transport at discharge)  Transition of Care Consult (CM Consult): SNF (Pt first choice was [facility], [address])  Partner SNF: Yes  Physical Therapy Consult: Yes  Occupational Therapy Consult: Yes  Current Support Network: Own Home  Confirm Follow Up Transport: Self (Pt drives but has supportive family and friends to accept)  Plan discussed with Pt/Family/Caregiver: Yes  Freedom of Choice Offered: Yes  Discharge Location  Discharge Placement: Skilled nursing facility    ISHMAEL Hairston  Ext [phone]

## 2017-11-09 NOTE — PROGRESS NOTES
Bedside and Verbal shift change report given to Meryle Bolder and Luis A Fang (oncoming nurse) by Job Edwards (offgoing nurse). Report included the following information SBAR, Kardex, ED Summary, OR Summary, Procedure Summary, Intake/Output, MAR, Accordion, Recent Results and Med Rec Status.

## 2017-11-09 NOTE — PROGRESS NOTES
Bedside shift change report given to Vanita Murillo RN (oncoming nurse) by Meryle Bolder, RN (offgoing nurse). Report included the following information SBAR, OR Summary, MAR, Recent Results and Cardiac Rhythm Sinus Tachycardia with SVTs.

## 2017-11-09 NOTE — PROGRESS NOTES
Problem: Hip Fracture: Post-Op Day 1  Goal: Medications  Outcome: Progressing Towards Goal  Request of MDs to decrease narcotic use if possible to help resolve drowsiness; Orders maintained if needed but to use tylenol as scheduled for pain medications.

## 2017-11-09 NOTE — PROGRESS NOTES
Problem: Hip Fracture: Post-Op Day 1  Goal: Activity/Safety  Outcome: Not Progressing Towards Goal  Patient remained in bed; q2h turn with mobility team orders, due to drowsiness and decreased activity tolerance related to pain medications received, and requirement of blood transfusion.

## 2017-11-09 NOTE — PROGRESS NOTES
Hematology Oncology Progress Note       Follow up for: angioimmunoblastic lymphoma, T cell type    Chart notes reviewed since last visit. Case discussed with following: patient and family member present in room. .    Patient complains of the following: in good spirits. Believes she might be transferred to [facility] and is pleased. Thus far, no further issues but did not tolerate transferring to chair. Additional concerns noted by the staff:     Patient Vitals for the past 24 hrs:   BP Temp Pulse Resp SpO2   11/09/17 1717 122/44 98.9 °F (37.2 °C) 84 18 98 %   11/09/17 1453 105/54 98.7 °F (37.1 °C) 84 18 95 %   11/09/17 1013 112/62 98.9 °F (37.2 °C) 83 18 98 %   11/09/17 0741 110/60 98.2 °F (36.8 °C) 94 20 98 %   11/09/17 0500 152/75 99 °F (37.2 °C) (!) 110 20 100 %   11/09/17 0030 135/67 99.1 °F (37.3 °C) 97 18 100 %   11/08/17 2040 118/52 98.9 °F (37.2 °C) 92 18 98 %   11/08/17 1823 144/57 98.8 °F (37.1 °C) 88 16 100 %       ROS negative for 10 organ systems except pain related to hip fracture. Mild weakness. Physical Examination:  Constitutional Alert, cooperative, oriented. Mood and affect appropriate. Appears close to chronological age. Well nourished. Well developed. Head Normocephalic; no scars   Eyes Conjunctivae and sclerae are clear and without icterus. Pupils are reactive and equal.   ENMT Sinuses are nontender. No oral exudates, ulcers, masses, thrush or mucositis. Oropharynx clear. Tongue normal.   Neck Supple without masses or thyromegaly. No jugular venous distension. Hematologic/Lymphatic No petechiae or purpura. No tender or palpable lymph nodes noted. Respiratory Lungs are clear to auscultation without rhonchi or wheezing. Cardiovascular Regular rate and rhythm of heart without murmurs, gallops or rubs. Chest / Line Site Chest is symmetric with no chest wall deformities. Abdomen Non-tender, non-distended, no masses, ascites or hepatosplenomegaly.  Good bowel sounds. No guarding or rebound tenderness. No pulsatile masses. Musculoskeletal No tenderness or swelling, normal range of motion without obvious weakness. Extremities No visible deformities, no cyanosis, clubbing or edema. Bandage over right upper hip region   Skin No rashes, scars, or lesions suggestive of malignancy. No petechiae, purpura, or ecchymoses. No excoriations. Neurologic No sensory or motor deficits noted. Psychiatric Alert and oriented . Coherent speech. Verbalizes understanding of our discussions today. Labs:  Recent Results (from the past 24 hour(s))   GLUCOSE, POC    Collection Time: 11/08/17  8:38 PM   Result Value Ref Range    Glucose (POC) 212 (H) 65 - 100 mg/dL    Performed by Lory Natarajan    CBC WITH AUTOMATED DIFF    Collection Time: 11/09/17  5:14 AM   Result Value Ref Range    WBC 1.6 (L) 3.6 - 11.0 K/uL    RBC 2.87 (L) 3.80 - 5.20 M/uL    HGB 7.8 (L) 11.5 - 16.0 g/dL    HCT 23.6 (L) 35.0 - 47.0 %    MCV 82.2 80.0 - 99.0 FL    MCH 27.2 26.0 - 34.0 PG    MCHC 33.1 30.0 - 36.5 g/dL    RDW 16.6 (H) 11.5 - 14.5 %    PLATELET 71 (L) 723 - 400 K/uL    NEUTROPHILS 67 %    BAND NEUTROPHILS 9 %    LYMPHOCYTES 8 %    MONOCYTES 15 %    EOSINOPHILS 0 %    BASOPHILS 0 %    METAMYELOCYTES 1 %    ABS. NEUTROPHILS 1.2 K/UL    ABS. LYMPHOCYTES 0.1 K/UL    ABS. MONOCYTES 0.2 K/UL    ABS. EOSINOPHILS 0.0 K/UL    ABS.  BASOPHILS 0.0 K/UL    RBC COMMENTS ANISOCYTOSIS  1+        WBC COMMENTS TOXIC GRANULATION      DF MANUAL     METABOLIC PANEL, BASIC    Collection Time: 11/09/17  5:14 AM   Result Value Ref Range    Sodium 136 136 - 145 mmol/L    Potassium 3.8 3.5 - 5.1 mmol/L    Chloride 104 97 - 108 mmol/L    CO2 26 21 - 32 mmol/L    Anion gap 6 5 - 15 mmol/L    Glucose 134 (H) 65 - 100 mg/dL    BUN 19 6 - 20 MG/DL    Creatinine 0.84 0.55 - 1.02 MG/DL    BUN/Creatinine ratio 23 (H) 12 - 20      GFR est AA >60 >60 ml/min/1.73m2    GFR est non-AA >60 >60 ml/min/1.73m2    Calcium 8.0 (L) 8.5 - 10.1 MG/DL   MAGNESIUM    Collection Time: 11/09/17  5:14 AM   Result Value Ref Range    Magnesium 2.0 1.6 - 2.4 mg/dL   EKG, 12 LEAD, INITIAL    Collection Time: 11/09/17  7:28 AM   Result Value Ref Range    Ventricular Rate 113 BPM    Atrial Rate 113 BPM    P-R Interval 180 ms    QRS Duration 86 ms    Q-T Interval 330 ms    QTC Calculation (Bezet) 452 ms    Calculated P Axis 59 degrees    Calculated R Axis -30 degrees    Calculated T Axis 48 degrees    Diagnosis       Sinus tachycardia with Possible premature atrial complexes with  Left axis deviation    When compared with ECG of 06-NOV-2017 20:57,  No significant change was found    Confirmed by Ana Hall ([id number]) on 11/9/2017 10:56:33 AM     GLUCOSE, POC    Collection Time: 11/09/17  7:36 AM   Result Value Ref Range    Glucose (POC) 189 (H) 65 - 100 mg/dL    Performed by Gopal Gillespie    GLUCOSE, POC    Collection Time: 11/09/17 11:24 AM   Result Value Ref Range    Glucose (POC) 230 (H) 65 - 100 mg/dL    Performed by Gopal Gillespie    GLUCOSE, POC    Collection Time: 11/09/17  4:24 PM   Result Value Ref Range    Glucose (POC) 343 (H) 65 - 100 mg/dL    Performed by Fiona Solis        Assessment and Plan:   NHL, 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 Cytopenias - she has not received chemo since August so current low counts are worrisome. On granix to boost white count. Got one unit PRBCs - hgb better but marginal.  Will check quick screen to see  If any obviously correctable factors. Right hip fracture s/pright hip hemiarthroplasty using [employer] cent[employer] [place] bipolar device. Fever during transfusion    HTN    Chronic CHF - coreg, hydralazine as needed.      Type 2 DM - SSI ordered

## 2017-11-09 NOTE — INTERDISCIPLINARY ROUNDS
Bedside interdisciplinary rounds were held today to discuss patient plan of care and outcomes. The following members were present: Physician, Nurse, Clinical Care Leader, Pharmacy, Physical Therapy, and Case Management. Plan:  Heart rhythm improved. Labs improved. Lovenox for DVT prevention. Continue Larios until patient more mobile. Continue working with PT/OT.

## 2017-11-09 NOTE — PROGRESS NOTES
Problem: Hip Fracture: Post-Op Day 2  Goal: *Hemodynamically stable  Outcome: Not Progressing Towards Goal  Patient's heart rhythm irregular. See notes.

## 2017-11-09 NOTE — PROGRESS NOTES
POD 2 Days Post-Op    Procedure:  Procedure(s):  RIGHT FEMORAL HEMIARTHROPLASTY    Subjective:     Patient doing well, complaining of appropriate post-op pain. Objective:     Blood pressure 105/54, pulse 84, temperature 98.7 °F (37.1 °C), resp. rate 18, height 5' 11\" (1.803 m), weight 76.7 kg (169 lb), SpO2 95 %. Temp (24hrs), Av.8 °F (37.1 °C), Min:98.2 °F (36.8 °C), Max:99.1 °F (37.3 °C)      Physical Exam:  Examination of the right hip reveals that the incision is clean, dry and intact. Sensation is intact to light touch.  mild swelling. No calf pain.   NVSI    Labs:   Lab Results   Component Value Date/Time    HGB 7.8 2017 05:14 AM         Assessment:     Principal Problem:    Closed right hip fracture ([facility] (2017)    Active Problems:    Hip fracture ([facility] (2017)        S/p R HIP HEMIARTHROPLASTY    Plan/Recommendations/Medical Decision Making:     - pain control - oxy  - ice   - pt/ot - wbat, hip precautions  - dvt prophylaxis - lovenox   - d/c planning - likely snf/rehab        Candi Carbajal DO

## 2017-11-09 NOTE — PROGRESS NOTES
Problem: Mobility Impaired (Adult and Pediatric)  Goal: *Acute Goals and Plan of Care (Insert Text)  Physical Therapy Goals  Initiated 11/8/2017    1. Patient will move from supine to sit and sit to supine  in bed with maximal assistance within 4 days. 2. Patient will perform sit to stand with maximal assistance within 4 days. 3. Patient will ambulate with maximal assistance for 5 feet with the least restrictive device within 4 days. 4. Patient will verbalize and demonstrate understanding of total hip precautions per protocol within 4 days. 5. Patient will perform LE home exercise program per protocol with supervision/set-up within 4 days. physical Therapy TREATMENT  Patient: Carie Ospina (74 y.o. female)  Date: 11/9/2017  Diagnosis: Hip fracture (HCC)  Right Hip Fracture  right femoral neck fracture  Closed right hip fracture (HCC)  Procedure(s) (LRB):  RIGHT FEMORAL HEMIARTHROPLASTY (Right) 2 Days Post-Op  Precautions: Total hip    ASSESSMENT:  Patient received in supine, nearing end of Q6 pain medication window. Instructed patient, family on therapeutic exercises below in effort to improve pain tolerance, decrease edema, and ROM where indicated. Able to assess R knee ROM which she reports at baseline limited; this will certainly limit progress ahead as noted minimal flexion range yet full extension. Patient reports fairly sedentary activity PTA, sponge bathing and with large fear of falling which will further impact recovery. Will attempt mobility OOB next session. SNF recommended. Progression toward goals:  []      Improving appropriately and progressing toward goals  [x]      Improving slowly and progressing toward goals  []      Not making progress toward goals and plan of care will be adjusted     PLAN:  Patient continues to benefit from skilled intervention to address the above impairments. Continue treatment per established plan of care.   Discharge Recommendations:  Skilled Nursing Facility  Further Equipment Recommendations for Discharge:  defer     SUBJECTIVE:   Patient stated When you coming back?   The patient stated 2/3 hip precautions. Reviewed all 3 with patient.     OBJECTIVE DATA SUMMARY:   Critical Behavior:  Neurologic State: Alert  Orientation Level: Oriented X4  Cognition: Follows commands  Safety/Judgement: Awareness of environment, Fall prevention    Therapeutic Exercises: All 1 minute in length in effort to decrease edema, improve pain tolerance, ROM where applicable   SUPINE  EXERCISES   Sets   Reps   Active Active Assist   Passive Self ROM   Comments   Ankle Pumps   [x]                                           []                                           []                                           []                                              Quad Sets   [x]                                           []                                           []                                           []                                              Heel Slides   []                                           [x]                                           []                                           []                                           RLE only; notable knee restriction she reports at baseline    Hip Abduction   []                                           [x]                                           []                                           []                                              Hip External Rotation   []                                           [x]                                           []                                           []                                              Glut Sets   [x]                                           []                                           []                                           []                                              Bridging   [x]                                           [] []                                           []                                           Unable to lift off bed; did note gluteal contraction       []                                           []                                           []                                           []                                                Pain:  Pain Scale 1: Numeric (0 - 10)  Pain Intensity 1: 4  Pain Location 1: Hip  Pain Orientation 1: Right  Pain Description 1: Aching  Pain Intervention(s) 1: Cold pack;Ice;Repositioned  Activity Tolerance:   Pain limiting; RN notified. Please refer to the flowsheet for vital signs taken during this treatment.   After treatment:   []  Patient left in no apparent distress sitting up in chair  [x]  Patient left in no apparent distress in bed  [x]  Call bell left within reach  [x]  Nursing notified  [x]  Caregiver present  []  Bed alarm activated    COMMUNICATION/COLLABORATION:   The patients plan of care was discussed with: Registered Nurse    Unique Fowler, PT, DPT, KINJAL      Time Calculation: 22 mins

## 2017-11-09 NOTE — PROGRESS NOTES
11/9/2017  5:52 AM    Patient running irregular rhythm varying of Atrial Tach; frequent PACs; and 1 degree heart block. 11/9/2017  05:57 AM    Dr. Kaitlin Shannon, telehospitalists requests strips placed on chart for attending to note; no further orders at this time. If patient is in a sustained irregular rhythm, may get EKG. Will continue to monitor. 7:15 AM  Dr. Frances Magana paged for increasing heart rate; continued preSVT; with longer runs present. 7:30 AM  Dr. Frances Magana returned page; consult placed to cardiology. Call out to Dr. Lashon Hart at this time. 8:06 AM  Patient to receive 2.5 mg lopressor per day shift RN, CECILIA. Dr. Lashon Hart Paged again at this time.

## 2017-11-09 NOTE — PROGRESS NOTES
----- Message from Vikas Fields MD sent at 7/25/2017  7:38 AM EDT -----  Call.  No anemia.  Renal and liver test are basically normal.  Thyroid normal.  Cholesterol is still somewhat elevated 269 whereas 6 months ago was 285.  Triglycerides are still elevated.  This means that the elevated triglycerides represent an inherited type of elevated cholesterol and triglycerides.  Taking the omega-3 Fish oil I think is still a good idea.  Taking a statin to lower total cholesterol and perhaps triglycerides may also be a good idea.  Continue with a healthy changes that you have made.  We may just repeat these in 6 months and have this discussion again.  Regarding elevated blood glucose..  3 month blood sugar test, the A1c, is now 5.8 which is essentially normal.  This is a great accomplishment with your weight loss.  Keep up the good work.   Problem: Mobility Impaired (Adult and Pediatric)  Goal: *Acute Goals and Plan of Care (Insert Text)  Physical Therapy Goals  Initiated 11/8/2017    1. Patient will move from supine to sit and sit to supine  in bed with maximal assistance within 4 days. 2. Patient will perform sit to stand with maximal assistance within 4 days. 3. Patient will ambulate with maximal assistance for 5 feet with the least restrictive device within 4 days. 4. Patient will verbalize and demonstrate understanding of total hip precautions per protocol within 4 days. 5. Patient will perform LE home exercise program per protocol with supervision/set-up within 4 days. physical Therapy TREATMENT  Patient: Carie Ospina (4 y.o. female)  Date: 11/9/2017  Diagnosis: Hip fracture (HCC)  Right Hip Fracture  right femoral neck fracture  Closed right hip fracture (HCC)  Procedure(s) (LRB):  RIGHT FEMORAL HEMIARTHROPLASTY (Right) 2 Days Post-Op  Precautions: Fall, WBAT (posterior rodrigo hip)    ASSESSMENT:  Patient remains limited by significant pain, despite prior administration of medication by RN. Received in supine and more agreeable today, only minimally resistive throughout to movement. Educated on importance of time to relieve pain during transitions, and how patient herself should control movement to best control pain. She required max A and extra time to come to EOB, sliding LE's while maintaining posterior precautions then reaching across for bed-rail. She struggled to scoot despite education on techniques. Patient stood with total A of 2, total 1' with heavy L lateral lean and minimal BUE push through device without strong multi-modal cueing. At that point onward, patient unable to stand nor even bring bottom off bed despite total A of 2, raising bed height, and both pulling/pushing from RW device.  Patient with notable R hip IR in seated position when flexing knee to assist in easier stand, unable to correct to proper position as she reported baseline R>L knee pain which is likely also complicating performance. Returned to supine with total A where linens adjusted via rolling and concluded with all needs met. She has a long road ahead of her unless pain is more appropriately managed. Recommend SNF rehab. Progression toward goals:  []      Improving appropriately and progressing toward goals  [x]      Improving slowly and progressing toward goals  []      Not making progress toward goals and plan of care will be adjusted     PLAN:  Patient continues to benefit from skilled intervention to address the above impairments. Continue treatment per established plan of care. Discharge Recommendations:  [facility]  Further Equipment Recommendations for Discharge:  defer     SUBJECTIVE:   Patient stated That's not fair.     OBJECTIVE DATA SUMMARY:   Functional Mobility Training:  Bed Mobility:  Rolling: Maximum assistance;Assist x2; Additional time  Supine to Sit: Maximum assistance;Assist x2; Additional time  Sit to Supine: Total assistance;Assist x2  Scooting: Assist x1;Additional time; Moderate assistance        Transfers:  Sit to Stand: Total assistance;Assist x2 (stood x1 for 1' total; unable to clear bed other 3 attempts)  Stand to Sit: Total assistance;Assist x2        Bed to Chair:  (unable at this time)           Pain: Medicated prior by RN with appropriate wait time   Pain Scale 1: Numeric (0 - 10)  Pain Intensity 1: 4  Pain Location 1: Hip  Pain Orientation 1: Right  Pain Description 1: Aching  Pain Intervention(s) 1: Cold pack;Ice;Repositioned  Activity Tolerance:   POOR 2/2 R hip pain, R shoulder pain, baseline B knee pain R>L    Please refer to the flowsheet for vital signs taken during this treatment.   After treatment:   [] Patient left in no apparent distress sitting up in chair  [x] Patient left in no apparent distress in bed  [x] Call bell left within reach  [x] Nursing notified  [] Caregiver present  [] Bed alarm activated    COMMUNICATION/COLLABORATION:   The patients plan of care was discussed with: Occupational Therapist and Registered Nurse    Unique Fowler, PT, DPT, KINJAL      Time Calculation: 39 mins

## 2017-11-10 LAB
ABO + RH BLD: NORMAL
BASOPHILS # BLD: 0 K/UL
BASOPHILS NFR BLD: 0 %
BLD PROD TYP BPU: NORMAL
BLD PROD TYP BPU: NORMAL
BLOOD GROUP ANTIBODIES SERPL: NORMAL
BPU ID: NORMAL
BPU ID: NORMAL
CROSSMATCH RESULT,%XM: NORMAL
CROSSMATCH RESULT,%XM: NORMAL
DIFFERENTIAL METHOD BLD: ABNORMAL
EOSINOPHIL # BLD: 0 K/UL
EOSINOPHIL NFR BLD: 0 %
ERYTHROCYTE [DISTWIDTH] IN BLOOD BY AUTOMATED COUNT: 17 % (ref 11.5–14.5)
FERRITIN SERPL-MCNC: 485 NG/ML (ref 8–252)
FOLATE SERPL-MCNC: 22.5 NG/ML (ref 5–21)
GLUCOSE BLD STRIP.AUTO-MCNC: 213 MG/DL (ref 65–100)
GLUCOSE BLD STRIP.AUTO-MCNC: 241 MG/DL (ref 65–100)
GLUCOSE BLD STRIP.AUTO-MCNC: 252 MG/DL (ref 65–100)
GLUCOSE BLD STRIP.AUTO-MCNC: 272 MG/DL (ref 65–100)
HAPTOGLOB SERPL-MCNC: 204 MG/DL (ref 30–200)
HCT VFR BLD AUTO: 22.9 % (ref 35–47)
HGB BLD-MCNC: 7.6 G/DL (ref 11.5–16)
LYMPHOCYTES # BLD: 0.4 K/UL
LYMPHOCYTES NFR BLD: 24 %
MCH RBC QN AUTO: 27.5 PG (ref 26–34)
MCHC RBC AUTO-ENTMCNC: 33.2 G/DL (ref 30–36.5)
MCV RBC AUTO: 83 FL (ref 80–99)
MONOCYTES # BLD: 0.2 K/UL
MONOCYTES NFR BLD: 14 %
NEUTS BAND NFR BLD MANUAL: 10 %
NEUTS SEG # BLD: 1 K/UL
NEUTS SEG NFR BLD: 52 %
PLATELET # BLD AUTO: 87 K/UL (ref 150–400)
RBC # BLD AUTO: 2.76 M/UL (ref 3.8–5.2)
RBC MORPH BLD: ABNORMAL
SERVICE CMNT-IMP: ABNORMAL
SPECIMEN EXP DATE BLD: NORMAL
STATUS OF UNIT,%ST: NORMAL
STATUS OF UNIT,%ST: NORMAL
TOTAL CELLS COUNTED BLD: 50
UNIT DIVISION, %UDIV: 0
UNIT DIVISION, %UDIV: 0
VIT B12 SERPL-MCNC: 1658 PG/ML (ref 211–911)
WBC # BLD AUTO: 1.6 K/UL (ref 3.6–11)
WBC MORPH BLD: ABNORMAL

## 2017-11-10 PROCEDURE — 65660000000 HC RM CCU STEPDOWN

## 2017-11-10 PROCEDURE — 74011250636 HC RX REV CODE- 250/636: Performed by: INTERNAL MEDICINE

## 2017-11-10 PROCEDURE — 77030011943

## 2017-11-10 PROCEDURE — 90471 IMMUNIZATION ADMIN: CPT

## 2017-11-10 PROCEDURE — 82525 ASSAY OF COPPER: CPT | Performed by: INTERNAL MEDICINE

## 2017-11-10 PROCEDURE — 74011250637 HC RX REV CODE- 250/637: Performed by: PHYSICIAN ASSISTANT

## 2017-11-10 PROCEDURE — 77030034849

## 2017-11-10 PROCEDURE — 97110 THERAPEUTIC EXERCISES: CPT

## 2017-11-10 PROCEDURE — 36415 COLL VENOUS BLD VENIPUNCTURE: CPT | Performed by: INTERNAL MEDICINE

## 2017-11-10 PROCEDURE — 82607 VITAMIN B-12: CPT | Performed by: INTERNAL MEDICINE

## 2017-11-10 PROCEDURE — 85025 COMPLETE CBC W/AUTO DIFF WBC: CPT | Performed by: INTERNAL MEDICINE

## 2017-11-10 PROCEDURE — 51798 US URINE CAPACITY MEASURE: CPT

## 2017-11-10 PROCEDURE — 82746 ASSAY OF FOLIC ACID SERUM: CPT | Performed by: INTERNAL MEDICINE

## 2017-11-10 PROCEDURE — 82962 GLUCOSE BLOOD TEST: CPT

## 2017-11-10 PROCEDURE — 74011250636 HC RX REV CODE- 250/636: Performed by: ORTHOPAEDIC SURGERY

## 2017-11-10 PROCEDURE — 74011250637 HC RX REV CODE- 250/637: Performed by: ORTHOPAEDIC SURGERY

## 2017-11-10 PROCEDURE — 82728 ASSAY OF FERRITIN: CPT | Performed by: INTERNAL MEDICINE

## 2017-11-10 PROCEDURE — 74011636637 HC RX REV CODE- 636/637: Performed by: INTERNAL MEDICINE

## 2017-11-10 PROCEDURE — 74011250637 HC RX REV CODE- 250/637: Performed by: INTERNAL MEDICINE

## 2017-11-10 PROCEDURE — 97530 THERAPEUTIC ACTIVITIES: CPT

## 2017-11-10 PROCEDURE — 97116 GAIT TRAINING THERAPY: CPT

## 2017-11-10 PROCEDURE — 90686 IIV4 VACC NO PRSV 0.5 ML IM: CPT | Performed by: INTERNAL MEDICINE

## 2017-11-10 PROCEDURE — 83010 ASSAY OF HAPTOGLOBIN QUANT: CPT | Performed by: INTERNAL MEDICINE

## 2017-11-10 RX ORDER — ENOXAPARIN SODIUM 100 MG/ML
40 INJECTION SUBCUTANEOUS DAILY
Qty: 8 ML | Refills: 0 | Status: SHIPPED
Start: 2017-11-10 | End: 2017-11-30

## 2017-11-10 RX ORDER — AMOXICILLIN 250 MG
1 CAPSULE ORAL 2 TIMES DAILY
Qty: 30 TAB | Refills: 0 | Status: ON HOLD
Start: 2017-11-10 | End: 2018-01-15

## 2017-11-10 RX ORDER — FERROUS SULFATE, DRIED 160(50) MG
1 TABLET, EXTENDED RELEASE ORAL
Qty: 30 TAB | Refills: 0 | Status: SHIPPED
Start: 2017-11-10 | End: 2018-01-22

## 2017-11-10 RX ORDER — AMOXICILLIN 250 MG
1 CAPSULE ORAL DAILY
Qty: 30 TAB | Refills: 0 | Status: SHIPPED | OUTPATIENT
Start: 2017-11-10

## 2017-11-10 RX ORDER — POLYETHYLENE GLYCOL 3350 17 G/17G
17 POWDER, FOR SOLUTION ORAL
Qty: 15 PACKET | Refills: 0 | Status: SHIPPED
Start: 2017-11-10 | End: 2017-11-25

## 2017-11-10 RX ADMIN — Medication 10 ML: at 06:57

## 2017-11-10 RX ADMIN — LEVOTHYROXINE SODIUM 88 MCG: 100 TABLET ORAL at 06:55

## 2017-11-10 RX ADMIN — CALCIUM CARBONATE 500 MG (1,250 MG)-VITAMIN D3 200 UNIT TABLET 1 TABLET: at 09:13

## 2017-11-10 RX ADMIN — ENOXAPARIN SODIUM 40 MG: 40 INJECTION SUBCUTANEOUS at 08:59

## 2017-11-10 RX ADMIN — ACETAMINOPHEN 650 MG: 500 TABLET ORAL at 17:13

## 2017-11-10 RX ADMIN — FLUOXETINE HYDROCHLORIDE 20 MG: 20 CAPSULE ORAL at 08:59

## 2017-11-10 RX ADMIN — POLYETHYLENE GLYCOL 3350 17 G: 17 POWDER, FOR SOLUTION ORAL at 09:00

## 2017-11-10 RX ADMIN — DOCUSATE SODIUM AND SENNOSIDES 1 TABLET: 8.6; 5 TABLET, FILM COATED ORAL at 08:58

## 2017-11-10 RX ADMIN — DOCUSATE SODIUM AND SENNOSIDES 1 TABLET: 8.6; 5 TABLET, FILM COATED ORAL at 17:13

## 2017-11-10 RX ADMIN — Medication 10 ML: at 20:51

## 2017-11-10 RX ADMIN — HYDROCODONE BITARTRATE AND ACETAMINOPHEN 1 TABLET: 7.5; 325 TABLET ORAL at 20:51

## 2017-11-10 RX ADMIN — TBO-FILGRASTIM 480 MCG: 480 INJECTION, SOLUTION SUBCUTANEOUS at 17:21

## 2017-11-10 RX ADMIN — INSULIN LISPRO 3 UNITS: 100 INJECTION, SOLUTION INTRAVENOUS; SUBCUTANEOUS at 09:00

## 2017-11-10 RX ADMIN — CARVEDILOL 6.25 MG: 6.25 TABLET, FILM COATED ORAL at 17:14

## 2017-11-10 RX ADMIN — INSULIN LISPRO 5 UNITS: 100 INJECTION, SOLUTION INTRAVENOUS; SUBCUTANEOUS at 12:52

## 2017-11-10 RX ADMIN — INFLUENZA VIRUS VACCINE 0.5 ML: 15; 15; 15; 15 SUSPENSION INTRAMUSCULAR at 18:44

## 2017-11-10 RX ADMIN — CARVEDILOL 6.25 MG: 6.25 TABLET, FILM COATED ORAL at 09:13

## 2017-11-10 RX ADMIN — MORPHINE SULFATE 2 MG: 4 INJECTION INTRAVENOUS at 12:29

## 2017-11-10 RX ADMIN — CALCIUM CARBONATE 500 MG (1,250 MG)-VITAMIN D3 200 UNIT TABLET 1 TABLET: at 17:13

## 2017-11-10 RX ADMIN — ACETAMINOPHEN 650 MG: 500 TABLET ORAL at 12:29

## 2017-11-10 RX ADMIN — CALCIUM CARBONATE 500 MG (1,250 MG)-VITAMIN D3 200 UNIT TABLET 1 TABLET: at 12:29

## 2017-11-10 RX ADMIN — INSULIN LISPRO 5 UNITS: 100 INJECTION, SOLUTION INTRAVENOUS; SUBCUTANEOUS at 17:12

## 2017-11-10 RX ADMIN — PANTOPRAZOLE SODIUM 40 MG: 40 TABLET, DELAYED RELEASE ORAL at 09:17

## 2017-11-10 RX ADMIN — ACETAMINOPHEN 650 MG: 500 TABLET ORAL at 06:55

## 2017-11-10 NOTE — PROGRESS NOTES
Problem: Mobility Impaired (Adult and Pediatric)  Goal: *Acute Goals and Plan of Care (Insert Text)  Physical Therapy Goals  Initiated 11/8/2017    1. Patient will move from supine to sit and sit to supine  in bed with maximal assistance within 4 days. 2. Patient will perform sit to stand with maximal assistance within 4 days. 3. Patient will ambulate with maximal assistance for 5 feet with the least restrictive device within 4 days. 4. Patient will verbalize and demonstrate understanding of total hip precautions per protocol within 4 days. 5. Patient will perform LE home exercise program per protocol with supervision/set-up within 4 days. physical Therapy TREATMENT  Patient: Zeina Matamoros (81 y.o. female)  Date: 11/10/2017  Diagnosis: Hip fracture (HCC)  Right Hip Fracture  right femoral neck fracture  Closed right hip fracture (HCC)  Procedure(s) (LRB):  RIGHT FEMORAL HEMIARTHROPLASTY (Right) 3 Days Post-Op  Precautions: Total hip  Chart, physical therapy assessment, plan of care and goals were reviewed. ASSESSMENT:  Pt cleared by nurse to mobilize. Pt received in bed. Pt agreeable to walking with therapy. Pt performed supine to sit at max A. Pt able to help more compared to morning session but still requiring max A for trunk. Pt with improved sitting balance sitting equally on both hips. Pt performed sit to stand at min A x2 with cueing for hand placement  for sequencing. Pt ambulated 15ft with RW at [id number]. Pt requiring less cueing for sequencing   this afternoon. Pt able to manage walker with no assistance. Pt performed sit to supine at mod A of LE. Pt with improved mobility requiring  less assistance. Pt will benefit from SNF to improve strength and mobility.    Progression toward goals:  []      Improving appropriately and progressing toward goals  [x]      Improving slowly and progressing toward goals  []      Not making progress toward goals and plan of care will be adjusted     PLAN:  Patient continues to benefit from skilled intervention to address the above impairments. Continue treatment per established plan of care. Discharge Recommendations:  Skilled Nursing Facility  Further Equipment Recommendations for Discharge:  TBD by SNF     SUBJECTIVE:   Patient stated It getting easier to walk .     OBJECTIVE DATA SUMMARY:   Critical Behavior:  Neurologic State: Alert, Appropriate for age  Orientation Level: Oriented X4  Cognition: Follows commands  Safety/Judgement: Awareness of environment, Fall prevention  Functional Mobility Training:  Bed Mobility:     Supine to Sit: Maximum assistance  Sit to Supine: Total assistance  Scooting: Moderate assistance         Transfers:  Sit to Stand: Minimum assistance;Assist x2  Stand to Sit: Contact guard assistance        Bed to Chair: Contact guard assistance (constant VC for seqencing )                    Balance:  Sitting: Intact  Standing: Intact  Standing - Static: Good  Standing - Dynamic : Fair  Ambulation/Gait Training:  Distance (ft): 15 Feet (ft)  Assistive Device: Gait belt;Walker, rolling  Ambulation - Level of Assistance: Contact guard assistance        Gait Abnormalities: Antalgic;Decreased step clearance        Base of Support: Narrowed  Stance: Right decreased  Speed/Daria: Slow  Step Length: Right shortened;Left shortened                  Pain:   Pt only  complained of pain with bed mobility. 5/10                 Activity Tolerance:   Pt with improved mobility from morning session.      After treatment:   [] Patient left in no apparent distress sitting up in chair  [x] Patient left in no apparent distress in bed  [x] Call bell left within reach  [x] Nursing notified  [] Caregiver present  [] Bed alarm activated    COMMUNICATION/COLLABORATION:   The patients plan of care was discussed with: Registered Nurse    Sidney Rosas   Time Calculation: 13 mins

## 2017-11-10 NOTE — PROGRESS NOTES
Problem: Self Care Deficits Care Plan (Adult)  Goal: *Acute Goals and Plan of Care (Insert Text)  Occupational Therapy Goals  Initiated 11/9/2017     1. Patient will perform lower body dressing using Reacher Stocking Aid and Dressing Stick at minimal assistance/contact guard assist level within 7 days. 2. Patient will perform toilet transfers at moderate assistance  level using Walkers, Type: Rolling Walker  within 7 days. 3. Patient will perform all aspects of toileting at minimal assistance/contact guard assist level within 7 days. 4. Patient will demonstrate 3/3 hip precautions without cues within 7 days. Occupational Therapy TREATMENT  Patient: Gil Addison (60 y.o. female)  Date: 11/10/2017  Diagnosis: Hip fracture (HCC)  Right Hip Fracture  right femoral neck fracture  Closed right hip fracture (HCC)  Procedure(s) (LRB):  RIGHT FEMORAL HEMIARTHROPLASTY (Right) 3 Days Post-Op  Precautions: Total hip    ASSESSMENT:  patient received supine, issued yellow theraband and educated pt on UE exercises to perform to increase B UE strength in preparation for ADLs and transfers. Pt has decreased R shoulder flexion, noted R shoulder negative for fx but with possible rotator cuff thinning or cuff tear (possibly supraspinatus). Limited with shoulder flexion on R due to pain and weakness. Instructed pt to perform 10 reps, pain free, 2x/day. She will need SNF rehab at discharge. Progression toward goals:  []       Improving appropriately and progressing toward goals  [x]       Improving slowly and progressing toward goals  []       Not making progress toward goals and plan of care will be adjusted     PLAN:  Patient continues to benefit from skilled intervention to address the above impairments. Continue treatment per established plan of care.   Discharge Recommendations:  Skilled Nursing Facility  Further Equipment Recommendations for Discharge:  TBD     SUBJECTIVE:   Patient stated Gracie Mora had a hard time lifting this arm to put dinner plates away at home.     OBJECTIVE DATA SUMMARY:   Cognitive/Behavioral Status:  Neurologic State: Alert; Appropriate for age  Orientation Level: Oriented X4  Cognition: Follows commands                Therapeutic Exercises:   Issued yellow therband. EXERCISE   Sets   Reps   Active Active Assist   Passive   Comments   Shoulder Flexion 1 10 [x]               []               []               Limited R, decreased to 5 reps   Shoulder horizontal abduction 1 10 [x]               []               []                  Elbow external rotation 1 10 [x]               [x]               []               AAROM on R, removal of band and performed against gravity   Chest pulls 1 10 [x]               []               []                  Pain:                    Activity Tolerance:   VSS  Please refer to the flowsheet for vital signs taken during this treatment.   After treatment:   [] Patient left in no apparent distress sitting up in chair  [x] Patient left in no apparent distress in bed  [x] Call bell left within reach  [x] Nursing notified  [] Caregiver present  [] Bed alarm activated    COMMUNICATION/COLLABORATION:   The patients plan of care was discussed with: Physical Therapy Assistant and Registered Nurse    Vicenta Ardon OT  Time Calculation: 20 mins

## 2017-11-10 NOTE — PROGRESS NOTES
Problem: Mobility Impaired (Adult and Pediatric)  Goal: *Acute Goals and Plan of Care (Insert Text)  Physical Therapy Goals  Initiated 11/8/2017    1. Patient will move from supine to sit and sit to supine  in bed with maximal assistance within 4 days. 2. Patient will perform sit to stand with maximal assistance within 4 days. 3. Patient will ambulate with maximal assistance for 5 feet with the least restrictive device within 4 days. 4. Patient will verbalize and demonstrate understanding of total hip precautions per protocol within 4 days. 5. Patient will perform LE home exercise program per protocol with supervision/set-up within 4 days. physical Therapy TREATMENT  Patient: Austen Michaels (5 y.o. female)  Date: 11/10/2017  Diagnosis: Hip fracture (HCC)  Right Hip Fracture  right femoral neck fracture  Closed right hip fracture (HCC)  Procedure(s) (LRB):  RIGHT FEMORAL HEMIARTHROPLASTY (Right) 3 Days Post-Op  Precautions: Total hip  Chart, physical therapy assessment, plan of care and goals were reviewed. ASSESSMENT:  Pt cleared by nurse to mobilize Pt received in bed supine. Pt agreeable to therapy and educated that she will have pain but needs to move and get out of bed. Pt performed sit to supine at max A with cueing for hand placement and sequencing. Pt able to sit EOB unsupported but leaning on L hip due to pain of R hip. Pt able to improve with time. Pt performed x3 sit to stand transfer at mod A x2. Pt requring constant verbal cueing for sequencing to perform. Pt can't perform correctly without cueing. Pt ambulated 8 ft and 10ft at [facility] with constant cueing for sequencing with walker. Pt requiring  assistance to move walker appropriate  distance. Pt reported pain was bearable. Pt able to sit up in chair at end of session. Pt will benefit from SNF to improve mobility and strength.      Progression toward goals:  []      Improving appropriately and progressing toward goals  [x]      Improving slowly and progressing toward goals  []      Not making progress toward goals and plan of care will be adjusted     PLAN:  Patient continues to benefit from skilled intervention to address the above impairments. Continue treatment per established plan of care. [facility] Recommendations:  [facility]  Further Equipment Recommendations for Discharge:  TBD by SNF     SUBJECTIVE:   Patient stated Its not as bad as yesterday. refering to pain    OBJECTIVE DATA SUMMARY:   Critical Behavior:  Neurologic State: Alert, Appropriate for age  Orientation Level: Oriented X4  Cognition: Follows commands  Safety/Judgement: Awareness of environment, Fall prevention  Functional Mobility Training:  Bed Mobility:     Supine to Sit: Maximum assistance  Sit to Supine: Total assistance  Scooting: Minimum assistance;Assist x1         Transfers:  Sit to Stand: Moderate assistance;Assist x2  Stand to Sit: Contact guard assistance        Bed to Chair: Contact guard assistance (constant VC for seqencing )                    Balance:  Sitting: Intact (leaning on L hip due to pain)  Standing: Intact; With support  Standing - Static: Good  Standing - Dynamic : Fair  Ambulation/Gait Training:  Distance (ft): 18 Feet (ft)  Assistive Device: Gait belt;Walker, rolling  Ambulation - Level of Assistance: Contact guard assistance        Gait Abnormalities: Antalgic;Decreased step clearance        Base of Support: Narrowed  Stance: Right decreased  Speed/Daria: Slow  Step Length: Right shortened;Left shortened              Pain:      Pt reported pain was bearable. Activity Tolerance:   Pt with imporved activity tolerance today.    After treatment:   [x] Patient left in no apparent distress sitting up in chair  [] Patient left in no apparent distress in bed  [x] Call bell left within reach  [x] Nursing notified  [] Caregiver present  [] Bed alarm activated    COMMUNICATION/COLLABORATION:   The patients plan of care was discussed with: Registered Nurse    Roxanne Mix   Time Calculation: 26 mins

## 2017-11-10 NOTE — PROGRESS NOTES
Bedside and Verbal shift change report given to Daniela RN (oncoming nurse) by Genny Rabago RN (offgoing nurse). Report included the following information SBAR, Intake/Output, MAR and Recent Results.

## 2017-11-10 NOTE — DIABETES MGMT
DTC Progress Note    Recommendations/ Comments: Please consider beginning lantus 15units daily to aid in glucose control. Pt BG remains elevated and has required 17 units of correction over the past 24 hours. Chart reviewed on [facility]. Patient is a 80 y.o. female with known Type 2 Diabetes on metformin 1000mg ac b/d at home. A1c:   No results found for: HBA1C, HGBE8, HUZ4ADVL, CGC7MDAM    Recent Glucose Results:   Lab Results   Component Value Date/Time    GLUCPOC 272 (H) 11/10/2017 11:42 AM    GLUCPOC 213 (H) 11/10/2017 07:54 AM    GLUCPOC 232 (H) 11/09/2017 09:11 PM        Lab Results   Component Value Date/Time    Creatinine 0.84 11/09/2017 05:14 AM     Estimated Creatinine Clearance: 57.7 mL/min (based on Cr of 0.84). Active Orders   Diet    DIET DIABETIC CONSISTENT CARB Regular; Neutropenic        PO intake:   Patient Vitals for the past 72 hrs:   % Diet Eaten   11/10/17 0636 100 %   11/08/17 1842 45 %   11/08/17 1300 20 %       Current hospital DM medication: humalog correction    Will continue to follow as needed.     Thank you    Abbe Richards, [address] [place] [address], [address]  Office: [phone]

## 2017-11-10 NOTE — PROGRESS NOTES
Pt possible discharge tomorrow to [facility] once she is able to void. Sofie made aware of possible discharge tomorrow. Pt will need ambulance transport at discharge.     ISHMAEL Freedman  Ext [phone]

## 2017-11-10 NOTE — ROUTINE PROCESS
Pt. Temp 99.9 WBC 1.2 Abs. Neutrophil 1.2  paged on-call Dr. Meredith Michaels ordered to give tylenol and monitor patient if temp increases above 100.4 then to draw blood cultures and obtain urine sample pt.  Resting quietly

## 2017-11-10 NOTE — PROGRESS NOTES
Hospitalist Progress Note    NAME: Zeina Matamoros   :  1935   MRN:  [id number]       Assessment / Plan:  R hip fracture from mechanical fall  -pt at high risk for infection perioperatively given he neutropenia. Pt on neutropenic precaution  -s/p R femoral hemiarthroplasty  -pain and DVT prophylaxis per ortho (pain med reduced)  -not able to have much movement due to pain  -PT/OT, likely SNF on discharge  -ortho following    Urinary retention  -walters removed last night, but pt still has urinary retention. I expect once she has more mobility, she will be able to urinate on her own. Will cont' to monitor.    -bladder scan prn    PSVT  -likely induced by stress, and acute illness, resolved  -resume home med coreg    NHL  Neutropenia  Anemia  Fever  -chemotherapy outpt several weeks ago  -s/p 1 unit PRBC (fever during transfusion), cont' Granix   -UA neg, Bcx obtained during fever ntd  -Tm 99.9 overnight. No obvious source of infection. Pt appears non-toxic.    -appreciate oncology following (outpt f/u with Dr America Jacobo)  Discussed with Dr Ancelmo Morgan    HTN  Chronic systolic heart failure  -stop IVF, encourage PO intake  -cont' coreg, hydralazine prn     T2DM  -cont' SSI    Hypothyroidism  -cont' synthroid    Recent skin lesion  -s/p \"mole\" excision left malar region on 17. Healing nicely. Body mass index is 23.57 kg/(m^2).       Code Status: full  Surrogate Decision Maker: tawny Fernandez [phone]   DVT Prophylaxis: per ortho  Baseline: independent     Subjective:     Chief Complaint / Reason for Physician Visit  Pt seen at bedside. Still not much movement due to pain. Tm 99.9 overnight. Walters removed yesterday, failed voiding trial, required in/out cath overnight. Discussed with RN events overnight.      Review of Systems:  Symptom Y/N Comments  Symptom Y/N Comments   Fever/Chills n   Chest Pain n    Poor Appetite    Edema     Cough    Abdominal Pain n    Sputum    Joint Pain y    SOB/MUSE n Pruritis/Rash     Nausea/vomit    Tolerating PT/OT     Diarrhea    Tolerating Diet     Constipation    Other       Could NOT obtain due to:      Objective:     VITALS:   Last 24hrs VS reviewed since prior progress note. Most recent are:  Patient Vitals for the past 24 hrs:   Temp Pulse Resp BP SpO2   11/10/17 0636 98.2 °F (36.8 °C) 82 18 121/72 96 %   11/10/17 0214 98.7 °F (37.1 °C) 89 18 131/56 98 %   11/09/17 2145 99.9 °F (37.7 °C) - - - -   11/09/17 2113 99.6 °F (37.6 °C) 80 18 123/49 96 %   11/09/17 1717 98.9 °F (37.2 °C) 84 18 122/44 98 %   11/09/17 1453 98.7 °F (37.1 °C) 84 18 105/54 95 %       Intake/Output Summary (Last 24 hours) at 11/10/17 1025  Last data filed at 11/10/17 0636   Gross per 24 hour   Intake              240 ml   Output              950 ml   Net             -710 ml        PHYSICAL EXAM:  General: WD, WN. Alert, cooperative, no acute distress    EENT:  EOMI. Anicteric sclerae. MMM  Resp:  CTA bilaterally, no wheezing or rales. No accessory muscle use  CV:  Regular  rhythm,  No edema  GI:  Soft, Non distended, Non tender.  +Bowel sounds  Neurologic:  Alert and oriented X 3, normal speech,   Psych:   Good insight. Not anxious nor agitated  Skin:  No rashes. No jaundice    Reviewed most current lab test results and cultures  YES  Reviewed most current radiology test results   YES  Review and summation of old records today    NO  Reviewed patient's current orders and MAR    YES  PMH/ reviewed - no change compared to H&P  ________________________________________________________________________  Care Plan discussed with:    Comments   Patient x    Family      RN x    Care Manager     Consultant                       x Multidiciplinary team rounds were held today with , nursing, pharmacist and clinical coordinator. Patient's plan of care was discussed; medications were reviewed and discharge planning was addressed. ________________________________________________________________________  Total NON critical care TIME:  35   Minutes    Total CRITICAL CARE TIME Spent:   Minutes non procedure based      Comments   >50% of visit spent in counseling and coordination of care     ________________________________________________________________________  Meenakshi Interiano MD     Procedures: see electronic medical records for all procedures/Xrays and details which were not copied into this note but were reviewed prior to creation of Plan. LABS:  I reviewed today's most current labs and imaging studies.   Pertinent labs include:  Recent Labs      11/10/17   0449  11/09/17 0514 11/08/17 0245   WBC  1.6*  1.6*  1.6*   HGB  7.6*  7.8*  6.9*   HCT  22.9*  23.6*  20.7*   PLT  87*  71*  81*     Recent Labs      11/09/17 0514 11/08/17   0245   NA  136  134*   K  3.8  4.1   CL  104  103   CO2  26  25   GLU  134*  188*   BUN  19  23*   CREA  0.84  1.04*   CA  8.0*  7.8*   MG  2.0   --        Signed: Meenakshi Interiano MD

## 2017-11-10 NOTE — PROGRESS NOTES
Spoke with MD Niharika Dias about patient having not voided and bladder scan amount 600, advised to place walters since patient had been bladder trained 3 times since admission. Walters placed per MD, 600 output in bag.

## 2017-11-10 NOTE — INTERDISCIPLINARY ROUNDS
Patient's course of treatment, hospital stay and discharge planning were discussed by the surgical interdisciplinary team which includes, nursing, nurse manager, nurse practitioner, care management, rehab therapy representative, rehab liason,and pharmacy. Plan to discharge to [facility] tomorrow. Waiting for urinate, trying to avoid a walters to due to patient neutropenia.

## 2017-11-10 NOTE — PROGRESS NOTES
POD 3 Days Post-Op    Procedure:  Procedure(s):  RIGHT FEMORAL HEMIARTHROPLASTY    Subjective:     Patient doing well, complaining of appropriate post-op pain    Objective:     Blood pressure 121/72, pulse 82, temperature 98.2 °F (36.8 °C), resp. rate 18, height 5' 11\" (1.803 m), weight 76.7 kg (169 lb), SpO2 96 %. Temp (24hrs), Av °F (37.2 °C), Min:98.2 °F (36.8 °C), Max:99.9 °F (37.7 °C)      Physical Exam:  Examination of the right hip reveals that the incision is clean, dry and intact. Sensation is intact to light touch.  mild swelling. No calf pain.   NVSI    Labs:   Lab Results   Component Value Date/Time    HGB 7.6 11/10/2017 04:49 AM         Assessment:     Principal Problem:    Closed right hip fracture ([facility] [address] (2017)    Active Problems:    Hip fracture ([facility] [address] (2017)        S/p R HIP HEMIARTHROPLASTY    Plan/Recommendations/Medical Decision Making:     - pain control - oxy  - ice   - pt/ot - wbat, hip precautions  - dvt prophylaxis - lovenox  - d/c planning - snf/rehab        Shania Duckworth DO

## 2017-11-10 NOTE — ROUTINE PROCESS
Bedside and Verbal shift change report given to [facility] RN (oncoming nurse) by Lucas Ramsey RN (offgoing nurse). Report included the following information SBAR, Kardex, ED Summary, OR Summary, Procedure Summary, Intake/Output, MAR, Accordion, Recent Results, Med Rec Status and Cardiac Rhythm NSR.

## 2017-11-10 NOTE — PROGRESS NOTES
Hematology Oncology Progress Note       Follow up for: angioimmunoblastic lymphoma, T cell type    Chart notes reviewed since last visit. Case discussed with following: patient and daughter present in room. .    Patient complains of the following: in good spirits. Believes she might be transferred to [facility] rehab soon and is pleased. Issue thus far is ability to urinate on own. Sat up in chair for lunch. Additional concerns noted by the staff:     Patient Vitals for the past 24 hrs:   BP Temp Pulse Resp SpO2   11/10/17 1454 106/52 98.1 °F (36.7 °C) 78 18 96 %   11/10/17 1147 119/56 98.3 °F (36.8 °C) 85 18 98 %   11/10/17 0636 121/72 98.2 °F (36.8 °C) 82 18 96 %   11/10/17 0214 131/56 98.7 °F (37.1 °C) 89 18 98 %   11/09/17 2145 - 99.9 °F (37.7 °C) - - -   11/09/17 2113 123/49 99.6 °F (37.6 °C) 80 18 96 %   11/09/17 1717 122/44 98.9 °F (37.2 °C) 84 18 98 %       ROS negative for 10 organ systems except pain related to hip fracture. Mild weakness. Physical Examination:  Constitutional Alert, cooperative, oriented. Mood and affect appropriate. Appears close to chronological age. Well nourished. Well developed. Head Normocephalic; no scars   Eyes Conjunctivae and sclerae are clear and without icterus. Pupils are reactive and equal.   ENMT Sinuses are nontender. No oral exudates, ulcers, masses, thrush or mucositis. Oropharynx clear. Tongue normal.   Neck Supple without masses or thyromegaly. No jugular venous distension. Hematologic/Lymphatic No petechiae or purpura. No tender or palpable lymph nodes noted. Respiratory Lungs are clear to auscultation without rhonchi or wheezing. Cardiovascular Regular rate and rhythm of heart without murmurs, gallops or rubs. Chest / Line Site Chest is symmetric with no chest wall deformities. Abdomen Non-tender, non-distended, no masses, ascites or hepatosplenomegaly. Good bowel sounds. No guarding or rebound tenderness. No pulsatile masses. Musculoskeletal No tenderness or swelling, normal range of motion without obvious weakness. Extremities No visible deformities, no cyanosis, clubbing or edema. Bandage over right upper hip region   Skin No rashes, scars, or lesions suggestive of malignancy. No petechiae, purpura, or ecchymoses. No excoriations. Neurologic No sensory or motor deficits noted. Psychiatric Alert and oriented . Coherent speech. Verbalizes understanding of our discussions today. Labs:  Recent Results (from the past 24 hour(s))   GLUCOSE, POC    Collection Time: 11/09/17  4:24 PM   Result Value Ref Range    Glucose (POC) 343 (H) 65 - 100 mg/dL    Performed by Katty Casper    GLUCOSE, POC    Collection Time: 11/09/17  9:11 PM   Result Value Ref Range    Glucose (POC) 232 (H) 65 - 100 mg/dL    Performed by Katty Casper    CBC WITH AUTOMATED DIFF    Collection Time: 11/10/17  4:49 AM   Result Value Ref Range    WBC 1.6 (L) 3.6 - 11.0 K/uL    RBC 2.76 (L) 3.80 - 5.20 M/uL    HGB 7.6 (L) 11.5 - 16.0 g/dL    HCT 22.9 (L) 35.0 - 47.0 %    MCV 83.0 80.0 - 99.0 FL    MCH 27.5 26.0 - 34.0 PG    MCHC 33.2 30.0 - 36.5 g/dL    RDW 17.0 (H) 11.5 - 14.5 %    PLATELET 87 (L) 404 - 400 K/uL    NEUTROPHILS 52 %    BAND NEUTROPHILS 10 %    LYMPHOCYTES 24 %    MONOCYTES 14 %    EOSINOPHILS 0 %    BASOPHILS 0 %    TOTAL CELLS COUNTED: 50      ABS. NEUTROPHILS 1.0 K/UL    ABS. LYMPHOCYTES 0.4 K/UL    ABS. MONOCYTES 0.2 K/UL    ABS. EOSINOPHILS 0.0 K/UL    ABS.  BASOPHILS 0.0 K/UL    RBC COMMENTS ANISOCYTOSIS  1+        RBC COMMENTS POLYCHROMASIA  PRESENT        RBC COMMENTS DECREASED PLATELETS      WBC COMMENTS TOXIC GRANULATION      DF MANUAL     VITAMIN B12    Collection Time: 11/10/17  4:49 AM   Result Value Ref Range    Vitamin B12 1658 (H) 211 - 911 pg/mL   FOLATE    Collection Time: 11/10/17  4:49 AM   Result Value Ref Range    Folate 22.5 (H) 5.0 - 21.0 ng/mL   FERRITIN    Collection Time: 11/10/17  4:49 AM   Result Value Ref Range Ferritin 485 (H) 8 - 252 NG/ML   HAPTOGLOBIN    Collection Time: 11/10/17  4:49 AM   Result Value Ref Range    Haptoglobin 204 (H) 30 - 200 mg/dL   GLUCOSE, POC    Collection Time: 11/10/17  7:54 AM   Result Value Ref Range    Glucose (POC) 213 (H) 65 - 100 mg/dL    Performed by Kervin Shultz, POC    Collection Time: 11/10/17 11:42 AM   Result Value Ref Range    Glucose (POC) 272 (H) 65 - 100 mg/dL    Performed by Mane Escalante        Assessment and Plan:   NHL, 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 Cytopenias - she has not received chemo since August so current low counts are worrisome. On granix to boost white count with goal to maintain ANC > 1000. Got one unit PRBCs - hgb better but marginal.  Not b12, folate or iron deficient    Right hip fracture s/pright hip hemiarthroplasty using De[employer]uy centeter [place] bipolar device. Fever during transfusion. BC NGSF    HTN    Chronic CHF - coreg, hydralazine as needed.      Type 2 DM - SSI ordered

## 2017-11-11 LAB
BASOPHILS # BLD: 0 K/UL (ref 0–0.1)
BASOPHILS NFR BLD: 0 % (ref 0–1)
EOSINOPHIL # BLD: 0 K/UL (ref 0–0.4)
EOSINOPHIL NFR BLD: 0 % (ref 0–7)
ERYTHROCYTE [DISTWIDTH] IN BLOOD BY AUTOMATED COUNT: 17.2 % (ref 11.5–14.5)
GLUCOSE BLD STRIP.AUTO-MCNC: 185 MG/DL (ref 65–100)
GLUCOSE BLD STRIP.AUTO-MCNC: 247 MG/DL (ref 65–100)
GLUCOSE BLD STRIP.AUTO-MCNC: 265 MG/DL (ref 65–100)
GLUCOSE BLD STRIP.AUTO-MCNC: 311 MG/DL (ref 65–100)
HCT VFR BLD AUTO: 22.3 % (ref 35–47)
HGB BLD-MCNC: 7.4 G/DL (ref 11.5–16)
LYMPHOCYTES # BLD: 0.2 K/UL (ref 0.8–3.5)
LYMPHOCYTES NFR BLD: 16 % (ref 12–49)
MCH RBC QN AUTO: 27.5 PG (ref 26–34)
MCHC RBC AUTO-ENTMCNC: 33.2 G/DL (ref 30–36.5)
MCV RBC AUTO: 82.9 FL (ref 80–99)
MONOCYTES # BLD: 0.2 K/UL (ref 0–1)
MONOCYTES NFR BLD: 16 % (ref 5–13)
NEUTS SEG # BLD: 1.1 K/UL (ref 1.8–8)
NEUTS SEG NFR BLD: 68 % (ref 32–75)
PLATELET # BLD AUTO: 92 K/UL (ref 150–400)
RBC # BLD AUTO: 2.69 M/UL (ref 3.8–5.2)
RBC MORPH BLD: ABNORMAL
RBC MORPH BLD: ABNORMAL
SERVICE CMNT-IMP: ABNORMAL
WBC # BLD AUTO: 1.5 K/UL (ref 3.6–11)
WBC MORPH BLD: ABNORMAL

## 2017-11-11 PROCEDURE — 65660000000 HC RM CCU STEPDOWN

## 2017-11-11 PROCEDURE — 36415 COLL VENOUS BLD VENIPUNCTURE: CPT | Performed by: INTERNAL MEDICINE

## 2017-11-11 PROCEDURE — 74011250637 HC RX REV CODE- 250/637: Performed by: PHYSICIAN ASSISTANT

## 2017-11-11 PROCEDURE — 74011250636 HC RX REV CODE- 250/636: Performed by: INTERNAL MEDICINE

## 2017-11-11 PROCEDURE — 74011000250 HC RX REV CODE- 250: Performed by: INTERNAL MEDICINE

## 2017-11-11 PROCEDURE — 97116 GAIT TRAINING THERAPY: CPT

## 2017-11-11 PROCEDURE — 74011250636 HC RX REV CODE- 250/636: Performed by: ORTHOPAEDIC SURGERY

## 2017-11-11 PROCEDURE — 74011250637 HC RX REV CODE- 250/637: Performed by: ORTHOPAEDIC SURGERY

## 2017-11-11 PROCEDURE — 74011636637 HC RX REV CODE- 636/637: Performed by: INTERNAL MEDICINE

## 2017-11-11 PROCEDURE — 74011250637 HC RX REV CODE- 250/637: Performed by: INTERNAL MEDICINE

## 2017-11-11 PROCEDURE — 82962 GLUCOSE BLOOD TEST: CPT

## 2017-11-11 PROCEDURE — 85025 COMPLETE CBC W/AUTO DIFF WBC: CPT | Performed by: INTERNAL MEDICINE

## 2017-11-11 RX ORDER — SODIUM CHLORIDE 9 MG/ML
50 INJECTION, SOLUTION INTRAVENOUS CONTINUOUS
Status: DISCONTINUED | OUTPATIENT
Start: 2017-11-11 | End: 2017-11-11

## 2017-11-11 RX ORDER — SODIUM CHLORIDE 9 MG/ML
50 INJECTION, SOLUTION INTRAVENOUS CONTINUOUS
Status: DISPENSED | OUTPATIENT
Start: 2017-11-11 | End: 2017-11-11

## 2017-11-11 RX ADMIN — SODIUM CHLORIDE 50 ML/HR: 900 INJECTION, SOLUTION INTRAVENOUS at 12:25

## 2017-11-11 RX ADMIN — LEVOTHYROXINE SODIUM 88 MCG: 100 TABLET ORAL at 07:09

## 2017-11-11 RX ADMIN — Medication 10 ML: at 00:17

## 2017-11-11 RX ADMIN — DOCUSATE SODIUM AND SENNOSIDES 1 TABLET: 8.6; 5 TABLET, FILM COATED ORAL at 17:40

## 2017-11-11 RX ADMIN — TBO-FILGRASTIM 480 MCG: 480 INJECTION, SOLUTION SUBCUTANEOUS at 17:48

## 2017-11-11 RX ADMIN — Medication 10 ML: at 12:27

## 2017-11-11 RX ADMIN — CALCIUM CARBONATE 500 MG (1,250 MG)-VITAMIN D3 200 UNIT TABLET 1 TABLET: at 08:15

## 2017-11-11 RX ADMIN — CALCIUM CARBONATE 500 MG (1,250 MG)-VITAMIN D3 200 UNIT TABLET 1 TABLET: at 12:27

## 2017-11-11 RX ADMIN — INSULIN LISPRO 2 UNITS: 100 INJECTION, SOLUTION INTRAVENOUS; SUBCUTANEOUS at 00:15

## 2017-11-11 RX ADMIN — METOPROLOL TARTRATE 2.5 MG: 5 INJECTION INTRAVENOUS at 03:42

## 2017-11-11 RX ADMIN — ACETAMINOPHEN 650 MG: 500 TABLET ORAL at 17:40

## 2017-11-11 RX ADMIN — INSULIN LISPRO 7 UNITS: 100 INJECTION, SOLUTION INTRAVENOUS; SUBCUTANEOUS at 17:43

## 2017-11-11 RX ADMIN — HYDROCODONE BITARTRATE AND ACETAMINOPHEN 1 TABLET: 7.5; 325 TABLET ORAL at 02:49

## 2017-11-11 RX ADMIN — INSULIN LISPRO 3 UNITS: 100 INJECTION, SOLUTION INTRAVENOUS; SUBCUTANEOUS at 12:27

## 2017-11-11 RX ADMIN — POLYETHYLENE GLYCOL 3350 17 G: 17 POWDER, FOR SOLUTION ORAL at 08:14

## 2017-11-11 RX ADMIN — FLUOXETINE HYDROCHLORIDE 20 MG: 20 CAPSULE ORAL at 08:15

## 2017-11-11 RX ADMIN — ACETAMINOPHEN 650 MG: 500 TABLET ORAL at 02:48

## 2017-11-11 RX ADMIN — ACETAMINOPHEN 650 MG: 500 TABLET ORAL at 07:09

## 2017-11-11 RX ADMIN — ENOXAPARIN SODIUM 40 MG: 40 INJECTION SUBCUTANEOUS at 08:14

## 2017-11-11 RX ADMIN — CARVEDILOL 6.25 MG: 6.25 TABLET, FILM COATED ORAL at 08:15

## 2017-11-11 RX ADMIN — PANTOPRAZOLE SODIUM 40 MG: 40 TABLET, DELAYED RELEASE ORAL at 07:11

## 2017-11-11 RX ADMIN — CALCIUM CARBONATE 500 MG (1,250 MG)-VITAMIN D3 200 UNIT TABLET 1 TABLET: at 17:40

## 2017-11-11 RX ADMIN — HYDROCODONE BITARTRATE AND ACETAMINOPHEN 1 TABLET: 7.5; 325 TABLET ORAL at 08:15

## 2017-11-11 RX ADMIN — DOCUSATE SODIUM AND SENNOSIDES 1 TABLET: 8.6; 5 TABLET, FILM COATED ORAL at 08:14

## 2017-11-11 RX ADMIN — HYDROCODONE BITARTRATE AND ACETAMINOPHEN 1 TABLET: 7.5; 325 TABLET ORAL at 14:14

## 2017-11-11 RX ADMIN — CARVEDILOL 6.25 MG: 6.25 TABLET, FILM COATED ORAL at 17:40

## 2017-11-11 RX ADMIN — INSULIN LISPRO 5 UNITS: 100 INJECTION, SOLUTION INTRAVENOUS; SUBCUTANEOUS at 08:14

## 2017-11-11 NOTE — PROGRESS NOTES
Hospitalist Progress Note    NAME: Roselyn Cornejo   :  1935   MRN:  [id number]       Assessment / Plan:  R hip fracture from mechanical fall  -pt at high risk for infection perioperatively given he neutropenia. Pt on neutropenic precaution  -s/p R femoral hemiarthroplasty  -pain and DVT prophylaxis per ortho  -PT/OT, SNF on discharge when medically table  -ortho following    Hypotension with dizziness  -start gentle IVF, likely from poor hydration with pain meds in place  -parameters placed for coreg    Urinary retention  -pt failed voiding trial multiple times during inpt  -walters re-inserted  -will need outpt urology f/u     PSVT  -likely induced by stress, and acute illness, resolved  -resume home med coreg    NHL  Neutropenia  Anemia  Fever  -chemotherapy outpt several weeks ago  -s/p 1 unit PRBC (fever during transfusion), cont' Granix   -UA neg, Bcx obtained during fever ntd  -Tm 100.2 overnight  No obvious source of infection. Pt appears non-toxic.    -appreciate oncology following (outpt f/u with Dr Shayy Gallagher)  Discussed with Dr No Ashton    HTN  Chronic systolic heart failure  -stop IVF, encourage PO intake  -cont' coreg, hydralazine prn     T2DM  -cont' SSI    Hypothyroidism  -cont' synthroid    Recent skin lesion  -s/p \"mole\" excision left malar region on 17. Healing nicely. Body mass index is 23.57 kg/(m^2).       Code Status: full  Surrogate Decision Maker: tawny Lopez [phone]   DVT Prophylaxis: per ortho  Baseline: independent     Subjective:     Chief Complaint / Reason for Physician Visit  Pt seen at bedside. No acute complaints. Has urinary retention overnight and failed voiding trial.  Walters re-inserted. Discussed with RN events overnight.      Review of Systems:  Symptom Y/N Comments  Symptom Y/N Comments   Fever/Chills n   Chest Pain n    Poor Appetite    Edema     Cough    Abdominal Pain n    Sputum    Joint Pain y    SOB/MUSE n   Pruritis/Rash     Nausea/vomit Tolerating PT/OT     Diarrhea    Tolerating Diet     Constipation    Other       Could NOT obtain due to:      Objective:     VITALS:   Last 24hrs VS reviewed since prior progress note. Most recent are:  Patient Vitals for the past 24 hrs:   Temp Pulse Resp BP SpO2   11/11/17 1013 98.2 °F (36.8 °C) 71 17 95/44 96 %   11/11/17 0610 98.4 °F (36.9 °C) 87 18 119/63 97 %   11/11/17 0342 - (!) 120 - 111/55 -   11/11/17 0239 100.2 °F (37.9 °C) 91 18 136/64 96 %   11/10/17 2215 98.8 °F (37.1 °C) 74 18 116/55 96 %   11/10/17 1829 98.5 °F (36.9 °C) 78 18 104/51 99 %   11/10/17 1454 98.1 °F (36.7 °C) 78 18 106/52 96 %   11/10/17 1147 98.3 °F (36.8 °C) 85 18 119/56 98 %     No intake or output data in the 24 hours ending 11/11/17 1132     PHYSICAL EXAM:  General: WD, WN. Alert, cooperative, no acute distress    EENT:  EOMI. Anicteric sclerae. MMM  Resp:  CTA bilaterally, no wheezing or rales. No accessory muscle use  CV:  Regular  rhythm,  No edema  GI:  Soft, Non distended, Non tender.  +Bowel sounds  Neurologic:  Alert and oriented X 3, normal speech,   Psych:   Good insight. Not anxious nor agitated  Skin:  No rashes. No jaundice    Reviewed most current lab test results and cultures  YES  Reviewed most current radiology test results   YES  Review and summation of old records today    NO  Reviewed patient's current orders and MAR    YES  PMH/SH reviewed - no change compared to H&P  ________________________________________________________________________  Care Plan discussed with:    Comments   Patient x    Family      RN x    Care Manager     Consultant                       x Multidiciplinary team rounds were held today with , nursing, pharmacist and clinical coordinator. Patient's plan of care was discussed; medications were reviewed and discharge planning was addressed.      ________________________________________________________________________  Total NON critical care TIME:  35   Minutes    Total CRITICAL CARE TIME Spent:   Minutes non procedure based      Comments   >50% of visit spent in counseling and coordination of care     ________________________________________________________________________  Michaela Hughes MD     Procedures: see electronic medical records for all procedures/Xrays and details which were not copied into this note but were reviewed prior to creation of Plan. LABS:  I reviewed today's most current labs and imaging studies.   Pertinent labs include:  Recent Labs      11/11/17   0028  11/10/17   0449  11/09/17   0514   WBC  1.5*  1.6*  1.6*   HGB  7.4*  7.6*  7.8*   HCT  22.3*  22.9*  23.6*   PLT  92*  87*  71*     Recent Labs      11/09/17   0514   NA  136   K  3.8   CL  104   CO2  26   GLU  134*   BUN  19   CREA  0.84   CA  8.0*   MG  2.0       Signed: Michaela Hughes MD

## 2017-11-11 NOTE — PROGRESS NOTES
Problem: Mobility Impaired (Adult and Pediatric)  Goal: *Acute Goals and Plan of Care (Insert Text)  Physical Therapy Goals  Initiated 11/8/2017    1. Patient will move from supine to sit and sit to supine  in bed with maximal assistance within 4 days. 2. Patient will perform sit to stand with maximal assistance within 4 days. 3. Patient will ambulate with maximal assistance for 5 feet with the least restrictive device within 4 days. 4. Patient will verbalize and demonstrate understanding of total hip precautions per protocol within 4 days. 5. Patient will perform LE home exercise program per protocol with supervision/set-up within 4 days. physical Therapy TREATMENT  Patient: Nikhil Lambert (32 y.o. female)  Date: 11/11/2017  Diagnosis: Hip fracture (HCC)  Right Hip Fracture  right femoral neck fracture  Closed right hip fracture (HCC)  Procedure(s) (LRB):  RIGHT FEMORAL HEMIARTHROPLASTY (Right) 4 Days Post-Op  Precautions: Total hip    ASSESSMENT:  Pt able to complete gait efforts again this pm ; however, pt with increased pain with nurse in to give meds. Pt reluctant to sit back in chair again but was in bed several hours prior to this pm session and with much education agreeable to sitting back up in chair. Continue to follow. Progression toward goals:  [x]      Improving appropriately and progressing toward goals  []      Improving slowly and progressing toward goals  []      Not making progress toward goals and plan of care will be adjusted     PLAN:  Patient continues to benefit from skilled intervention to address the above impairments. Continue treatment per established plan of care. Discharge Recommendations:  Skilled Nursing Facility  Further Equipment Recommendations for Discharge:  tbd (pt using RW at this time)     SUBJECTIVE:   Patient stated I have a lot of pain.     OBJECTIVE DATA SUMMARY:   Functional Mobility Training:  Bed Mobility:     Supine to Sit: Maximum assistance; Additional time     Scooting: Maximum assistance (with progression to SBA after closer to EOB)        Transfers:  Sit to Stand: Moderate assistance  Stand to Sit: Minimum assistance                             Ambulation/Gait Training:  Distance (ft): 25 Feet (ft)  Assistive Device: Gait belt;Walker, rolling  Ambulation - Level of Assistance: Contact guard assistance        Gait Abnormalities: Antalgic;Decreased step clearance              Speed/Daria: Pace decreased (<100 feet/min); Slow  Step Length: Left shortened;Right shortened             Therapeutic Exercises:   Exercises performed per protocol. See morning treatment note for description. Pain:  Pain Scale 1: Numeric (0 - 10)  Pain Intensity 1: 8  Pain Location 1: Hip  Pain Orientation 1: Right  Pain Description 1: Aching  Pain Intervention(s) 1: Medication (see MAR); Cold pack     Activity Tolerance:   Fair, limited by pain this pm:  Please refer to the flowsheet for vital signs taken during this treatment.   After treatment:   [x] Patient left in no apparent distress sitting up in chair  [] Patient left in no apparent distress in bed  [x] Call bell left within reach  [x] Nursing notified  [] Caregiver present  [] Bed alarm activated    COMMUNICATION/COLLABORATION:   The patients plan of care was discussed with: Registered Nurse and Jermain Mon, PT, DPT   Time Calculation: 24 mins

## 2017-11-11 NOTE — PROGRESS NOTES
Problem: Mobility Impaired (Adult and Pediatric)  Goal: *Acute Goals and Plan of Care (Insert Text)  Physical Therapy Goals  Initiated 11/8/2017    1. Patient will move from supine to sit and sit to supine  in bed with maximal assistance within 4 days. 2. Patient will perform sit to stand with maximal assistance within 4 days. 3. Patient will ambulate with maximal assistance for 5 feet with the least restrictive device within 4 days. 4. Patient will verbalize and demonstrate understanding of total hip precautions per protocol within 4 days. 5. Patient will perform LE home exercise program per protocol with supervision/set-up within 4 days. physical Therapy TREATMENT  Patient: Iban Escobar (18 y.o. female)  Date: 11/11/2017  Diagnosis: Hip fracture (HCC)  Right Hip Fracture  right femoral neck fracture  Closed right hip fracture (HCC)  Procedure(s) (LRB):  RIGHT FEMORAL HEMIARTHROPLASTY (Right) 4 Days Post-Op  Precautions: Total hip    ASSESSMENT:  Pt doing well today, making progress with gait distance and activity tolerance abilities. No LOB but very slow, calculated movements and mobility efforts with increased compensation of UE's on RW. Continue to follow. Progression toward goals:  [x]      Improving appropriately and progressing toward goals  []      Improving slowly and progressing toward goals  []      Not making progress toward goals and plan of care will be adjusted     PLAN:  Patient continues to benefit from skilled intervention to address the above impairments. Continue treatment per established plan of care. Discharge Recommendations:  Skilled Nursing Facility  Further Equipment Recommendations for Discharge:  tbd (RW use at this time)     SUBJECTIVE:   Patient stated I have been in this bed too long, since therapy yesterday.   The patient stated 0/3 hip precautions.  Reviewed all 3 with patient, with pt able to state \"yes\" or \"no\" when provided with cuing for whether or not she is allowed to move in a certain way. OBJECTIVE DATA SUMMARY:   Critical Behavior:  Neurologic State: Alert  Orientation Level: Oriented X4  Cognition: Follows commands  Safety/Judgement: Awareness of environment, Fall prevention  Functional Mobility Training:  Bed Mobility:     Supine to Sit: Moderate assistance; Additional time (HOB raised)     Scooting: Moderate assistance; Additional time        Transfers:  Sit to Stand: Moderate assistance  Stand to Sit: Minimum assistance                             Balance:  Sitting: Intact  Standing: Intact; With support  Standing - Static: Good (RW)  Standing - Dynamic : Fair (RW)  Ambulation/Gait Training:  Distance (ft): 35 Feet (ft)  Assistive Device: Gait belt;Walker, rolling  Ambulation - Level of Assistance: Contact guard assistance        Gait Abnormalities: Antalgic;Decreased step clearance              Speed/Daria: Pace decreased (<100 feet/min); Slow  Step Length: Left shortened;Right shortened       Therapeutic Exercises:   SUPINE  EXERCISES   Sets   Reps   Active Active Assist   Passive Self ROM   Comments   Ankle Pumps 1 10 [x]                                           []                                           []                                           []                                              Quad Sets   []                                           []                                           []                                           []                                              Heel Slides 1 10 [x]                                           []                                           []                                           []                                              Hip Abduction   []                                           []                                           []                                           []                                              Hip External Rotation   []                                           [] []                                           []                                              Glut Sets 1 10 [x]                                           []                                           []                                           []                                                 []                                           []                                           []                                           []                                                 []                                           []                                           []                                           []                                                Pain:  Pain Scale 1: Numeric (0 - 10)  Pain Intensity 1: 5  Pain Location 1: Hip  Pain Orientation 1: Right  Pain Description 1: Aching  Pain Intervention(s) 1: Ambulation/Increased Activity;Cold pack     Activity Tolerance:   Improving:  Please refer to the flowsheet for vital signs taken during this treatment.   After treatment:   [x]  Patient left in no apparent distress sitting up in chair  []  Patient left in no apparent distress in bed  [x]  Call bell left within reach  [x]  Nursing notified  []  Caregiver present  []  Bed alarm activated    COMMUNICATION/COLLABORATION:   The patients plan of care was discussed with: Registered Nurse    Abdullahi Magana, PT, DPT   Time Calculation: 35 mins

## 2017-11-12 ENCOUNTER — APPOINTMENT (OUTPATIENT)
Dept: GENERAL RADIOLOGY | Age: 82
DRG: 469 | End: 2017-11-12
Attending: EMERGENCY MEDICINE
Payer: MEDICARE

## 2017-11-12 LAB
ANION GAP SERPL CALC-SCNC: 7 MMOL/L (ref 5–15)
APPEARANCE UR: ABNORMAL
BACTERIA URNS QL MICRO: ABNORMAL /HPF
BASOPHILS # BLD: 0 K/UL (ref 0–0.1)
BASOPHILS NFR BLD: 0 % (ref 0–1)
BILIRUB UR QL: NEGATIVE
BUN SERPL-MCNC: 18 MG/DL (ref 6–20)
BUN/CREAT SERPL: 22 (ref 12–20)
CALCIUM SERPL-MCNC: 8 MG/DL (ref 8.5–10.1)
CHLORIDE SERPL-SCNC: 101 MMOL/L (ref 97–108)
CO2 SERPL-SCNC: 25 MMOL/L (ref 21–32)
COLOR UR: ABNORMAL
CREAT SERPL-MCNC: 0.83 MG/DL (ref 0.55–1.02)
EOSINOPHIL # BLD: 0 K/UL (ref 0–0.4)
EOSINOPHIL NFR BLD: 0 % (ref 0–7)
EPITH CASTS URNS QL MICRO: ABNORMAL /LPF
ERYTHROCYTE [DISTWIDTH] IN BLOOD BY AUTOMATED COUNT: 17.7 % (ref 11.5–14.5)
GLUCOSE BLD STRIP.AUTO-MCNC: 209 MG/DL (ref 65–100)
GLUCOSE BLD STRIP.AUTO-MCNC: 214 MG/DL (ref 65–100)
GLUCOSE BLD STRIP.AUTO-MCNC: 216 MG/DL (ref 65–100)
GLUCOSE BLD STRIP.AUTO-MCNC: 223 MG/DL (ref 65–100)
GLUCOSE BLD STRIP.AUTO-MCNC: 275 MG/DL (ref 65–100)
GLUCOSE SERPL-MCNC: 206 MG/DL (ref 65–100)
GLUCOSE UR STRIP.AUTO-MCNC: NEGATIVE MG/DL
HCT VFR BLD AUTO: 19.8 % (ref 35–47)
HGB BLD-MCNC: 6.6 G/DL (ref 11.5–16)
HGB UR QL STRIP: ABNORMAL
KETONES UR QL STRIP.AUTO: 40 MG/DL
LACTATE SERPL-SCNC: 0.7 MMOL/L (ref 0.4–2)
LEUKOCYTE ESTERASE UR QL STRIP.AUTO: ABNORMAL
LYMPHOCYTES # BLD: 0.2 K/UL (ref 0.8–3.5)
LYMPHOCYTES NFR BLD: 14 % (ref 12–49)
MCH RBC QN AUTO: 27.7 PG (ref 26–34)
MCHC RBC AUTO-ENTMCNC: 33.3 G/DL (ref 30–36.5)
MCV RBC AUTO: 83.2 FL (ref 80–99)
MONOCYTES # BLD: 0.2 K/UL (ref 0–1)
MONOCYTES NFR BLD: 9 % (ref 5–13)
NEUTS SEG # BLD: 1.3 K/UL (ref 1.8–8)
NEUTS SEG NFR BLD: 77 % (ref 32–75)
NITRITE UR QL STRIP.AUTO: POSITIVE
PH UR STRIP: 5.5 [PH] (ref 5–8)
PLATELET # BLD AUTO: 80 K/UL (ref 150–400)
POTASSIUM SERPL-SCNC: 4 MMOL/L (ref 3.5–5.1)
PROT UR STRIP-MCNC: ABNORMAL MG/DL
RBC # BLD AUTO: 2.38 M/UL (ref 3.8–5.2)
RBC #/AREA URNS HPF: ABNORMAL /HPF (ref 0–5)
RBC MORPH BLD: ABNORMAL
SERVICE CMNT-IMP: ABNORMAL
SODIUM SERPL-SCNC: 133 MMOL/L (ref 136–145)
SP GR UR REFRACTOMETRY: 1.02 (ref 1–1.03)
UA: UC IF INDICATED,UAUC: ABNORMAL
UROBILINOGEN UR QL STRIP.AUTO: 0.2 EU/DL (ref 0.2–1)
WBC # BLD AUTO: 1.7 K/UL (ref 3.6–11)
WBC MORPH BLD: ABNORMAL
WBC URNS QL MICRO: ABNORMAL /HPF (ref 0–4)

## 2017-11-12 PROCEDURE — 87186 SC STD MICRODIL/AGAR DIL: CPT

## 2017-11-12 PROCEDURE — 74011250636 HC RX REV CODE- 250/636: Performed by: INTERNAL MEDICINE

## 2017-11-12 PROCEDURE — 74011000258 HC RX REV CODE- 258: Performed by: EMERGENCY MEDICINE

## 2017-11-12 PROCEDURE — 65660000000 HC RM CCU STEPDOWN

## 2017-11-12 PROCEDURE — 87077 CULTURE AEROBIC IDENTIFY: CPT

## 2017-11-12 PROCEDURE — 36415 COLL VENOUS BLD VENIPUNCTURE: CPT | Performed by: INTERNAL MEDICINE

## 2017-11-12 PROCEDURE — P9040 RBC LEUKOREDUCED IRRADIATED: HCPCS | Performed by: INTERNAL MEDICINE

## 2017-11-12 PROCEDURE — 97116 GAIT TRAINING THERAPY: CPT

## 2017-11-12 PROCEDURE — 85025 COMPLETE CBC W/AUTO DIFF WBC: CPT

## 2017-11-12 PROCEDURE — 87086 URINE CULTURE/COLONY COUNT: CPT

## 2017-11-12 PROCEDURE — 74011250636 HC RX REV CODE- 250/636: Performed by: ORTHOPAEDIC SURGERY

## 2017-11-12 PROCEDURE — 81001 URINALYSIS AUTO W/SCOPE: CPT

## 2017-11-12 PROCEDURE — 74011250637 HC RX REV CODE- 250/637: Performed by: INTERNAL MEDICINE

## 2017-11-12 PROCEDURE — 71010 XR CHEST PORT: CPT

## 2017-11-12 PROCEDURE — 83605 ASSAY OF LACTIC ACID: CPT

## 2017-11-12 PROCEDURE — 74011000250 HC RX REV CODE- 250: Performed by: INTERNAL MEDICINE

## 2017-11-12 PROCEDURE — 74011636637 HC RX REV CODE- 636/637: Performed by: INTERNAL MEDICINE

## 2017-11-12 PROCEDURE — 87040 BLOOD CULTURE FOR BACTERIA: CPT

## 2017-11-12 PROCEDURE — 82962 GLUCOSE BLOOD TEST: CPT

## 2017-11-12 PROCEDURE — 97530 THERAPEUTIC ACTIVITIES: CPT

## 2017-11-12 PROCEDURE — 86900 BLOOD TYPING SEROLOGIC ABO: CPT | Performed by: INTERNAL MEDICINE

## 2017-11-12 PROCEDURE — 74011250636 HC RX REV CODE- 250/636: Performed by: EMERGENCY MEDICINE

## 2017-11-12 PROCEDURE — 74011250637 HC RX REV CODE- 250/637: Performed by: PHYSICIAN ASSISTANT

## 2017-11-12 PROCEDURE — 86923 COMPATIBILITY TEST ELECTRIC: CPT | Performed by: INTERNAL MEDICINE

## 2017-11-12 PROCEDURE — 36430 TRANSFUSION BLD/BLD COMPNT: CPT

## 2017-11-12 PROCEDURE — 74011250637 HC RX REV CODE- 250/637: Performed by: ORTHOPAEDIC SURGERY

## 2017-11-12 PROCEDURE — 80048 BASIC METABOLIC PNL TOTAL CA: CPT | Performed by: INTERNAL MEDICINE

## 2017-11-12 RX ORDER — SODIUM CHLORIDE 9 MG/ML
250 INJECTION, SOLUTION INTRAVENOUS AS NEEDED
Status: DISCONTINUED | OUTPATIENT
Start: 2017-11-12 | End: 2017-11-15 | Stop reason: HOSPADM

## 2017-11-12 RX ORDER — SODIUM CHLORIDE 9 MG/ML
50 INJECTION, SOLUTION INTRAVENOUS CONTINUOUS
Status: DISPENSED | OUTPATIENT
Start: 2017-11-12 | End: 2017-11-13

## 2017-11-12 RX ADMIN — CALCIUM CARBONATE 500 MG (1,250 MG)-VITAMIN D3 200 UNIT TABLET 1 TABLET: at 17:16

## 2017-11-12 RX ADMIN — SODIUM CHLORIDE 1000 ML: 900 INJECTION, SOLUTION INTRAVENOUS at 06:07

## 2017-11-12 RX ADMIN — INSULIN LISPRO 3 UNITS: 100 INJECTION, SOLUTION INTRAVENOUS; SUBCUTANEOUS at 17:15

## 2017-11-12 RX ADMIN — Medication 10 ML: at 22:00

## 2017-11-12 RX ADMIN — SODIUM CHLORIDE 1000 ML: 900 INJECTION, SOLUTION INTRAVENOUS at 02:51

## 2017-11-12 RX ADMIN — ACETAMINOPHEN 650 MG: 500 TABLET ORAL at 17:16

## 2017-11-12 RX ADMIN — CARVEDILOL 6.25 MG: 6.25 TABLET, FILM COATED ORAL at 08:27

## 2017-11-12 RX ADMIN — INSULIN LISPRO 3 UNITS: 100 INJECTION, SOLUTION INTRAVENOUS; SUBCUTANEOUS at 08:26

## 2017-11-12 RX ADMIN — ENOXAPARIN SODIUM 40 MG: 40 INJECTION SUBCUTANEOUS at 08:26

## 2017-11-12 RX ADMIN — HYDROCODONE BITARTRATE AND ACETAMINOPHEN 1 TABLET: 7.5; 325 TABLET ORAL at 08:27

## 2017-11-12 RX ADMIN — SODIUM CHLORIDE 1 G: 900 INJECTION, SOLUTION INTRAVENOUS at 20:03

## 2017-11-12 RX ADMIN — INSULIN LISPRO 2 UNITS: 100 INJECTION, SOLUTION INTRAVENOUS; SUBCUTANEOUS at 21:54

## 2017-11-12 RX ADMIN — Medication 10 ML: at 01:46

## 2017-11-12 RX ADMIN — CARVEDILOL 6.25 MG: 6.25 TABLET, FILM COATED ORAL at 17:16

## 2017-11-12 RX ADMIN — DOCUSATE SODIUM AND SENNOSIDES 1 TABLET: 8.6; 5 TABLET, FILM COATED ORAL at 17:16

## 2017-11-12 RX ADMIN — METOPROLOL TARTRATE 2.5 MG: 5 INJECTION INTRAVENOUS at 01:48

## 2017-11-12 RX ADMIN — LEVOTHYROXINE SODIUM 88 MCG: 100 TABLET ORAL at 08:27

## 2017-11-12 RX ADMIN — POLYETHYLENE GLYCOL 3350 17 G: 17 POWDER, FOR SOLUTION ORAL at 08:26

## 2017-11-12 RX ADMIN — DOCUSATE SODIUM AND SENNOSIDES 1 TABLET: 8.6; 5 TABLET, FILM COATED ORAL at 08:27

## 2017-11-12 RX ADMIN — Medication 10 ML: at 12:15

## 2017-11-12 RX ADMIN — SODIUM CHLORIDE 1 G: 900 INJECTION, SOLUTION INTRAVENOUS at 03:10

## 2017-11-12 RX ADMIN — SODIUM CHLORIDE 50 ML/HR: 900 INJECTION, SOLUTION INTRAVENOUS at 17:19

## 2017-11-12 RX ADMIN — Medication 10 ML: at 06:06

## 2017-11-12 RX ADMIN — ACETAMINOPHEN 650 MG: 500 TABLET ORAL at 12:14

## 2017-11-12 RX ADMIN — HYDROCODONE BITARTRATE AND ACETAMINOPHEN 1 TABLET: 7.5; 325 TABLET ORAL at 02:59

## 2017-11-12 RX ADMIN — CALCIUM CARBONATE 500 MG (1,250 MG)-VITAMIN D3 200 UNIT TABLET 1 TABLET: at 12:14

## 2017-11-12 RX ADMIN — SODIUM CHLORIDE 1 G: 900 INJECTION, SOLUTION INTRAVENOUS at 12:22

## 2017-11-12 RX ADMIN — CALCIUM CARBONATE 500 MG (1,250 MG)-VITAMIN D3 200 UNIT TABLET 1 TABLET: at 08:27

## 2017-11-12 RX ADMIN — PANTOPRAZOLE SODIUM 40 MG: 40 TABLET, DELAYED RELEASE ORAL at 08:27

## 2017-11-12 RX ADMIN — TBO-FILGRASTIM 480 MCG: 480 INJECTION, SOLUTION SUBCUTANEOUS at 17:19

## 2017-11-12 RX ADMIN — HYDROCODONE BITARTRATE AND ACETAMINOPHEN 1 TABLET: 7.5; 325 TABLET ORAL at 21:54

## 2017-11-12 RX ADMIN — INSULIN LISPRO 5 UNITS: 100 INJECTION, SOLUTION INTRAVENOUS; SUBCUTANEOUS at 12:14

## 2017-11-12 RX ADMIN — ACETAMINOPHEN 650 MG: 500 TABLET ORAL at 01:46

## 2017-11-12 RX ADMIN — FLUOXETINE HYDROCHLORIDE 20 MG: 20 CAPSULE ORAL at 08:27

## 2017-11-12 RX ADMIN — HYDROCODONE BITARTRATE AND ACETAMINOPHEN 1 TABLET: 7.5; 325 TABLET ORAL at 14:50

## 2017-11-12 NOTE — PROGRESS NOTES
Problem: Mobility Impaired (Adult and Pediatric)  Goal: *Acute Goals and Plan of Care (Insert Text)  Physical Therapy Goals  Initiated 11/8/2017    1. Patient will move from supine to sit and sit to supine  in bed with maximal assistance within 4 days. 2. Patient will perform sit to stand with maximal assistance within 4 days. 3. Patient will ambulate with maximal assistance for 5 feet with the least restrictive device within 4 days. 4. Patient will verbalize and demonstrate understanding of total hip precautions per protocol within 4 days. 5. Patient will perform LE home exercise program per protocol with supervision/set-up within 4 days. physical Therapy TREATMENT  Patient: Jessica Raza (19 y.o. female)  Date: 11/12/2017  Diagnosis: Hip fracture (HCC)  Right Hip Fracture  right femoral neck fracture  Closed right hip fracture (HCC)  Procedure(s) (LRB):  RIGHT FEMORAL HEMIARTHROPLASTY (Right) 5 Days Post-Op  Precautions: Total hip, Fall, Other (comment) (neutropenic)    ASSESSMENT:  Patient continues to make gains, despite overnight temperature and drop in hgb level (of which received 2L bolus and tylenol per RN). Patient received in supine and agreeable; noted heavily soiled (sweat) gown of which was changed once sitting. Patient required mod A to arise to EOB, with attempted use of gait belt on RLE to assist in clearing failing after patient reports no greater A than without device. She then scooted self to EOB. With verbal, visual cueing for hand placement, stood with CGA to RW then completed gait period as detailed with step-to short gait mechanics as expected. RW raised x2 clicks which improved posture and heavy B elbow extension and report of pain from such. Concluded in chair with all needs met. Of note, patient's VSS throughout with negative orthostatics and denial of subjective symptoms.   Ultimately patient has made great progress since this therapist last saw earlier this week; she remains an excellent candidate for fast track SNF rehabilitation at [facility]. Progression toward goals:  [x]      Improving appropriately and progressing toward goals  []      Improving slowly and progressing toward goals  []      Not making progress toward goals and plan of care will be adjusted     PLAN:  Patient continues to benefit from skilled intervention to address the above impairments. Continue treatment per established plan of care. Discharge Recommendations:  [facility]  Further Equipment Recommendations for Discharge:  defer     SUBJECTIVE:   Patient stated hi honey.     OBJECTIVE DATA SUMMARY:   Functional Mobility Training:  Bed Mobility:  Rolling: Moderate assistance;Assist x1;Additional time  Supine to Sit: Moderate assistance;Assist x1;Additional time     Scooting: Supervision; Additional time        Transfers:  Sit to Stand: Contact guard assistance; Additional time (VCs for hand placement)  Stand to Sit: Contact guard assistance; Additional time (uncontrolled descent)                             Ambulation/Gait Training:  Distance (ft): 44 Feet (ft)  Assistive Device: Gait belt;Walker, rolling  Ambulation - Level of Assistance: Contact guard assistance        Gait Abnormalities: Antalgic;Decreased step clearance; Step to gait           Stance: Right decreased  Speed/Daria: Slow  Step Length: Left shortened;Right shortened (L>R )               VC's for upright posture, increased step height/length; RW raised to improve B elbow extension           Pain:  Pain Scale 1: Numeric (0 - 10)  Pain Intensity 1: 0  Pain Location 1: Hip  Pain Orientation 1: Right  Pain Description 1: Aching  Pain Intervention(s) 1: Medication (see MAR)  Activity Tolerance:   VSS RA     Please refer to the flowsheet for vital signs taken during this treatment.   After treatment:   [x] Patient left in no apparent distress sitting up in chair  [] Patient left in no apparent distress in bed  [x] Call bell left within reach  [x] Nursing notified  [] Caregiver present  [] Bed alarm activated    COMMUNICATION/COLLABORATION:   The patients plan of care was discussed with: Registered Nurse    Stu Miller, PT, DPT, CEEAA      Time Calculation: 29 mins

## 2017-11-12 NOTE — ROUTINE PROCESS
Bedside and Verbal shift change report given to Chelsie Scott (oncoming nurse) by Manuel Ordaz RN (offgoing nurse). Report included the following information SBAR, Kardex, Intake/Output, MAR and Recent Results.

## 2017-11-12 NOTE — PROGRESS NOTES
Problem: Falls - Risk of  Goal: *Absence of Falls  Document Kristian Fall Risk and appropriate interventions in the flowsheet.    Outcome: Progressing Towards Goal  Fall Risk Interventions:  Mobility Interventions: Patient to call before getting OOB, Bed/chair exit alarm    Mentation Interventions: Adequate sleep, hydration, pain control, Increase mobility, Reorient patient    Medication Interventions: Assess postural VS orthostatic hypotension, Evaluate medications/consider consulting pharmacy, Patient to call before getting OOB, Teach patient to arise slowly, Utilize gait belt for transfers/ambulation    Elimination Interventions: Call light in reach, Patient to call for help with toileting needs, Toileting schedule/hourly rounds    History of Falls Interventions: Door open when patient unattended, Investigate reason for fall, Room close to nurse's station, Utilize gait belt for transfer/ambulation

## 2017-11-12 NOTE — PROGRESS NOTES
pts rectal temp was 102.8, spoke with Dr. Kevin Bhardwaj, also advised her pts hr is fluctuating from 90 to 1teens, she advised giving her her ordered Tylenol and Lopressor. I sent blood cultures and UA.

## 2017-11-12 NOTE — PROGRESS NOTES
Hospitalist Progress Note    NAME: Sadiq Pina   :  1935   MRN:  [id number]       Assessment / Plan:  Sepsis  Complicated UTI  Urinary retention  -in the setting of indwelling walters cath due to urinary retention post op. Pt failed voiding trail so walters cath re-inserted  -CLEOPATRA consistent with UTI  -f/u with urine cx  -gentle IVF given hx of systolic heart failure  -started on meropenum, will de-escalate in the AM pending UA  -needs urology outpt     R hip fracture from mechanical fall  -pt at high risk for infection perioperatively given he neutropenia. Pt on neutropenic precaution  -s/p R femoral hemiarthroplasty  -pain and DVT prophylaxis per ortho  -PT/OT, SNF on discharge when medically table  -ortho following    Hypotension with dizziness  -gentle IVF, likely from poor hydration with pain meds in place  -parameters placed for coreg    PSVT  -likely induced by stress, and acute illness, resolved  -resume home med coreg    NHL  Pancytopenia  -chemotherapy outpt several weeks ago  -1 unit prbc ordered. Total transfused 2 units thus far cont' Granix   -UA neg, Bcx obtained during fever ntd  -Tm 100.2 overnight  No obvious source of infection. Pt appears non-toxic.    -appreciate oncology following (outpt f/u with Dr Mynor Ramírez)  Discussed with Dr Albin Aceves    HTN  Chronic systolic heart failure  -cont' coreg, hydralazine prn     T2DM  -cont' SSI    Hypothyroidism  -cont' synthroid    Recent skin lesion  -s/p \"mole\" excision left malar region on 17. Healing nicely. Body mass index is 23.57 kg/(m^2).       Code Status: full  Surrogate Decision Maker: tawny Aquino [place] [phone]   DVT Prophylaxis: per ortho  Baseline: independent     Subjective:     Chief Complaint / Reason for Physician Visit  Pt seen at bedside. No acute complaints. Fever overnight with UA showing UTI. Pt started on meropenum by on call physician. Pt appears well in bed, NAD, nontoxic appearing.     Discussed with RN events overnight. Review of Systems:  Symptom Y/N Comments  Symptom Y/N Comments   Fever/Chills y   Chest Pain n    Poor Appetite    Edema     Cough    Abdominal Pain n    Sputum    Joint Pain y    SOB/MUSE n   Pruritis/Rash     Nausea/vomit    Tolerating PT/OT     Diarrhea    Tolerating Diet     Constipation    Other       Could NOT obtain due to:      Objective:     VITALS:   Last 24hrs VS reviewed since prior progress note. Most recent are:  Patient Vitals for the past 24 hrs:   Temp Pulse Resp BP SpO2   11/12/17 1445 97.9 °F (36.6 °C) 80 18 106/55 98 %   11/12/17 1430 97.9 °F (36.6 °C) 73 18 93/62 97 %   11/12/17 1422 97.8 °F (36.6 °C) 73 18 108/61 96 %   11/12/17 1101 98.1 °F (36.7 °C) 72 18 109/50 96 %   11/12/17 0618 98.1 °F (36.7 °C) 78 18 109/59 95 %   11/12/17 0434 98.6 °F (37 °C) 84 - - -   11/12/17 0206 100.4 °F (38 °C) (!) 116 18 124/68 93 %   11/12/17 0148 - 94 - 136/69 -   11/12/17 0123 (!) 102.8 °F (39.3 °C) - - - -   11/12/17 0106 (!) 102 °F (38.9 °C) 94 18 136/69 95 %   11/11/17 2214 (!) 100.5 °F (38.1 °C) 80 18 135/60 94 %   11/11/17 1740 - 82 18 135/65 96 %       Intake/Output Summary (Last 24 hours) at 11/12/17 1514  Last data filed at 11/12/17 0803   Gross per 24 hour   Intake          1983.33 ml   Output                0 ml   Net          1983.33 ml        PHYSICAL EXAM:  General: WD, WN. Alert, cooperative, no acute distress    EENT:  EOMI. Anicteric sclerae. MMM  Resp:  CTA bilaterally, no wheezing or rales. No accessory muscle use  CV:  Regular  rhythm,  No edema  GI:  Soft, Non distended, Non tender.  +Bowel sounds  Neurologic:  Alert and oriented X 3, normal speech  Psych:   Good insight. Not anxious nor agitated  Skin:  No rashes.   No jaundice    Reviewed most current lab test results and cultures  YES  Reviewed most current radiology test results   YES  Review and summation of old records today    NO  Reviewed patient's current orders and MAR    YES  PMH/SH reviewed - no change compared to H&P  ________________________________________________________________________  Care Plan discussed with:    Comments   Patient x    Family  x daughter   RN x    Care Manager     Consultant                        Multidiciplinary team rounds were held today with , nursing, pharmacist and clinical coordinator. Patient's plan of care was discussed; medications were reviewed and discharge planning was addressed. ________________________________________________________________________  Total NON critical care TIME:  35   Minutes    Total CRITICAL CARE TIME Spent:   Minutes non procedure based      Comments   >50% of visit spent in counseling and coordination of care     ________________________________________________________________________  Adele Villalta MD     Procedures: see electronic medical records for all procedures/Xrays and details which were not copied into this note but were reviewed prior to creation of Plan. LABS:  I reviewed today's most current labs and imaging studies.   Pertinent labs include:  Recent Labs      11/12/17   0210  11/11/17   0028  11/10/17   0449   WBC  1.7*  1.5*  1.6*   HGB  6.6*  7.4*  7.6*   HCT  19.8*  22.3*  22.9*   PLT  80*  92*  87*     Recent Labs      11/12/17   0210   NA  133*   K  4.0   CL  101   CO2  25   GLU  206*   BUN  18   CREA  0.83   CA  8.0*       Signed: Adele Villalta MD

## 2017-11-13 LAB
ABO + RH BLD: NORMAL
ANION GAP SERPL CALC-SCNC: 10 MMOL/L (ref 5–15)
BACTERIA SPEC CULT: NORMAL
BASOPHILS # BLD: 0 K/UL
BASOPHILS NFR BLD: 0 %
BLD PROD TYP BPU: NORMAL
BLOOD GROUP ANTIBODIES SERPL: NORMAL
BPU ID: NORMAL
BUN SERPL-MCNC: 15 MG/DL (ref 6–20)
BUN/CREAT SERPL: 18 (ref 12–20)
CALCIUM SERPL-MCNC: 8.3 MG/DL (ref 8.5–10.1)
CHLORIDE SERPL-SCNC: 103 MMOL/L (ref 97–108)
CO2 SERPL-SCNC: 23 MMOL/L (ref 21–32)
CREAT SERPL-MCNC: 0.83 MG/DL (ref 0.55–1.02)
CROSSMATCH RESULT,%XM: NORMAL
DIFFERENTIAL METHOD BLD: ABNORMAL
EOSINOPHIL # BLD: 0 K/UL
EOSINOPHIL NFR BLD: 0 %
ERYTHROCYTE [DISTWIDTH] IN BLOOD BY AUTOMATED COUNT: 17.5 % (ref 11.5–14.5)
GLUCOSE BLD STRIP.AUTO-MCNC: 178 MG/DL (ref 65–100)
GLUCOSE BLD STRIP.AUTO-MCNC: 221 MG/DL (ref 65–100)
GLUCOSE BLD STRIP.AUTO-MCNC: 227 MG/DL (ref 65–100)
GLUCOSE BLD STRIP.AUTO-MCNC: 267 MG/DL (ref 65–100)
GLUCOSE SERPL-MCNC: 253 MG/DL (ref 65–100)
HCT VFR BLD AUTO: 23.8 % (ref 35–47)
HGB BLD-MCNC: 7.8 G/DL (ref 11.5–16)
LYMPHOCYTES # BLD: 0.2 K/UL
LYMPHOCYTES NFR BLD: 6 %
MCH RBC QN AUTO: 27.2 PG (ref 26–34)
MCHC RBC AUTO-ENTMCNC: 32.8 G/DL (ref 30–36.5)
MCV RBC AUTO: 82.9 FL (ref 80–99)
METAMYELOCYTES NFR BLD MANUAL: 2 %
MONOCYTES # BLD: 0.3 K/UL
MONOCYTES NFR BLD: 8 %
NEUTS BAND NFR BLD MANUAL: 6 %
NEUTS SEG # BLD: 2.8 K/UL
NEUTS SEG NFR BLD: 78 %
PLATELET # BLD AUTO: 87 K/UL (ref 150–400)
POTASSIUM SERPL-SCNC: 3.9 MMOL/L (ref 3.5–5.1)
RBC # BLD AUTO: 2.87 M/UL (ref 3.8–5.2)
RBC MORPH BLD: ABNORMAL
RBC MORPH BLD: ABNORMAL
SERVICE CMNT-IMP: ABNORMAL
SERVICE CMNT-IMP: NORMAL
SODIUM SERPL-SCNC: 136 MMOL/L (ref 136–145)
SPECIMEN EXP DATE BLD: NORMAL
STATUS OF UNIT,%ST: NORMAL
UNIT DIVISION, %UDIV: 0
WBC # BLD AUTO: 3.3 K/UL (ref 3.6–11)

## 2017-11-13 PROCEDURE — 36415 COLL VENOUS BLD VENIPUNCTURE: CPT | Performed by: INTERNAL MEDICINE

## 2017-11-13 PROCEDURE — 97116 GAIT TRAINING THERAPY: CPT

## 2017-11-13 PROCEDURE — 74011250637 HC RX REV CODE- 250/637: Performed by: INTERNAL MEDICINE

## 2017-11-13 PROCEDURE — 36600 WITHDRAWAL OF ARTERIAL BLOOD: CPT

## 2017-11-13 PROCEDURE — 74011000258 HC RX REV CODE- 258: Performed by: EMERGENCY MEDICINE

## 2017-11-13 PROCEDURE — 97110 THERAPEUTIC EXERCISES: CPT

## 2017-11-13 PROCEDURE — 74011250636 HC RX REV CODE- 250/636: Performed by: ORTHOPAEDIC SURGERY

## 2017-11-13 PROCEDURE — 77030011256 HC DRSG MEPILEX <16IN NO BORD MOLN -A

## 2017-11-13 PROCEDURE — 80048 BASIC METABOLIC PNL TOTAL CA: CPT | Performed by: INTERNAL MEDICINE

## 2017-11-13 PROCEDURE — 65660000000 HC RM CCU STEPDOWN

## 2017-11-13 PROCEDURE — 74011250636 HC RX REV CODE- 250/636: Performed by: EMERGENCY MEDICINE

## 2017-11-13 PROCEDURE — 74011250637 HC RX REV CODE- 250/637: Performed by: PHYSICIAN ASSISTANT

## 2017-11-13 PROCEDURE — 82962 GLUCOSE BLOOD TEST: CPT

## 2017-11-13 PROCEDURE — 74011250637 HC RX REV CODE- 250/637: Performed by: ORTHOPAEDIC SURGERY

## 2017-11-13 PROCEDURE — 85025 COMPLETE CBC W/AUTO DIFF WBC: CPT | Performed by: INTERNAL MEDICINE

## 2017-11-13 PROCEDURE — 74011250636 HC RX REV CODE- 250/636: Performed by: INTERNAL MEDICINE

## 2017-11-13 PROCEDURE — 74011636637 HC RX REV CODE- 636/637: Performed by: INTERNAL MEDICINE

## 2017-11-13 RX ADMIN — INSULIN LISPRO 3 UNITS: 100 INJECTION, SOLUTION INTRAVENOUS; SUBCUTANEOUS at 08:17

## 2017-11-13 RX ADMIN — SODIUM CHLORIDE 1 G: 900 INJECTION, SOLUTION INTRAVENOUS at 03:16

## 2017-11-13 RX ADMIN — LEVOTHYROXINE SODIUM 88 MCG: 100 TABLET ORAL at 08:18

## 2017-11-13 RX ADMIN — INSULIN LISPRO 2 UNITS: 100 INJECTION, SOLUTION INTRAVENOUS; SUBCUTANEOUS at 18:37

## 2017-11-13 RX ADMIN — DOCUSATE SODIUM AND SENNOSIDES 1 TABLET: 8.6; 5 TABLET, FILM COATED ORAL at 18:37

## 2017-11-13 RX ADMIN — CASTOR OIL AND BALSAM, PERU: 788; 87 OINTMENT TOPICAL at 18:39

## 2017-11-13 RX ADMIN — ACETAMINOPHEN 650 MG: 500 TABLET ORAL at 18:36

## 2017-11-13 RX ADMIN — FLUOXETINE HYDROCHLORIDE 20 MG: 20 CAPSULE ORAL at 08:18

## 2017-11-13 RX ADMIN — INSULIN LISPRO 5 UNITS: 100 INJECTION, SOLUTION INTRAVENOUS; SUBCUTANEOUS at 11:52

## 2017-11-13 RX ADMIN — TBO-FILGRASTIM 480 MCG: 480 INJECTION, SOLUTION SUBCUTANEOUS at 18:37

## 2017-11-13 RX ADMIN — ACETAMINOPHEN 650 MG: 500 TABLET ORAL at 11:52

## 2017-11-13 RX ADMIN — POLYETHYLENE GLYCOL 3350 17 G: 17 POWDER, FOR SOLUTION ORAL at 08:17

## 2017-11-13 RX ADMIN — CALCIUM CARBONATE 500 MG (1,250 MG)-VITAMIN D3 200 UNIT TABLET 1 TABLET: at 11:52

## 2017-11-13 RX ADMIN — SODIUM CHLORIDE 1 G: 900 INJECTION, SOLUTION INTRAVENOUS at 18:37

## 2017-11-13 RX ADMIN — HYDROCODONE BITARTRATE AND ACETAMINOPHEN 1 TABLET: 7.5; 325 TABLET ORAL at 08:18

## 2017-11-13 RX ADMIN — CALCIUM CARBONATE 500 MG (1,250 MG)-VITAMIN D3 200 UNIT TABLET 1 TABLET: at 08:18

## 2017-11-13 RX ADMIN — CARVEDILOL 6.25 MG: 6.25 TABLET, FILM COATED ORAL at 18:36

## 2017-11-13 RX ADMIN — CARVEDILOL 6.25 MG: 6.25 TABLET, FILM COATED ORAL at 08:18

## 2017-11-13 RX ADMIN — Medication 10 ML: at 14:36

## 2017-11-13 RX ADMIN — Medication 10 ML: at 06:02

## 2017-11-13 RX ADMIN — HYDROCODONE BITARTRATE AND ACETAMINOPHEN 1 TABLET: 7.5; 325 TABLET ORAL at 14:37

## 2017-11-13 RX ADMIN — PANTOPRAZOLE SODIUM 40 MG: 40 TABLET, DELAYED RELEASE ORAL at 08:18

## 2017-11-13 RX ADMIN — SODIUM CHLORIDE 1 G: 900 INJECTION, SOLUTION INTRAVENOUS at 11:53

## 2017-11-13 RX ADMIN — CALCIUM CARBONATE 500 MG (1,250 MG)-VITAMIN D3 200 UNIT TABLET 1 TABLET: at 18:36

## 2017-11-13 RX ADMIN — HYDROCODONE BITARTRATE AND ACETAMINOPHEN 1 TABLET: 7.5; 325 TABLET ORAL at 21:46

## 2017-11-13 RX ADMIN — INSULIN LISPRO 2 UNITS: 100 INJECTION, SOLUTION INTRAVENOUS; SUBCUTANEOUS at 22:24

## 2017-11-13 RX ADMIN — ENOXAPARIN SODIUM 40 MG: 40 INJECTION SUBCUTANEOUS at 08:18

## 2017-11-13 RX ADMIN — DOCUSATE SODIUM AND SENNOSIDES 1 TABLET: 8.6; 5 TABLET, FILM COATED ORAL at 08:18

## 2017-11-13 RX ADMIN — ACETAMINOPHEN 650 MG: 500 TABLET ORAL at 06:01

## 2017-11-13 RX ADMIN — Medication 10 ML: at 22:25

## 2017-11-13 NOTE — PROGRESS NOTES
Hospitalist Progress Note    NAME: Lynn Walker   :  1935   MRN:  [id number]       Assessment / Plan:  Sepsis  Complicated UTI  Urinary retention  -in the setting of indwelling walters cath due to urinary retention post op. Pt failed voiding trail so walters cath re-inserted  -CLEOPATRA consistent with UTI  -Urine cx showed gram neg  -started on meropenum, will de-escalate pending cx I&D  -needs urology outpt     R hip fracture from mechanical fall  -pt at high risk for infection perioperatively given he neutropenia. Pt on neutropenic precaution  -s/p R femoral hemiarthroplasty  -pain and DVT prophylaxis per ortho  -PT/OT, SNF on discharge when medically stable  -ortho following    Hypotension with dizziness, resolved  -gentle IVF, likely from poor hydration with pain meds in place  -parameters placed for coreg    PSVT  -likely induced by stress, and acute illness, resolved  -resume home med coreg    NHL  Pancytopenia, improving  -chemotherapy outpt several weeks ago  -s/p transfused 2 units thus far, cont' Granix   -appreciate oncology following (outpt f/u with Dr Sky )  Discussed with Dr Betty Page    HTN  Chronic systolic heart failure  -cont' coreg, hydralazine prn     T2DM  -cont' SSI    Hypothyroidism  -cont' synthroid    Recent skin lesion  -s/p \"mole\" excision left malar region on 17. Healing nicely. Body mass index is 23.57 kg/(m^2).       Code Status: full  Surrogate Decision Maker: tawny Shay [phone]   DVT Prophylaxis: per ortho  Baseline: independent     Subjective:     Chief Complaint / Reason for Physician Visit  Pt seen at bedside, no acute complaints. Discussed anticipation of discharge in the AM.  Discussed with RN events overnight.      Review of Systems:  Symptom Y/N Comments  Symptom Y/N Comments   Fever/Chills y   Chest Pain n    Poor Appetite    Edema     Cough    Abdominal Pain n    Sputum    Joint Pain y    SOB/MUSE n   Pruritis/Rash     Nausea/vomit    Tolerating PT/OT Diarrhea    Tolerating Diet     Constipation    Other       Could NOT obtain due to:      Objective:     VITALS:   Last 24hrs VS reviewed since prior progress note. Most recent are:  Patient Vitals for the past 24 hrs:   Temp Pulse Resp BP SpO2   11/13/17 1025 98.2 °F (36.8 °C) 67 18 99/48 97 %   11/13/17 0528 98.9 °F (37.2 °C) 83 16 147/55 96 %   11/13/17 0206 98.2 °F (36.8 °C) 74 16 138/55 98 %   11/12/17 2238 98.5 °F (36.9 °C) 69 17 108/68 95 %   11/12/17 1850 - 70 18 103/58 96 %   11/12/17 1751 - 87 18 111/60 97 %   11/12/17 1716 - 79 18 115/64 99 %   11/12/17 1703 98 °F (36.7 °C) 70 17 103/58 98 %   11/12/17 1615 - 72 18 100/56 100 %   11/12/17 1515 - 73 18 97/61 98 %   11/12/17 1445 97.9 °F (36.6 °C) 80 18 106/55 98 %   11/12/17 1430 97.9 °F (36.6 °C) 73 18 93/62 97 %   11/12/17 1422 97.8 °F (36.6 °C) 73 18 108/61 96 %   11/12/17 1101 98.1 °F (36.7 °C) 72 18 109/50 96 %       Intake/Output Summary (Last 24 hours) at 11/13/17 1041  Last data filed at 11/12/17 1751   Gross per 24 hour   Intake              385 ml   Output             1800 ml   Net            -1415 ml        PHYSICAL EXAM:  General: WD, WN. Alert, cooperative, no acute distress    EENT:  EOMI. Anicteric sclerae. MMM  Resp:  CTA bilaterally, no wheezing or rales. No accessory muscle use  CV:  Regular  rhythm,  No edema  GI:  Soft, Non distended, Non tender.  +Bowel sounds  Neurologic:  Alert and oriented X 3, normal speech  Psych:   Good insight. Not anxious nor agitated  Skin:  No rashes.   No jaundice    Reviewed most current lab test results and cultures  YES  Reviewed most current radiology test results   YES  Review and summation of old records today    NO  Reviewed patient's current orders and MAR    YES  PMH/ reviewed - no change compared to H&P  ________________________________________________________________________  Care Plan discussed with:    Comments   Patient x    Family  x daughter   RN x    Care Manager     Consultant Multidiciplinary team rounds were held today with , nursing, pharmacist and clinical coordinator. Patient's plan of care was discussed; medications were reviewed and discharge planning was addressed. ________________________________________________________________________  Total NON critical care TIME:  35   Minutes    Total CRITICAL CARE TIME Spent:   Minutes non procedure based      Comments   >50% of visit spent in counseling and coordination of care     ________________________________________________________________________  Ashtyn Ventura MD     Procedures: see electronic medical records for all procedures/Xrays and details which were not copied into this note but were reviewed prior to creation of Plan. LABS:  I reviewed today's most current labs and imaging studies.   Pertinent labs include:  Recent Labs      11/13/17   0926  11/12/17   0210  11/11/17   0028   WBC  3.3*  1.7*  1.5*   HGB  7.8*  6.6*  7.4*   HCT  23.8*  19.8*  22.3*   PLT  87*  80*  92*     Recent Labs      11/12/17   0210   NA  133*   K  4.0   CL  101   CO2  25   GLU  206*   BUN  18   CREA  0.83   CA  8.0*       Signed: Ashtyn Ventura MD

## 2017-11-13 NOTE — ROUTINE PROCESS
Bedside and Verbal shift change report given to Kelly Garza (oncoming nurse) by Royal Danelle CROWLEY (offgoing nurse). Report included the following information SBAR, Kardex, Intake/Output, MAR and Recent Results.

## 2017-11-13 NOTE — PROGRESS NOTES
Ortho/ NeuroSurgery NP Note    s/p RIGHT FEMORAL HEMIARTHROPLASTY on 11/7/2017     Pt resting in the bed. Pain in the sacral area from a \"blister\" she reports is there. Has pain in the hip responsive to pain med. VSS, had a fever this morning. No feeling well today. Lack of energy. Larios in place. Retention. UTI being treated by hospitalist. Awaiting final culture. Labs  Lab Results   Component Value Date/Time    HGB 7.8 11/13/2017 09:26 AM        Body mass index is 23.57 kg/(m^2). BMI greater than 30 is classified as obesity and greater than 40 is classified as morbid obesity. Dressing c.d.i, sussy-wound erythema, worth monitoring. Some warmth in the area. Calves soft and supple; No pain with passive stretch  Sensation and motor intact  SCDs for mechanical DVT proph while in bed     PLAN:  1) PT BID  2) Lovenox for DVT Prophylaxis  3) Protonix for GI Prophylaxis. 4) UTI- awaiting speciation for final abx recommendations. 5) Dime sized Stage II p/u to the sacrum, purple in color with overlying blister which has ruptured. 6) Incision- redness, will discuss with Dr. En Cano. 7) Plan d/c to [facility] when cleared by medicine.      Alli Wilks NP

## 2017-11-13 NOTE — PROGRESS NOTES
Problem: Mobility Impaired (Adult and Pediatric)  Goal: *Acute Goals and Plan of Care (Insert Text)  Physical Therapy Goals  Initiated 11/8/2017    1. Patient will move from supine to sit and sit to supine  in bed with maximal assistance within 4 days. 2. Patient will perform sit to stand with maximal assistance within 4 days. 3. Patient will ambulate with maximal assistance for 5 feet with the least restrictive device within 4 days. 4. Patient will verbalize and demonstrate understanding of total hip precautions per protocol within 4 days. 5. Patient will perform LE home exercise program per protocol with supervision/set-up within 4 days. physical Therapy TREATMENT  Patient: Ashwini Ordonez (62 y.o. female)  Date: 11/13/2017  Diagnosis: Hip fracture (HCC)  Right Hip Fracture  right femoral neck fracture  Closed right hip fracture (HCC)  Procedure(s) (LRB):  RIGHT FEMORAL HEMIARTHROPLASTY (Right) 6 Days Post-Op  Precautions: Total hip, Fall, Other (comment) (neutropenic)  Chart, physical therapy assessment, plan of care and goals were reviewed. ASSESSMENT:  Pt cleared by nurse to mobilize. Pt received up in chair. Pt agreeable to ambulation. Pt performed sit to stand transfer at [facility]. Pt ambulated 60ft With RW at [facility]. Pt requiring cueing to increase step length with L LE. Pt able to correct. Pt reported arms feeling fatigued. Educated pt on allowing increased weight on LE instead of UE. Pt able to performed occasionally. Pt with improved ambulation distance today although fatigued after. Pt will benefit from SNF to improve activity tolerance and strength. Progression toward goals:  []      Improving appropriately and progressing toward goals  [x]      Improving slowly and progressing toward goals  []      Not making progress toward goals and plan of care will be adjusted     PLAN:  Patient continues to benefit from skilled intervention to address the above impairments.   Continue treatment per established plan of care. Discharge Recommendations:  [facility]  Further Equipment Recommendations for Discharge:  TBD by SNF     SUBJECTIVE:   Patient stated Its getting better each day. Shanna Neff    OBJECTIVE DATA SUMMARY:   Critical Behavior:  Neurologic State: Alert  Orientation Level: Oriented X4  Cognition: Follows commands  Safety/Judgement: Awareness of environment  Functional Mobility Training:                       Transfers:  Sit to Stand: Contact guard assistance; Additional time  Stand to Sit: Contact guard assistance; Additional time                             Balance:  Sitting: Intact  Standing: Intact; With support  Standing - Static: Good  Standing - Dynamic : Good  Ambulation/Gait Training:  Distance (ft): 60 Feet (ft)  Assistive Device: Gait belt;Walker, rolling  Ambulation - Level of Assistance: Contact guard assistance        Gait Abnormalities: Antalgic;Decreased step clearance        Base of Support: Narrowed  Stance: Right decreased  Speed/Daria: Pace decreased (<100 feet/min)  Step Length: Right shortened;Left shortened           Pain:  Pain Scale 1: Numeric (0 - 10)  Pain Intensity 1: 5  Pain Location 1: Hip  Pain Orientation 1: Right  Pain Description 1: Aching; Sore  Pain Intervention(s) 1: Medication (see MAR)  Activity Tolerance:   Pt with improved ambulation distance     After treatment:   [x] Patient left in no apparent distress sitting up in chair  [] Patient left in no apparent distress in bed  [x] Call bell left within reach  [x] Nursing notified  [] Caregiver present  [x] Bed alarm activated    COMMUNICATION/COLLABORATION:   The patients plan of care was discussed with: Registered Nurse    Gust Leyden   Time Calculation: 18 mins

## 2017-11-13 NOTE — PROGRESS NOTES
Orthopedic NP Progress Note  Post Op day: 5 Days Post-Op    November 12, 2017 8:49 PM     Anna Boyce    Attending Physician: Treatment Team: Attending Provider: Lina Riggs MD; Consulting Provider: Zainab Figueroa DO; Consulting Provider: Malik Whitfield MD; Consulting Provider: Alan Gonzalez MD; Utilization Review: Boston Rosa RN; Care Manager: ISHMAEL Marquis     Vital Signs:    Patient Vitals for the past 8 hrs:   BP Temp Pulse Resp SpO2   11/12/17 1850 103/58 - 70 18 96 %   11/12/17 1751 111/60 - 87 18 97 %   11/12/17 1716 115/64 - 79 18 99 %   11/12/17 1703 103/58 98 °F (36.7 °C) 70 17 98 %   11/12/17 1615 100/56 - 72 18 100 %   11/12/17 1515 97/61 - 73 18 98 %   11/12/17 1445 106/55 97.9 °F (36.6 °C) 80 18 98 %   11/12/17 1430 93/62 97.9 °F (36.6 °C) 73 18 97 %   11/12/17 1422 108/61 97.8 °F (36.6 °C) 73 18 96 %     BMI (calculated): 23.6 (11/07/17 2014)    Intake/Output:     11/11 0701 - 11/12 1900  In: 2368.3 [I.V.:2033.3]  Out: 1800 [Urine:1800]    Pain Control:   Pain Assessment  Pain Scale 1: Numeric (0 - 10)  Pain Intensity 1: 0  Pain Onset 1: acute  Pain Location 1: Hip  Pain Orientation 1: Right  Pain Description 1: Aching  Pain Intervention(s) 1: Cold pack    LAB:    Recent Labs      11/12/17   0210   HCT  19.8*   HGB  6.6*     Lab Results   Component Value Date/Time    Sodium 133 11/12/2017 02:10 AM    Potassium 4.0 11/12/2017 02:10 AM    Chloride 101 11/12/2017 02:10 AM    CO2 25 11/12/2017 02:10 AM    Glucose 206 11/12/2017 02:10 AM    BUN 18 11/12/2017 02:10 AM    Creatinine 0.83 11/12/2017 02:10 AM    Calcium 8.0 11/12/2017 02:10 AM       Subjective: Anna Boyce is a 80 y.o. female s/p a  Procedure(s):  RIGHT FEMORAL HEMIARTHROPLASTY   Procedure(s):  RIGHT FEMORAL HEMIARTHROPLASTY. Tolerating diet. Pain well managed      Objective: General: alert, cooperative, no distress. Neuro/Vascular: CNS Intact. Sensation stable. Brisk cap refill, + pulses UE/LE  Musculoskeletal:  + ROM UE/LE. Maynor's sign negative in bilateral lower extremities. Calves soft, supple, non-tender upon palpation or with passive stretch. Skin: Incision - clean, dry and intact. No significant erythema or swelling. Dressing: + dry blood          PT/OT:   Gait:  Gait  Base of Support: Narrowed  Speed/Daria: Slow  Step Length: Left shortened, Right shortened (L>R )  Stance: Right decreased  Gait Abnormalities: Antalgic, Decreased step clearance, Step to gait  Ambulation - Level of Assistance: Contact guard assistance  Distance (ft): 44 Feet (ft)  Assistive Device: Gait belt, Walker, rolling                   Assessment:    s/p Procedure(s):  RIGHT FEMORAL HEMIARTHROPLASTY    Principal Problem:    Closed right hip fracture ([facility] (11/6/2017)    Active Problems:    Hip fracture ([facility] (11/6/2017)         Plan:     -  Continue PT/OT - WBAT   -  Continue established methods of pain control  -  VTE Prophylaxes - TEDS &/or SCDs with lovenox      Dr. En Cano aware and agree with plan.      Signed By: Victoria Meléndez    Orthopedic Nurse Practitioner

## 2017-11-13 NOTE — DIABETES MGMT
DTC Progress Note    Recommendations/ Comments: Please consider beginning lantus 15units daily to aid in glucose control. Pt BG remains elevated (209-267mg/dL) and has required 18 units of correction over the past 24 hours. Chart reviewed on [facility]. Patient is a 80 y.o. female with known Type 2 Diabetes on metformin 1000mg ac b/d at home. A1c:   No results found for: HBA1C, HGBE8, WTX9QJZO, ABQ9KMPB    Recent Glucose Results:   Lab Results   Component Value Date/Time     (H) 11/13/2017 09:26 AM    GLUCPOC 267 (H) 11/13/2017 11:24 AM    GLUCPOC 227 (H) 11/13/2017 07:25 AM    GLUCPOC 209 (H) 11/12/2017 08:54 PM        Lab Results   Component Value Date/Time    Creatinine 0.83 11/13/2017 09:26 AM     Estimated Creatinine Clearance: 58.4 mL/min (based on Cr of 0.83). Active Orders   Diet    DIET DIABETIC CONSISTENT CARB Regular; Neutropenic        PO intake:   No data found. Current hospital DM medication: humalog correction    Will continue to follow as needed.     Thank you    Dinorah Loja, [address], [place]   Office: [phone]

## 2017-11-13 NOTE — PROGRESS NOTES
Problem: Mobility Impaired (Adult and Pediatric)  Goal: *Acute Goals and Plan of Care (Insert Text)  Physical Therapy Goals  Initiated 11/8/2017    1. Patient will move from supine to sit and sit to supine  in bed with maximal assistance within 4 days. 2. Patient will perform sit to stand with maximal assistance within 4 days. 3. Patient will ambulate with maximal assistance for 5 feet with the least restrictive device within 4 days. 4. Patient will verbalize and demonstrate understanding of total hip precautions per protocol within 4 days. 5. Patient will perform LE home exercise program per protocol with supervision/set-up within 4 days. physical Therapy TREATMENT  Patient: Jose Alberto Hermosillo (46 y.o. female)  Date: 11/13/2017  Diagnosis: Hip fracture (HCC)  Right Hip Fracture  right femoral neck fracture  Closed right hip fracture (HCC)  Procedure(s) (LRB):  RIGHT FEMORAL HEMIARTHROPLASTY (Right) 6 Days Post-Op  Precautions: Total hip, Fall, Other (comment) (neutropenic)  Chart, physical therapy assessment, plan of care and goals were reviewed. ASSESSMENT:  Pt cleared by nurse to mobilize. Pt received in bed supine after just being up in chair. Pt refused ambulation this afternoon but agreeable to exercises. Pt performed supine exercises with assistance. Pt with increased pain but able to perform. Pt will benefit from SNF to improve strength and mobility. Progression toward goals:  []      Improving appropriately and progressing toward goals  [x]      Improving slowly and progressing toward goals  []      Not making progress toward goals and plan of care will be adjusted     PLAN:  Patient continues to benefit from skilled intervention to address the above impairments. Continue treatment per established plan of care.   Discharge Recommendations:  Skilled Nursing Facility  Further Equipment Recommendations for Discharge:  TBD by SNF     SUBJECTIVE:   Patient stated I just got settled and I really don't think I can walk right now.     OBJECTIVE DATA SUMMARY:   Critical Behavior:  Neurologic State: Alert  Orientation Level: Oriented X4  Cognition: Follows commands  Safety/Judgement: Awareness of environment  Functional Mobility Training:                       Transfers:  Sit to Stand: Contact guard assistance; Additional time  Stand to Sit: Contact guard assistance; Additional time                             Balance:  Sitting: Intact  Standing: Intact; With support  Standing - Static: Good  Standing - Dynamic : Good  Ambulation/Gait Training:  Distance (ft): 60 Feet (ft)  Assistive Device: Gait belt;Walker, rolling  Ambulation - Level of Assistance: Contact guard assistance        Gait Abnormalities: Antalgic;Decreased step clearance        Base of Support: Narrowed  Stance: Right decreased  Speed/Daria: Pace decreased (<100 feet/min)  Step Length: Right shortened;Left shortened                        Therapeutic Exercises:   SUPINE  EXERCISES   Sets   Reps   Active Active Assist   Passive Self ROM   Comments   Ankle Pumps 1 10 [x]                           []                           []                           []                              Quad Sets 1 10 [x]                           []                           []                           []                              Heel Slides 1 10 []                           [x]                           []                           []                              Hip Abduction 1 10 [x]                           []                           []                           []                              Glut Sets 1 10 [x]                           []                           []                           []                              Straight leg raise 1 8 []                           [x]                           []                           []                                 []                           []                           []                           [] STANDING  EXERCISES   Sets   Reps   Active Active Assist   Passive Self ROM   Comments   Heel Raises   []                           []                           []                           []                              Hip Abduction   []                           []                           []                           []                                 []                           []                           []                           []                                 []                           []                           []                           []                                  Pain:  Pain Scale 1: Numeric (0 - 10)  Pain Intensity 1: 6  Pain Location 1: Hip  Pain Orientation 1: Right  Pain Description 1: Aching  Pain Intervention(s) 1: Medication (see MAR); Ice  Activity Tolerance:   Pt with improved strength with increased reps.    After treatment:   [] Patient left in no apparent distress sitting up in chair  [x] Patient left in no apparent distress in bed  [x] Call bell left within reach  [x] Nursing notified  [x] Caregiver present  [] Bed alarm activated    COMMUNICATION/COLLABORATION:   The patients plan of care was discussed with: Registered Nurse    Flavia Monteiro   Time Calculation: 16 mins

## 2017-11-13 NOTE — PROGRESS NOTES
Nutrition Assessment:    INTERVENTIONS/RECOMMENDATIONS:   Continue consistent carb diet for BG control  Encourage protein sources with meals    ASSESSMENT:   Chart reviewed. Pt reports of fair appetite and consuming ~50% of meals. She is not consuming glucerna shakes and would like to discontinued. Protein sources were once again encouraged for every meal especially if she was not going to consume glucerna shakes. Diet Order: Consistent carb  % Eaten:  No data found. Pertinent Medications: [x]Reviewed: os-morgan, insulin, synthroid, PPI, miralax, senna-docusate,   Pertinent Labs: [x]Reviewed: BG>200,   Food Allergies: [x]NKFA  []Other   Last BM:  11/12  Edema:      []RUE   []LUE   []RLE   []LLE      Pressure Ulcer:      [] Stage I   [] Stage II   [] Stage III   [] Stage IV      Anthropometrics: Height: 5' 11\" (180.3 cm) Weight: 76.7 kg (169 lb)    IBW (%IBW):   ( ) UBW (%UBW):   (  %)    BMI: Body mass index is 23.57 kg/(m^2). This BMI is indicative of:  []Underweight   [x]Normal   []Overweight   [] Obesity   [] Extreme Obesity (BMI>40)  Last Weight Metrics:  Weight Loss Metrics 11/7/2017 11/25/2014 10/7/2014 9/15/2014 8/18/2014 7/14/2014 6/3/2014   Today's Wt 169 lb 169 lb 173 lb 173 lb 171 lb 176 lb 174 lb   BMI 23.57 kg/m2 23.58 kg/m2 24.14 kg/m2 24.14 kg/m2 23.86 kg/m2 24.56 kg/m2 24.28 kg/m2       Estimated Nutrition Needs (Based on): 1720 Kcals/day (BMR: 1325 x 1.3) , 90 g (1.2 g/kg) Protein  Carbohydrate: At Least 130 g/day  Fluids: 1720 mL/day or per primary team    NUTRITION DIAGNOSES:   Problem:  Increased nutrient needs (protein )      Etiology: related to fracture healing     Signs/Symptoms: as evidenced by s/p RIGHT HIP HEMIARTHROPLASTY      NUTRITION INTERVENTIONS:  Meals/Snacks: General/healthful diet   Supplements: Commercial supplement              GOAL:   consume >50% of meals in 3-5 days    NUTRITION MONITORING AND EVALUATION      Food/Nutrient Intake Outcomes:  Total energy intake  Physical Signs/Symptoms Outcomes: Weight/weight change, Electrolyte and renal profile, GI profile, Glucose profile, GI    Previous Goal Met:   [] Met              [x] Progressing Towards Goal              [] Not Progressing Towards Goal   Previous Recommendations:   [x] Implemented          [] Not Implemented          [] Not Applicable    LEARNING NEEDS (Diet, Food/Nutrient-Drug Interaction):    [x] None Identified   [] Identified and Education Provided/Documented   [] Identified and Pt declined/was not appropriate     Cultural, Buddhism, OR Ethnic Dietary Needs:    [x] None Identified   [] Identified and Addressed     [x] Interdisciplinary Care Plan Reviewed/Documented    [x] Discharge Planning: Consistent carb diet for BG control with protein sources at each meal   [] Participated in Interdisciplinary Rounds    NUTRITION RISK:    [] High              [x] Moderate           []  Low  []  Minimal/Uncompromised      Chris Aguayo RDN  Pager [phone]  Weekend Pager [phone]

## 2017-11-13 NOTE — PROGRESS NOTES
Spiritual Care Assessment/Progress Notes    Amanda Carroll [id number]  [id number]    1935  80 y.o.  female    Patient Telephone Number: There is no home phone number on file. 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) 10/07/2014    Diabetic neuropathy ([facility] 09/15/2014    S/P lymph node biopsy 09/15/2014    Osteoarthritis of both knees 09/15/2014    Osteoarthritis, shoulder 09/15/2014    Rotator cuff arthropathy 09/15/2014    PMR (polymyalgia rheumatica) (recurrent) 07/14/2014    H/O polymyalgia rheumatica 06/03/2014    OA (osteoarthritis) 06/03/2014    Anemia, unspecified 06/03/2014    DM2 (diabetes mellitus, type 2) ([facility] 06/03/2014    Hypovitaminosis D 06/03/2014    Gastroesophageal reflux disease with hiatal hernia 06/03/2014    Depression 06/03/2014        Date: 11/13/2017       Level of Christianity/Spiritual Activity:  [x]         Involved in lory tradition/spiritual practice    []         Not involved in lory tradition/spiritual practice  [x]         Spiritually oriented    []         Claims no spiritual orientation    []         seeking spiritual identity  []         Feels alienated from Mu-ism practice/tradition  []         Feels angry about Mu-ism practice/tradition  [x]         Spirituality/Mu-ism tradition is a resource for coping at this time.   []         Not able to assess due to medical condition    Services Provided Today:  []         crisis intervention    []         reading Scriptures  [x]         spiritual assessment    [x]         prayer  [x]         empathic listening/emotional support  []         rites and rituals (cite in comments)  []         life review     []         Bahai support  []         theological development   []         advocacy  []         ethical dialog     []         blessing  []         bereavement support    [x]         support to family  []         anticipatory grief support   []         help with AMD  []         spiritual guidance    []         meditation      Spiritual Care Needs  [x]         Emotional Support  [x]         Spiritual/Quaker Care  []         Loss/Adjustment  []         Advocacy/Referral                /Ethics  []         No needs expressed at               this time  []         Other: (note in               comments)  [address]  []         Follow up visits with               pt/family  []         Provide materials  []         Schedule sacraments  []         Contact Community               Clergy  [x]         Follow up as needed  []         Other: (note in               comments)     Initial Spiritual Assessment for LOS pt in 3213. Pt awake and alert, accompanied by daughter who shared she was visiting from [place]. Pt provided  with extensive health history along with resilient attitude in dealing with physical setbacks and hurdles. Pt identified as a person of lory and heaped praises on her former [address] which she has not been able to attend for a few years due to health, but continues to receive support from Mandae[employer] and  who has visited twice during this hospitalization. Pt expressed her lory in God and was affirmed in her lory.  provided words of encouragement and reassurance and prayed with pt who thanked  and invited any follow up visits. Will follow as able/needed. WALT Brown. Brady Mayfield

## 2017-11-13 NOTE — PROGRESS NOTES
Hematology Oncology Progress Note       Follow up for: angioimmunoblastic lymphoma, T cell type    Chart notes reviewed since last visit. Case discussed with following: patient and daughter present in room. .    Patient complains of the following: in good spirits. Believes she might be transferred to [facility] rehab soon and is pleased. Issue thus far is ability to urinate on own. Sat up in chair for lunch. Did have a fever 2 nights ago and found to have a UTI    Additional concerns noted by the staff:     Patient Vitals for the past 24 hrs:   BP Temp Pulse Resp SpO2   11/13/17 1025 99/48 98.2 °F (36.8 °C) 67 18 97 %   11/13/17 0528 147/55 98.9 °F (37.2 °C) 83 16 96 %   11/13/17 0206 138/55 98.2 °F (36.8 °C) 74 16 98 %   11/12/17 2238 108/68 98.5 °F (36.9 °C) 69 17 95 %   11/12/17 1850 103/58 - 70 18 96 %   11/12/17 1751 111/60 - 87 18 97 %   11/12/17 1716 115/64 - 79 18 99 %   11/12/17 1703 103/58 98 °F (36.7 °C) 70 17 98 %   11/12/17 1615 100/56 - 72 18 100 %   11/12/17 1515 97/61 - 73 18 98 %   11/12/17 1445 106/55 97.9 °F (36.6 °C) 80 18 98 %   11/12/17 1430 93/62 97.9 °F (36.6 °C) 73 18 97 %   11/12/17 1422 108/61 97.8 °F (36.6 °C) 73 18 96 %       ROS negative for 10 organ systems except pain related to hip fracture. Mild weakness. Physical Examination:  Constitutional Alert, cooperative, oriented. Mood and affect appropriate. Appears close to chronological age. Well nourished. Well developed. Head Normocephalic; no scars   Eyes Conjunctivae and sclerae are clear and without icterus. Pupils are reactive and equal.   ENMT Sinuses are nontender. No oral exudates, ulcers, masses, thrush or mucositis. Oropharynx clear. Tongue normal.   Neck Supple without masses or thyromegaly. No jugular venous distension. Hematologic/Lymphatic No petechiae or purpura. No tender or palpable lymph nodes noted. Respiratory Lungs are clear to auscultation without rhonchi or wheezing.    Cardiovascular Regular rate and rhythm of heart without murmurs, gallops or rubs. Chest / Line Site Chest is symmetric with no chest wall deformities. Abdomen Non-tender, non-distended, no masses, ascites or hepatosplenomegaly. Good bowel sounds. No guarding or rebound tenderness. No pulsatile masses. Musculoskeletal No tenderness or swelling, normal range of motion without obvious weakness. Extremities No visible deformities, no cyanosis, clubbing or edema. Bandage over right upper hip region   Skin No rashes, scars, or lesions suggestive of malignancy. No petechiae, purpura, or ecchymoses. No excoriations. Neurologic No sensory or motor deficits noted. Psychiatric Alert and oriented . Coherent speech. Verbalizes understanding of our discussions today.        Labs:  Recent Results (from the past 24 hour(s))   GLUCOSE, POC    Collection Time: 11/12/17  5:05 PM   Result Value Ref Range    Glucose (POC) 223 (H) 65 - 100 mg/dL    Performed by [id number], POC    Collection Time: 11/12/17  8:54 PM   Result Value Ref Range    Glucose (POC) 209 (H) 65 - 100 mg/dL    Performed by [address], POC    Collection Time: 11/13/17  7:25 AM   Result Value Ref Range    Glucose (POC) 227 (H) 65 - 100 mg/dL    Performed by Gopal Gillespie    METABOLIC PANEL, BASIC    Collection Time: 11/13/17  9:26 AM   Result Value Ref Range    Sodium 136 136 - 145 mmol/L    Potassium 3.9 3.5 - 5.1 mmol/L    Chloride 103 97 - 108 mmol/L    CO2 23 21 - 32 mmol/L    Anion gap 10 5 - 15 mmol/L    Glucose 253 (H) 65 - 100 mg/dL    BUN 15 6 - 20 MG/DL    Creatinine 0.83 0.55 - 1.02 MG/DL    BUN/Creatinine ratio 18 12 - 20      GFR est AA >60 >60 ml/min/1.73m2    GFR est non-AA >60 >60 ml/min/1.73m2    Calcium 8.3 (L) 8.5 - 10.1 MG/DL   CBC WITH AUTOMATED DIFF    Collection Time: 11/13/17  9:26 AM   Result Value Ref Range    WBC 3.3 (L) 3.6 - 11.0 K/uL    RBC 2.87 (L) 3.80 - 5.20 M/uL    HGB 7.8 (L) 11.5 - 16.0 g/dL    HCT 23.8 (L) 35.0 - 47.0 %    MCV 82.9 80.0 - 99.0 FL    MCH 27.2 26.0 - 34.0 PG    MCHC 32.8 30.0 - 36.5 g/dL    RDW 17.5 (H) 11.5 - 14.5 %    PLATELET 87 (L) 204 - 400 K/uL    NEUTROPHILS 78 %    BAND NEUTROPHILS 6 %    LYMPHOCYTES 6 %    MONOCYTES 8 %    EOSINOPHILS 0 %    BASOPHILS 0 %    METAMYELOCYTES 2 %    ABS. NEUTROPHILS 2.8 K/UL    ABS. LYMPHOCYTES 0.2 K/UL    ABS. MONOCYTES 0.3 K/UL    ABS. EOSINOPHILS 0.0 K/UL    ABS. 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 , last chemo was in August 2017 - since then she has transferred her outpatient care to Dr. Afshin Negron in [facility], [address] [place] [address] from Dr. Juwan Holbrook. Will need a f/u with her soon after she is d/c from Rehab. Cytopenias - she has not received chemo since August so current low counts are worrisome. On granix to boost white count with goal to maintain ANC > 1000. Got one unit PRBCs - hgb better but marginal.  Not b12, folate or iron deficient. Continue Granix for another 1-2 days and then stop. But I expect that her wbc may drift down again. At some point she may benefit from a bone marrow biopsy as outpatient    Complicated UTI- GNR in the urine. On iv merrem- indwelling walters for urinary retention. Tailor to culture results. Right hip fracture s/pright hip hemiarthroplasty using [employer] AutoReflex.cometer [place] bipolar device. Fever during transfusion. BC NGSF. HTN    Chronic CHF - coreg, hydralazine as needed.      Type 2 DM - SSI ordered

## 2017-11-13 NOTE — WOUND CARE
Wound Care Consult: Chart reviewed and patient assessed for her sacral wound. Patient was admitted to repair a right hip fracture with a hemiarthroplasy. The staples are dry and intact. Nursing discovered a purple \"blood blister\" to the gluteal cleft skin this morning. Pt. States that the area is only mildly painful but the skin does not justine when palpated. This is a stage 2 pressure ulcer that was not noted on admission on 11-6-17. Dr. Clements Just notified. Assessment today and treatment: rolled / assisted to her left side. There was a purple blister filled with clear fluid on the top portion of her gluteal cleft on the sacrum. The blister was leaking and therefore, removed today and the site cleansed. Venelex ordered for the wound but treated now with zinc based cream. Pt. Is staying on her left side for now. Sacral foam dressing applied.      Wound Sacral/coccyx Posterior (Active)   DRESSING STATUS Removed 11/13/2017  3:34 PM   DRESSING TYPE Foam 11/13/2017  3:34 PM   Pressure Injury Stage ll 11/13/2017  3:34 PM   Wound Length (cm) 1.2 cm 11/13/2017  3:34 PM   Wound Width (cm) 1.4 cm 11/13/2017  3:34 PM   Wound Depth (cm) 0.2 11/13/2017  3:34 PM   Wound Surface area (cm^3) 0.34 cm^2 11/13/2017  3:34 PM   Condition of [place] 11/13/2017  3:34 PM   Condition of Edges Rolled/curled 11/13/2017  3:34 PM   Epithelialization (%) 0 11/13/2017  3:34 PM   Tissue Type Pink 11/13/2017  3:34 PM   Tissue Type Percent Pink 100 11/13/2017  3:34 PM   Drainage Amount  Small  11/13/2017  3:34 PM   Drainage Color Serosanguinous 11/13/2017  3:34 PM   Wound Odor None 11/13/2017  3:34 PM   Periwound Skin Condition Intact 11/13/2017  3:34 PM   Cleansing and Cleansing Agents  Soap and water 11/13/2017  3:34 PM   Dressing Type Applied Zinc based paste 11/13/2017  3:34 PM   Procedure Tolerated Well 11/13/2017  3:34 PM   Number of days:0       Arnoldo Joshi RN, BSN, [facility]

## 2017-11-13 NOTE — PROGRESS NOTES
Problem: Falls - Risk of  Goal: *Absence of Falls  Document Kristian Fall Risk and appropriate interventions in the flowsheet. Outcome: Progressing Towards Goal  Fall Risk Interventions:  Mobility Interventions: Patient to call before getting OOB, Communicate number of staff needed for ambulation/transfer    Mentation Interventions: Adequate sleep, hydration, pain control, Family/sitter at bedside    Medication Interventions: Assess postural VS orthostatic hypotension, Patient to call before getting OOB, Utilize gait belt for transfers/ambulation    Elimination Interventions: Call light in reach, Toileting schedule/hourly rounds    History of Falls Interventions:  Investigate reason for fall, Door open when patient unattended, Room close to nurse's station, Utilize gait belt for transfer/ambulation

## 2017-11-14 LAB
BACTERIA SPEC CULT: ABNORMAL
BASOPHILS # BLD: 0 K/UL
BASOPHILS NFR BLD: 0 %
CC UR VC: ABNORMAL
COPPER SERPL-MCNC: 169 UG/DL (ref 72–166)
DIFFERENTIAL METHOD BLD: ABNORMAL
EOSINOPHIL # BLD: 0 K/UL
EOSINOPHIL NFR BLD: 0 %
ERYTHROCYTE [DISTWIDTH] IN BLOOD BY AUTOMATED COUNT: 17.8 % (ref 11.5–14.5)
GLUCOSE BLD STRIP.AUTO-MCNC: 179 MG/DL (ref 65–100)
GLUCOSE BLD STRIP.AUTO-MCNC: 209 MG/DL (ref 65–100)
GLUCOSE BLD STRIP.AUTO-MCNC: 220 MG/DL (ref 65–100)
GLUCOSE BLD STRIP.AUTO-MCNC: 269 MG/DL (ref 65–100)
HCT VFR BLD AUTO: 23.7 % (ref 35–47)
HGB BLD-MCNC: 7.8 G/DL (ref 11.5–16)
LYMPHOCYTES # BLD: 0.5 K/UL
LYMPHOCYTES NFR BLD: 15 %
MCH RBC QN AUTO: 27.6 PG (ref 26–34)
MCHC RBC AUTO-ENTMCNC: 32.9 G/DL (ref 30–36.5)
MCV RBC AUTO: 83.7 FL (ref 80–99)
MONOCYTES # BLD: 0.3 K/UL
MONOCYTES NFR BLD: 8 %
NEUTS BAND NFR BLD MANUAL: 3 %
NEUTS SEG # BLD: 2.5 K/UL
NEUTS SEG NFR BLD: 74 %
PLATELET # BLD AUTO: 76 K/UL (ref 150–400)
RBC # BLD AUTO: 2.83 M/UL (ref 3.8–5.2)
RBC MORPH BLD: ABNORMAL
SERVICE CMNT-IMP: ABNORMAL
WBC # BLD AUTO: 3.3 K/UL (ref 3.6–11)
WBC MORPH BLD: ABNORMAL

## 2017-11-14 PROCEDURE — 36415 COLL VENOUS BLD VENIPUNCTURE: CPT | Performed by: INTERNAL MEDICINE

## 2017-11-14 PROCEDURE — 74011250636 HC RX REV CODE- 250/636: Performed by: INTERNAL MEDICINE

## 2017-11-14 PROCEDURE — 74011250637 HC RX REV CODE- 250/637: Performed by: INTERNAL MEDICINE

## 2017-11-14 PROCEDURE — 74011250637 HC RX REV CODE- 250/637: Performed by: ORTHOPAEDIC SURGERY

## 2017-11-14 PROCEDURE — 74011636637 HC RX REV CODE- 636/637: Performed by: INTERNAL MEDICINE

## 2017-11-14 PROCEDURE — 97116 GAIT TRAINING THERAPY: CPT

## 2017-11-14 PROCEDURE — 82962 GLUCOSE BLOOD TEST: CPT

## 2017-11-14 PROCEDURE — 85025 COMPLETE CBC W/AUTO DIFF WBC: CPT | Performed by: INTERNAL MEDICINE

## 2017-11-14 PROCEDURE — 97110 THERAPEUTIC EXERCISES: CPT

## 2017-11-14 PROCEDURE — 74011250636 HC RX REV CODE- 250/636: Performed by: ORTHOPAEDIC SURGERY

## 2017-11-14 PROCEDURE — 65660000000 HC RM CCU STEPDOWN

## 2017-11-14 PROCEDURE — 74011000258 HC RX REV CODE- 258: Performed by: EMERGENCY MEDICINE

## 2017-11-14 PROCEDURE — 74011250636 HC RX REV CODE- 250/636: Performed by: EMERGENCY MEDICINE

## 2017-11-14 PROCEDURE — 74011250637 HC RX REV CODE- 250/637: Performed by: PHYSICIAN ASSISTANT

## 2017-11-14 RX ADMIN — CARVEDILOL 6.25 MG: 6.25 TABLET, FILM COATED ORAL at 09:36

## 2017-11-14 RX ADMIN — SODIUM CHLORIDE 1 G: 900 INJECTION, SOLUTION INTRAVENOUS at 11:34

## 2017-11-14 RX ADMIN — POLYETHYLENE GLYCOL 3350 17 G: 17 POWDER, FOR SOLUTION ORAL at 09:35

## 2017-11-14 RX ADMIN — DOCUSATE SODIUM AND SENNOSIDES 1 TABLET: 8.6; 5 TABLET, FILM COATED ORAL at 09:36

## 2017-11-14 RX ADMIN — CASTOR OIL AND BALSAM, PERU: 788; 87 OINTMENT TOPICAL at 18:28

## 2017-11-14 RX ADMIN — CALCIUM CARBONATE 500 MG (1,250 MG)-VITAMIN D3 200 UNIT TABLET 1 TABLET: at 18:25

## 2017-11-14 RX ADMIN — INSULIN LISPRO 3 UNITS: 100 INJECTION, SOLUTION INTRAVENOUS; SUBCUTANEOUS at 21:34

## 2017-11-14 RX ADMIN — CARVEDILOL 6.25 MG: 6.25 TABLET, FILM COATED ORAL at 18:25

## 2017-11-14 RX ADMIN — SODIUM CHLORIDE 1 G: 900 INJECTION, SOLUTION INTRAVENOUS at 18:25

## 2017-11-14 RX ADMIN — ACETAMINOPHEN 650 MG: 500 TABLET ORAL at 18:25

## 2017-11-14 RX ADMIN — PANTOPRAZOLE SODIUM 40 MG: 40 TABLET, DELAYED RELEASE ORAL at 09:35

## 2017-11-14 RX ADMIN — INSULIN LISPRO 3 UNITS: 100 INJECTION, SOLUTION INTRAVENOUS; SUBCUTANEOUS at 09:36

## 2017-11-14 RX ADMIN — CALCIUM CARBONATE 500 MG (1,250 MG)-VITAMIN D3 200 UNIT TABLET 1 TABLET: at 11:34

## 2017-11-14 RX ADMIN — Medication 10 ML: at 21:37

## 2017-11-14 RX ADMIN — LEVOTHYROXINE SODIUM 88 MCG: 100 TABLET ORAL at 09:36

## 2017-11-14 RX ADMIN — INSULIN LISPRO 2 UNITS: 100 INJECTION, SOLUTION INTRAVENOUS; SUBCUTANEOUS at 18:21

## 2017-11-14 RX ADMIN — DOCUSATE SODIUM AND SENNOSIDES 1 TABLET: 8.6; 5 TABLET, FILM COATED ORAL at 18:25

## 2017-11-14 RX ADMIN — HYDROCODONE BITARTRATE AND ACETAMINOPHEN 1 TABLET: 7.5; 325 TABLET ORAL at 14:43

## 2017-11-14 RX ADMIN — INSULIN LISPRO 3 UNITS: 100 INJECTION, SOLUTION INTRAVENOUS; SUBCUTANEOUS at 12:30

## 2017-11-14 RX ADMIN — CASTOR OIL AND BALSAM, PERU: 788; 87 OINTMENT TOPICAL at 00:06

## 2017-11-14 RX ADMIN — SODIUM CHLORIDE 1 G: 900 INJECTION, SOLUTION INTRAVENOUS at 03:52

## 2017-11-14 RX ADMIN — FLUOXETINE HYDROCHLORIDE 20 MG: 20 CAPSULE ORAL at 09:36

## 2017-11-14 RX ADMIN — ENOXAPARIN SODIUM 40 MG: 40 INJECTION SUBCUTANEOUS at 09:35

## 2017-11-14 RX ADMIN — Medication 10 ML: at 14:43

## 2017-11-14 RX ADMIN — CASTOR OIL AND BALSAM, PERU: 788; 87 OINTMENT TOPICAL at 09:44

## 2017-11-14 RX ADMIN — CALCIUM CARBONATE 500 MG (1,250 MG)-VITAMIN D3 200 UNIT TABLET 1 TABLET: at 09:36

## 2017-11-14 RX ADMIN — CASTOR OIL AND BALSAM, PERU: 788; 87 OINTMENT TOPICAL at 21:30

## 2017-11-14 RX ADMIN — Medication 10 ML: at 08:15

## 2017-11-14 RX ADMIN — ACETAMINOPHEN 650 MG: 500 TABLET ORAL at 11:34

## 2017-11-14 RX ADMIN — TBO-FILGRASTIM 480 MCG: 480 INJECTION, SOLUTION SUBCUTANEOUS at 21:36

## 2017-11-14 NOTE — PROGRESS NOTES
Bedside shift change report given to Darshan Sims (oncoming nurse) by Chantale Huggins (offgoing nurse). Report included the following information SBAR, Kardex, Intake/Output, MAR, Accordion and Recent Results.

## 2017-11-14 NOTE — INTERDISCIPLINARY ROUNDS
Bedside interdisciplinary rounds were held today to discuss patient plan of care and outcomes. The following members were present: Physician, Nurse Practitioner, Nurse, Clinical Care Leader, Pharmacy, Physical Therapy, and Case Management. Plan:  Continue walters catheter for retention. Continue treatment for UTI - awaiting final culture report. Dr. Hector Ronquillo to come evaluate incision. Plan for discharge to [facility] once medically stable.

## 2017-11-14 NOTE — PROGRESS NOTES
Peacekirsty Ramirez [id number] MRN [id number]  ANC 2.5 today & 2.8 yesterday on Granix 480mcg SC daily  Please consider discontinuing Granix

## 2017-11-14 NOTE — PROGRESS NOTES
Ortho/ NeuroSurgery NP Note    s/p RIGHT FEMORAL HEMIARTHROPLASTY on 11/7/2017     Pt resting in the bed. Pain in the sacral area from a \"blister\" she reports is there. Has pain in the hip responsive to pain med. VSS, had a fever this morning. No feeling well today. Lack of energy. Larios in place. Retention. UTI being treated by hospitalist. Awaiting final culture. Labs  Lab Results   Component Value Date/Time    HGB 7.8 11/14/2017 11:55 AM        Body mass index is 23.57 kg/(m^2). BMI greater than 30 is classified as obesity and greater than 40 is classified as morbid obesity. Dressing c.d.i, sussy-wound erythema, worth monitoring. Some warmth in the area. Calves soft and supple; No pain with passive stretch  Sensation and motor intact  SCDs for mechanical DVT proph while in bed     PLAN:  1) PT BID  2) Lovenox for DVT Prophylaxis  3) Protonix for GI Prophylaxis. 4) UTI- awaiting speciation for final abx recommendations. 5) Dime sized Stage II p/u to the sacrum, purple in color with overlying blister which has ruptured. 6) Incision- redness, will discuss with Dr. Medhat Monge. 7) Plan d/c to [facility] when cleared by medicine. Joyce Wynne, YEMI    Spoke with Dr. Medhat Monge who with ask Dr. Good to come by and assess today.

## 2017-11-14 NOTE — PROGRESS NOTES
Hospitalist Progress Note    NAME: Haile Omer   :  1935   MRN:  [id number]       Assessment / Plan:  Sepsis  Complicated UTI  Urinary retention  -fever again overnight Tm 100.5  -in the setting of indwelling walters cath due to urinary retention post op. Pt failed voiding trail so walters cath re-inserted  -UA consistent with UTI  -Urine cx showed gram neg, BCX NTD for 2 days  -started on meropenum, will de-escalate pending cx I&D  -f/u with CBC  -needs urology outpt     R hip fracture from mechanical fall  -pt at high risk for infection perioperatively given he neutropenia. Pt on neutropenic precaution  -s/p R femoral hemiarthroplasty  -pain and DVT prophylaxis per ortho  -PT/OT, SNF on discharge when medically stable  -ortho following    Hypotension with dizziness, resolved  - likely from poor hydration with pain meds in place, resolved  -parameters placed for coreg    PSVT  -likely induced by stress, and acute illness, resolved  -home med coreg resumed    NHL  Pancytopenia, improving  -chemotherapy outpt several weeks ago  -s/p transfused 2 units thus far, cont' Granix   -appreciate oncology following (outpt f/u with Dr Elle Amador)  Discussed with Dr Valentino Kung    HTN  Chronic systolic heart failure  -cont' coreg, hydralazine prn     T2DM  -cont' SSI    Hypothyroidism  -cont' synthroid    Recent skin lesion  -s/p \"mole\" excision left malar region on 17. Healing nicely. Body mass index is 23.57 kg/(m^2).       Code Status: full  Surrogate Decision Maker: tawny Ortega [phone]   DVT Prophylaxis: per ortho  Baseline: independent     Subjective:     Chief Complaint / Reason for Physician Visit  Pt seen at bedside. Had fever with Tm 100.5 last night. No acute complaints. Discussed with RN events overnight.      Review of Systems:  Symptom Y/N Comments  Symptom Y/N Comments   Fever/Chills y   Chest Pain n    Poor Appetite    Edema     Cough    Abdominal Pain n    Sputum    Joint Pain y    SOB/MUSE n   Pruritis/Rash     Nausea/vomit    Tolerating PT/OT     Diarrhea    Tolerating Diet     Constipation    Other       Could NOT obtain due to:      Objective:     VITALS:   Last 24hrs VS reviewed since prior progress note. Most recent are:  Patient Vitals for the past 24 hrs:   Temp Pulse Resp BP SpO2   11/14/17 0816 (!) 100.5 °F (38.1 °C) 81 16 142/79 94 %   11/14/17 0815 100.3 °F (37.9 °C) - - - -   11/13/17 2052 98.8 °F (37.1 °C) 72 18 102/54 96 %   11/13/17 1723 97.9 °F (36.6 °C) 80 18 111/58 -   11/13/17 1505 98 °F (36.7 °C) 82 18 106/57 97 %   11/13/17 1025 98.2 °F (36.8 °C) 67 18 99/48 97 %       Intake/Output Summary (Last 24 hours) at 11/14/17 0955  Last data filed at 11/14/17 0640   Gross per 24 hour   Intake                0 ml   Output             1250 ml   Net            -1250 ml        PHYSICAL EXAM:  General: WD, WN. Alert, cooperative, no acute distress    EENT:  EOMI. Anicteric sclerae. MMM  Resp:  CTA bilaterally, no wheezing or rales. No accessory muscle use  CV:  Regular  rhythm,  No edema  GI:  Soft, Non distended, Non tender.  +Bowel sounds  Neurologic:  Alert and oriented X 3, normal speech  Psych:   Good insight. Not anxious nor agitated  Skin:  No rashes. No jaundice    Reviewed most current lab test results and cultures  YES  Reviewed most current radiology test results   YES  Review and summation of old records today    NO  Reviewed patient's current orders and MAR    YES  PMH/ reviewed - no change compared to H&P  ________________________________________________________________________  Care Plan discussed with:    Comments   Patient x    Family  x daughter   RN x    Care Manager     Consultant                        Multidiciplinary team rounds were held today with , nursing, pharmacist and clinical coordinator. Patient's plan of care was discussed; medications were reviewed and discharge planning was addressed. ________________________________________________________________________  Total NON critical care TIME:  35   Minutes    Total CRITICAL CARE TIME Spent:   Minutes non procedure based      Comments   >50% of visit spent in counseling and coordination of care     ________________________________________________________________________  Adele Villalta MD     Procedures: see electronic medical records for all procedures/Xrays and details which were not copied into this note but were reviewed prior to creation of Plan. LABS:  I reviewed today's most current labs and imaging studies.   Pertinent labs include:  Recent Labs      11/13/17 0926 11/12/17 0210   WBC  3.3*  1.7*   HGB  7.8*  6.6*   HCT  23.8*  19.8*   PLT  87*  80*     Recent Labs      11/13/17 0926 11/12/17 0210   NA  136  133*   K  3.9  4.0   CL  103  101   CO2  23  25   GLU  253*  206*   BUN  15  18   CREA  0.83  0.83   CA  8.3*  8.0*       Signed: Adele Villalta MD

## 2017-11-14 NOTE — PROGRESS NOTES
Problem: Mobility Impaired (Adult and Pediatric)  Goal: *Acute Goals and Plan of Care (Insert Text)  Physical Therapy Goals  Initiated 11/8/2017    1. Patient will move from supine to sit and sit to supine  in bed with maximal assistance within 4 days. 2. Patient will perform sit to stand with maximal assistance within 4 days. 3. Patient will ambulate with maximal assistance for 5 feet with the least restrictive device within 4 days. 4. Patient will verbalize and demonstrate understanding of total hip precautions per protocol within 4 days. 5. Patient will perform LE home exercise program per protocol with supervision/set-up within 4 days. physical Therapy TREATMENT  Patient: Sky Sy (20 y.o. female)  Date: 11/14/2017  Diagnosis: Hip fracture (HCC)  Right Hip Fracture  right femoral neck fracture  Closed right hip fracture (HCC)  Procedure(s) (LRB):  RIGHT FEMORAL HEMIARTHROPLASTY (Right) 7 Days Post-Op  Precautions: Total hip, Fall, Other (comment) (neutropenic)  Chart, physical therapy assessment, plan of care and goals were reviewed. ASSESSMENT:  Pt cleared by nurse to mobilize. Pt received in bed supine. Pt reported just not feeling the best. Pt agreeable to therapy. Pt performed supine exercises with assistance. Pt falling asleep during exercises but easily aroused. Pt agreeable to ambulation in afternoon session. Pt will benefit form SNF to improve strength and mobility once stable. Progression toward goals:  []      Improving appropriately and progressing toward goals  [x]      Improving slowly and progressing toward goals  []      Not making progress toward goals and plan of care will be adjusted     PLAN:  Patient continues to benefit from skilled intervention to address the above impairments. Continue treatment per established plan of care.   Discharge Recommendations:  Kirk Weathers  Further Equipment Recommendations for Discharge:  TBD by SNF     SUBJECTIVE:   Patient stated I just feel blah.     OBJECTIVE DATA SUMMARY:   Critical Behavior:  Neurologic State: Alert  Orientation Level: Oriented X4  Cognition: Follows commands  Safety/Judgement: Awareness of environment  Functional Mobility Training:  Therapeutic Exercises:   SUPINE  EXERCISES   Sets   Reps   Active Active Assist   Passive Self ROM   Comments   Ankle Pumps 1 10 [x]                           []                           []                           []                              Quad Sets 1 10 [x]                           []                           []                           []                              Heel Slides 1 10 [x]                           []                           []                           []                              Hip Abduction 1 10 [x]                           []                           []                           []                              Glut Sets 1 10 [x]                           []                           []                           []                                 []                           []                           []                           []                                 []                           []                           []                           []                                STANDING  EXERCISES   Sets   Reps   Active Active Assist   Passive Self ROM   Comments   Heel Raises   []                           []                           []                           []                              Hip Abduction   []                           []                           []                           []                                 []                           []                           []                           []                                 []                           []                           []                           []                                  Pain:      Pt with no complaints  of pain              Activity Tolerance:   Pt not feeling well but agreeable to ambulation this afternoon.      After treatment:   [] Patient left in no apparent distress sitting up in chair  [x] Patient left in no apparent distress in bed  [x] Call bell left within reach  [x] Nursing notified  [] Caregiver present  [] Bed alarm activated    COMMUNICATION/COLLABORATION:   The patients plan of care was discussed with: Registered Nurse    Tracey Murillo   Time Calculation: 12 mins

## 2017-11-14 NOTE — PROGRESS NOTES
Problem: Mobility Impaired (Adult and Pediatric)  Goal: *Acute Goals and Plan of Care (Insert Text)  Physical Therapy Goals  Initiated 11/8/2017    1. Patient will move from supine to sit and sit to supine  in bed with maximal assistance within 4 days. 2. Patient will perform sit to stand with maximal assistance within 4 days. 3. Patient will ambulate with maximal assistance for 5 feet with the least restrictive device within 4 days. 4. Patient will verbalize and demonstrate understanding of total hip precautions per protocol within 4 days. 5. Patient will perform LE home exercise program per protocol with supervision/set-up within 4 days. physical Therapy TREATMENT  Patient: Reva Christianson (6 y.o. female)  Date: 11/14/2017  Diagnosis: Hip fracture (HCC)  Right Hip Fracture  right femoral neck fracture  Closed right hip fracture (HCC)  Procedure(s) (LRB):  RIGHT FEMORAL HEMIARTHROPLASTY (Right) 7 Days Post-Op  Precautions: Total hip, Fall, Other (comment) (neutropenic)  Chart, physical therapy assessment, plan of care and goals were reviewed. ASSESSMENT:  Pt cleared by nurse to mobilize. Pt received in bed supine. Pt agreeable to ambulation. Pt performed supine to sit at [facility] with additional time. Pt performed sit to stand at [facility]. Pt ambulated 60ft with RW at [facility]. Pt with improve step length on left with increased stance time on R LE. Pt with increased mobility but moving slower due to not feeling the best. Pt performed standing marching x10 at [facility] with no LOB. Pt with improved mobility today. Pt will benefit from SNF to improve strength and activity tolerance. Progression toward goals:  []      Improving appropriately and progressing toward goals  [x]      Improving slowly and progressing toward goals  []      Not making progress toward goals and plan of care will be adjusted     PLAN:  Patient continues to benefit from skilled intervention to address the above impairments.   Continue treatment per established plan of care. Discharge Recommendations:  Skilled Nursing Facility  Further Equipment Recommendations for Discharge:  defer     SUBJECTIVE:   Patient stated Roxanne Chavez can tell my walking is getting better. Eduardo Duggan    OBJECTIVE DATA SUMMARY:   Critical Behavior:  Neurologic State: Alert  Orientation Level: Oriented X4  Cognition: Follows commands  Safety/Judgement: Awareness of environment  Functional Mobility Training:  Bed Mobility:  Rolling: Contact guard assistance; Additional time  Supine to Sit: Contact guard assistance; Additional time     Scooting: Supervision         Transfers:  Sit to Stand: Contact guard assistance  Stand to Sit: Stand-by asssistance                             Balance:  Sitting: Intact  Standing: Intact  Standing - Static: Good  Standing - Dynamic : Good  Ambulation/Gait Training:  Distance (ft): 60 Feet (ft)  Assistive Device: Gait belt;Walker, rolling  Ambulation - Level of Assistance: Contact guard assistance        Gait Abnormalities: Antalgic        Base of Support: Narrowed     Speed/Daria: Pace decreased (<100 feet/min)  Step Length: Right shortened;Left shortened                         Therapeutic Exercises:   STANDING  EXERCISES   Sets   Reps   Active Active Assist   Passive Self ROM   Comments   Marching 1 10 [x]                           []                           []                           []                                 []                           []                           []                           []                                 []                           []                           []                           []                                 []                           []                           []                           []                                  Pain:  Pain Scale 1: Numeric (0 - 10)  Pain Intensity 1: 5  Pain Location 1: Hip  Pain Orientation 1: Right  Pain Description 1: Aching  Pain Intervention(s) 1: Medication (see MAR);Ice  Activity Tolerance:   Pt with improved mobility although moving slower.    After treatment:   [x] Patient left in no apparent distress sitting up in chair  [] Patient left in no apparent distress in bed  [x] Call bell left within reach  [x] Nursing notified  [x] Caregiver present  [] Bed alarm activated    COMMUNICATION/COLLABORATION:   The patients plan of care was discussed with: Registered Nurse    Anup Owen   Time Calculation: 29 mins

## 2017-11-14 NOTE — PROGRESS NOTES
Patient is status post right hip hemiarthroplasty. Dr. Kesha Godinez is out of town and unable to see and he asked me to see his patient today. There is concern for right hip incision \"redness\", concern for infection. The patient is doing okay otherwise, has UTI and neutropenia s/p a fall several days ago. ( T 97.5) AFVSS, alert and oriented elderly female, daughter at bedside. Right hip incision is clean, dry and intact, there is no drainage at area of incision, there is no cellulitis, fluctuance, does not appear infected. Incision looks good. Stable post right hip hemiarthroplasty. 1. Continue current care, no signs of infection at the right hip incision. 2. Call if any other problems.

## 2017-11-14 NOTE — PROGRESS NOTES
Ortho/ NeuroSurgery NP Note     s/p RIGHT FEMORAL HEMIARTHROPLASTY on 11/7/2017      Pt resting in the chair. Pain in the sacral area from a \"blister\" she reports is there. Has pain in the hip responsive to pain med. VSS Afebrile. Hypotension at times. IVF ongoing. Larios in place. Retention.      Labs        Lab Results   Component Value Date/Time     HGB 7.8 11/13/2017 09:26 AM         Body mass index is 23.57 kg/(m^2). BMI greater than 30 is classified as obesity and greater than 40 is classified as morbid obesity.      Dressing c.d.i, cryotherapy  Calves soft and supple; No pain with passive stretch  Sensation and motor intact  SCDs for mechanical DVT proph while in bed      PLAN:  1) PT BID  2) Lovenox for DVT Prophylaxis  3) Protonix for GI Prophylaxis. 4) UTI- awaiting speciation for final abx recommendations. 5) Dime sized DTI to the sacrum, purple in color with overlying blister which has ruptured.    6) Plan d/c to [facility] when cleared by [facility], NP

## 2017-11-14 NOTE — PROGRESS NOTES
Occupational Therapy  Medical record reviewed; nursing cleared pt for therapy. Pt was supine in bed upon arrival and declined OT treatment reporting that she'd just gotten back in bed and preferred to rest after sitting up for a few hours earlier this date. Will defer at pt request and continue to follow.

## 2017-11-14 NOTE — PROGRESS NOTES
Problem: Falls - Risk of  Goal: *Absence of Falls  Document Kristian Fall Risk and appropriate interventions in the flowsheet.    Outcome: Progressing Towards Goal  Fall Risk Interventions:  Mobility Interventions: Patient to call before getting OOB, Communicate number of staff needed for ambulation/transfer    Mentation Interventions: Adequate sleep, hydration, pain control    Medication Interventions: Patient to call before getting OOB, Utilize gait belt for transfers/ambulation, Teach patient to arise slowly    Elimination Interventions: Call light in reach, Patient to call for help with toileting needs    History of Falls Interventions: Door open when patient unattended, Investigate reason for fall, Room close to nurse's station, Utilize gait belt for transfer/ambulation

## 2017-11-14 NOTE — WOUND CARE
Spoke with nursing regarding a request for an air mattress. Pt.'s Rubio Score is 19 (low risk). She can move with some assistance or be reminded to move during the hourly rounds for her room. She moved on her own yesterday and her Rubio Score is higher today. No need for a specialty bed today. Nursing is already performing the rest of the pressure ulcer prevention initiatives. Wound care orders were placed yesterday for her gluteal cleft ulcer.    Shanna Reyes RN, BSN, [employer]

## 2017-11-15 VITALS
OXYGEN SATURATION: 97 % | DIASTOLIC BLOOD PRESSURE: 57 MMHG | BODY MASS INDEX: 23.66 KG/M2 | HEART RATE: 73 BPM | TEMPERATURE: 98.5 F | SYSTOLIC BLOOD PRESSURE: 114 MMHG | WEIGHT: 169 LBS | RESPIRATION RATE: 17 BRPM | HEIGHT: 71 IN

## 2017-11-15 LAB
ANION GAP SERPL CALC-SCNC: 8 MMOL/L (ref 5–15)
BASOPHILS # BLD: 0 K/UL
BASOPHILS NFR BLD: 0 %
BUN SERPL-MCNC: 12 MG/DL (ref 6–20)
BUN/CREAT SERPL: 19 (ref 12–20)
CALCIUM SERPL-MCNC: 8.2 MG/DL (ref 8.5–10.1)
CHLORIDE SERPL-SCNC: 101 MMOL/L (ref 97–108)
CO2 SERPL-SCNC: 28 MMOL/L (ref 21–32)
CREAT SERPL-MCNC: 0.63 MG/DL (ref 0.55–1.02)
DIFFERENTIAL METHOD BLD: ABNORMAL
EOSINOPHIL # BLD: 0 K/UL
EOSINOPHIL NFR BLD: 1 %
ERYTHROCYTE [DISTWIDTH] IN BLOOD BY AUTOMATED COUNT: 18 % (ref 11.5–14.5)
GLUCOSE BLD STRIP.AUTO-MCNC: 215 MG/DL (ref 65–100)
GLUCOSE BLD STRIP.AUTO-MCNC: 239 MG/DL (ref 65–100)
GLUCOSE SERPL-MCNC: 187 MG/DL (ref 65–100)
HCT VFR BLD AUTO: 22.5 % (ref 35–47)
HGB BLD-MCNC: 7.5 G/DL (ref 11.5–16)
LYMPHOCYTES # BLD: 0.2 K/UL
LYMPHOCYTES NFR BLD: 8 %
MCH RBC QN AUTO: 28 PG (ref 26–34)
MCHC RBC AUTO-ENTMCNC: 33.3 G/DL (ref 30–36.5)
MCV RBC AUTO: 84 FL (ref 80–99)
METAMYELOCYTES NFR BLD MANUAL: 1 %
MONOCYTES # BLD: 0.1 K/UL
MONOCYTES NFR BLD: 5 %
MYELOCYTES NFR BLD MANUAL: 3 %
NEUTS BAND NFR BLD MANUAL: 14 %
NEUTS SEG # BLD: 2.4 K/UL
NEUTS SEG NFR BLD: 68 %
PLATELET # BLD AUTO: 76 K/UL (ref 150–400)
POTASSIUM SERPL-SCNC: 3.8 MMOL/L (ref 3.5–5.1)
RBC # BLD AUTO: 2.68 M/UL (ref 3.8–5.2)
RBC MORPH BLD: ABNORMAL
SERVICE CMNT-IMP: ABNORMAL
SERVICE CMNT-IMP: ABNORMAL
SODIUM SERPL-SCNC: 137 MMOL/L (ref 136–145)
WBC # BLD AUTO: 2.9 K/UL (ref 3.6–11)
WBC MORPH BLD: ABNORMAL

## 2017-11-15 PROCEDURE — 80048 BASIC METABOLIC PNL TOTAL CA: CPT | Performed by: INTERNAL MEDICINE

## 2017-11-15 PROCEDURE — 82962 GLUCOSE BLOOD TEST: CPT

## 2017-11-15 PROCEDURE — 74011636637 HC RX REV CODE- 636/637: Performed by: INTERNAL MEDICINE

## 2017-11-15 PROCEDURE — 74011250637 HC RX REV CODE- 250/637: Performed by: HOSPITALIST

## 2017-11-15 PROCEDURE — 74011250637 HC RX REV CODE- 250/637: Performed by: ORTHOPAEDIC SURGERY

## 2017-11-15 PROCEDURE — 74011250637 HC RX REV CODE- 250/637: Performed by: PHYSICIAN ASSISTANT

## 2017-11-15 PROCEDURE — 74011250636 HC RX REV CODE- 250/636: Performed by: EMERGENCY MEDICINE

## 2017-11-15 PROCEDURE — 77030011256 HC DRSG MEPILEX <16IN NO BORD MOLN -A

## 2017-11-15 PROCEDURE — 36415 COLL VENOUS BLD VENIPUNCTURE: CPT | Performed by: INTERNAL MEDICINE

## 2017-11-15 PROCEDURE — 74011000258 HC RX REV CODE- 258: Performed by: EMERGENCY MEDICINE

## 2017-11-15 PROCEDURE — 74011250637 HC RX REV CODE- 250/637: Performed by: INTERNAL MEDICINE

## 2017-11-15 PROCEDURE — 74011250636 HC RX REV CODE- 250/636: Performed by: ORTHOPAEDIC SURGERY

## 2017-11-15 PROCEDURE — 85025 COMPLETE CBC W/AUTO DIFF WBC: CPT | Performed by: INTERNAL MEDICINE

## 2017-11-15 RX ORDER — HYDROCODONE BITARTRATE AND ACETAMINOPHEN 7.5; 325 MG/1; MG/1
1 TABLET ORAL
Qty: 30 TAB | Refills: 0 | Status: ON HOLD | OUTPATIENT
Start: 2017-11-15 | End: 2018-06-14

## 2017-11-15 RX ORDER — CEFDINIR 300 MG/1
300 CAPSULE ORAL 2 TIMES DAILY
Qty: 14 CAP | Refills: 0 | Status: SHIPPED
Start: 2017-11-15 | End: 2017-11-22

## 2017-11-15 RX ADMIN — ACETAMINOPHEN 650 MG: 500 TABLET ORAL at 06:12

## 2017-11-15 RX ADMIN — CALCIUM CARBONATE 500 MG (1,250 MG)-VITAMIN D3 200 UNIT TABLET 1 TABLET: at 08:56

## 2017-11-15 RX ADMIN — DOCUSATE SODIUM AND SENNOSIDES 1 TABLET: 8.6; 5 TABLET, FILM COATED ORAL at 10:23

## 2017-11-15 RX ADMIN — ACETAMINOPHEN 650 MG: 500 TABLET ORAL at 00:43

## 2017-11-15 RX ADMIN — CASTOR OIL AND BALSAM, PERU: 788; 87 OINTMENT TOPICAL at 10:23

## 2017-11-15 RX ADMIN — Medication 1 CAPSULE: at 10:23

## 2017-11-15 RX ADMIN — INSULIN LISPRO 3 UNITS: 100 INJECTION, SOLUTION INTRAVENOUS; SUBCUTANEOUS at 08:56

## 2017-11-15 RX ADMIN — LEVOTHYROXINE SODIUM 88 MCG: 100 TABLET ORAL at 08:56

## 2017-11-15 RX ADMIN — HYDROCODONE BITARTRATE AND ACETAMINOPHEN 1 TABLET: 7.5; 325 TABLET ORAL at 08:56

## 2017-11-15 RX ADMIN — ENOXAPARIN SODIUM 40 MG: 40 INJECTION SUBCUTANEOUS at 10:23

## 2017-11-15 RX ADMIN — SODIUM CHLORIDE 1 G: 900 INJECTION, SOLUTION INTRAVENOUS at 06:12

## 2017-11-15 RX ADMIN — FLUOXETINE HYDROCHLORIDE 20 MG: 20 CAPSULE ORAL at 10:23

## 2017-11-15 RX ADMIN — PANTOPRAZOLE SODIUM 40 MG: 40 TABLET, DELAYED RELEASE ORAL at 08:56

## 2017-11-15 RX ADMIN — Medication 10 ML: at 06:13

## 2017-11-15 RX ADMIN — CARVEDILOL 6.25 MG: 6.25 TABLET, FILM COATED ORAL at 08:56

## 2017-11-15 NOTE — PROGRESS NOTES
Hospitalist Progress Note    NAME: Leidy Allen   :  1935   MRN:  [id number]       Assessment / Plan:  Sepsis  Complicated UTI  Urinary retention  -T down to normal last 24 hr   -in the setting of indwelling walters cath due to urinary retention post op. Pt failed voiding trail so walters cath re-inserted--> repeat free voiding trial in 1 week, if fail will need to be refer to urology    -UC: pan sensitive E.coli; BC NTD   -Meropenum started  - will need 10 days AB. DC on Cefdinir ( allergic to Keflex with myopathy noted)     R hip fracture from mechanical fall  -s/p R femoral hemiarthroplasty  -pain and DVT prophylaxis per ortho  -PT/OT: [facility]    Non Hodgkin's Lymphoma   Pancytopenia, improving  -chemotherapy outpt several weeks ago  -s/p transfused 2 units thus far  -s/p Granix x 9   -continue close monitoring at [facility]   -appreciate oncology following (outpt f/u with Dr Evangelina Christianson): may need Op BM biopsy to further evaluate pancytopenia      HTN / Chronic systolic heart failure / PSVT  -BP is stable, no signs of fluid overload   -PSVT is likely was induced by stress, and acute illness, resolved  -cont' coreg, hydralazine prn     DM type II  -BS 200s , likely high side due to stress   -restart home Metformin on DC + SS while at [facility]     Hypothyroidism, cont' synthroid  Recent skin lesion, s/p \"mole\" excision left malar region on 17. Healing nicely. Hypotension with dizziness, likely due to dehydration, resolved  Body mass index is 23.57 kg/(m^2).         Code Status: full  Surrogate Decision Maker: tawny Tariq [phone]   DVT Prophylaxis: per ortho    Baseline: independent  Disposition: [facility] today      Subjective:     Chief Complaint / Reason for Physician Visit: following hip fracture / UTI / leukopenia   T is down to normal     Discussed with RN events overnight.      Review of Systems:  Symptom Y/N Comments  Symptom Y/N Comments   Fever/Chills n   Chest Pain n    Poor Appetite    Edema Cough    Abdominal Pain n    Sputum    Joint Pain y    SOB/MUSE n   Pruritis/Rash     Nausea/vomit    Tolerating PT/OT     Diarrhea    Tolerating Diet     Constipation    Other       Could NOT obtain due to:      Objective:     VITALS:   Last 24hrs VS reviewed since prior progress note. Most recent are:  Patient Vitals for the past 24 hrs:   Temp Pulse Resp BP SpO2   11/15/17 0627 98.7 °F (37.1 °C) 76 18 145/67 98 %   11/15/17 0200 97.9 °F (36.6 °C) 72 18 125/58 100 %   11/14/17 2247 98.2 °F (36.8 °C) 70 18 110/50 96 %   11/14/17 1846 98.1 °F (36.7 °C) 75 16 114/52 94 %   11/14/17 1355 97.8 °F (36.6 °C) 75 16 118/50 99 %   11/14/17 1000 98.3 °F (36.8 °C) 82 16 120/54 94 %       Intake/Output Summary (Last 24 hours) at 11/15/17 0836  Last data filed at 11/15/17 0818   Gross per 24 hour   Intake                0 ml   Output             4825 ml   Net            -4825 ml        PHYSICAL EXAM:  General: WD, WN. Alert, cooperative, no acute distress    EENT:  EOMI. Anicteric sclerae. MMM  Resp:  CTA bilaterally, no wheezing or rales. No accessory muscle use  CV:  Regular  rhythm,  No edema  GI:  Soft, Non distended, Non tender.  +Bowel sounds  Neurologic:  Alert and oriented X 3, normal speech  Psych:   Good insight. Not anxious nor agitated  Extr:                 R LE > L   Skin:  No rashes. No jaundice    Reviewed most current lab test results and cultures  YES  Reviewed most current radiology test results   YES  Review and summation of old records today    NO  Reviewed patient's current orders and MAR    YES  PMH/SH reviewed - no change compared to H&P  ________________________________________________________________________  Care Plan discussed with:    Comments   Patient x    Family      RN x    Care Manager     Consultant  x Ortho                      Multidiciplinary team rounds were held today with , nursing, pharmacist and clinical coordinator.   Patient's plan of care was discussed; medications were reviewed and discharge planning was addressed. ________________________________________________________________________  Total NON critical care TIME:  35   Minutes    Total CRITICAL CARE TIME Spent:   Minutes non procedure based      Comments   >50% of visit spent in counseling and coordination of care y Dc coordination    ________________________________________________________________________  Waleska Hunt MD     Procedures: see electronic medical records for all procedures/Xrays and details which were not copied into this note but were reviewed prior to creation of Plan. LABS:  I reviewed today's most current labs and imaging studies.   Pertinent labs include:  Recent Labs      11/15/17   0507  11/14/17   1155  11/13/17   0926   WBC  2.9*  3.3*  3.3*   HGB  7.5*  7.8*  7.8*   HCT  22.5*  23.7*  23.8*   PLT  76*  76*  87*     Recent Labs      11/15/17   0507  11/13/17   0926   NA  137  136   K  3.8  3.9   CL  101  103   CO2  28  23   GLU  187*  253*   BUN  12  15   CREA  0.63  0.83   CA  8.2*  8.3*       Signed: Waleska Hunt MD

## 2017-11-15 NOTE — PROGRESS NOTES
Granix 480mcg SC daily  ANC > 1.5 x3 consecutive days (2.4, 2.5, 2.8)  Please consider discontinuing Granix

## 2017-11-15 NOTE — PROGRESS NOTES
[facility] to [facility] [address]                                                                        80 y.o.   female    [address]   Room: [address] [place] [address] 3 ORTHOPEDICS  Unit Phone# :  [phone]      [address]  [facility] [address]  [address] [address]  Dept: [phone]  Loc: [phone]                    SITUATION     Admitted:  11/6/2017         Attending Provider:  Ju Agrawal MD       Consultations:  IP CONSULT TO HEMATOLOGY    PCP:  Ozzie Hardy MD   [phone]    Treatment Team: Attending Provider: Ju Agrawal MD; Consulting Provider: Steven Reyes DO; Consulting Provider: Jackie Sams MD; Consulting Provider: Adilene Jc MD; Utilization Review: Deana Sarkar RN    Admitting Dx: Hip fracture ([address]  Right Hip Fracture  right femoral neck fracture        Principal Problem: Closed right hip fracture (HCC)    8 Days Post-Op of   Procedure(s):  RIGHT FEMORAL HEMIARTHROPLASTY   BY: Steven Reyes DO             ON: 11/7/2017                  Code Status: Full Code                Advance Directives:   Advance Care Planning 11/6/2017   Patient's Healthcare Decision Maker is: Legal Next of Kin   Confirm Advance Directive None    (Send w/patient)   No Doesnt Have       Isolation:  There are currently no Active Isolations       MDRO: No current active infections    Pain Medications given:  Lynnetta Elliott 7.5/325    Last dose: 7.5/325 at  0900    Special Equipment needed: no  Type of equipment:      (Not currently on dialysis)  (Not currently on dialysis)  (Not currently on dialysis)     BACKGROUND     Allergies:   Allergies   Allergen Reactions    Cephalexin Myalgia     Painful joints    Lisinopril Hives    Celebrex [Celecoxib] Hives    Ibuprofen Hives       Past Medical History:   Diagnosis Date    CHF (congestive heart failure) (HCC)     thought to be result of chemotherapy    Depression     [facility]     GERD (gastroesophageal reflux disease)     HTN (hypertension)     Non Hodgkin's lymphoma (HCC)     Rheumatoid arthritis(714.0)     Thyroid disease        Past Surgical History:   Procedure Laterality Date    HX APPENDECTOMY      HX CATARACT REMOVAL Bilateral     HX HYSTERECTOMY      HX UROLOGICAL      bladder tack x 2    VASCULAR SURGERY PROCEDURE UNLIST      varicose vein surgery x 2       Prescriptions Prior to Admission   Medication Sig    carvedilol (COREG) 6.25 mg tablet Take 6.25 mg by mouth two (2) times daily (with meals).  predniSONE (DELTASONE) 1 mg tablet Take  by mouth daily (with breakfast).  aspirin delayed-release 81 mg tablet Take  by mouth daily.  metFORMIN (GLUCOPHAGE) 500 mg tablet Take 1,000 mg by mouth two (2) times daily (with meals).  pantoprazole (PROTONIX) 40 mg tablet Take 40 mg by mouth daily.  FLUoxetine (PROZAC) 20 mg capsule Take 20 mg by mouth daily.  levothyroxine (SYNTHROID) 100 mcg tablet Take 88 mcg by mouth Daily (before breakfast). Hard scripts included in transfer packet yes    Vaccinations:    Immunization History   Administered Date(s) Administered    Influenza Vaccine (Quad) PF 11/10/2017       Readmission Risks:    Known Risks: fall, CHF, DM        The Charlson CoMorbitiy Index tool is an evidenced based tool that has more automatic generated information. The tool looks at many different items such as the age of the patient, how many times they were admitted in the last calendar year, current length of stay in the hospital and their diagnosis. All of these items are pulled automatically from information documented in the chart from various places and will generate a score that predicts whether a patient is at low (less than 13), medium (13-20) or high (21 or greater) risk of being readmitted.         ASSESSMENT                Temp: 98.7 °F (37.1 °C) (11/15/17 0627) Pulse (Heart Rate): 76 (11/15/17 0627)     Resp Rate: 18 (11/15/17 3390)           BP: 145/67 (11/15/17 0627)     O2 Sat (%): 98 % (11/15/17 0627)     Weight: 76.7 kg (169 lb)    Height: 5' 11\" (180.3 cm) (11/06/17 0407)       If above not within 1 hour of discharge:    BP:_____  P:____  R:____ T:_____ O2 Sat: ___%  O2: ______    Active Orders   Diet    DIET DIABETIC CONSISTENT CARB Regular; Neutropenic         Orientation: oriented to time, place, person and situation     Active Behaviors: None                                   Active Lines/Drains:  (Peg Tube / Larios / CL or S/L?): no    Urinary Status: Larios, Draining     Last BM: Last Bowel Movement Date: 11/15/17     Skin Integrity: Incision (comment), Wound (add Wound LDA)   [REMOVED] Wound Sacral/coccyx Posterior-DRESSING STATUS: Clean, dry, and intact  [REMOVED] Wound Hip Right-DRESSING STATUS: Dry, Intact, Old drainage    [REMOVED] Wound Sacral/coccyx Posterior-DRESSING TYPE: Foam  [REMOVED] Wound Hip Right-DRESSING TYPE: Other (Comment) ([place])    Mobility: Slightly limited   Weight Bearing Status: WBAT (Weight Bearing as Tolerated)      Gait Training  Assistive Device: Gait belt, Walker, rolling  Ambulation - Level of Assistance: Contact guard assistance  Distance (ft): 60 Feet (ft)         Lab Results   Component Value Date/Time    Glucose 187 11/15/2017 05:07 AM    HGB 7.5 11/15/2017 05:07 AM    HGB 7.8 11/14/2017 11:55 AM        RECOMMENDATION     See After Visit Summary (AVS) for:  · Discharge instructions  · After [address]   · Special equipment needed (entered pre-discharge by Care Management)  · Medication Reconciliation    · Follow up Appointment(s)         Report given/sent by:  Jacquie Painting RN                    Verbal report given to: Adeel Garzon  FAXED to:  paper copys taken with transport         Estimated discharge time:  11/15/2017 at 1120

## 2017-11-15 NOTE — PROGRESS NOTES
CM noted that Pt is ready for discharge. CM called [facility], and spoke to Cory Zuniga: They can accept her today. She will go to Room 316A. CM spoke to Pt and she was aware of the discharge. Her DTR has gone to get some of her belongings and she is requesting medical transport at discharge. RN will need to call report to [phone] to [employer]. CM will talk with RN to see when I can set up medical transport. CM asked CM specialist to make PCP post hospital follow up appointment. 9:37 AM:   CM set up medical transport through [facility] for 11 AM.  CM let Pt and DTR know transport time. CM called [facility] to let them know  time of 11 AM.     CM issued [facility] letter to Pt. Signed copy is on bedside chart. Care Management Interventions  PCP Verified by CM:  Yes  Palliative Care Criteria Met (RRAT>21 & CHF Dx)?: No  Mode of Transport at Discharge: BLS ([facility])  Hospital Transport Time of Discharge: 1100  Transition of Care Consult (CM Consult): SNF ([facility])  Partner SNF: Yes  MyChart Signup:  (Pending. )  Discharge Durable Medical Equipment: No  Physical Therapy Consult: Yes  Occupational Therapy Consult: Yes  Speech Therapy Consult: No  Current Support Network: Lives Alone, Own Home  Confirm Follow Up Transport: Self  Plan discussed with Pt/Family/Caregiver: Yes  Freedom of Choice Offered: Yes  Discharge Location  Discharge Placement: Skilled nursing facility [facility])       Sunday, Nithya [address]

## 2017-11-15 NOTE — CDMP QUERY
2.   Please clarify if this patient is being treated/managed for:    =>Stage 2 gluteal cleft pressure ulcer that was not present on admission, being treated with wound care consult and venelex cream with sacral foam dressing  =>Other Explanation of clinical findings  =>Unable to Determine (no explanation of clinical findings)    The medical record reflects the following clinical findings, treatment, and risk factors:    81 y/o female transferred  from [address] for fractured R hip. No decubitus issues are noted on admission date  of 11/6. Wound care note of 11/13 notes stage 2 gluteal cleft pressure ulcer, fluid filled blister which was not present on admission. she is being treated with wound care consult, venelex cream and foam dressing. Please clarify and document your clinical opinion in the progress notes and discharge summary including the definitive and/or presumptive diagnosis, (suspected or probable), related to the above clinical findings. Please include clinical findings supporting your diagnosis. Thanks for your time.     Vinny Grady RN, BSN  [phone]

## 2017-11-15 NOTE — DISCHARGE SUMMARY
Hospitalist Discharge Summary     Patient ID:  Ashwini Ordonez  [id number]  80 y.o.  1935    PCP on record: Algernon Saint, MD    Admit date: 11/6/2017  Discharge date and time: 11/15/2017      DISCHARGE DIAGNOSIS:    R hip fracture from mechanical fall  Sepsis, not POA, resolved   Complicated UTI not POA   Urinary retention  Non Hodgkin's Lymphoma   Pancytopenia  HTN   Chronic systolic heart failure   PSVT  DM type II  Hypothyroidism  Recent skin lesion, s/p \"mole\" excision left malar region on 11/1/17  Hypotension with dizziness, likely due to dehydration, resolved  Body mass index is 23.57 kg/(m^2). Code Status: full        CONSULTATIONS:  IP CONSULT TO HEMATOLOGY    Excerpted HPI from H&P of Samuel Macario MD:    Redd Vega is a 80 y.o.  female with history of non hodgkins lymphoma, CHF as a result of chemotherapy, DM who presents after ground level fall. Patient was trying to hang her clock when she fell onto her right side. She was unable to get up and was able to contact her grandson who lives on her property for help. XR showed R fem neck fracture she was transferred for orthopedic evaluation. Patient's most recent chemotherapy was a few weeks ago. Per patient her NHL is \"stable\"     We were asked to admit for work up and evaluation of the above problems.        ______________________________________________________________________  DISCHARGE SUMMARY/HOSPITAL COURSE:  for full details see H&P, daily progress notes, labs, consult notes. Sepsis not POA, resolved   Complicated UTI  Urinary retention  -T down to normal last 24 hr   -in the setting of indwelling walters cath due to urinary retention post op. Pt failed voiding trail so walters cath re-inserted--> repeat free voiding trial in 1 week, if fail will need to be refer to urology    -UC: pan sensitive E.coli; BC NTD   -Meropenum started 11/12 - will need 10 days AB.  DC on Cefdinir ( allergic to Keflex with myopathy noted)      R hip fracture from mechanical fall  -s/p R femoral hemiarthroplasty  - DVT prophylaxis: Lovenox x 20 days  -PT/OT: [facility]     Non Hodgkin's Lymphoma   Pancytopenia, improving  -chemotherapy outpt several weeks ago  -s/p transfused 2 units thus far  -s/p Granix x 9   -continue close monitoring at [facility]   -appreciate oncology following (outpt f/u with Dr Roshan Carlton): may need Op BM biopsy to further evaluate pancytopenia       HTN / Chronic systolic heart failure / PSVT  -BP is stable, no signs of fluid overload   -PSVT is likely was induced by stress, and acute illness, resolved  -cont' coreg, hydralazine prn      DM type II  -BS 200s , likely high side due to stress   -restart home Metformin on DC + SS while at [facility]      Hypothyroidism, cont' synthroid  Recent skin lesion, s/p \"mole\" excision left malar region on 11/1/17. Healing nicely. Hypotension with dizziness, likely due to dehydration, resolved  Body mass index is 23.57 kg/(m^2).           Code Status: full  Surrogate Decision Maker: tawny Topete [phone]   DVT Prophylaxis: per ortho     Baseline: independent  Disposition: [facility] today         _______________________________________________________________________  Patient seen and examined by me on discharge day. PHYSICAL EXAM:  General:                    WD, WN. Alert, cooperative, no acute distress    EENT:                                  EOMI. Anicteric sclerae. MMM  Resp:                                   CTA bilaterally, no wheezing or rales. No accessory muscle use  CV:                                      Regular  rhythm,  No edema  GI:                                       Soft, Non distended, Non tender.  +Bowel sounds  Neurologic:                Alert and oriented X 3, normal speech  Psych:                       Good insight. Not anxious nor agitated  Extr:                 R LE > L ( expected s/p hip repair   Skin:                                    No rashes.   No jaundice  _______________________________________________________________________  DISCHARGE MEDICATIONS:   Current Discharge Medication List      START taking these medications    Details   L. acidoph & paracasei- S therm- Bifido (HERNANDO-Q/RISAQUAD) 8 billion cell cap cap Take 1 Cap by mouth daily. Qty: 30 Cap, Refills: 0      cefdinir (OMNICEF) 300 mg capsule Take 1 Cap by mouth two (2) times a day for 7 days. Qty: 14 Cap, Refills: 0      calcium-vitamin D (OYSTER SHELL) 500 mg(1,250mg) -200 unit per tablet Take 1 Tab by mouth three (3) times daily (with meals). Qty: 30 Tab, Refills: 0      !! senna-docusate (PERICOLACE) 8.6-50 mg per tablet Take 1 Tab by mouth two (2) times a day. Qty: 30 Tab, Refills: 0      enoxaparin (LOVENOX) 40 mg/0.4 mL 0.4 mL by SubCUTAneous route daily for 20 days. Qty: 8 mL, Refills: 0      polyethylene glycol (MIRALAX) 17 gram packet Take 1 Packet by mouth daily as needed (constipation) for up to 15 days. Qty: 15 Packet, Refills: 0      !! senna-docusate (PERICOLACE) 8.6-50 mg per tablet Take 1 Tab by mouth daily. Qty: 30 Tab, Refills: 0      oxyCODONE-acetaminophen (PERCOCET) 5-325 mg per tablet Take 1-2 Tabs by mouth every four (4) hours as needed for Pain. Max Daily Amount: 12 Tabs. Qty: 60 Tab, Refills: 0       !! - Potential duplicate medications found. Please discuss with provider. CONTINUE these medications which have NOT CHANGED    Details   carvedilol (COREG) 6.25 mg tablet Take 6.25 mg by mouth two (2) times daily (with meals). predniSONE (DELTASONE) 1 mg tablet Take  by mouth daily (with breakfast). metFORMIN (GLUCOPHAGE) 500 mg tablet Take 1,000 mg by mouth two (2) times daily (with meals). pantoprazole (PROTONIX) 40 mg tablet Take 40 mg by mouth daily. FLUoxetine (PROZAC) 20 mg capsule Take 20 mg by mouth daily. levothyroxine (SYNTHROID) 100 mcg tablet Take 88 mcg by mouth Daily (before breakfast).          STOP taking these medications aspirin delayed-release 81 mg tablet Comments:   Reason for Stopping:               My Recommended Diet, Activity, Wound Care, and follow-up labs are listed in the patient's Discharge Insturctions which I have personally completed and reviewed.     _______________________________________________________________________  DISPOSITION:    Home with Family:    Home with HH/PT/OT/RN:    SNF/LTC: y   MITZY:    OTHER:        Condition at Discharge:  Stable  _______________________________________________________________________  Follow up with:   PCP : Leann Goodson MD  Follow-up Information     Follow up With Details Nolvia Arndt MD In 5 days  [id number] [place] [address],  In 2 weeks  [address] [address]  [phone]      Brian Crooks MD In 1 week  [address] [address] [phone] [address] [facility]   [address] [place] [address]  [facility] [address]                Total time in minutes spent coordinating this discharge (includes going over instructions, follow-up, prescriptions, and preparing report for sign off to her PCP) :  > 30  minutes    Signed:  Sandro Friedman MD

## 2017-11-15 NOTE — PROGRESS NOTES
Ortho/ NeuroSurgery NP Note    s/p RIGHT FEMORAL HEMIARTHROPLASTY on 11/7/2017     Pt resting in the bed. Pain in the sacral area from a \"blister\" she reports is there. Has pain in the hip responsive to pain med. VSS, no fevers since yesterday  Larios in place. Retention. UTI being treated by hospitalist.    Labs  Lab Results   Component Value Date/Time    HGB 7.5 11/15/2017 05:07 AM        Body mass index is 23.57 kg/(m^2). BMI greater than 30 is classified as obesity and greater than 40 is classified as morbid obesity. Dressing c.d.i, sussy-wound erythema seems to have resolve. No significant warmth in the area today  Calves soft and supple; No pain with passive stretch  Sensation and motor intact  SCDs for mechanical DVT proph while in bed     PLAN:  1) PT BID  2) Lovenox for DVT Prophylaxis  3) Protonix for GI Prophylaxis. 4) HAUTI- Larios day 5 for urinary retention; awaiting speciation for final abx recommendations; E. Coli - pansensitive. Has been on Merem. Will defer discharge abx plan to hospitalist  5) Dime sized Stage II p/u to the sacrum, purple in color with overlying blister which has ruptured. Venelex  6) Incision- redness improved today. Dr. Good assessed yesterday  7) Neutropenia - Hematology following.   8) Plan d/c to [facility] today    Jordan Alarcon NP

## 2017-11-18 LAB
BACTERIA SPEC CULT: NORMAL
SERVICE CMNT-IMP: NORMAL

## 2017-11-24 ENCOUNTER — PATIENT OUTREACH (OUTPATIENT)
Dept: CASE MANAGEMENT | Age: 82
End: 2017-11-24

## 2017-11-24 NOTE — PROGRESS NOTES
Community Care Team Documentation for Patient in [facility]  Initial Follow Up       Patient was admitted to [facility] from 11/6 to 11/15. Patient was discharged to [facility], [facility], on 11/15 (date). Community Care Team followed up to [address] [place] [address] during this transition. Hospital Discharge diagnosis:  R hip fracture from mechanical fall    RRAT score: 38    Advance Medical Directive on file in EMR? Not on file    Total Hospitalizations/ED visits last 6 months? IP - 1; ED - 0    PCP : Hosea Liu MD     Per Sandeep Acosta at SNF, Reviewed [place] [facility] questions with SNF to ensure patient arrived with admission packet in order. Reviewed upcoming follow up appointments. Tim Lowe - On 12/11/2017 - 2:00pm hospital follow-up (Will need to be cancelled if still in SNF). Riki BARAHONA  - In 2 weeks (Scheduled for 11/29 at 9:20). Andrew Andrew -  In 1 week (scheduled for 11/27 at 2:45). Open to PT/OT. Currently contact guard for all functional mobility. Ambulating 40 ft with RW. Therapy anticipating 2 weeks LOS. Voiding trial today. Community Care Team will follow up with [facility] when patient is discharged. Medications were not reconciled and general patient assessment was not completed during this skilled nursing facility outreach.

## 2017-12-04 ENCOUNTER — HOSPITAL ENCOUNTER (INPATIENT)
Age: 82
LOS: 1 days | Discharge: NURSING, REHAB OR LONG-TERM CARE | DRG: 812 | End: 2017-12-06
Attending: INTERNAL MEDICINE | Admitting: INTERNAL MEDICINE
Payer: MEDICARE

## 2017-12-04 PROCEDURE — 86923 COMPATIBILITY TEST ELECTRIC: CPT | Performed by: INTERNAL MEDICINE

## 2017-12-04 PROCEDURE — 74011250636 HC RX REV CODE- 250/636: Performed by: INTERNAL MEDICINE

## 2017-12-04 PROCEDURE — 99218 HC RM OBSERVATION: CPT

## 2017-12-04 PROCEDURE — 77030003560 HC NDL HUBR BARD -A

## 2017-12-04 PROCEDURE — P9040 RBC LEUKOREDUCED IRRADIATED: HCPCS | Performed by: INTERNAL MEDICINE

## 2017-12-04 PROCEDURE — 36430 TRANSFUSION BLD/BLD COMPNT: CPT

## 2017-12-04 PROCEDURE — 74011250637 HC RX REV CODE- 250/637: Performed by: INTERNAL MEDICINE

## 2017-12-04 PROCEDURE — 86900 BLOOD TYPING SEROLOGIC ABO: CPT | Performed by: INTERNAL MEDICINE

## 2017-12-04 PROCEDURE — 36415 COLL VENOUS BLD VENIPUNCTURE: CPT | Performed by: INTERNAL MEDICINE

## 2017-12-04 RX ORDER — SODIUM CHLORIDE 0.9 % (FLUSH) 0.9 %
10 SYRINGE (ML) INJECTION AS NEEDED
Status: DISCONTINUED | OUTPATIENT
Start: 2017-12-04 | End: 2017-12-06 | Stop reason: HOSPADM

## 2017-12-04 RX ORDER — SODIUM CHLORIDE 0.9 % (FLUSH) 0.9 %
10-30 SYRINGE (ML) INJECTION AS NEEDED
Status: DISCONTINUED | OUTPATIENT
Start: 2017-12-04 | End: 2017-12-06 | Stop reason: HOSPADM

## 2017-12-04 RX ORDER — FUROSEMIDE 10 MG/ML
40 INJECTION INTRAMUSCULAR; INTRAVENOUS ONCE
Status: COMPLETED | OUTPATIENT
Start: 2017-12-04 | End: 2017-12-05

## 2017-12-04 RX ORDER — SODIUM CHLORIDE 0.9 % (FLUSH) 0.9 %
10 SYRINGE (ML) INJECTION EVERY 24 HOURS
Status: DISCONTINUED | OUTPATIENT
Start: 2017-12-04 | End: 2017-12-05

## 2017-12-04 RX ORDER — HEPARIN 100 UNIT/ML
300-500 SYRINGE INTRAVENOUS AS NEEDED
Status: DISCONTINUED | OUTPATIENT
Start: 2017-12-04 | End: 2017-12-06 | Stop reason: HOSPADM

## 2017-12-04 RX ORDER — DIPHENHYDRAMINE HYDROCHLORIDE 50 MG/ML
25 INJECTION, SOLUTION INTRAMUSCULAR; INTRAVENOUS ONCE
Status: COMPLETED | OUTPATIENT
Start: 2017-12-04 | End: 2017-12-04

## 2017-12-04 RX ORDER — ACETAMINOPHEN 325 MG/1
650 TABLET ORAL
Status: DISCONTINUED | OUTPATIENT
Start: 2017-12-04 | End: 2017-12-06 | Stop reason: HOSPADM

## 2017-12-04 RX ORDER — SODIUM CHLORIDE 0.9 % (FLUSH) 0.9 %
20 SYRINGE (ML) INJECTION EVERY 24 HOURS
Status: DISCONTINUED | OUTPATIENT
Start: 2017-12-04 | End: 2017-12-06 | Stop reason: HOSPADM

## 2017-12-04 RX ORDER — SODIUM CHLORIDE 0.9 % (FLUSH) 0.9 %
10-40 SYRINGE (ML) INJECTION EVERY 8 HOURS
Status: DISCONTINUED | OUTPATIENT
Start: 2017-12-04 | End: 2017-12-05

## 2017-12-04 RX ORDER — SODIUM CHLORIDE 9 MG/ML
250 INJECTION, SOLUTION INTRAVENOUS AS NEEDED
Status: DISCONTINUED | OUTPATIENT
Start: 2017-12-04 | End: 2017-12-06 | Stop reason: HOSPADM

## 2017-12-04 RX ADMIN — Medication 10 ML: at 18:44

## 2017-12-04 RX ADMIN — SODIUM CHLORIDE 250 ML: 900 INJECTION, SOLUTION INTRAVENOUS at 20:35

## 2017-12-04 RX ADMIN — ACETAMINOPHEN 650 MG: 325 TABLET, FILM COATED ORAL at 22:32

## 2017-12-04 RX ADMIN — DIPHENHYDRAMINE HYDROCHLORIDE 25 MG: 50 INJECTION, SOLUTION INTRAMUSCULAR; INTRAVENOUS at 22:32

## 2017-12-04 RX ADMIN — Medication 10 ML: at 22:33

## 2017-12-04 NOTE — IP AVS SNAPSHOT
[address] 
[phone] Patient: Ashwini Ordonez MRN: [id number] :1935 You are allergic to the following Allergen Reactions Cephalexin Myalgia Painful joints Lisinopril Hives Celebrex (Celecoxib) Hives Ibuprofen Hives Recent Documentation Weight BMI OB Status Smoking Status 72.7 kg 22.35 kg/m2 Postmenopausal Never Smoker Emergency Contacts  (Rel.) Home Phone Work Phone Mobile Phone Tara Neumann (Child) [phone] -- --  
 Curt Corado (Son) [phone] -- -- About your hospitalization You were admitted on:  2017 You last received care in the:  [facility] You were discharged on:  2017 Why you were hospitalized Your primary diagnosis was:  Not on File Your diagnoses also included:  Anemia Providers Seen During Your Hospitalization Provider Specialty Primary office phone Pipo Hall MD Hematology and Oncology [phone] Your Primary Care Physician (PCP) Primary Care Physician Office Phone Office Fax [address] [phone] Follow-up Information Follow up With Details Comments Contact [place], [address] [facility] [address] [phone] My Medications ASK your physician about these medications Instructions Each Dose to Equal  
 Morning Noon Evening Bedtime  
 calcium-vitamin D 500 mg(1,250mg) -200 unit per tablet Commonly known as:  OYSTER SHELL Your last dose was: Your next dose is: Take 1 Tab by mouth three (3) times daily (with meals). 1 Tab COREG 6.25 mg tablet Generic drug:  carvedilol Your last dose was: Your next dose is: Take 6.25 mg by mouth two (2) times daily (with meals). 6.25 mg FLUoxetine 20 mg capsule Commonly known as:  PROzac Your last dose was: Your next dose is: Take 20 mg by mouth daily. 20 mg HYDROcodone-acetaminophen 7.5-325 mg per tablet Commonly known as:  [address]onzo Your last dose was: Your next dose is: Take 1 Tab by mouth every six (6) hours as needed. Max Daily Amount: 4 Tabs. 1 Tab  
    
   
   
   
  
 L. acidoph & paracasei- S therm- Bifido 8 billion cell Cap cap Commonly known as:  HERNANDO-Q/RISAQUAD Your last dose was: Your next dose is: Take 1 Cap by mouth daily. 1 Cap  
    
   
   
   
  
 levothyroxine 100 mcg tablet Commonly known as:  SYNTHROID Your last dose was: Your next dose is: Take 88 mcg by mouth Daily (before breakfast). 88 mcg  
    
   
   
   
  
 metFORMIN 500 mg tablet Commonly known as:  GLUCOPHAGE Your last dose was: Your next dose is: Take 1,000 mg by mouth two (2) times daily (with meals). 1000 mg  
    
   
   
   
  
 pantoprazole 40 mg tablet Commonly known as:  PROTONIX Your last dose was: Your next dose is: Take 40 mg by mouth daily. 40 mg  
    
   
   
   
  
 predniSONE 1 mg tablet Commonly known as:  Adriana Mons Your last dose was: Your next dose is: Take  by mouth daily (with breakfast). * senna-docusate 8.6-50 mg per tablet Commonly known as:  Charleen Courtney Your last dose was: Your next dose is: Take 1 Tab by mouth two (2) times a day. 1 Tab * senna-docusate 8.6-50 mg per tablet Commonly known as:  Charleen Courtney Your last dose was: Your next dose is: Take 1 Tab by mouth daily. 1 Tab * Notice: This list has 2 medication(s) that are the same as other medications prescribed for you. Read the directions carefully, and ask your doctor or other care provider to review them with you. Discharge Instructions None Discharge Orders None Introducing [facility]! [facility] introduces [employer] patient portal. Now you can access parts of your medical record, email your doctor's office, and request medication refills online. 1. In your internet browser, go to [email or web address] 2. Click on the First Time User? Click Here link in the Sign In box. You will see the New Member Sign Up page. 3. Enter your [employer] Access Code exactly as it appears below. You will not need to use this code after youve completed the sign-up process. If you do not sign up before the expiration date, you must request a new code. · [employer] Access Code: [email or web address] Expires: 2/4/2018  4:21 AM 
 
4. Enter the last four digits of your Social Security Number (xxxx) and Date of Birth (mm/dd/yyyy) as indicated and click Submit. You will be taken to the next sign-up page. 5. Create a [employer] ID. This will be your [employer] login ID and cannot be changed, so think of one that is secure and easy to remember. 6. Create a [employer] password. You can change your password at any time. 7. Enter your Password Reset Question and Answer. This can be used at a later time if you forget your password. 8. Enter your e-mail address. You will receive e-mail notification when new information is available in [address] 9. Click Sign Up. You can now view and download portions of your medical record. 10. Click the Download Summary menu link to download a portable copy of your medical information. If you have questions, please visit the Frequently Asked Questions section of the [employer] website. Remember, [employer] is NOT to be used for urgent needs. For medical emergencies, dial 911. Now available from your iPhone and Android! General Information Please provide this summary of care documentation to your next provider. Patient Signature:  ____________________________________________________________ Date:  ____________________________________________________________  
  
[facility] [place] Pablo Provider Signature:  ____________________________________________________________ Date:  ____________________________________________________________

## 2017-12-04 NOTE — PROGRESS NOTES
Spoke with Dr. Nai Bautista who authorized port order set and said to resume the patient's home meds.

## 2017-12-05 ENCOUNTER — APPOINTMENT (OUTPATIENT)
Dept: CT IMAGING | Age: 82
DRG: 812 | End: 2017-12-05
Attending: INTERNAL MEDICINE
Payer: MEDICARE

## 2017-12-05 LAB
ALBUMIN SERPL-MCNC: 3.1 G/DL (ref 3.5–5)
ALBUMIN/GLOB SERPL: 0.8 {RATIO} (ref 1.1–2.2)
ALP SERPL-CCNC: 79 U/L (ref 45–117)
ALT SERPL-CCNC: 12 U/L (ref 12–78)
ANION GAP SERPL CALC-SCNC: 8 MMOL/L (ref 5–15)
AST SERPL-CCNC: 9 U/L (ref 15–37)
BASOPHILS # BLD: 0 K/UL (ref 0–0.1)
BASOPHILS NFR BLD: 0 % (ref 0–1)
BILIRUB SERPL-MCNC: 0.8 MG/DL (ref 0.2–1)
BUN SERPL-MCNC: 14 MG/DL (ref 6–20)
BUN/CREAT SERPL: 19 (ref 12–20)
CALCIUM SERPL-MCNC: 9.1 MG/DL (ref 8.5–10.1)
CHLORIDE SERPL-SCNC: 99 MMOL/L (ref 97–108)
CO2 SERPL-SCNC: 31 MMOL/L (ref 21–32)
CREAT SERPL-MCNC: 0.73 MG/DL (ref 0.55–1.02)
DIFFERENTIAL METHOD BLD: ABNORMAL
EOSINOPHIL # BLD: 0.1 K/UL (ref 0–0.4)
EOSINOPHIL NFR BLD: 4 % (ref 0–7)
ERYTHROCYTE [DISTWIDTH] IN BLOOD BY AUTOMATED COUNT: 18.5 % (ref 11.5–14.5)
ERYTHROCYTE [DISTWIDTH] IN BLOOD BY AUTOMATED COUNT: 18.6 % (ref 11.5–14.5)
GLOBULIN SER CALC-MCNC: 3.8 G/DL (ref 2–4)
GLUCOSE SERPL-MCNC: 127 MG/DL (ref 65–100)
HCT VFR BLD AUTO: 30.1 % (ref 35–47)
HCT VFR BLD AUTO: 30.1 % (ref 35–47)
HGB BLD-MCNC: 10 G/DL (ref 11.5–16)
HGB BLD-MCNC: 10.1 G/DL (ref 11.5–16)
LDH SERPL L TO P-CCNC: 178 U/L (ref 81–246)
LYMPHOCYTES # BLD: 0.3 K/UL (ref 0.8–3.5)
LYMPHOCYTES NFR BLD: 16 % (ref 12–49)
MCH RBC QN AUTO: 28.6 PG (ref 26–34)
MCH RBC QN AUTO: 28.9 PG (ref 26–34)
MCHC RBC AUTO-ENTMCNC: 33.2 G/DL (ref 30–36.5)
MCHC RBC AUTO-ENTMCNC: 33.6 G/DL (ref 30–36.5)
MCV RBC AUTO: 86 FL (ref 80–99)
MCV RBC AUTO: 86 FL (ref 80–99)
MONOCYTES # BLD: 0.4 K/UL (ref 0–1)
MONOCYTES NFR BLD: 22 % (ref 5–13)
NEUTS SEG # BLD: 0.9 K/UL (ref 1.8–8)
NEUTS SEG NFR BLD: 58 % (ref 32–75)
PLATELET # BLD AUTO: 150 K/UL (ref 150–400)
PLATELET # BLD AUTO: 159 K/UL (ref 150–400)
POTASSIUM SERPL-SCNC: 3.5 MMOL/L (ref 3.5–5.1)
PROT SERPL-MCNC: 6.9 G/DL (ref 6.4–8.2)
RBC # BLD AUTO: 3.5 M/UL (ref 3.8–5.2)
RBC # BLD AUTO: 3.5 M/UL (ref 3.8–5.2)
RBC MORPH BLD: ABNORMAL
RETICS/RBC NFR AUTO: 2.2 % (ref 0.7–2.1)
SODIUM SERPL-SCNC: 138 MMOL/L (ref 136–145)
WBC # BLD AUTO: 1.6 K/UL (ref 3.6–11)
WBC # BLD AUTO: 1.7 K/UL (ref 3.6–11)

## 2017-12-05 PROCEDURE — 85025 COMPLETE CBC W/AUTO DIFF WBC: CPT | Performed by: INTERNAL MEDICINE

## 2017-12-05 PROCEDURE — 85097 BONE MARROW INTERPRETATION: CPT | Performed by: INTERNAL MEDICINE

## 2017-12-05 PROCEDURE — 77012 CT SCAN FOR NEEDLE BIOPSY: CPT

## 2017-12-05 PROCEDURE — 83615 LACTATE (LD) (LDH) ENZYME: CPT | Performed by: INTERNAL MEDICINE

## 2017-12-05 PROCEDURE — 88184 FLOWCYTOMETRY/ TC 1 MARKER: CPT | Performed by: INTERNAL MEDICINE

## 2017-12-05 PROCEDURE — 36430 TRANSFUSION BLD/BLD COMPNT: CPT

## 2017-12-05 PROCEDURE — 88311 DECALCIFY TISSUE: CPT | Performed by: INTERNAL MEDICINE

## 2017-12-05 PROCEDURE — 30233N1 TRANSFUSION OF NONAUTOLOGOUS RED BLOOD CELLS INTO PERIPHERAL VEIN, PERCUTANEOUS APPROACH: ICD-10-PCS | Performed by: RADIOLOGY

## 2017-12-05 PROCEDURE — 88365 INSITU HYBRIDIZATION (FISH): CPT | Performed by: INTERNAL MEDICINE

## 2017-12-05 PROCEDURE — 80053 COMPREHEN METABOLIC PANEL: CPT | Performed by: INTERNAL MEDICINE

## 2017-12-05 PROCEDURE — 77030018781

## 2017-12-05 PROCEDURE — 74011250636 HC RX REV CODE- 250/636: Performed by: INTERNAL MEDICINE

## 2017-12-05 PROCEDURE — 36415 COLL VENOUS BLD VENIPUNCTURE: CPT | Performed by: INTERNAL MEDICINE

## 2017-12-05 PROCEDURE — 77030003666 HC NDL SPINAL BD -A

## 2017-12-05 PROCEDURE — 82232 ASSAY OF BETA-2 PROTEIN: CPT | Performed by: INTERNAL MEDICINE

## 2017-12-05 PROCEDURE — 88305 TISSUE EXAM BY PATHOLOGIST: CPT | Performed by: INTERNAL MEDICINE

## 2017-12-05 PROCEDURE — 88313 SPECIAL STAINS GROUP 2: CPT | Performed by: INTERNAL MEDICINE

## 2017-12-05 PROCEDURE — 88342 IMHCHEM/IMCYTCHM 1ST ANTB: CPT | Performed by: INTERNAL MEDICINE

## 2017-12-05 PROCEDURE — 74011250636 HC RX REV CODE- 250/636: Performed by: RADIOLOGY

## 2017-12-05 PROCEDURE — 77030028872 HC BN BIOP NDL ON CNTRL TY TELE -C

## 2017-12-05 PROCEDURE — 07DR3ZX EXTRACTION OF ILIAC BONE MARROW, PERCUTANEOUS APPROACH, DIAGNOSTIC: ICD-10-PCS | Performed by: RADIOLOGY

## 2017-12-05 PROCEDURE — 85045 AUTOMATED RETICULOCYTE COUNT: CPT | Performed by: INTERNAL MEDICINE

## 2017-12-05 PROCEDURE — 85027 COMPLETE CBC AUTOMATED: CPT | Performed by: INTERNAL MEDICINE

## 2017-12-05 PROCEDURE — 88185 FLOWCYTOMETRY/TC ADD-ON: CPT | Performed by: INTERNAL MEDICINE

## 2017-12-05 PROCEDURE — 88237 TISSUE CULTURE BONE MARROW: CPT | Performed by: INTERNAL MEDICINE

## 2017-12-05 PROCEDURE — P9040 RBC LEUKOREDUCED IRRADIATED: HCPCS | Performed by: INTERNAL MEDICINE

## 2017-12-05 PROCEDURE — 88264 CHROMOSOME ANALYSIS 20-25: CPT | Performed by: INTERNAL MEDICINE

## 2017-12-05 RX ORDER — SODIUM CHLORIDE 9 MG/ML
25 INJECTION, SOLUTION INTRAVENOUS
Status: DISCONTINUED | OUTPATIENT
Start: 2017-12-05 | End: 2017-12-06 | Stop reason: HOSPADM

## 2017-12-05 RX ORDER — FENTANYL CITRATE 50 UG/ML
200 INJECTION, SOLUTION INTRAMUSCULAR; INTRAVENOUS
Status: DISCONTINUED | OUTPATIENT
Start: 2017-12-05 | End: 2017-12-05 | Stop reason: ALTCHOICE

## 2017-12-05 RX ORDER — LIDOCAINE HYDROCHLORIDE 10 MG/ML
10 INJECTION INFILTRATION; PERINEURAL
Status: DISPENSED | OUTPATIENT
Start: 2017-12-05 | End: 2017-12-06

## 2017-12-05 RX ORDER — MIDAZOLAM HYDROCHLORIDE 1 MG/ML
5 INJECTION, SOLUTION INTRAMUSCULAR; INTRAVENOUS
Status: DISCONTINUED | OUTPATIENT
Start: 2017-12-05 | End: 2017-12-05 | Stop reason: ALTCHOICE

## 2017-12-05 RX ORDER — SODIUM CHLORIDE 0.9 % (FLUSH) 0.9 %
5-10 SYRINGE (ML) INJECTION EVERY 8 HOURS
Status: DISCONTINUED | OUTPATIENT
Start: 2017-12-05 | End: 2017-12-06 | Stop reason: HOSPADM

## 2017-12-05 RX ORDER — SODIUM CHLORIDE 0.9 % (FLUSH) 0.9 %
5-10 SYRINGE (ML) INJECTION
Status: DISCONTINUED | OUTPATIENT
Start: 2017-12-05 | End: 2017-12-06 | Stop reason: HOSPADM

## 2017-12-05 RX ADMIN — MIDAZOLAM HYDROCHLORIDE 1 MG: 1 INJECTION, SOLUTION INTRAMUSCULAR; INTRAVENOUS at 15:57

## 2017-12-05 RX ADMIN — SODIUM CHLORIDE 25 ML/HR: 900 INJECTION, SOLUTION INTRAVENOUS at 15:58

## 2017-12-05 RX ADMIN — FENTANYL CITRATE 25 MCG: 50 INJECTION, SOLUTION INTRAMUSCULAR; INTRAVENOUS at 16:06

## 2017-12-05 RX ADMIN — FUROSEMIDE 40 MG: 10 INJECTION, SOLUTION INTRAMUSCULAR; INTRAVENOUS at 01:35

## 2017-12-05 RX ADMIN — FENTANYL CITRATE 50 MCG: 50 INJECTION, SOLUTION INTRAMUSCULAR; INTRAVENOUS at 15:57

## 2017-12-05 RX ADMIN — Medication 10 ML: at 20:36

## 2017-12-05 RX ADMIN — MIDAZOLAM HYDROCHLORIDE 1 MG: 1 INJECTION, SOLUTION INTRAMUSCULAR; INTRAVENOUS at 16:05

## 2017-12-05 RX ADMIN — Medication 10 ML: at 14:07

## 2017-12-05 NOTE — PROGRESS NOTES
Bedside and Verbal shift change report given to Ronnie Presley, RN (oncoming nurse) by Akila Hernandez RN (offgoing nurse). Report included the following information SBAR, Kardex, MAR and Recent Results. 0830: Spoke with CT and informed them that the patient received a breakfast tray this AM and ate a banana prior to this RN taking the tray away. CT stated that they spoke with the radiologist and they will be able to do the bone marrow biopsy this afternoon and to keep the patient NPO until then. Patient aware.

## 2017-12-05 NOTE — H&P
Radiology History and Physical    Patient: Maya Duong 80 y.o. female       Chief Complaint: No chief complaint on file. History of Present Illness: for bone marrow biopsy for lymphoma    History:    Past Medical History:   Diagnosis Date    [facility]estive [facility] failure) ([facility]     thought to be result of chemotherapy    Depression     Diabetes ([facility]     GERD (gastroesophageal reflux disease)     HTN (hypertension)     Non Hodgkin's lymphoma (HCC)     Rheumatoid arthritis(714.0)     Thyroid disease      No family history on file. Social History     Social History    Marital status:      Spouse name: N/A    Number of children: N/A    Years of education: N/A     Occupational History    Not on file. Social History Main Topics    Smoking status: Never Smoker    Smokeless tobacco: Never Used    Alcohol use Yes      Comment: rare    Drug use: Yes     Special: Prescription, OTC    Sexual activity: Not on file     Other Topics Concern    Not on file     Social History Narrative       Allergies:    Allergies   Allergen Reactions    Cephalexin Myalgia     Painful joints    Lisinopril Hives    Celebrex [Celecoxib] Hives    Ibuprofen Hives       Current Medications:  Current Facility-Administered Medications   Medication Dose Route Frequency    sodium chloride (NS) flush 5-10 mL  5-10 mL IntraVENous Q8H    lidocaine (XYLOCAINE) 10 mg/mL (1 %) injection 1 mL  10 mg IntraDERMal RAD ONCE    0.9% sodium chloride infusion  25 mL/hr IntraVENous RAD CONTINUOUS    sodium chloride (NS) flush 5-10 mL  5-10 mL IntraVENous RAD PRN    0.9% sodium chloride infusion 250 mL  250 mL IntraVENous PRN    acetaminophen (TYLENOL) tablet 650 mg  650 mg Oral Q4H PRN    sodium chloride (NS) flush 10-30 mL  10-30 mL InterCATHeter PRN    sodium chloride (NS) flush 10 mL  10 mL InterCATHeter PRN    sodium chloride (NS) flush 20 mL  20 mL InterCATHeter Q24H    heparin (porcine) pf 300-500 Units  300-500 Units InterCATHeter PRN    alteplase (CATHFLO) 1 mg in sterile water (preservative free) 1 mL injection  1 mg InterCATHeter PRN        Physical Exam:  Blood pressure 106/72, pulse 76, temperature 98.2 °F (36.8 °C), resp. rate 20, weight 75 kg (165 lb 6.4 oz), SpO2 99 %. LUNG: clear to auscultation bilaterally, HEART: regular rate and rhythm      Alerts:    Hospital Problems  Date Reviewed: 11/7/2017    None          Laboratory:      Recent Labs      12/05/17   0538   HGB  10.0*   HCT  30.1*   WBC  1.6*   PLT  150   BUN  14   CREA  0.73   K  3.5         Plan of Care/Planned Procedure:  Risks, benefits, and alternatives reviewed with patient and she agrees to proceed with the procedure.        Nickolas Portillo MD

## 2017-12-05 NOTE — PROGRESS NOTES
Bedside and Verbal shift change report given to Jeffrey Hollis RN (oncoming nurse) by Zaria Wyman RN (offgoing nurse). Report included the following information SBAR, Kardex and MAR. Primary Nurse Lolis Moore RN and Zaria Wyman RN performed a dual skin assessment on this patient No impairment noted  Rubio score is 19  Pt has healed scars to R knee and hip from previous surgeries. Pt sacrum red. Wound consult ordered. 10:02 PM  Came in to get another set of vitals for blood and pt was itching and had welts on left arm, left shoulder, right leg, left knee. Pt states she is very itchy. Breath sounds clear and states no SOB or pain. Blood stopped, running normal saline. Vitals stable. Will page MD   10:16 PM  Paged Tiana Kim. Awaiting call back. 10:21 PM  Spoke with . Orders received to give benadryl and tylenol and then restart blood in 30 minutes. Will continue to monitor pt.  11:12 PM  Blood restarted. Pt states she is no longer itchy after benadryl. Will continue to monitor. 11:45 PM  Pt resting comfortably. Will continue to monitor. 7:29 AM  CT called and stated that pt cannot have both procedures (CT w/ contrast and bone marrow biopsy) done today because the bone marrow biopsy requires the pt be NPO. Paged Nikunj Seth, awaiting call back. 7:45 AM  Spoke with . Orders received to proceed with bone marrow biopsy. CT notified.

## 2017-12-05 NOTE — WOUND CARE
Wound Care Note:     New consult placed by nurse request for sacrum red    Chart shows:  Admitted for bone biopsy, CT, and blood transfusion. Past Medical History:   Diagnosis Date    CHF (congestive heart failure) ([address]     thought to be result of chemotherapy    Depression     Diabetes (HCC)     GERD (gastroesophageal reflux disease)     HTN (hypertension)     Non Hodgkin's lymphoma (HCC)     Rheumatoid arthritis(714.0)     Thyroid disease      WBC = 1.6 on 12/5/17    Assessment:   Patient is A&O x 4, communicative, continent with no assistance needed in repositioning. Bed: Advance  Diet: Cardiac regular with 2 grams sodium  Patient reports no pain    Bilateral heels skin intact and without erythema. Bilateral buttocks, and sacral skin intact and with blanchable erythema. 1. POA sacral wound that is crusted over. Patient ambulates independently; however, she broke her hip in November. Etiology is unclear, wound could have been created with fall or pressure during recovery. There is currently a superficial crusting over wound. No wound care needed for this wound. Patient educated on the importance of repositioning frequently, at least every 2 hours and floating heels while in bed; she acknowledged understanding. Patient repositioned on supine   Heels offloaded on pillow. Recommendations:    Sacrum and heels- Every 12 hours apply Aloe [place] lotion. Skin Care & Pressure Prevention:  Minimize layers of linen/pads under patient to optimize support surface. Turn/reposition approximately every 2 hours and offload heels.   Manage incontinence / promote continence     Discussed above plan with patient & Ana Alaniz RN    Transition of Care: Plan to follow as needed while admitted to hospital.    Gregor Nichols RN, BSN, Wound Care Nurse  office [phone]  pager [phone] or call  to page

## 2017-12-05 NOTE — PROGRESS NOTES
TRANSFER - IN REPORT:    Verbal report received from [facility] (name) on Haile Omer  being received from IR (unit) for routine post - op      Report consisted of patients Situation, Background, Assessment and   Recommendations(SBAR). Information from the following report(s) SBAR, Kardex and Procedure Summary was reviewed with the receiving nurse. Opportunity for questions and clarification was provided. Assessment completed upon patients arrival to unit and care assumed.

## 2017-12-05 NOTE — H&P
[address]   e [place] [address], [address]   HISTORY AND PHYSICAL       Name:  Yadira Quiroga   MR#:  [id number]   :  1935   Account #:  [de-identified]        Date of Adm:  2017       HISTORY OF PRESENT ILLNESS: The patient is an 59-year-old   woman with a history of angioimmunoblastic non-Hodgkin's lymphoma,   who was admitted for symptomatic anemia. This patient was seen in   the office earlier today and her hemoglobin was 7.9. She relates   significant increase in her fatigue as well as the development of   dyspnea on exertion. She denies bleeding or bruising. PAST MEDICAL HISTORY: Significant for angioimmunoblastic   lymphoma originally diagnosed in , treated with R-CVP   chemotherapy. She was brought into remission after 6 cycles, but then   relapsed in the  of . She was put back on R-CVP and   continued that until 2017 when it was discontinued. Also includes   congestive heart failure, hypothyroidism, hypercholesterolemia, GERD,   diabetes, polymyalgia rheumatica. PAST SURGICAL HISTORY: Includes cataract surgery, colonoscopy,   right submandibular lymph node resection in , right total knee   replacement, ankle surgery, ISAIAS, breast biopsies x2, right hip   replacement surgery 2017. SOCIAL HISTORY: She lives alone in [address] [place] [address]. She has a son   who takes her back and forth for her visit. She has 3 other children. She used   to work at [employer]. She has no drug or alcohol abuse. FAMILY HISTORY: Her mother had pancreas cancer. Brother had   esophageal cancer. Her sister has breast cancer. REVIEW OF SYSTEMS   As noted above, otherwise noncontributory. ALLERGIES:     1. CELEBREX.   2. IBUPROFEN. PHYSICAL EXAMINATION   GENERAL: She is a frail, elderly woman in no apparent distress. VITAL SIGNS: Temperature 98, pulse 66, /77, respirations 16,   O2 saturation 98%. HEENT: EOMI, PERRLA. Oropharynx without lesions.    NECK: Supple. LUNGS: Clear anteriorly. CARDIAC: Regular rate. No murmur. ABDOMEN: Soft, scaphoid, nontender. She has no hsm  EXTREMITIES: No clubbing, cyanosis or edema. NEUROLOGIC: Alert and oriented x3. SKIN: No petechiae or bruising. IMPRESSION: Symptomatic anemia. The patient had a transfusion   with 2 units of packed cells last night and developed hives during the   infusion. She has had an improvement in her hemoglobin to 10. We   believe that she has relapsed angioimmunoblastic lymphoma and she   is set up for a bone marrow aspirate and biopsy. She cannot get that   done until later in the afternoon and then will be observed overnight   following that procedure. She will have restaging CT scans performed   tomorrow morning, given the fact that she cannot have oral contrast   within 8 hours prior to the upcoming bone marrow biopsy. We cannot   take advantage of today's hospitalization for that purpose. If all goes   well overnight and she has no further problems, she will be discharged   in the morning.         MD José Miguel Rivas / Mayur Miguel   D:  12/05/2017   10:18   T:  12/05/2017   10:55   Job #:  [id number]

## 2017-12-05 NOTE — ROUTINE PROCESS
TRANSFER - OUT REPORT:    Verbal report given to Junior(name) on Sadiq Pina  being transferred to 60(unit) for routine progression of care       Report consisted of patients Situation, Background, Assessment and   Recommendations(SBAR). Information from the following report(s) SBAR, Procedure Summary and MAR was reviewed with the receiving nurse. Lines:   Venous Access Device 12/04/17 Upper chest (subclavicular area, right (Active)   Central Line Being Utilized Yes 12/5/2017 11:14 AM   Criteria for Appropriate Use Limited/no vessel suitable for conventional peripheral access 12/5/2017 11:14 AM   Site Assessment Clean, dry, & intact 12/5/2017 11:14 AM   Date of Last Dressing Change 12/04/17 12/5/2017 11:14 AM   Dressing Status Clean, dry, & intact 12/5/2017 11:14 AM   Dressing Type Disk with Chlorhexadine gluconate (CHG); Transparent 12/5/2017 11:14 AM   Action Taken Open ports on tubing capped 12/5/2017 11:14 AM   Access Needle Length (Medial Site) 1.5 inches 12/4/2017  8:51 PM   Positive Blood Return (Medial Site) Yes 12/5/2017 11:14 AM   Alcohol Cap Used Yes 12/5/2017 11:14 AM        Opportunity for questions and clarification was provided.       Patient transported with:   [place]

## 2017-12-06 ENCOUNTER — APPOINTMENT (OUTPATIENT)
Dept: CT IMAGING | Age: 82
DRG: 812 | End: 2017-12-06
Attending: INTERNAL MEDICINE
Payer: MEDICARE

## 2017-12-06 VITALS
DIASTOLIC BLOOD PRESSURE: 66 MMHG | HEART RATE: 61 BPM | BODY MASS INDEX: 22.35 KG/M2 | TEMPERATURE: 98 F | RESPIRATION RATE: 18 BRPM | SYSTOLIC BLOOD PRESSURE: 147 MMHG | WEIGHT: 160.27 LBS | OXYGEN SATURATION: 100 %

## 2017-12-06 PROBLEM — D64.9 ANEMIA: Status: ACTIVE | Noted: 2017-12-06

## 2017-12-06 LAB
ABO + RH BLD: NORMAL
B2 MICROGLOB SERPL-MCNC: 2.7 MG/L (ref 0.6–2.4)
BLD PROD TYP BPU: NORMAL
BLD PROD TYP BPU: NORMAL
BLOOD GROUP ANTIBODIES SERPL: NORMAL
BPU ID: NORMAL
BPU ID: NORMAL
CROSSMATCH RESULT,%XM: NORMAL
CROSSMATCH RESULT,%XM: NORMAL
SPECIMEN EXP DATE BLD: NORMAL
STATUS OF UNIT,%ST: NORMAL
STATUS OF UNIT,%ST: NORMAL
UNIT DIVISION, %UDIV: 0
UNIT DIVISION, %UDIV: 0

## 2017-12-06 PROCEDURE — 74011000258 HC RX REV CODE- 258: Performed by: RADIOLOGY

## 2017-12-06 PROCEDURE — 74011636320 HC RX REV CODE- 636/320: Performed by: INTERNAL MEDICINE

## 2017-12-06 PROCEDURE — 74011250636 HC RX REV CODE- 250/636: Performed by: INTERNAL MEDICINE

## 2017-12-06 PROCEDURE — 65270000029 HC RM PRIVATE

## 2017-12-06 PROCEDURE — 74177 CT ABD & PELVIS W/CONTRAST: CPT

## 2017-12-06 PROCEDURE — 71260 CT THORAX DX C+: CPT

## 2017-12-06 RX ORDER — SODIUM CHLORIDE 0.9 % (FLUSH) 0.9 %
10 SYRINGE (ML) INJECTION
Status: DISCONTINUED | OUTPATIENT
Start: 2017-12-06 | End: 2017-12-06 | Stop reason: HOSPADM

## 2017-12-06 RX ADMIN — IOPAMIDOL 100 ML: 755 INJECTION, SOLUTION INTRAVENOUS at 09:55

## 2017-12-06 RX ADMIN — Medication 10 ML: at 07:04

## 2017-12-06 RX ADMIN — SODIUM CHLORIDE, PRESERVATIVE FREE 500 UNITS: 5 INJECTION INTRAVENOUS at 16:42

## 2017-12-06 RX ADMIN — Medication 10 ML: at 15:05

## 2017-12-06 RX ADMIN — SODIUM CHLORIDE 25 ML/HR: 900 INJECTION, SOLUTION INTRAVENOUS at 09:56

## 2017-12-06 RX ADMIN — Medication 20 ML: at 15:05

## 2017-12-06 NOTE — PROGRESS NOTES
Problem: Falls - Risk of  Goal: *Absence of falls  Outcome: Progressing Towards Goal  Pt is moving a lot better since her 2 units of blood.

## 2017-12-06 NOTE — PROGRESS NOTES
2200- RN tried to flush port and pt said it \"stung\" and that she wasn't getting the metallic taste. Some redness at site. Nurse tried to access site and was unable to flush or get blood return. Another attempt was made. Charge nurse then attempted and flushed and a bulge formed around the top R side of the port. Port was cleaned and dressed. A peripheral was started in the L wrist. Will need a port-o-gram ordered. Pt is also on neutropenic precautions. Results for Baldemar Ibrahim (MRN [id number]) as of 12/5/2017 23:11   Ref. Range 12/5/2017 05:38   ABS.  NEUTROPHILS Latest Ref Range: 1.8 - 8.0 K/UL 0.9 (L)

## 2017-12-06 NOTE — PROGRESS NOTES
Bedside and Verbal shift change report given to Latrice RN (oncoming nurse) by John De Luna RN (offgoing nurse). Report included the following information SBAR, Kardex, Intake/Output, MAR, Recent Results and Med Rec Status.

## 2017-12-06 NOTE — PROGRESS NOTES
Patient is being discharge to and per Dr. Giovani Conley to call Dr. Rosalva Deluna nurse tomorrow for follow-up.

## 2017-12-07 NOTE — DISCHARGE SUMMARY
Physician Discharge Summary     Sadiq Pina        [id number]      80 y.o.      1935    Admit date: 12/4/2017                Discharge date and time: 12/6/2017    Patient Instructions:   Discharge Medication List as of 12/6/2017  4:51 PM          Activity: up as tolerated  Diet: regular  Follow-up: Dr. Lowe Punch - call    Hospitalization Summary  Admitted for fatigue and anemia. Had BM Bx  CT's this am not read. Port not working. Access by another nurse and treated with TPA, then blood draw obtained. Discharged to home. Chief Complaint - follow-up and management of     Exam  Gen No distress   HEENT No mucositis; no thrush   Nodes No supraclavicular or cervical adenopathy   Chest / line sites No inflammation   Lungs Clear to auscultation   CV RRR   Abd Non-tender   Ext No edema   Skin No rashes   Neuro Awake and alert   Other    Time-based care: [] 25-35 min    [] > 35 mi     (Total time with >50% spent counseling/coordination)  Discussed with the following:  []     []  Nursing Staff  [] Consultant    []  Family   []  Other:  Active Medical Problems  Lymphoma  anemia    Interim History and Assessment   See above. Current medications, progress notes, vital signs, radiology, and labs reviewed.

## 2017-12-13 ENCOUNTER — PATIENT OUTREACH (OUTPATIENT)
Dept: CASE MANAGEMENT | Age: 82
End: 2017-12-13

## 2017-12-13 NOTE — PROGRESS NOTES
Community Care Team Documentation for Patient in [facility]  Subsequent Follow up     Patient remains at [facility] See previous [facility] Team notes. PCP : Felix Hughes MD    Per Cheryl Springer at SNF, Reviewed [place] Back questions with SNF to ensure patient arrived with admission packet in order. Pt readmitted to [facility] 12/4-12/6. Pt returned to [facility] 12/6. Pt progressing with PT/OT. Plan to discharge home alone with [facility] 12/16. Medications were not reconciled and general patient assessment was not completed during this skilled nursing facility outreach.      CHRIS Thayer

## 2018-01-15 ENCOUNTER — HOSPITAL ENCOUNTER (INPATIENT)
Age: 83
LOS: 7 days | Discharge: HOME | DRG: 809 | End: 2018-01-22
Attending: INTERNAL MEDICINE | Admitting: INTERNAL MEDICINE
Payer: MEDICARE

## 2018-01-15 ENCOUNTER — APPOINTMENT (OUTPATIENT)
Dept: CT IMAGING | Age: 83
DRG: 809 | End: 2018-01-15
Attending: INTERNAL MEDICINE
Payer: MEDICARE

## 2018-01-15 ENCOUNTER — APPOINTMENT (OUTPATIENT)
Dept: GENERAL RADIOLOGY | Age: 83
DRG: 809 | End: 2018-01-15
Attending: INTERNAL MEDICINE
Payer: MEDICARE

## 2018-01-15 PROBLEM — R50.9 FEVER: Status: ACTIVE | Noted: 2018-01-15

## 2018-01-15 LAB
ALBUMIN SERPL-MCNC: 2.4 G/DL (ref 3.5–5)
ALBUMIN/GLOB SERPL: 0.7 {RATIO} (ref 1.1–2.2)
ALP SERPL-CCNC: 62 U/L (ref 45–117)
ALT SERPL-CCNC: 17 U/L (ref 12–78)
ANION GAP SERPL CALC-SCNC: 7 MMOL/L (ref 5–15)
AST SERPL-CCNC: 22 U/L (ref 15–37)
BASOPHILS # BLD: 0 K/UL (ref 0–0.1)
BASOPHILS NFR BLD: 0 % (ref 0–1)
BILIRUB SERPL-MCNC: 0.5 MG/DL (ref 0.2–1)
BLOOD GROUP ANTIBODIES SERPL: NORMAL
BUN SERPL-MCNC: 15 MG/DL (ref 6–20)
BUN/CREAT SERPL: 17 (ref 12–20)
CALCIUM SERPL-MCNC: 7.8 MG/DL (ref 8.5–10.1)
CHLORIDE SERPL-SCNC: 100 MMOL/L (ref 97–108)
CO2 SERPL-SCNC: 26 MMOL/L (ref 21–32)
CREAT SERPL-MCNC: 0.88 MG/DL (ref 0.55–1.02)
DAT POLY-SP REAG RBC QL: NORMAL
DIFFERENTIAL METHOD BLD: ABNORMAL
EOSINOPHIL # BLD: 0.2 K/UL (ref 0–0.4)
EOSINOPHIL NFR BLD: 18 % (ref 0–7)
ERYTHROCYTE [DISTWIDTH] IN BLOOD BY AUTOMATED COUNT: 18.8 % (ref 11.5–14.5)
FOLATE SERPL-MCNC: 28.4 NG/ML (ref 5–21)
GLOBULIN SER CALC-MCNC: 3.5 G/DL (ref 2–4)
GLUCOSE BLD STRIP.AUTO-MCNC: 118 MG/DL (ref 65–100)
GLUCOSE SERPL-MCNC: 60 MG/DL (ref 65–100)
HAPTOGLOB SERPL-MCNC: 344 MG/DL (ref 30–200)
HCT VFR BLD AUTO: 21.5 % (ref 35–47)
HGB BLD-MCNC: 7.1 G/DL (ref 11.5–16)
LDH SERPL L TO P-CCNC: 225 U/L (ref 81–246)
LYMPHOCYTES # BLD: 0.2 K/UL (ref 0.8–3.5)
LYMPHOCYTES NFR BLD: 18 % (ref 12–49)
MCH RBC QN AUTO: 29.2 PG (ref 26–34)
MCHC RBC AUTO-ENTMCNC: 33 G/DL (ref 30–36.5)
MCV RBC AUTO: 88.5 FL (ref 80–99)
MONOCYTES # BLD: 0.1 K/UL (ref 0–1)
MONOCYTES NFR BLD: 7 % (ref 5–13)
NEUTS SEG # BLD: 0.6 K/UL (ref 1.8–8)
NEUTS SEG NFR BLD: 57 % (ref 32–75)
PLATELET # BLD AUTO: 120 K/UL (ref 150–400)
POTASSIUM SERPL-SCNC: 4.3 MMOL/L (ref 3.5–5.1)
PROT SERPL-MCNC: 5.9 G/DL (ref 6.4–8.2)
RBC # BLD AUTO: 2.43 M/UL (ref 3.8–5.2)
RBC MORPH BLD: ABNORMAL
RBC MORPH BLD: ABNORMAL
RETICS/RBC NFR AUTO: 1.9 % (ref 0.7–2.1)
SERVICE CMNT-IMP: ABNORMAL
SODIUM SERPL-SCNC: 133 MMOL/L (ref 136–145)
VIT B12 SERPL-MCNC: >2000 PG/ML (ref 211–911)
WBC # BLD AUTO: 1.1 K/UL (ref 3.6–11)

## 2018-01-15 PROCEDURE — 74011250636 HC RX REV CODE- 250/636: Performed by: INTERNAL MEDICINE

## 2018-01-15 PROCEDURE — 83615 LACTATE (LD) (LDH) ENZYME: CPT | Performed by: INTERNAL MEDICINE

## 2018-01-15 PROCEDURE — 77030003560 HC NDL HUBR BARD -A

## 2018-01-15 PROCEDURE — 87040 BLOOD CULTURE FOR BACTERIA: CPT | Performed by: INTERNAL MEDICINE

## 2018-01-15 PROCEDURE — 71045 X-RAY EXAM CHEST 1 VIEW: CPT

## 2018-01-15 PROCEDURE — 82746 ASSAY OF FOLIC ACID SERUM: CPT | Performed by: INTERNAL MEDICINE

## 2018-01-15 PROCEDURE — 82607 VITAMIN B-12: CPT | Performed by: INTERNAL MEDICINE

## 2018-01-15 PROCEDURE — 86880 COOMBS TEST DIRECT: CPT | Performed by: INTERNAL MEDICINE

## 2018-01-15 PROCEDURE — 70450 CT HEAD/BRAIN W/O DYE: CPT

## 2018-01-15 PROCEDURE — 85045 AUTOMATED RETICULOCYTE COUNT: CPT | Performed by: INTERNAL MEDICINE

## 2018-01-15 PROCEDURE — 36415 COLL VENOUS BLD VENIPUNCTURE: CPT | Performed by: INTERNAL MEDICINE

## 2018-01-15 PROCEDURE — 80053 COMPREHEN METABOLIC PANEL: CPT | Performed by: INTERNAL MEDICINE

## 2018-01-15 PROCEDURE — 82962 GLUCOSE BLOOD TEST: CPT

## 2018-01-15 PROCEDURE — 87086 URINE CULTURE/COLONY COUNT: CPT | Performed by: INTERNAL MEDICINE

## 2018-01-15 PROCEDURE — 85025 COMPLETE CBC W/AUTO DIFF WBC: CPT | Performed by: INTERNAL MEDICINE

## 2018-01-15 PROCEDURE — 83010 ASSAY OF HAPTOGLOBIN QUANT: CPT | Performed by: INTERNAL MEDICINE

## 2018-01-15 PROCEDURE — 74011000258 HC RX REV CODE- 258: Performed by: INTERNAL MEDICINE

## 2018-01-15 PROCEDURE — 65270000029 HC RM PRIVATE

## 2018-01-15 RX ORDER — GUAIFENESIN 100 MG/5ML
81 LIQUID (ML) ORAL
COMMUNITY
End: 2018-01-22

## 2018-01-15 RX ORDER — LEVOFLOXACIN 250 MG/1
250 TABLET ORAL DAILY
COMMUNITY
Start: 2018-01-13 | End: 2018-01-22

## 2018-01-15 RX ORDER — SODIUM CHLORIDE 0.9 % (FLUSH) 0.9 %
10 SYRINGE (ML) INJECTION EVERY 24 HOURS
Status: DISCONTINUED | OUTPATIENT
Start: 2018-01-15 | End: 2018-01-22 | Stop reason: HOSPADM

## 2018-01-15 RX ORDER — HEPARIN 100 UNIT/ML
300-500 SYRINGE INTRAVENOUS AS NEEDED
Status: DISCONTINUED | OUTPATIENT
Start: 2018-01-15 | End: 2018-01-22 | Stop reason: HOSPADM

## 2018-01-15 RX ORDER — ENOXAPARIN SODIUM 100 MG/ML
40 INJECTION SUBCUTANEOUS EVERY 24 HOURS
Status: DISCONTINUED | OUTPATIENT
Start: 2018-01-15 | End: 2018-01-22 | Stop reason: HOSPADM

## 2018-01-15 RX ORDER — DEXTROSE MONOHYDRATE AND SODIUM CHLORIDE 5; .9 G/100ML; G/100ML
50 INJECTION, SOLUTION INTRAVENOUS CONTINUOUS
Status: DISCONTINUED | OUTPATIENT
Start: 2018-01-15 | End: 2018-01-22 | Stop reason: HOSPADM

## 2018-01-15 RX ORDER — SODIUM CHLORIDE 0.9 % (FLUSH) 0.9 %
10 SYRINGE (ML) INJECTION AS NEEDED
Status: DISCONTINUED | OUTPATIENT
Start: 2018-01-15 | End: 2018-01-22 | Stop reason: HOSPADM

## 2018-01-15 RX ORDER — SODIUM CHLORIDE 0.9 % (FLUSH) 0.9 %
10-30 SYRINGE (ML) INJECTION AS NEEDED
Status: DISCONTINUED | OUTPATIENT
Start: 2018-01-15 | End: 2018-01-22 | Stop reason: HOSPADM

## 2018-01-15 RX ORDER — CODEINE PHOSPHATE AND GUAIFENESIN 10; 100 MG/5ML; MG/5ML
5 SOLUTION ORAL
COMMUNITY
End: 2018-01-22

## 2018-01-15 RX ORDER — OSELTAMIVIR PHOSPHATE 75 MG/1
75 CAPSULE ORAL 2 TIMES DAILY
COMMUNITY
Start: 2018-01-13 | End: 2018-01-22

## 2018-01-15 RX ORDER — LEVOFLOXACIN 5 MG/ML
750 INJECTION, SOLUTION INTRAVENOUS EVERY 24 HOURS
Status: DISCONTINUED | OUTPATIENT
Start: 2018-01-15 | End: 2018-01-22 | Stop reason: HOSPADM

## 2018-01-15 RX ADMIN — ENOXAPARIN SODIUM 40 MG: 40 INJECTION SUBCUTANEOUS at 21:32

## 2018-01-15 RX ADMIN — LEVOFLOXACIN 750 MG: 5 INJECTION, SOLUTION INTRAVENOUS at 21:34

## 2018-01-15 RX ADMIN — DEXTROSE MONOHYDRATE AND SODIUM CHLORIDE 75 ML/HR: 5; .9 INJECTION, SOLUTION INTRAVENOUS at 20:17

## 2018-01-15 RX ADMIN — Medication 10 ML: at 23:58

## 2018-01-15 RX ADMIN — Medication 10 ML: at 20:16

## 2018-01-15 NOTE — IP AVS SNAPSHOT
[address] 
[phone] Patient: Kemar Torres MRN: [id number] :1935 You are allergic to the following Allergen Reactions Cephalexin Myalgia Painful joints Lisinopril Hives Celebrex (Celecoxib) Hives Ibuprofen Hives Recent Documentation Height Weight BMI OB Status Smoking Status 1.803 m 71.2 kg 21.88 kg/m2 Postmenopausal Never Smoker Emergency Contacts  (Rel.) Home Phone Work Phone Mobile Phone Felicity Caicedo (Child) [phone] -- --  
 Dunia Carlton (Son) [phone] -- -- About your hospitalization You were admitted on:  January 15, 2018 You last received care in the:  [facility] You were discharged on:  2018 Why you were hospitalized Your primary diagnosis was:  Not on File Your diagnoses also included:  Fever Providers Seen During Your Hospitalization Provider Specialty Primary office phone Evi Montesinos MD Hematology and Oncology [phone] Your Primary Care Physician (PCP) Primary Care Physician Office Phone Office Fax [address], [phone] [phone] Follow-up Information Follow up With Details Comments Contact Info Kenny Agarwal, [address] [facility] [address] [phone] My Medications STOP taking these medications   
 aspirin 81 mg chewable tablet  
   
  
 calcium-vitamin D 500 mg(1,250mg) -200 unit per tablet Commonly known as:  OYSTER SHELL  
   
  
 guaiFENesin-codeine 100-10 mg/5 mL solution Commonly known as:  ROBITUSSIN AC  
   
  
 levoFLOXacin 250 mg tablet Commonly known as:  LEVAQUIN  
   
  
 TAMIFLU 75 mg capsule Generic drug:  oseltamivir TAKE these medications as instructed  Instructions Each Dose to Equal  
 Morning Noon Evening Bedtime COREG 6.25 mg tablet Generic drug:  carvedilol Your last dose was: Your next dose is: Take 6.25 mg by mouth two (2) times daily (with meals). 6.25 mg FLUoxetine 20 mg capsule Commonly known as:  PROzac Your last dose was: Your next dose is: Take 20 mg by mouth daily. 20 mg HYDROcodone-acetaminophen 7.5-325 mg per tablet Commonly known as:  Dunia Nguyễn Your last dose was: Your next dose is: Take 1 Tab by mouth every six (6) hours as needed. Max Daily Amount: 4 Tabs. 1 Tab  
    
   
   
   
  
 L. acidoph & paracasei- S therm- Bifido 8 billion cell Cap cap Commonly known as:  HERNANDO-Q/RISAQUAD Your last dose was: Your next dose is: Take 1 Cap by mouth daily. 1 Cap  
    
   
   
   
  
 levothyroxine 100 mcg tablet Commonly known as:  SYNTHROID Your last dose was: Your next dose is: Take 88 mcg by mouth Daily (before breakfast). 88 mcg  
    
   
   
   
  
 metFORMIN 500 mg tablet Commonly known as:  GLUCOPHAGE Your last dose was: Your next dose is: Take 1,000 mg by mouth two (2) times daily (with meals). 1000 mg  
    
   
   
   
  
 pantoprazole 40 mg tablet Commonly known as:  PROTONIX Your last dose was: Your next dose is: Take 40 mg by mouth daily. 40 mg  
    
   
   
   
  
 predniSONE 1 mg tablet Commonly known as:  Luis E Ok Your last dose was: Your next dose is: Take  by mouth daily (with breakfast). senna-docusate 8.6-50 mg per tablet Commonly known as:  Chelsie Zaldivar Your last dose was: Your next dose is: Take 1 Tab by mouth daily. 1 Tab Discharge Instructions Patient Discharge Instructions Samantha Roosevelt / [id number] : 1935 Admitted 1/15/2018 Discharged: 2018 DISCHARGE DIAGNOSIS:  
Active Problems: 
  Fever (1/15/2018) Take Home Medications · It is important that you take the medication exactly as they are prescribed. · Keep your medication in the bottles provided by the pharmacist and keep a list of the medication names, dosages, and times to be taken in your wallet. · Do not take other medications without consulting your doctor. What to do at [facility] 1. Recommended diet: regular 2. Recommended activity: activity as tolerated Follow-up with:  
Dr. Neisha Mann office in 1 week. Please call for your own appointment [phone]. Information obtained by : 
I understand that if any problems occur once I am at home I am to contact my physician. I understand and acknowledge receipt of the instructions indicated above. Physician's or R.N.'s Signature                                                                  Date/Time Patient or Representative Signature                                                          Date/Time PORTACATH REMOVAL DISCHARGE INSTRUCTIONS Home Care Instructions: Your Portacath has been removed. Do not allow bra straps or any clothing to rub the skin over the removal site. Do not bathe or swim until the skin has healed. Once it is healed (usually within 7-10 days), it is okay to bathe and swim. Restrict yourself to light activity for the first 5 days, after that resume normal activity slowly. You may resume your normal diet and medications.  
 
Follow-Up Instructions: Please see your oncologist, or the physician who ordered the port removal. 
You may remove the dressing in three days and continue to keep the area dry until the incision is healed. Call If: You should call your Physician and/or the Radiology Nurse if you notice redness, pus, swelling, or pain from the area of your incision. Call if you should develop a fever. To Reach Us:  
Should you experience any significant changes, please call [phone] between the hours of 7:30 am and 10 pm or [phone] after hours. After hours, ask the  to page the [address] Technologist, and describe the problem to the technologist.   
 
 
 
Patient Signature: 
Date: 1/19/2018 Discharging Nurse: Jhony Díaz RN Nutrition Recommendations for Discharge: 
Continue Oral Nutrition Supplements at discharge:  
Sugar Free Lackey Instant Breakfast Twice daily  
for 30 days unless otherwise directed by your Primary Care Physician. This product or similar product can be purchased at your local grocery and pharmacy. Francy Thrasher RD, MS, CDE Discharge Orders None [employer] Announcement We are excited to announce that we are making your provider's discharge notes available to you in Dx[employer]. You will see these notes when they are completed and signed by the physician that discharged you from your recent hospital stay. If you have any questions or concerns about any information you see in DxNA, please call the Health Information Department where you were seen or reach out to your Primary Care Provider for more information about your plan of care. Introducing [facility] & HE[facility]! [address] introduces [employer] patient portal. Now you can access parts of your medical record, email your doctor's office, and request medication refills online. 1. In your internet browser, go to [email or web address] 2. Click on the First Time User? Click Here link in the Sign In box. You will see the New Member Sign Up page. 3. Enter your [employer] Access Code exactly as it appears below. You will not need to use this code after youve completed the sign-up process. If you do not sign up before the expiration date, you must request a new code. · [employer] Access Code: [email or web address] Expires: 2/4/2018  4:21 AM 
 
4. Enter the last four digits of your Social Security Number (xxxx) and Date of Birth (mm/dd/yyyy) as indicated and click Submit. You will be taken to the next sign-up page. 5. Create a [employer] ID. This will be your [employer] login ID and cannot be changed, so think of one that is secure and easy to remember. 6. Create a [employer] password. You can change your password at any time. 7. Enter your Password Reset Question and Answer. This can be used at a later time if you forget your password. 8. Enter your e-mail address. You will receive e-mail notification when new information is available in [address]. 9. Click Sign Up. You can now view and download portions of your medical record. 10. Click the Download Summary menu link to download a portable copy of your medical information. If you have questions, please visit the Frequently Asked Questions section of the [employer] website. Remember, [employer] is NOT to be used for urgent needs. For medical emergencies, dial 911. Now available from your iPhone and Android! General Information Please provide this summary of care documentation to your next provider. Patient Signature:  ____________________________________________________________ Date:  ____________________________________________________________  
  
Philly Chris Provider Signature:  ____________________________________________________________ Date:  ____________________________________________________________

## 2018-01-15 NOTE — H&P
[address]  ACUTE CARE HISTORY AND PHYSICAL    Paul Stanley.  MR#: [id number]  : 1935  ACCOUNT #: [de-identified]   DATE OF SERVICE: 01/15/2018    HISTORY OF PRESENT ILLNESS:  The patient is an 51-year-old woman with a history of angioimmunoblastic lymphoma, currently off therapy, who is admitted for altered mental status. This patient has had fever to 102 documented last week by her family. She was brought to her primary care physician and was started on oral Levaquin and Tamiflu. She was told by the physician that the test was negative for flu and over the weekend, she has had a fluctuating mental status changes. The family states that today she had more fever, shaking chills and difficulty with answering questions, difficulty eating and drinking. There are no localizing symptoms. PAST MEDICAL HISTORY:  Significant for angioimmunoblastic T-cell lymphoma stage IV. This was diagnosed in  and treated with CVP. She went into remission, but relapsed shortly thereafter and then had another course of CVP chemotherapy for approximately 1 year. She has been off therapy since early 2017. She has had pancytopenia develop since then and had a bone marrow biopsy performed in the office 2 months ago, which failed to identify recurrent lymphoma. She has a history of anemia requiring transfusion support, congestive heart failure, polymyalgia rheumatica, arthritis, hypothyroidism, hypercholesterolemia, GERD, diabetes, hypercholesterolemia. PAST SURGICAL HISTORY:  Appendectomy, cataract removal, colonoscopy, right submandibular lymph node resection, right total knee replacement, ankle surgery, ISAIAS, 2 separate breast biopsies, right hip fracture 2017. SOCIAL HISTORY:  She lives in [address] [place] [address]. She is . She has no alcohol or tobacco abuse. FAMILY HISTORY:  Negative. ALLERGIES:  NO KNOWN DRUG ALLERGIES.     REVIEW OF SYSTEMS:  As noted above, otherwise noncontributory. PHYSICAL EXAMINATION:  VITAL SIGNS:  Temperature is 100.1, blood pressure 110/70, heart rate 90, respirations 16. HEENT:  EOMI. She has nonicteric sclerae. NECK:  Supple, full range of motion. LUNGS:  Clear. CARDIAC:  Irregular rate. No murmur. ABDOMEN:  Scaphoid, nontender, no organomegaly. EXTREMITIES:  No edema. NEUROLOGIC:  AO x2, nonfocal.    IMPRESSION:  1. Altered mental status in a woman with a history of lymphoma, currently off treatment. Differential diagnosis includes dehydration, electrolyte abnormalities, infection, subarachnoid bleed, leptomeningeal involvement with lymphoma, medication effect, etc.  We will admit her now to the hospital, pan culture her and start her on IV antibiotics. Check electrolytes. Check a CT scan of the head without contrast and if need be, ask Neurology to see her. 2.  Pancytopenia. This has been evaluated in the office with a bone marrow biopsy within the past 6 months, identifying no evidence of lymphoma. This bone marrow biopsy might need to be repeated this admission. We will check for hemolysis, iron deficiency, B12 and folate deficiency. We will check daily labs. We will continue her home meds.       MD Teena Moreno / Justina Mack  D: 01/15/2018 15:55     T: 01/15/2018 16:42  JOB #: [id number]

## 2018-01-16 ENCOUNTER — APPOINTMENT (OUTPATIENT)
Dept: INTERVENTIONAL RADIOLOGY/VASCULAR | Age: 83
DRG: 809 | End: 2018-01-16
Attending: INTERNAL MEDICINE
Payer: MEDICARE

## 2018-01-16 ENCOUNTER — APPOINTMENT (OUTPATIENT)
Dept: GENERAL RADIOLOGY | Age: 83
DRG: 809 | End: 2018-01-16
Attending: INTERNAL MEDICINE
Payer: MEDICARE

## 2018-01-16 LAB
ALBUMIN SERPL-MCNC: 2.2 G/DL (ref 3.5–5)
ALBUMIN/GLOB SERPL: 0.6 {RATIO} (ref 1.1–2.2)
ALP SERPL-CCNC: 56 U/L (ref 45–117)
ALT SERPL-CCNC: 18 U/L (ref 12–78)
ANION GAP SERPL CALC-SCNC: 9 MMOL/L (ref 5–15)
AST SERPL-CCNC: 24 U/L (ref 15–37)
BILIRUB SERPL-MCNC: 0.3 MG/DL (ref 0.2–1)
BUN SERPL-MCNC: 14 MG/DL (ref 6–20)
BUN/CREAT SERPL: 15 (ref 12–20)
CALCIUM SERPL-MCNC: 8.2 MG/DL (ref 8.5–10.1)
CHLORIDE SERPL-SCNC: 100 MMOL/L (ref 97–108)
CO2 SERPL-SCNC: 24 MMOL/L (ref 21–32)
CREAT SERPL-MCNC: 0.92 MG/DL (ref 0.55–1.02)
ERYTHROCYTE [DISTWIDTH] IN BLOOD BY AUTOMATED COUNT: 18.7 % (ref 11.5–14.5)
GLOBULIN SER CALC-MCNC: 4 G/DL (ref 2–4)
GLUCOSE BLD STRIP.AUTO-MCNC: 228 MG/DL (ref 65–100)
GLUCOSE BLD STRIP.AUTO-MCNC: 70 MG/DL (ref 65–100)
GLUCOSE BLD STRIP.AUTO-MCNC: 78 MG/DL (ref 65–100)
GLUCOSE BLD STRIP.AUTO-MCNC: 88 MG/DL (ref 65–100)
GLUCOSE BLD STRIP.AUTO-MCNC: 99 MG/DL (ref 65–100)
GLUCOSE BLD STRIP.AUTO-MCNC: 99 MG/DL (ref 65–100)
GLUCOSE SERPL-MCNC: 78 MG/DL (ref 65–100)
HCT VFR BLD AUTO: 21.9 % (ref 35–47)
HGB BLD-MCNC: 7.3 G/DL (ref 11.5–16)
MCH RBC QN AUTO: 30.2 PG (ref 26–34)
MCHC RBC AUTO-ENTMCNC: 33.3 G/DL (ref 30–36.5)
MCV RBC AUTO: 90.5 FL (ref 80–99)
PLATELET # BLD AUTO: 107 K/UL (ref 150–400)
POTASSIUM SERPL-SCNC: 4.1 MMOL/L (ref 3.5–5.1)
PROT SERPL-MCNC: 6.2 G/DL (ref 6.4–8.2)
RBC # BLD AUTO: 2.42 M/UL (ref 3.8–5.2)
SERVICE CMNT-IMP: ABNORMAL
SERVICE CMNT-IMP: NORMAL
SODIUM SERPL-SCNC: 133 MMOL/L (ref 136–145)
WBC # BLD AUTO: 0.8 K/UL (ref 3.6–11)

## 2018-01-16 PROCEDURE — 85027 COMPLETE CBC AUTOMATED: CPT | Performed by: INTERNAL MEDICINE

## 2018-01-16 PROCEDURE — 71045 X-RAY EXAM CHEST 1 VIEW: CPT

## 2018-01-16 PROCEDURE — 82962 GLUCOSE BLOOD TEST: CPT

## 2018-01-16 PROCEDURE — 36430 TRANSFUSION BLD/BLD COMPNT: CPT

## 2018-01-16 PROCEDURE — 86900 BLOOD TYPING SEROLOGIC ABO: CPT | Performed by: INTERNAL MEDICINE

## 2018-01-16 PROCEDURE — 80053 COMPREHEN METABOLIC PANEL: CPT | Performed by: INTERNAL MEDICINE

## 2018-01-16 PROCEDURE — 86923 COMPATIBILITY TEST ELECTRIC: CPT | Performed by: INTERNAL MEDICINE

## 2018-01-16 PROCEDURE — 36569 INSJ PICC 5 YR+ W/O IMAGING: CPT | Performed by: INTERNAL MEDICINE

## 2018-01-16 PROCEDURE — 65270000029 HC RM PRIVATE

## 2018-01-16 PROCEDURE — 36598 INJ W/FLUOR EVAL CV DEVICE: CPT

## 2018-01-16 PROCEDURE — 77030003560 HC NDL HUBR BARD -A

## 2018-01-16 PROCEDURE — 02HV33Z INSERTION OF INFUSION DEVICE INTO SUPERIOR VENA CAVA, PERCUTANEOUS APPROACH: ICD-10-PCS | Performed by: INTERNAL MEDICINE

## 2018-01-16 PROCEDURE — C1751 CATH, INF, PER/CENT/MIDLINE: HCPCS

## 2018-01-16 PROCEDURE — 77030018719 HC DRSG PTCH ANTIMIC J&J -A

## 2018-01-16 PROCEDURE — 74011250636 HC RX REV CODE- 250/636: Performed by: INTERNAL MEDICINE

## 2018-01-16 PROCEDURE — P9040 RBC LEUKOREDUCED IRRADIATED: HCPCS | Performed by: INTERNAL MEDICINE

## 2018-01-16 PROCEDURE — 74011636320 HC RX REV CODE- 636/320: Performed by: RADIOLOGY

## 2018-01-16 PROCEDURE — 77030020365 HC SOL INJ SOD CL 0.9% 50ML

## 2018-01-16 PROCEDURE — 36415 COLL VENOUS BLD VENIPUNCTURE: CPT | Performed by: INTERNAL MEDICINE

## 2018-01-16 PROCEDURE — 76937 US GUIDE VASCULAR ACCESS: CPT

## 2018-01-16 PROCEDURE — 74011250637 HC RX REV CODE- 250/637: Performed by: INTERNAL MEDICINE

## 2018-01-16 PROCEDURE — 77030020847 HC STATLOK BARD -A

## 2018-01-16 RX ORDER — CODEINE PHOSPHATE AND GUAIFENESIN 10; 100 MG/5ML; MG/5ML
5 SOLUTION ORAL
Status: DISCONTINUED | OUTPATIENT
Start: 2018-01-16 | End: 2018-01-22 | Stop reason: HOSPADM

## 2018-01-16 RX ORDER — SODIUM CHLORIDE 0.9 % (FLUSH) 0.9 %
10 SYRINGE (ML) INJECTION EVERY 8 HOURS
Status: DISCONTINUED | OUTPATIENT
Start: 2018-01-16 | End: 2018-01-22 | Stop reason: HOSPADM

## 2018-01-16 RX ORDER — ACETAMINOPHEN 325 MG/1
650 TABLET ORAL
Status: DISCONTINUED | OUTPATIENT
Start: 2018-01-16 | End: 2018-01-22 | Stop reason: HOSPADM

## 2018-01-16 RX ORDER — BACITRACIN 500 UNIT/G
1 PACKET (EA) TOPICAL AS NEEDED
Status: DISCONTINUED | OUTPATIENT
Start: 2018-01-16 | End: 2018-01-22 | Stop reason: HOSPADM

## 2018-01-16 RX ORDER — SODIUM CHLORIDE 0.9 % (FLUSH) 0.9 %
20 SYRINGE (ML) INJECTION AS NEEDED
Status: DISCONTINUED | OUTPATIENT
Start: 2018-01-16 | End: 2018-01-22 | Stop reason: HOSPADM

## 2018-01-16 RX ORDER — FUROSEMIDE 10 MG/ML
40 INJECTION INTRAMUSCULAR; INTRAVENOUS ONCE
Status: ACTIVE | OUTPATIENT
Start: 2018-01-16 | End: 2018-01-16

## 2018-01-16 RX ORDER — SODIUM CHLORIDE 0.9 % (FLUSH) 0.9 %
10 SYRINGE (ML) INJECTION AS NEEDED
Status: DISCONTINUED | OUTPATIENT
Start: 2018-01-16 | End: 2018-01-22 | Stop reason: HOSPADM

## 2018-01-16 RX ORDER — SODIUM CHLORIDE 0.9 % (FLUSH) 0.9 %
10 SYRINGE (ML) INJECTION EVERY 24 HOURS
Status: DISCONTINUED | OUTPATIENT
Start: 2018-01-16 | End: 2018-01-22 | Stop reason: HOSPADM

## 2018-01-16 RX ADMIN — Medication 10 ML: at 18:16

## 2018-01-16 RX ADMIN — IOPAMIDOL 20 ML: 612 INJECTION, SOLUTION INTRAVENOUS at 16:29

## 2018-01-16 RX ADMIN — Medication 10 ML: at 22:23

## 2018-01-16 RX ADMIN — ACETAMINOPHEN 650 MG: 325 TABLET, FILM COATED ORAL at 15:54

## 2018-01-16 RX ADMIN — GUAIFENESIN AND CODEINE PHOSPHATE 5 ML: 100; 10 SOLUTION ORAL at 01:34

## 2018-01-16 RX ADMIN — ENOXAPARIN SODIUM 40 MG: 40 INJECTION SUBCUTANEOUS at 18:16

## 2018-01-16 RX ADMIN — LEVOFLOXACIN 750 MG: 5 INJECTION, SOLUTION INTRAVENOUS at 18:16

## 2018-01-16 NOTE — PROGRESS NOTES
01/16/18 1546   Vital Signs   Temp (!) 102.8 °F (39.3 °C)   Temp Source Oral   Pulse (Heart Rate) 95   Heart Rate Source Monitor   Resp Rate 16   O2 Sat (%) 93 %   Level of Consciousness Alert   /60   MAP (Calculated) 78   BP 1 Method Automatic   BP 1 Location Right arm   BP Patient Position At rest   MEWS Score 3   Tylenol given, going to port o gram soon

## 2018-01-16 NOTE — DIABETES MGMT
DTC Progress Note    Recommendations/ Comments:Chart reviewed due to hypoglycemia. Noted po intake 0% . Also note D5 running. Pt currently has no diabetes medications ordered. DTC to continue to follow. Chart reviewed on Theodor . Patient is a 80 y.o. female with known diabetes on Metformin 1000 mg BID at home. A1c:   No results found for: HBA1C, HGBE8, LFE3GCBQ    Recent Glucose Results: Lab Results   Component Value Date/Time    GLU 78 01/16/2018 08:37 AM    GLU 60 (L) 01/15/2018 07:28 PM    GLUCPOC 78 01/16/2018 11:27 AM    GLUCPOC 88 01/16/2018 07:03 AM    GLUCPOC 70 01/16/2018 06:45 AM        Lab Results   Component Value Date/Time    Creatinine 0.92 01/16/2018 08:37 AM     CrCl cannot be calculated (Unknown ideal weight. ). Active Orders   Diet    DIET DIABETIC CONSISTENT CARB Regular        PO intake: Patient Vitals for the past 72 hrs:   % Diet Eaten   01/16/18 0928 0 %       Current hospital DM medication: none    Will continue to follow as needed.     Thank you  Maricarmen Gil, ALE, CDE

## 2018-01-16 NOTE — PROGRESS NOTES
Hematology-Oncology Progress Note    Ivette Milligan  1935  [id number]  1/16/2018    Follow-up for:  ams     [x]        Chart notes since last visit reviewed   [x]        Medications reviewed for allergies and interactions       Case discussed with the following:         []                            [x]        Nursing Staff                                                                         []        Pathologist                                                                        []        FAMILY      Subjective:     Spoke with patient who complains of: much less confused today, but very weak, sob with exertion, Port not working    Objective:   Patient Vitals for the past 24 hrs:   BP Temp Pulse Resp SpO2 Weight   01/16/18 0928 111/55 99.6 °F (37.6 °C) 80 16 94 % -   01/16/18 0435 - - - - - 71.1 kg (156 lb 12.8 oz)   01/16/18 0204 103/58 98.5 °F (36.9 °C) 84 16 97 % -   01/15/18 2229 99/53 98.8 °F (37.1 °C) 81 16 97 % -   01/15/18 1638 131/70 99.8 °F (37.7 °C) 87 16 98 % -       REVIEW OF SYSTEMS:    Constitutional: negative fever, negative chills, negative weight loss  Eyes:   negative visual changes  ENT:   negative sore throat, tongue or lip swelling  Respiratory:  negative cough, negative dyspnea  Cards:  negative for chest pain, palpitations, lower extremity edema  GI:   negative for nausea, vomiting, diarrhea, and abdominal pain  Neuro:  negative for headaches, dizziness, vertigo  [x]                        Full ROS o/w normal/non contributor    Constitutional:  Patient looks  []        Sick  [x]        Frail  []        Better                                                 []        Depressed    HEENT:  [x]   NC                         []   AT               []    ALOPECIA           Eyes: [x]   Normal               []    Icteric  Oropharynx: [x]    Normal                  []  Thrush               []   Dry  Mucositis: [x]    None                 Grade: []        I  []        II  [] III  []        IV  Neck:   [x]   Supple                  []  Rigid               JVD:    [x]   ABSENT       []   PRESENT  Lymphadenopathy:   [x]   None Noted            []   PRESENT    Chest:  [x]   Clear               []    Rhonchi                      Dec'd @     []  Right Base           []   Left Base    CV:             [x]   Regular              []  Irregular               []   Tachy                []   Murmur  Abdominal:   [x]    Soft              []   NON-tender               []   Tender      BS:    []   ABSENT                   [x]   PRESENT  Liver:     [x]  NON-palp                  []   EDGE- palp  Spleen: [x]   NON-palp                   []  EDGE - palp  Mass:   [x]   ABSENT                          []  PRESENT  Extr:    [x]  Lymphedema             []   Cyanosis      []  Clubbing  Edema:     [x]   NONE       []   PRESENT  Skin:  Intact []           Purpura []        Rash: []   ABSENT       []  PRESENT  Neuro:  [x]        Normal  []        Confused      Available labs reviewed:  Labs:    Recent Results (from the past 24 hour(s))   METABOLIC PANEL, COMPREHENSIVE    Collection Time: 01/15/18  7:28 PM   Result Value Ref Range    Sodium 133 (L) 136 - 145 mmol/L    Potassium 4.3 3.5 - 5.1 mmol/L    Chloride 100 97 - 108 mmol/L    CO2 26 21 - 32 mmol/L    Anion gap 7 5 - 15 mmol/L    Glucose 60 (L) 65 - 100 mg/dL    BUN 15 6 - 20 MG/DL    Creatinine 0.88 0.55 - 1.02 MG/DL    BUN/Creatinine ratio 17 12 - 20      GFR est AA >60 >60 ml/min/1.73m2    GFR est non-AA >60 >60 ml/min/1.73m2    Calcium 7.8 (L) 8.5 - 10.1 MG/DL    Bilirubin, total 0.5 0.2 - 1.0 MG/DL    ALT (SGPT) 17 12 - 78 U/L    AST (SGOT) 22 15 - 37 U/L    Alk.  phosphatase 62 45 - 117 U/L    Protein, total 5.9 (L) 6.4 - 8.2 g/dL    Albumin 2.4 (L) 3.5 - 5.0 g/dL    Globulin 3.5 2.0 - 4.0 g/dL    A-G Ratio 0.7 (L) 1.1 - 2.2     CBC WITH AUTOMATED DIFF    Collection Time: 01/15/18  7:28 PM   Result Value Ref Range    WBC 1.1 (L) 3.6 - 11.0 K/uL    RBC 2.43 (L) 3.80 - 5.20 M/uL    HGB 7.1 (L) 11.5 - 16.0 g/dL    HCT 21.5 (L) 35.0 - 47.0 %    MCV 88.5 80.0 - 99.0 FL    MCH 29.2 26.0 - 34.0 PG    MCHC 33.0 30.0 - 36.5 g/dL    RDW 18.8 (H) 11.5 - 14.5 %    PLATELET 259 (L) 040 - 400 K/uL    NEUTROPHILS 57 32 - 75 %    LYMPHOCYTES 18 12 - 49 %    MONOCYTES 7 5 - 13 %    EOSINOPHILS 18 (H) 0 - 7 %    BASOPHILS 0 0 - 1 %    ABS. NEUTROPHILS 0.6 (L) 1.8 - 8.0 K/UL    ABS. LYMPHOCYTES 0.2 (L) 0.8 - 3.5 K/UL    ABS. MONOCYTES 0.1 0.0 - 1.0 K/UL    ABS. EOSINOPHILS 0.2 0.0 - 0.4 K/UL    ABS.  BASOPHILS 0.0 0.0 - 0.1 K/UL    DF SMEAR SCANNED      RBC COMMENTS ANISOCYTOSIS  1+        RBC COMMENTS OVALOCYTES  1+       RETICULOCYTE COUNT    Collection Time: 01/15/18  7:28 PM   Result Value Ref Range    Reticulocyte count 1.9 0.7 - 2.1 %   DIRECT & INDIRECT ALIBN    Collection Time: 01/15/18  7:28 PM   Result Value Ref Range    MARK Poly NEG     Antibody screen NEG    HAPTOGLOBIN    Collection Time: 01/15/18  7:28 PM   Result Value Ref Range    Haptoglobin 344 (H) 30 - 200 mg/dL   LD    Collection Time: 01/15/18  7:28 PM   Result Value Ref Range     81 - 246 U/L   VITAMIN B12    Collection Time: 01/15/18  7:28 PM   Result Value Ref Range    Vitamin B12 >2000 (H) 211 - 911 pg/mL   FOLATE    Collection Time: 01/15/18  7:28 PM   Result Value Ref Range    Folate 28.4 (H) 5.0 - 21.0 ng/mL   CULTURE, BLOOD    Collection Time: 01/15/18  9:23 PM   Result Value Ref Range    Special Requests: NO SPECIAL REQUESTS      Culture result: NO GROWTH AFTER 10 HOURS     GLUCOSE, POC    Collection Time: 01/15/18 11:47 PM   Result Value Ref Range    Glucose (POC) 118 (H) 65 - 100 mg/dL    Performed by [employer], POC    Collection Time: 01/16/18  6:45 AM   Result Value Ref Range    Glucose (POC) 70 65 - 100 mg/dL    Performed by Charla Sorensen POC    Collection Time: 01/16/18  7:03 AM   Result Value Ref Range    Glucose (POC) 88 65 - 100 mg/dL    Performed by [facility]    CBC W/O DIFF    Collection Time: 01/16/18  8:37 AM   Result Value Ref Range    WBC 0.8 (LL) 3.6 - 11.0 K/uL    RBC 2.42 (L) 3.80 - 5.20 M/uL    HGB 7.3 (L) 11.5 - 16.0 g/dL    HCT 21.9 (L) 35.0 - 47.0 %    MCV 90.5 80.0 - 99.0 FL    MCH 30.2 26.0 - 34.0 PG    MCHC 33.3 30.0 - 36.5 g/dL    RDW 18.7 (H) 11.5 - 14.5 %    PLATELET 266 (L) 102 - 284 K/uL   METABOLIC PANEL, COMPREHENSIVE    Collection Time: 01/16/18  8:37 AM   Result Value Ref Range    Sodium 133 (L) 136 - 145 mmol/L    Potassium 4.1 3.5 - 5.1 mmol/L    Chloride 100 97 - 108 mmol/L    CO2 24 21 - 32 mmol/L    Anion gap 9 5 - 15 mmol/L    Glucose 78 65 - 100 mg/dL    BUN 14 6 - 20 MG/DL    Creatinine 0.92 0.55 - 1.02 MG/DL    BUN/Creatinine ratio 15 12 - 20      GFR est AA >60 >60 ml/min/1.73m2    GFR est non-AA 58 (L) >60 ml/min/1.73m2    Calcium 8.2 (L) 8.5 - 10.1 MG/DL    Bilirubin, total 0.3 0.2 - 1.0 MG/DL    ALT (SGPT) 18 12 - 78 U/L    AST (SGOT) 24 15 - 37 U/L    Alk. phosphatase 56 45 - 117 U/L    Protein, total 6.2 (L) 6.4 - 8.2 g/dL    Albumin 2.2 (L) 3.5 - 5.0 g/dL    Globulin 4.0 2.0 - 4.0 g/dL    A-G Ratio 0.6 (L) 1.1 - 2.2     TYPE & SCREEN    Collection Time: 01/16/18  8:37 AM   Result Value Ref Range    Crossmatch Expiration 01/19/2018     ABO/Rh(D) A POSITIVE     Antibody screen NEG        Available Xrays reviewed:    Chemotherapy monitored and toxicities assessed:    Assessment and Plan   1. Ams. .. Much improved, ct head negative for bleed. ..  2. Pancytopenia. .. Bone marrow bx in December showed hypercellular marrow, normal c-somes and no sign of lymphoma. .. Cause for the worsining cytopenias unclear, suspect early mds +/- other causes. Will transfuse today,  3. Hx of AIL. .. No evidence of recurrence on December ct's . .. Her original presentation was with severe pruritis/fever  4. Port malfunction. .. Will ask IR to evaluate, will need picc if it doesn't work, order left with nursing  5.  Cora Gotti MD

## 2018-01-16 NOTE — PROGRESS NOTES
Spoke with jovan in IR and was informed port was fractured. Informed PICC team and primary nurse Landon Nails.

## 2018-01-16 NOTE — PROCEDURES
PICC Placement Note. Order received. PICC procedure, risks, benefits and complications reviewed with the patient's son. Questions answered to satisfaction. Will Lane patient's son signed informed consent for bedside placement of PICC line. PRE-PROCEDURE VERIFICATION  Correct Procedure: yes  Correct Site:  yes  Temperature: Temp: (!) 102.8 °F (39.3 °C), Temperature Source: Temp Source: Oral  Recent Labs      01/16/18   0837   BUN  14   CREA  0.92   PLT  107*   WBC  0.8*     Allergies: Cephalexin; Lisinopril; Celebrex [celecoxib]; and Ibuprofen  Education materials, including PICC Booklet, for PICC Care given to patient: yes. See Patient Education activity for further details. PROCEDURE DETAIL  A double lumen PICC line was started for vascular access, desire for reliable access and nurse/physician request. The following documentation is in addition to the PICC properties in the lines/airways flowsheet : The vein was accessed with a 20g IV catheter using ultrasound guidance and a guidewire was easily thread. Modified Seldinger Technique was used to thread the PICC and the tip direction was determined with a PICC tip  device  Lot #: [id number]  Was xylocaine 1% used intradermally:  yes  Catheter Length: 38 (cm) circumference 27.5cm  Vein Selection for PICC:right basilic  Central Line Bundle followed yes  Complication Related to Insertion: none  The placement was verified by ECG technology: Waveform placed on the patient's chart to document that the tip is in thesuperior vena cava. Report to Thayer Riedel is okay to use.

## 2018-01-16 NOTE — PROGRESS NOTES
Problem: Falls - Risk of  Goal: *Absence of Falls  Document Kristian Fall Risk and appropriate interventions in the flowsheet.    Outcome: Progressing Towards Goal  Fall Risk Interventions:  Mobility Interventions: Bed/chair exit alarm, Patient to call before getting OOB         Medication Interventions: Patient to call before getting OOB, Evaluate medications/consider consulting pharmacy    Elimination Interventions: Call light in reach, Patient to call for help with toileting needs    History of Falls Interventions: Door open when patient unattended, Investigate reason for fall, Evaluate medications/consider consulting pharmacy

## 2018-01-16 NOTE — PROGRESS NOTES
Problem: Falls - Risk of  Goal: *Absence of Falls  Document Kristian Fall Risk and appropriate interventions in the flowsheet.   Outcome: Progressing Towards Goal  Fall Risk Interventions:

## 2018-01-16 NOTE — PROGRESS NOTES
Port needle was replaced on night shift when it failed to give blood return, and swelling noticed on attempts to flush. New needle seems to be in center of port, but patient there is swelling with attempts to flush. Dr. Marquita Cameron notified. Port study ordered.

## 2018-01-16 NOTE — PROGRESS NOTES
Bedside shift change report given to Stefanie Argueta RN (oncoming nurse) by Princess Reagan RN (offgoing nurse). Report included the following information SBAR, Kardex, MAR and Recent Results.

## 2018-01-16 NOTE — PROGRESS NOTES
Physical Therapy Screening:    [facility] triggered for [facility] therapy based on results obtained during the nursing admission assessment. The patients chart was reviewed and the patient is appropriate for a skilled therapy evaluation if there is a decline in functional mobility from baseline. Please order a consult for physical therapy if you are in agreement and would like an evaluation to be completed. Thank you.     Lorenda Riedel, PT

## 2018-01-17 LAB
ALBUMIN SERPL-MCNC: 2.2 G/DL (ref 3.5–5)
ALBUMIN/GLOB SERPL: 0.5 {RATIO} (ref 1.1–2.2)
ALP SERPL-CCNC: 61 U/L (ref 45–117)
ALT SERPL-CCNC: 17 U/L (ref 12–78)
ANION GAP SERPL CALC-SCNC: 8 MMOL/L (ref 5–15)
AST SERPL-CCNC: 25 U/L (ref 15–37)
BACTERIA SPEC CULT: NORMAL
BILIRUB SERPL-MCNC: 1.5 MG/DL (ref 0.2–1)
BUN SERPL-MCNC: 10 MG/DL (ref 6–20)
BUN/CREAT SERPL: 12 (ref 12–20)
CALCIUM SERPL-MCNC: 8.2 MG/DL (ref 8.5–10.1)
CC UR VC: NORMAL
CHLORIDE SERPL-SCNC: 99 MMOL/L (ref 97–108)
CO2 SERPL-SCNC: 24 MMOL/L (ref 21–32)
CREAT SERPL-MCNC: 0.82 MG/DL (ref 0.55–1.02)
ERYTHROCYTE [DISTWIDTH] IN BLOOD BY AUTOMATED COUNT: 17.8 % (ref 11.5–14.5)
GLOBULIN SER CALC-MCNC: 4.2 G/DL (ref 2–4)
GLUCOSE BLD STRIP.AUTO-MCNC: 125 MG/DL (ref 65–100)
GLUCOSE BLD STRIP.AUTO-MCNC: 173 MG/DL (ref 65–100)
GLUCOSE BLD STRIP.AUTO-MCNC: 190 MG/DL (ref 65–100)
GLUCOSE BLD STRIP.AUTO-MCNC: 233 MG/DL (ref 65–100)
GLUCOSE SERPL-MCNC: 107 MG/DL (ref 65–100)
HCT VFR BLD AUTO: 22.8 % (ref 35–47)
HGB BLD-MCNC: 7.8 G/DL (ref 11.5–16)
MCH RBC QN AUTO: 29.5 PG (ref 26–34)
MCHC RBC AUTO-ENTMCNC: 34.2 G/DL (ref 30–36.5)
MCV RBC AUTO: 86.4 FL (ref 80–99)
PLATELET # BLD AUTO: 117 K/UL (ref 150–400)
POTASSIUM SERPL-SCNC: 4.1 MMOL/L (ref 3.5–5.1)
PROT SERPL-MCNC: 6.4 G/DL (ref 6.4–8.2)
RBC # BLD AUTO: 2.64 M/UL (ref 3.8–5.2)
SERVICE CMNT-IMP: ABNORMAL
SERVICE CMNT-IMP: NORMAL
SODIUM SERPL-SCNC: 131 MMOL/L (ref 136–145)
WBC # BLD AUTO: 1 K/UL (ref 3.6–11)

## 2018-01-17 PROCEDURE — 74011250636 HC RX REV CODE- 250/636: Performed by: INTERNAL MEDICINE

## 2018-01-17 PROCEDURE — 36415 COLL VENOUS BLD VENIPUNCTURE: CPT | Performed by: INTERNAL MEDICINE

## 2018-01-17 PROCEDURE — 80053 COMPREHEN METABOLIC PANEL: CPT | Performed by: INTERNAL MEDICINE

## 2018-01-17 PROCEDURE — 30243N1 TRANSFUSION OF NONAUTOLOGOUS RED BLOOD CELLS INTO CENTRAL VEIN, PERCUTANEOUS APPROACH: ICD-10-PCS | Performed by: INTERNAL MEDICINE

## 2018-01-17 PROCEDURE — 74011000258 HC RX REV CODE- 258: Performed by: INTERNAL MEDICINE

## 2018-01-17 PROCEDURE — 74011250637 HC RX REV CODE- 250/637: Performed by: INTERNAL MEDICINE

## 2018-01-17 PROCEDURE — 36430 TRANSFUSION BLD/BLD COMPNT: CPT

## 2018-01-17 PROCEDURE — 85027 COMPLETE CBC AUTOMATED: CPT | Performed by: INTERNAL MEDICINE

## 2018-01-17 PROCEDURE — P9040 RBC LEUKOREDUCED IRRADIATED: HCPCS | Performed by: INTERNAL MEDICINE

## 2018-01-17 PROCEDURE — 82962 GLUCOSE BLOOD TEST: CPT

## 2018-01-17 PROCEDURE — 65270000029 HC RM PRIVATE

## 2018-01-17 RX ORDER — SODIUM CHLORIDE 9 MG/ML
250 INJECTION, SOLUTION INTRAVENOUS AS NEEDED
Status: DISCONTINUED | OUTPATIENT
Start: 2018-01-17 | End: 2018-01-22 | Stop reason: HOSPADM

## 2018-01-17 RX ADMIN — Medication 10 ML: at 07:00

## 2018-01-17 RX ADMIN — Medication 10 ML: at 22:15

## 2018-01-17 RX ADMIN — LEVOFLOXACIN 750 MG: 5 INJECTION, SOLUTION INTRAVENOUS at 17:36

## 2018-01-17 RX ADMIN — ACETAMINOPHEN 650 MG: 325 TABLET, FILM COATED ORAL at 12:41

## 2018-01-17 RX ADMIN — DEXTROSE MONOHYDRATE AND SODIUM CHLORIDE 75 ML/HR: 5; .9 INJECTION, SOLUTION INTRAVENOUS at 09:53

## 2018-01-17 RX ADMIN — ENOXAPARIN SODIUM 40 MG: 40 INJECTION SUBCUTANEOUS at 17:36

## 2018-01-17 RX ADMIN — Medication 10 ML: at 17:37

## 2018-01-17 NOTE — PROGRESS NOTES
-Hematology / Oncology ([facility]) -  -Primary Oncologist- carissa  -YOSEPH- angioimmunoblastic lymphoma    -S-  Feeling better    -O-    Patient Vitals for the past 24 hrs:   Temp Pulse Resp BP SpO2   01/18/18 0838 99.1 °F (37.3 °C) 78 15 121/61 96 %   01/18/18 0430 99.6 °F (37.6 °C) 80 16 129/68 93 %   01/17/18 2005 98.3 °F (36.8 °C) 78 16 131/79 100 %   01/17/18 1441 98.1 °F (36.7 °C) 72 14 114/69 96 %   01/17/18 1425 98 °F (36.7 °C) 88 18 115/69 96 %   01/17/18 1312 100 °F (37.8 °C) 84 16 129/74 96 %   01/17/18 1207 100.1 °F (37.8 °C) 85 14 118/69 97 %   01/17/18 1149 99 °F (37.2 °C) 81 15 128/67 99 %   01/17/18 0921 98.9 °F (37.2 °C) 91 14 138/74 99 %     01/18 0701 - 01/18 1900  In: 0119 [I.V.:1789]  Out: -   Gen: nad  Chest: bilateral breath sounds present  Cardiac: rrr  Abd: s/nt  Neuro: oriented to person and place, difficulty with time    -Labs-    Recent Labs      01/18/18   0321  01/17/18   0701  01/16/18   0837  01/15/18   1928   WBC  0.9*  1.0*  0.8*  1.1*   HGB  10.3*  7.8*  7.3*  7.1*   PLT  102*  117*  107*  120*   ANEU  0.5*   --    --   0.6*   NA  132*  131*  133*  133*   K  3.7  4.1  4.1  4.3   GLU  185*  107*  78  60*   BUN  10  10  14  15   CREA  0.76  0.82  0.92  0.88   ALT  17  17  18  17   SGOT  15  25  24  22   TBILI  0.6  1.5*  0.3  0.5   AP  61  61  56  62   CA  8.2*  8.2*  8.2*  7.8*       -Imaging-   -- port study- hole in port tubing    -Assessment + Plan-     *) Ams. .. Much improved, ct head negative for bleed. .. *)Pancytopenia. .. Bone marrow bx in December showed hypercellular marrow, normal c-somes and no sign of lymphoma. .. Cause for the worsining cytopenias unclear, suspect early mds +/- other causes. 1/16 prbc x2, hgb 7.3-> 7.8. Plts and wbc stable. PRBC x 1 1/17  *) Hx of AIL. .. No evidence of recurrence on December ct's . .. Her original presentation was with severe pruritis/fever  *) Port malfunction. ..  Picc placed 1/16, hole in tubing noted on port- will request removal/ likely to need replacement in future, given current abx and low grade fevers hold on replacing for now  *) Elevated bili- mild, isolated, transient/ resolved  *) ID- bcx 1/15: ng 3d, ucx 1/15 neg.   Continue levofloxacin  *) dvt proph- on enoxaparin 40

## 2018-01-17 NOTE — PROGRESS NOTES
NUTRITION COMPLETE ASSESSMENT    RECOMMENDATIONS:   1. Continue current diet  2. RD to add Homemade Shake 2x daily  3. BG control   4. Consider appetite stimulant     Interventions/Plan:   Food/Nutrient Delivery:    Modified beverage (Homemade Vanilla Milkshake)        Nutrition Education:     Coordination of Care:    Nutrition Counseling:        Assessment:   Reason for Assessment:   [] Provider Consult  [x]BPA/MST Referral - poor PO, wt loss  []LOS   []Reassessment   []NPO/Clear Liquid   []At Nutrition Risk  []Other    Diet: Consistent carb  Supplements: none PTA  Nutritionally Significant Medications: [x] Reviewed & Includes: D5%,1/2NS IVF @ 75mL/hr; Lovenox, Levaquin    Meal Intake: Patient Vitals for the past 100 hrs:   % Diet Eaten   01/16/18 0928 0 %       Subjective:  \"I am just not hungry, I don't feel like eating\"     Objective:  Pt admitted with AMS. Medical history of angioimmunoblastic lymphoma, currently off therapy. Visited with RD. Lunch in front of her in room but when asked if she would eat, she said, \"probably not, I'm not hungry\". Pt is encouraged to take few bites & she is open to trial of homemade Vanilla Milkshake. She has had some elevated BG with known DM but without any SSI or DM medication ordered. She also has D5%, 1/2NS IVF runnning. Intake has been poor. Wt loss PTA of ~15-20# (11%) over the last 3 months noted places pt at risk for malnutrition. Estimated Nutrition Needs:   Kcals/day: 1520 Kcals/day  Protein: 85 g (1.2 g/kg of current wt)  Fluid: 1520 ml (1 mL/kcal of est needs)     Based On: [place] Jeor (AF 1.2)  Weight Used: Actual wt (70.9 kg)     Pt expected to meet estimated nutrient needs:    []   Yes     []  No       [x] Unable to predict at this time      Nutrition Diagnosis:   1. Inadequate energy intake related to poor appetite as evidenced by 15# wt loss from [employer], reports of declining appetite, AMS     2.    Meets Criteria for Severe Acute Malnutrition as evidenced No by:  [] Moderate muscle wasting, loss of subcutaneous fat  [x] Nutritional intake of <50% of recommended intake for >5 days  [x] Weight loss of >1-2% in 1 week, >5% in 1 month, >7.5% in 3 months, or >10% in 6 months  [] Moderate-severe edema       Goals:      Intake of 50% of ONS offered daily within 1 week   Stable BG control during admission       Monitoring & Evaluation:    - Total energy intake   - Glucose profile   -      Previous Nutrition Goals Met:  N/A    Previous Recommendations:      N/A    Education & Discharge Needs:   [x] None Identified   [] Identified and addressed    [x] Participated in care plan, discharge planning, and/or interdisciplinary rounds        Cultural, Shinto and ethnic food preferences identified:   None    Skin Integrity: [x]Intact  []Other  Edema: [x]None []Other  Last BM: 1/15  ABD: semi-soft, active  Food Allergies: [x]None []Other    Anthropometrics:    Weight Loss Metrics 1/17/2018 12/6/2017 11/7/2017 11/25/2014 10/7/2014 9/15/2014 8/18/2014   Today's Wt 156 lb 4.8 oz 160 lb 4.4 oz 169 lb 169 lb 173 lb 173 lb 171 lb   BMI 21.8 kg/m2 22.35 kg/m2 23.57 kg/m2 23.58 kg/m2 24.14 kg/m2 24.14 kg/m2 23.86 kg/m2      Last 3 Recorded Weights in this Encounter    01/16/18 0435 01/17/18 0430   Weight: 71.1 kg (156 lb 12.8 oz) 70.9 kg (156 lb 4.8 oz)      Weight Source: Standing scale (comment)  Height: 5' 11\" (180.3 cm), Height Source: Stated by patient  Body mass index is 21.8 kg/(m^2).   IBW : 63.5 kg (140 lb),    Usual Body Weight: 79.4 kg (175 lb),      Labs:  Lab Results   Component Value Date/Time    Sodium 131 01/17/2018 07:01 AM    Potassium 4.1 01/17/2018 07:01 AM    Chloride 99 01/17/2018 07:01 AM    CO2 24 01/17/2018 07:01 AM    Glucose 107 01/17/2018 07:01 AM    BUN 10 01/17/2018 07:01 AM    Creatinine 0.82 01/17/2018 07:01 AM    Calcium 8.2 01/17/2018 07:01 AM    Magnesium 2.0 11/09/2017 05:14 AM    Albumin 2.2 01/17/2018 07:01 AM     No results found for: HBA1C, HGBE8, [address], [id number]  Lab Results   Component Value Date/Time    Glucose 107 01/17/2018 07:01 AM    Glucose (POC) 173 01/17/2018 11:17 AM      Lab Results   Component Value Date/Time    ALT (SGPT) 17 01/17/2018 07:01 AM    AST (SGOT) 25 01/17/2018 07:01 AM    Alk.  phosphatase 61 01/17/2018 07:01 AM    Bilirubin, total 1.5 01/17/2018 07:01 AM        Obey Medina RD, MS, CDE

## 2018-01-18 LAB
ABO + RH BLD: NORMAL
ALBUMIN SERPL-MCNC: 2.3 G/DL (ref 3.5–5)
ALBUMIN/GLOB SERPL: 0.6 {RATIO} (ref 1.1–2.2)
ALP SERPL-CCNC: 61 U/L (ref 45–117)
ALT SERPL-CCNC: 17 U/L (ref 12–78)
ANION GAP SERPL CALC-SCNC: 8 MMOL/L (ref 5–15)
AST SERPL-CCNC: 15 U/L (ref 15–37)
BASOPHILS # BLD: 0 K/UL (ref 0–0.1)
BASOPHILS NFR BLD: 0 % (ref 0–1)
BILIRUB SERPL-MCNC: 0.6 MG/DL (ref 0.2–1)
BLD PROD TYP BPU: NORMAL
BLOOD GROUP ANTIBODIES SERPL: NORMAL
BPU ID: NORMAL
BUN SERPL-MCNC: 10 MG/DL (ref 6–20)
BUN/CREAT SERPL: 13 (ref 12–20)
CALCIUM SERPL-MCNC: 8.2 MG/DL (ref 8.5–10.1)
CHLORIDE SERPL-SCNC: 99 MMOL/L (ref 97–108)
CO2 SERPL-SCNC: 25 MMOL/L (ref 21–32)
CREAT SERPL-MCNC: 0.76 MG/DL (ref 0.55–1.02)
CROSSMATCH RESULT,%XM: NORMAL
DIFFERENTIAL METHOD BLD: ABNORMAL
EOSINOPHIL # BLD: 0.1 K/UL (ref 0–0.4)
EOSINOPHIL NFR BLD: 16 % (ref 0–7)
ERYTHROCYTE [DISTWIDTH] IN BLOOD BY AUTOMATED COUNT: 17.6 % (ref 11.5–14.5)
GLOBULIN SER CALC-MCNC: 3.9 G/DL (ref 2–4)
GLUCOSE BLD STRIP.AUTO-MCNC: 183 MG/DL (ref 65–100)
GLUCOSE BLD STRIP.AUTO-MCNC: 208 MG/DL (ref 65–100)
GLUCOSE BLD STRIP.AUTO-MCNC: 219 MG/DL (ref 65–100)
GLUCOSE BLD STRIP.AUTO-MCNC: 231 MG/DL (ref 65–100)
GLUCOSE SERPL-MCNC: 185 MG/DL (ref 65–100)
HCT VFR BLD AUTO: 29.1 % (ref 35–47)
HGB BLD-MCNC: 10.3 G/DL (ref 11.5–16)
LYMPHOCYTES # BLD: 0.1 K/UL (ref 0.8–3.5)
LYMPHOCYTES NFR BLD: 12 % (ref 12–49)
MCH RBC QN AUTO: 30.1 PG (ref 26–34)
MCHC RBC AUTO-ENTMCNC: 35.4 G/DL (ref 30–36.5)
MCV RBC AUTO: 85.1 FL (ref 80–99)
MONOCYTES # BLD: 0.1 K/UL (ref 0–1)
MONOCYTES NFR BLD: 12 % (ref 5–13)
MYELOCYTES NFR BLD MANUAL: 2 %
NEUTS BAND NFR BLD MANUAL: 6 % (ref 0–6)
NEUTS SEG # BLD: 0.5 K/UL (ref 1.8–8)
NEUTS SEG NFR BLD: 52 % (ref 32–75)
PLATELET # BLD AUTO: 102 K/UL (ref 150–400)
POTASSIUM SERPL-SCNC: 3.7 MMOL/L (ref 3.5–5.1)
PROT SERPL-MCNC: 6.2 G/DL (ref 6.4–8.2)
RBC # BLD AUTO: 3.42 M/UL (ref 3.8–5.2)
RBC MORPH BLD: ABNORMAL
SERVICE CMNT-IMP: ABNORMAL
SODIUM SERPL-SCNC: 132 MMOL/L (ref 136–145)
SPECIMEN EXP DATE BLD: NORMAL
STATUS OF UNIT,%ST: NORMAL
TOTAL CELLS COUNTED SPEC: 50
UNIT DIVISION, %UDIV: 0
WBC # BLD AUTO: 0.9 K/UL (ref 3.6–11)

## 2018-01-18 PROCEDURE — 74011250637 HC RX REV CODE- 250/637: Performed by: INTERNAL MEDICINE

## 2018-01-18 PROCEDURE — 82962 GLUCOSE BLOOD TEST: CPT

## 2018-01-18 PROCEDURE — 85025 COMPLETE CBC W/AUTO DIFF WBC: CPT | Performed by: INTERNAL MEDICINE

## 2018-01-18 PROCEDURE — 74011000258 HC RX REV CODE- 258: Performed by: INTERNAL MEDICINE

## 2018-01-18 PROCEDURE — 74011250636 HC RX REV CODE- 250/636: Performed by: INTERNAL MEDICINE

## 2018-01-18 PROCEDURE — 36415 COLL VENOUS BLD VENIPUNCTURE: CPT | Performed by: INTERNAL MEDICINE

## 2018-01-18 PROCEDURE — 80053 COMPREHEN METABOLIC PANEL: CPT | Performed by: INTERNAL MEDICINE

## 2018-01-18 PROCEDURE — 65270000029 HC RM PRIVATE

## 2018-01-18 PROCEDURE — 36592 COLLECT BLOOD FROM PICC: CPT

## 2018-01-18 RX ADMIN — GUAIFENESIN AND CODEINE PHOSPHATE 5 ML: 100; 10 SOLUTION ORAL at 20:55

## 2018-01-18 RX ADMIN — LEVOFLOXACIN 750 MG: 5 INJECTION, SOLUTION INTRAVENOUS at 16:40

## 2018-01-18 RX ADMIN — Medication 10 ML: at 20:55

## 2018-01-18 RX ADMIN — Medication 10 ML: at 07:08

## 2018-01-18 RX ADMIN — ENOXAPARIN SODIUM 40 MG: 40 INJECTION SUBCUTANEOUS at 16:40

## 2018-01-18 RX ADMIN — DEXTROSE MONOHYDRATE AND SODIUM CHLORIDE 75 ML/HR: 5; .9 INJECTION, SOLUTION INTRAVENOUS at 03:19

## 2018-01-18 RX ADMIN — DEXTROSE MONOHYDRATE AND SODIUM CHLORIDE 75 ML/HR: 5; .9 INJECTION, SOLUTION INTRAVENOUS at 16:28

## 2018-01-18 RX ADMIN — Medication 10 ML: at 14:21

## 2018-01-18 RX ADMIN — CASTOR OIL AND BALSAM, PERU: 788; 87 OINTMENT TOPICAL at 16:41

## 2018-01-18 NOTE — CDMP QUERY
Dear Dr. Tim Shannon,    Please clarify if this patient is (was) being treated/managed for:     => Unspecified severe protein-calorie malnutrition in the setting of lymphoma and poor PO intake requiring Registered dietician consult, lab monitoring and PO supplements  => Other explanation of clinical findings  => Unable to determine (no explanation for clinical findings)    The medical record reflects the following clinical findings, treatment, and risk factors. Risk Factors:  adm with lymphoma/chemo  Clinical Indicators: protein 6.2, albumin 2.3, per RD\"Meets Criteria for Severe Acute Malnutrition as evidenced by: Moderate muscle wasting, loss of subcutaneous fat   Nutritional intake of <50% of recommended intake for >5 days   Weight loss of >1-2% in 1 week, >5% in 1 month, >7.5% in 3 months, or >10% in 6 months\"  Treatment: RD consult, lab monitoring, shakes with diabetic diet    Please clarify and document your clinical opinion in the progress notes and discharge summary including the definitive and/or presumptive diagnosis, (suspected or probable), related to the above clinical findings. Please include clinical findings supporting your diagnosis.     Thank You  Ariela Bagley,MSN,BSN,RN,[facility]  [phone]

## 2018-01-18 NOTE — PROGRESS NOTES
Bedside shift change report given to [address] [place] [address] Drive (oncoming nurse) by Raúl Enriquez (offgoing nurse). Report included the following information SBAR, Kardex, Procedure Summary, Intake/Output, MAR and Recent Results.

## 2018-01-18 NOTE — PROGRESS NOTES
Problem: Falls - Risk of  Goal: *Absence of Falls  Document Kristian Fall Risk and appropriate interventions in the flowsheet.    Outcome: Progressing Towards Goal  Fall Risk Interventions:  Mobility Interventions: Bed/chair exit alarm, Communicate number of staff needed for ambulation/transfer, Patient to call before getting OOB    Mentation Interventions: Bed/chair exit alarm, Adequate sleep, hydration, pain control, Reorient patient, Update white board, Toileting rounds    Medication Interventions: Bed/chair exit alarm, Patient to call before getting OOB, Teach patient to arise slowly    Elimination Interventions: Bed/chair exit alarm, Toileting schedule/hourly rounds    History of Falls Interventions: Bed/chair exit alarm, Investigate reason for fall

## 2018-01-18 NOTE — PROGRESS NOTES
Care Management ED Assessment    RRAT 39/2/0  Palliative not following **will place FYI to MD**    Cain De La Rosa is a 80year old admitted for altered mental status, fevers to 102 documented last week by her family. PCP started on oral Levaquin and Tamiflu. CT of head - negative,  Port malfunction, Picc placed 1/16 - likely to need replaced. Verified face sheet and demographics. NOK:  Loyce Hammans [phone] **dialysis patient M/W/F  Kaitlyn Delgado [phone]  Karlie Jimenez [phone]  [employer]      **She has had Home Health in [facility] in review of notes, patient did not remember name of agency**    Care Management Interventions  PCP Verified by CM: Yes (Dr. Ju Ledesma )  Palliative Care Criteria Met (RRAT>21 & CHF Dx)?: No  Transition of Care Consult (CM Consult): Discharge Planning (Lives alone, daughter has been staying with her, grandson lives on property)  [address] Quincy Florez: No  Discharge Durable Medical Equipment:  (Trudell Piles and cane)  Health Maintenance Reviewed: Yes  Physical Therapy Consult:  (Will need orders )  Occupational Therapy Consult:  (Will need orders)  Speech Therapy Consult: No  Current Support Network: Lives Alone (Lives alone, daughter Loyce Hammans has been staying overnight with her. Daughter is on dialysis, she drives. Grandson lives with spouse, two children on property and does help with groceries. She drove short distances prior to admission.)  Plan discussed with Pt/Family/Caregiver: Yes (Met with patient, discussed CM role and discharge planning.)    Would benefit from PT/OT recommendations. Patient wants to return home, she was driving one week ago. Her daughter and grandson provide support in the home setting.     Ingrid Lopez, CRM

## 2018-01-18 NOTE — PROGRESS NOTES
Problem: Falls - Risk of  Goal: *Absence of Falls  Document Kristian Fall Risk and appropriate interventions in the flowsheet.    Outcome: Progressing Towards Goal  Fall Risk Interventions:  Mobility Interventions: Bed/chair exit alarm    Mentation Interventions: Bed/chair exit alarm, Door open when patient unattended, More frequent rounding, Room close to nurse's station    Medication Interventions: Bed/chair exit alarm, Teach patient to arise slowly    Elimination Interventions: Bed/chair exit alarm, Call light in reach, Toileting schedule/hourly rounds    History of Falls Interventions: Bed/chair exit alarm

## 2018-01-18 NOTE — DIABETES MGMT
DTC Progress Note    Recommendations/ Comments:Chart reviewed due to hyperglycemia. Noted po intake 0% . Also note D5 running. Pt currently has no diabetes medications ordered. If appropriate, please consider ordering correction scale Humalog, high sensitivity. Chart reviewed on Derald Pencil. Patient is a 80 y.o. female with known diabetes on Metformin 1000 mg BID at home. A1c:   No results found for: HBA1C, HGBE8, JZN1GDRV, [id number]    Recent Glucose Results:   Lab Results   Component Value Date/Time     (H) 01/18/2018 03:21 AM    GLUCPOC 208 (H) 01/18/2018 06:49 AM    GLUCPOC 190 (H) 01/17/2018 09:23 PM    GLUCPOC 233 (H) 01/17/2018 04:21 PM        Lab Results   Component Value Date/Time    Creatinine 0.76 01/18/2018 03:21 AM     Estimated Creatinine Clearance: 63.8 mL/min (based on Cr of 0.76). Active Orders   Diet    DIET DIABETIC CONSISTENT CARB Regular        PO intake:   Patient Vitals for the past 72 hrs:   % Diet Eaten   01/16/18 0928 0 %       Current hospital DM medication: none    Will continue to follow as needed.     Thank you  Lisa Leonard RD, CDE

## 2018-01-18 NOTE — WOUND CARE
WOCN Note:     New consult placed by RN for sacral assessment    Chart reviewed. Admitted DX:  fever  Past Medical History:  Lymphoma, anemia, CHF, diabetes. Admitted from home. Assessment:   Patient is alert, communicative, continent and mobile independently in bed. Bed: total care  Diet: diabetic  Patient reports no pain. All areas are present on admission. Bilateral heels with blanching pink erythema. Sacrum and buttocks with 5 x 7 x 0 cm of blanching red erythema. One centrally located dry scabbed over area 0.2 x 0.2 x 0 cm. Patient repositioned on left side with pillow between the knees. Heels offloaded on pillow. Recommendations:    Float heels    Heels, Sacrum and buttocks:  Twice daily apply Venelex. Skin Care & Pressure Prevention:  Minimize layers of linen/pads under patient to optimize support surface. Turn/reposition approximately every 2 hours and offload heels.     Discussed above plan with patient and RN    Transition of Care: Plan to follow weekly and as needed while admitted to hospital.    GET Monzon RN  Office [phone]  Pager [phone]

## 2018-01-19 ENCOUNTER — APPOINTMENT (OUTPATIENT)
Dept: INTERVENTIONAL RADIOLOGY/VASCULAR | Age: 83
DRG: 809 | End: 2018-01-19
Attending: INTERNAL MEDICINE
Payer: MEDICARE

## 2018-01-19 LAB
BASOPHILS # BLD: 0 K/UL (ref 0–0.1)
BASOPHILS NFR BLD: 0 % (ref 0–1)
DIFFERENTIAL METHOD BLD: ABNORMAL
EOSINOPHIL # BLD: 0.1 K/UL (ref 0–0.4)
EOSINOPHIL NFR BLD: 15 % (ref 0–7)
ERYTHROCYTE [DISTWIDTH] IN BLOOD BY AUTOMATED COUNT: 17.4 % (ref 11.5–14.5)
GLUCOSE BLD STRIP.AUTO-MCNC: 173 MG/DL (ref 65–100)
GLUCOSE BLD STRIP.AUTO-MCNC: 198 MG/DL (ref 65–100)
GLUCOSE BLD STRIP.AUTO-MCNC: 203 MG/DL (ref 65–100)
GLUCOSE BLD STRIP.AUTO-MCNC: 267 MG/DL (ref 65–100)
HCT VFR BLD AUTO: 29.2 % (ref 35–47)
HGB BLD-MCNC: 10 G/DL (ref 11.5–16)
LYMPHOCYTES # BLD: 0.1 K/UL (ref 0.8–3.5)
LYMPHOCYTES NFR BLD: 7 % (ref 12–49)
MCH RBC QN AUTO: 30 PG (ref 26–34)
MCHC RBC AUTO-ENTMCNC: 34.2 G/DL (ref 30–36.5)
MCV RBC AUTO: 87.7 FL (ref 80–99)
MONOCYTES # BLD: 0.1 K/UL (ref 0–1)
MONOCYTES NFR BLD: 9 % (ref 5–13)
MYELOCYTES NFR BLD MANUAL: 1 %
NEUTS BAND NFR BLD MANUAL: 7 % (ref 0–6)
NEUTS SEG # BLD: 0.6 K/UL (ref 1.8–8)
NEUTS SEG NFR BLD: 61 % (ref 32–75)
PLATELET # BLD AUTO: 93 K/UL (ref 150–400)
RBC # BLD AUTO: 3.33 M/UL (ref 3.8–5.2)
RBC MORPH BLD: ABNORMAL
SERVICE CMNT-IMP: ABNORMAL
WBC # BLD AUTO: 0.9 K/UL (ref 3.6–11)

## 2018-01-19 PROCEDURE — 77030011893 HC TY CUT DN TRIS -B

## 2018-01-19 PROCEDURE — 82962 GLUCOSE BLOOD TEST: CPT

## 2018-01-19 PROCEDURE — 99152 MOD SED SAME PHYS/QHP 5/>YRS: CPT

## 2018-01-19 PROCEDURE — 36590 REMOVAL TUNNELED CV CATH: CPT

## 2018-01-19 PROCEDURE — 74011000250 HC RX REV CODE- 250

## 2018-01-19 PROCEDURE — 65270000029 HC RM PRIVATE

## 2018-01-19 PROCEDURE — 77030031139 HC SUT VCRL2 J&J -A

## 2018-01-19 PROCEDURE — 36415 COLL VENOUS BLD VENIPUNCTURE: CPT | Performed by: INTERNAL MEDICINE

## 2018-01-19 PROCEDURE — 74011250637 HC RX REV CODE- 250/637: Performed by: INTERNAL MEDICINE

## 2018-01-19 PROCEDURE — 74011000258 HC RX REV CODE- 258: Performed by: INTERNAL MEDICINE

## 2018-01-19 PROCEDURE — 74011250636 HC RX REV CODE- 250/636: Performed by: INTERNAL MEDICINE

## 2018-01-19 PROCEDURE — 77030010507 HC ADH SKN DERMBND J&J -B

## 2018-01-19 PROCEDURE — 36592 COLLECT BLOOD FROM PICC: CPT

## 2018-01-19 PROCEDURE — 05PYX3Z REMOVAL OF INFUSION DEVICE FROM UPPER VEIN, EXTERNAL APPROACH: ICD-10-PCS | Performed by: RADIOLOGY

## 2018-01-19 PROCEDURE — 85025 COMPLETE CBC W/AUTO DIFF WBC: CPT | Performed by: INTERNAL MEDICINE

## 2018-01-19 PROCEDURE — 74011250636 HC RX REV CODE- 250/636: Performed by: RADIOLOGY

## 2018-01-19 RX ORDER — FENTANYL CITRATE 50 UG/ML
100 INJECTION, SOLUTION INTRAMUSCULAR; INTRAVENOUS
Status: DISCONTINUED | OUTPATIENT
Start: 2018-01-19 | End: 2018-01-19

## 2018-01-19 RX ORDER — SODIUM CHLORIDE 9 MG/ML
25 INJECTION, SOLUTION INTRAVENOUS CONTINUOUS
Status: DISCONTINUED | OUTPATIENT
Start: 2018-01-19 | End: 2018-01-19

## 2018-01-19 RX ORDER — LIDOCAINE HYDROCHLORIDE AND EPINEPHRINE 10; 10 MG/ML; UG/ML
INJECTION, SOLUTION INFILTRATION; PERINEURAL
Status: COMPLETED
Start: 2018-01-19 | End: 2018-01-19

## 2018-01-19 RX ORDER — MIDAZOLAM HYDROCHLORIDE 1 MG/ML
5 INJECTION, SOLUTION INTRAMUSCULAR; INTRAVENOUS
Status: DISCONTINUED | OUTPATIENT
Start: 2018-01-19 | End: 2018-01-19

## 2018-01-19 RX ADMIN — SODIUM CHLORIDE 25 ML/HR: 900 INJECTION, SOLUTION INTRAVENOUS at 09:06

## 2018-01-19 RX ADMIN — LIDOCAINE HYDROCHLORIDE,EPINEPHRINE BITARTRATE 10 ML: 10; .01 INJECTION, SOLUTION INFILTRATION; PERINEURAL at 09:36

## 2018-01-19 RX ADMIN — MIDAZOLAM HYDROCHLORIDE 1 MG: 1 INJECTION, SOLUTION INTRAMUSCULAR; INTRAVENOUS at 09:27

## 2018-01-19 RX ADMIN — CASTOR OIL AND BALSAM, PERU: 788; 87 OINTMENT TOPICAL at 10:56

## 2018-01-19 RX ADMIN — ENOXAPARIN SODIUM 40 MG: 40 INJECTION SUBCUTANEOUS at 17:00

## 2018-01-19 RX ADMIN — FENTANYL CITRATE 25 MCG: 50 INJECTION, SOLUTION INTRAMUSCULAR; INTRAVENOUS at 09:27

## 2018-01-19 RX ADMIN — CASTOR OIL AND BALSAM, PERU: 788; 87 OINTMENT TOPICAL at 17:01

## 2018-01-19 RX ADMIN — DEXTROSE MONOHYDRATE AND SODIUM CHLORIDE 75 ML/HR: 5; .9 INJECTION, SOLUTION INTRAVENOUS at 07:55

## 2018-01-19 RX ADMIN — LEVOFLOXACIN 750 MG: 5 INJECTION, SOLUTION INTRAVENOUS at 17:00

## 2018-01-19 RX ADMIN — Medication 10 ML: at 14:01

## 2018-01-19 RX ADMIN — MIDAZOLAM HYDROCHLORIDE 1 MG: 1 INJECTION, SOLUTION INTRAMUSCULAR; INTRAVENOUS at 09:20

## 2018-01-19 RX ADMIN — Medication 10 ML: at 04:14

## 2018-01-19 RX ADMIN — Medication 10 ML: at 17:00

## 2018-01-19 RX ADMIN — Medication 10 ML: at 23:03

## 2018-01-19 RX ADMIN — GUAIFENESIN AND CODEINE PHOSPHATE 5 ML: 100; 10 SOLUTION ORAL at 22:47

## 2018-01-19 RX ADMIN — FENTANYL CITRATE 25 MCG: 50 INJECTION, SOLUTION INTRAMUSCULAR; INTRAVENOUS at 09:20

## 2018-01-19 NOTE — PROGRESS NOTES
TRANSFER - IN REPORT:    Verbal report received from Satya(name) on Charisse Delvalle  being received from Angio(unit) for routine progression of care      Report consisted of patients Situation, Background, Assessment and   Recommendations(SBAR). Information from the following report(s) SBAR was reviewed with the receiving nurse. Opportunity for questions and clarification was provided.

## 2018-01-19 NOTE — H&P
Radiology History and Physical    Patient: Reddy Maravilla 80 y.o. female       Chief Complaint: No chief complaint on file. History of Present Illness: port removal      History:    Past Medical History:   Diagnosis Date    CHF (congestive heart failure) ([address]     thought to be result of chemotherapy    Depression     Diabetes (HCC)     GERD (gastroesophageal reflux disease)     HTN (hypertension)     Non Hodgkin's lymphoma (HCC)     Rheumatoid arthritis(714.0)     Thyroid disease      No family history on file. Social History     Social History    Marital status:      Spouse name: N/A    Number of children: N/A    Years of education: N/A     Occupational History    Not on file. Social History Main Topics    Smoking status: Never Smoker    Smokeless tobacco: Never Used    Alcohol use Yes      Comment: rare    Drug use: Yes     Special: Prescription, OTC    Sexual activity: Not on file     Other Topics Concern    Not on file     Social History Narrative       Allergies:    Allergies   Allergen Reactions    Cephalexin Myalgia     Painful joints    Lisinopril Hives    Celebrex [Celecoxib] Hives    Ibuprofen Hives       Current Medications:  Current Facility-Administered Medications   Medication Dose Route Frequency    balsam peru-castor oil (VENELEX)  mg/gram ointment   Topical BID    0.9% sodium chloride infusion 250 mL  250 mL IntraVENous PRN    guaiFENesin-codeine (ROBITUSSIN AC) 100-10 mg/5 mL solution 5 mL  5 mL Oral QID PRN    acetaminophen (TYLENOL) tablet 650 mg  650 mg Oral Q4H PRN    sodium chloride (NS) flush 20 mL  20 mL InterCATHeter PRN    sodium chloride (NS) flush 10 mL  10 mL InterCATHeter Q24H    sodium chloride (NS) flush 10 mL  10 mL InterCATHeter PRN    sodium chloride (NS) flush 10 mL  10 mL InterCATHeter Q8H    bacitracin 500 unit/gram packet 1 Packet  1 Packet Topical PRN    enoxaparin (LOVENOX) injection 40 mg  40 mg SubCUTAneous Q24H    dextrose 5% and 0.9% NaCl infusion  50 mL/hr IntraVENous CONTINUOUS    levoFLOXacin (LEVAQUIN) 750 mg in D5W IVPB  750 mg IntraVENous Q24H    alteplase (CATHFLO) 1 mg in sterile water (preservative free) 1 mL injection  1 mg InterCATHeter PRN    heparin (porcine) pf 300-500 Units  300-500 Units InterCATHeter PRN    sodium chloride (NS) flush 10-30 mL  10-30 mL InterCATHeter PRN    sodium chloride (NS) flush 10 mL  10 mL InterCATHeter Q24H    sodium chloride (NS) flush 10 mL  10 mL InterCATHeter PRN        Physical Exam:  Blood pressure 128/51, pulse 72, temperature 99.1 °F (37.3 °C), resp. rate 23, height 5' 11\" (1.803 m), weight 70.8 kg (156 lb 1.6 oz), SpO2 94 %. LUNG: clear to auscultation bilaterally, HEART: regular rate and rhythm      Alerts:    Hospital Problems  Date Reviewed: 11/7/2017          Codes Class Noted POA    Fever ICD-10-CM: R50.9  ICD-9-CM: 780.60  1/15/2018 Unknown              Laboratory:      Recent Labs      01/19/18   0415  01/18/18   0321   HGB  10.0*  10.3*   HCT  29.2*  29.1*   WBC  0.9*  0.9*   PLT  93*  102*   BUN   --   10   CREA   --   0.76   K   --   3.7         Plan of Care/Planned Procedure:  Risks, benefits, and alternatives reviewed with patient and she agrees to proceed with the procedure.        Patience Addison MD

## 2018-01-19 NOTE — PROGRESS NOTES
Hematology-Oncology Progress Note    Robe Rojo  1935  [id number]  1/19/2018    Follow-up for:  ams     [x]        Chart notes since last visit reviewed   [x]        Medications reviewed for allergies and interactions       Case discussed with the following:         []                            [x]        Nursing Staff                                                                         []        Pathologist                                                                        []        FAMILY      Subjective:     Spoke with patient who complains of: much less confused today, seen in IR after port removal,      Objective:     Patient Vitals for the past 24 hrs:   BP Temp Pulse Resp SpO2 Weight   01/19/18 1000 126/54 - 74 23 94 % -   01/19/18 0946 123/50 - 78 15 96 % -   01/19/18 0940 109/52 - 79 21 97 % -   01/19/18 0935 115/53 - 77 17 95 % -   01/19/18 0930 120/68 - 73 20 97 % -   01/19/18 0925 138/67 - 74 22 100 % -   01/19/18 0920 137/72 - 68 22 98 % -   01/19/18 0902 138/62 99.1 °F (37.3 °C) 72 19 94 % -   01/19/18 0830 144/77 98.6 °F (37 °C) 80 16 98 % -   01/19/18 0407 121/74 98.6 °F (37 °C) 86 16 98 % -   01/19/18 0406 - - - - - 70.8 kg (156 lb 1.6 oz)   01/18/18 2051 138/75 98.1 °F (36.7 °C) 75 16 95 % -   01/18/18 1403 121/66 98.4 °F (36.9 °C) 75 20 95 % -       REVIEW OF SYSTEMS:    Constitutional: negative fever, negative chills, negative weight loss  Eyes:   negative visual changes  ENT:   negative sore throat, tongue or lip swelling  Respiratory:  negative cough, negative dyspnea  Cards:  negative for chest pain, palpitations, lower extremity edema  GI:   negative for nausea, vomiting, diarrhea, and abdominal pain  Neuro:  negative for headaches, dizziness, vertigo  [x]                        Full ROS o/w normal/non contributor    Constitutional:  Patient looks  []        Sick  [x]        Frail  []        Better                                                 [] Depressed    HEENT:  [x]   NC                         []   AT               []    ALOPECIA           Eyes: [x]   Normal               []    Icteric  Oropharynx: [x]    Normal                  []  Thrush               []   Dry  Mucositis: [x]    None                 Grade: []        I  []        II  []        III  []        IV  Neck:   [x]   Supple                  []  Rigid               JVD:    [x]   ABSENT       []   PRESENT  Lymphadenopathy:   [x]   None Noted            []   PRESENT    Chest:  [x]   Clear               []    Rhonchi                      Dec'd @     []  Right Base           []   Left Base    CV:             [x]   Regular              []  Irregular               []   Tachy                []   Murmur  Abdominal:   [x]    Soft              []   NON-tender               []   Tender      BS:    []   ABSENT                   [x]   PRESENT  Liver:     [x]  NON-palp                  []   EDGE- palp  Spleen: [x]   NON-palp                   []  EDGE - palp  Mass:   [x]   ABSENT                          []  PRESENT  Extr:    [x]  Lymphedema             []   Cyanosis      []  Clubbing  Edema:     [x]   NONE       []   PRESENT  Skin:  Intact []           Purpura []        Rash: []   ABSENT       []  PRESENT  Neuro:  [x]        Normal  []        Confused      Available labs reviewed:  Labs:    Recent Results (from the past 24 hour(s))   GLUCOSE, POC    Collection Time: 01/18/18 11:29 AM   Result Value Ref Range    Glucose (POC) 219 (H) 65 - 100 mg/dL    Performed by Dagmar Gomez    GLUCOSE, POC    Collection Time: 01/18/18  4:30 PM   Result Value Ref Range    Glucose (POC) 231 (H) 65 - 100 mg/dL    Performed by Dagmar Gomez    GLUCOSE, POC    Collection Time: 01/18/18  9:37 PM   Result Value Ref Range    Glucose (POC) 183 (H) 65 - 100 mg/dL    Performed by RUBÉN ZAMARRIPA    CBC WITH AUTOMATED DIFF    Collection Time: 01/19/18  4:15 AM   Result Value Ref Range    WBC 0.9 (LL) 3.6 - 11.0 K/uL    RBC 3.33 (L) 3.80 - 5.20 M/uL    HGB 10.0 (L) 11.5 - 16.0 g/dL    HCT 29.2 (L) 35.0 - 47.0 %    MCV 87.7 80.0 - 99.0 FL    MCH 30.0 26.0 - 34.0 PG    MCHC 34.2 30.0 - 36.5 g/dL    RDW 17.4 (H) 11.5 - 14.5 %    PLATELET 93 (L) 017 - 400 K/uL    NEUTROPHILS 61 32 - 75 %    BAND NEUTROPHILS 7 (H) 0 - 6 %    LYMPHOCYTES 7 (L) 12 - 49 %    MONOCYTES 9 5 - 13 %    EOSINOPHILS 15 (H) 0 - 7 %    BASOPHILS 0 0 - 1 %    MYELOCYTES 1 (H) 0 %    ABS. NEUTROPHILS 0.6 (L) 1.8 - 8.0 K/UL    ABS. LYMPHOCYTES 0.1 (L) 0.8 - 3.5 K/UL    ABS. MONOCYTES 0.1 0.0 - 1.0 K/UL    ABS. EOSINOPHILS 0.1 0.0 - 0.4 K/UL    ABS. BASOPHILS 0.0 0.0 - 0.1 K/UL    DF MANUAL      RBC COMMENTS ANISOCYTOSIS  1+        RBC COMMENTS POLYCHROMASIA  PRESENT        RBC COMMENTS OVALOCYTES  PRESENT        RBC COMMENTS TEARDROP CELLS  PRESENT       GLUCOSE, POC    Collection Time: 01/19/18  7:11 AM   Result Value Ref Range    Glucose (POC) 198 (H) 65 - 100 mg/dL    Performed by Cory Singleton reviewed:    Chemotherapy monitored and toxicities assessed:    Assessment and Plan   1. Ams. .. Much improved, ct head negative for bleed. ..she was started on abx to cover for possible infection. Will continue for now  2. Pancytopenia. .. Bone marrow bx in December showed hypercellular marrow, normal c-somes and no sign of lymphoma. .. Cause for the worsining cytopenias unclear, suspect early mds +/- other causes. Follow counts daily, consider grannix if febrile  3. Hx of AIL. .. No evidence of recurrence on December ct's . .. Her original presentation was with severe pruritis/fever  4. Port malfunction. ..port removed in IR, picc in place r hunter Saeed MD

## 2018-01-19 NOTE — DIABETES MGMT
DTC Progress Note    Recommendations/ Comments:Chart reviewed due to hyperglycemia. Noted po intake varies . Also note D5 running. Pt currently has no diabetes medications ordered. If appropriate, please consider ordering correction scale Humalog, high sensitivity. Chart reviewed on Verline Rom. Patient is a 80 y.o. female with known diabetes on Metformin 1000 mg BID at home. A1c:   No results found for: HBA1C, HGBE8, DQZ9XYML, QQY6BKDB    Recent Glucose Results:   Lab Results   Component Value Date/Time    GLUCPOC 203 (H) 01/19/2018 11:21 AM    GLUCPOC 198 (H) 01/19/2018 07:11 AM    GLUCPOC 183 (H) 01/18/2018 09:37 PM        Lab Results   Component Value Date/Time    Creatinine 0.76 01/18/2018 03:21 AM     Estimated Creatinine Clearance: 63.8 mL/min (based on Cr of 0.76). Active Orders   Diet    DIET DIABETIC CONSISTENT CARB Regular        PO intake:   Patient Vitals for the past 72 hrs:   % Diet Eaten   01/19/18 1257 100 %   01/19/18 0830 0 %   01/18/18 1252 100 %       Current hospital DM medication: none    Will continue to follow as needed.     Thank you  Maria Eugenia Cline RD, CDE

## 2018-01-19 NOTE — PROGRESS NOTES
7703 - Protective precautions continued with strict handwashing and mask. 1000 - TRANSFER - OUT REPORT:    Verbal report given to CARLINE De Paz(name) on Vanita Parrish  being transferred to room 607(unit) for routine post - op       Report consisted of patients Situation, Background, Assessment and   Recommendations(SBAR). Information from the following report(s) Procedure Summary was reviewed with the receiving nurse. Lines:   PICC Double Lumen 80/01/35 Right;Basilic (Active)   Central Line Being Utilized Yes 1/19/2018  9:04 AM   Criteria for Appropriate Use Long term IV/antibiotic administration 1/19/2018  9:04 AM   Site Assessment Clean, dry, & intact 1/19/2018  9:04 AM   Phlebitis Assessment 0 1/19/2018  8:30 AM   Infiltration Assessment 0 1/19/2018  8:30 AM   Arm Circumference (cm) 27 cm 1/16/2018  6:36 PM   Date of Last Dressing Change 01/16/18 1/19/2018  8:30 AM   Dressing Status Clean, dry, & intact 1/19/2018  8:30 AM   Action Taken Open ports on tubing capped 1/19/2018  8:30 AM   Dressing Type Transparent 1/19/2018  8:30 AM   Hub Color/Line Status Red;Flushed 1/19/2018  9:04 AM   Positive Blood Return (Site #1) Yes 1/19/2018  9:04 AM   Hub Color/Line Status Purple;Patent 1/19/2018  8:30 AM   Positive Blood Return (Site #2) Yes 1/19/2018  8:30 AM   Alcohol Cap Used Yes 1/19/2018  8:30 AM        Opportunity for questions and clarification was provided.       Patient transported with:   with hospital transportation

## 2018-01-20 LAB
ALBUMIN SERPL-MCNC: 2 G/DL (ref 3.5–5)
ALBUMIN/GLOB SERPL: 0.5 {RATIO} (ref 1.1–2.2)
ALP SERPL-CCNC: 56 U/L (ref 45–117)
ALT SERPL-CCNC: 20 U/L (ref 12–78)
ANION GAP SERPL CALC-SCNC: 7 MMOL/L (ref 5–15)
AST SERPL-CCNC: 19 U/L (ref 15–37)
BASOPHILS # BLD: 0 K/UL (ref 0–0.1)
BASOPHILS NFR BLD: 0 % (ref 0–1)
BILIRUB SERPL-MCNC: 0.5 MG/DL (ref 0.2–1)
BUN SERPL-MCNC: 8 MG/DL (ref 6–20)
BUN/CREAT SERPL: 13 (ref 12–20)
CALCIUM SERPL-MCNC: 8.1 MG/DL (ref 8.5–10.1)
CHLORIDE SERPL-SCNC: 98 MMOL/L (ref 97–108)
CO2 SERPL-SCNC: 26 MMOL/L (ref 21–32)
CREAT SERPL-MCNC: 0.62 MG/DL (ref 0.55–1.02)
DIFFERENTIAL METHOD BLD: ABNORMAL
EOSINOPHIL # BLD: 0.2 K/UL (ref 0–0.4)
EOSINOPHIL NFR BLD: 23 % (ref 0–7)
ERYTHROCYTE [DISTWIDTH] IN BLOOD BY AUTOMATED COUNT: 17 % (ref 11.5–14.5)
GLOBULIN SER CALC-MCNC: 3.8 G/DL (ref 2–4)
GLUCOSE BLD STRIP.AUTO-MCNC: 174 MG/DL (ref 65–100)
GLUCOSE BLD STRIP.AUTO-MCNC: 180 MG/DL (ref 65–100)
GLUCOSE BLD STRIP.AUTO-MCNC: 202 MG/DL (ref 65–100)
GLUCOSE BLD STRIP.AUTO-MCNC: 251 MG/DL (ref 65–100)
GLUCOSE SERPL-MCNC: 139 MG/DL (ref 65–100)
HCT VFR BLD AUTO: 28.9 % (ref 35–47)
HGB BLD-MCNC: 9.9 G/DL (ref 11.5–16)
LYMPHOCYTES # BLD: 0.1 K/UL (ref 0.8–3.5)
LYMPHOCYTES NFR BLD: 10 % (ref 12–49)
MCH RBC QN AUTO: 30.3 PG (ref 26–34)
MCHC RBC AUTO-ENTMCNC: 34.3 G/DL (ref 30–36.5)
MCV RBC AUTO: 88.4 FL (ref 80–99)
MONOCYTES # BLD: 0 K/UL (ref 0–1)
MONOCYTES NFR BLD: 0 % (ref 5–13)
NEUTS SEG # BLD: 0.6 K/UL (ref 1.8–8)
NEUTS SEG NFR BLD: 67 % (ref 32–75)
NRBC # BLD: 0 K/UL (ref 0–0.01)
NRBC BLD-RTO: 0 PER 100 WBC
PATH REV BLD -IMP: ABNORMAL
PLATELET # BLD AUTO: 81 K/UL (ref 150–400)
POTASSIUM SERPL-SCNC: 3.3 MMOL/L (ref 3.5–5.1)
PROT SERPL-MCNC: 5.8 G/DL (ref 6.4–8.2)
RBC # BLD AUTO: 3.27 M/UL (ref 3.8–5.2)
RBC MORPH BLD: ABNORMAL
SERVICE CMNT-IMP: ABNORMAL
SODIUM SERPL-SCNC: 131 MMOL/L (ref 136–145)
WBC # BLD AUTO: 0.9 K/UL (ref 3.6–11)
WBC MORPH BLD: ABNORMAL

## 2018-01-20 PROCEDURE — 74011250636 HC RX REV CODE- 250/636: Performed by: INTERNAL MEDICINE

## 2018-01-20 PROCEDURE — 74011250637 HC RX REV CODE- 250/637: Performed by: INTERNAL MEDICINE

## 2018-01-20 PROCEDURE — 36415 COLL VENOUS BLD VENIPUNCTURE: CPT | Performed by: INTERNAL MEDICINE

## 2018-01-20 PROCEDURE — 82962 GLUCOSE BLOOD TEST: CPT

## 2018-01-20 PROCEDURE — 85025 COMPLETE CBC W/AUTO DIFF WBC: CPT | Performed by: INTERNAL MEDICINE

## 2018-01-20 PROCEDURE — 80053 COMPREHEN METABOLIC PANEL: CPT | Performed by: INTERNAL MEDICINE

## 2018-01-20 PROCEDURE — 74011000258 HC RX REV CODE- 258: Performed by: INTERNAL MEDICINE

## 2018-01-20 PROCEDURE — 65270000029 HC RM PRIVATE

## 2018-01-20 RX ORDER — LEVOTHYROXINE SODIUM 88 UG/1
88 TABLET ORAL
Status: DISCONTINUED | OUTPATIENT
Start: 2018-01-20 | End: 2018-01-22 | Stop reason: HOSPADM

## 2018-01-20 RX ORDER — CARVEDILOL 6.25 MG/1
6.25 TABLET ORAL 2 TIMES DAILY WITH MEALS
Status: DISCONTINUED | OUTPATIENT
Start: 2018-01-20 | End: 2018-01-22 | Stop reason: HOSPADM

## 2018-01-20 RX ORDER — METFORMIN HYDROCHLORIDE 500 MG/1
500 TABLET, EXTENDED RELEASE ORAL 2 TIMES DAILY
Status: DISCONTINUED | OUTPATIENT
Start: 2018-01-20 | End: 2018-01-22 | Stop reason: HOSPADM

## 2018-01-20 RX ORDER — FLUOXETINE 10 MG/1
10 CAPSULE ORAL DAILY
Status: DISCONTINUED | OUTPATIENT
Start: 2018-01-20 | End: 2018-01-22 | Stop reason: HOSPADM

## 2018-01-20 RX ADMIN — FLUOXETINE 10 MG: 10 CAPSULE ORAL at 09:49

## 2018-01-20 RX ADMIN — ENOXAPARIN SODIUM 40 MG: 40 INJECTION SUBCUTANEOUS at 17:21

## 2018-01-20 RX ADMIN — DEXTROSE MONOHYDRATE AND SODIUM CHLORIDE 50 ML/HR: 5; .9 INJECTION, SOLUTION INTRAVENOUS at 05:20

## 2018-01-20 RX ADMIN — CASTOR OIL AND BALSAM, PERU: 788; 87 OINTMENT TOPICAL at 17:21

## 2018-01-20 RX ADMIN — TBO-FILGRASTIM 300 MCG: 300 INJECTION, SOLUTION SUBCUTANEOUS at 09:53

## 2018-01-20 RX ADMIN — CASTOR OIL AND BALSAM, PERU: 788; 87 OINTMENT TOPICAL at 08:20

## 2018-01-20 RX ADMIN — Medication 10 ML: at 17:22

## 2018-01-20 RX ADMIN — GUAIFENESIN AND CODEINE PHOSPHATE 5 ML: 100; 10 SOLUTION ORAL at 22:37

## 2018-01-20 RX ADMIN — Medication 10 ML: at 04:26

## 2018-01-20 RX ADMIN — LEVOFLOXACIN 750 MG: 5 INJECTION, SOLUTION INTRAVENOUS at 17:21

## 2018-01-20 RX ADMIN — Medication 10 ML: at 05:21

## 2018-01-20 RX ADMIN — METFORMIN HYDROCHLORIDE 500 MG: 500 TABLET, EXTENDED RELEASE ORAL at 17:21

## 2018-01-20 RX ADMIN — Medication 10 ML: at 12:01

## 2018-01-20 RX ADMIN — Medication 10 ML: at 22:24

## 2018-01-20 RX ADMIN — METFORMIN HYDROCHLORIDE 500 MG: 500 TABLET, EXTENDED RELEASE ORAL at 09:52

## 2018-01-20 RX ADMIN — CARVEDILOL 6.25 MG: 6.25 TABLET, FILM COATED ORAL at 17:21

## 2018-01-20 RX ADMIN — Medication 10 ML: at 04:25

## 2018-01-20 RX ADMIN — Medication 10 ML: at 20:00

## 2018-01-20 RX ADMIN — CARVEDILOL 6.25 MG: 6.25 TABLET, FILM COATED ORAL at 09:51

## 2018-01-20 RX ADMIN — LEVOTHYROXINE SODIUM 88 MCG: 88 TABLET ORAL at 09:48

## 2018-01-20 NOTE — PROGRESS NOTES
Problem: Falls - Risk of  Goal: *Absence of Falls  Document Kristian Fall Risk and appropriate interventions in the flowsheet.    Outcome: Progressing Towards Goal  Fall Risk Interventions:  Mobility Interventions: Bed/chair exit alarm, Patient to call before getting OOB, PT Consult for mobility concerns    Mentation Interventions: Bed/chair exit alarm, Door open when patient unattended    Medication Interventions: Bed/chair exit alarm, Patient to call before getting OOB, Teach patient to arise slowly    Elimination Interventions: Bed/chair exit alarm, Call light in reach, Elevated toilet seat, Patient to call for help with toileting needs    History of Falls Interventions: Door open when patient unattended

## 2018-01-20 NOTE — PROGRESS NOTES
Bedside shift change report given to Haleigh Grider RN/Tayla, student (oncoming nurse) by Orlando Banda RN (offgoing nurse). Report included the following information SBAR, Kardex, Intake/Output, MAR and Recent Results.

## 2018-01-20 NOTE — PROGRESS NOTES
Hematology-Oncology Progress Note    Phu Otoole  1935  [id number]  1/20/2018    Follow-up for:  ams     [x]        Chart notes since last visit reviewed   [x]        Medications reviewed for allergies and interactions       Case discussed with the following:         []                            []        Nursing Staff                                                                         []        Pathologist                                                                        []        FAMILY      Subjective:     Spoke with patient who complains of: no confusion today,low grade fever yesterday, says she needs her \"nerve\" medicine     Objective:     Patient Vitals for the past 24 hrs:   BP Temp Pulse Resp SpO2 Weight   01/20/18 0807 134/74 98.5 °F (36.9 °C) 66 18 97 % -   01/20/18 0513 - - - - - 71.1 kg (156 lb 11.2 oz)   01/20/18 0418 121/65 99 °F (37.2 °C) 71 18 92 % -   01/19/18 2218 146/76 99.8 °F (37.7 °C) 76 18 93 % -   01/19/18 1428 122/59 97.8 °F (36.6 °C) 71 18 96 % -   01/19/18 1153 124/56 98.5 °F (36.9 °C) 70 20 94 % -   01/19/18 1051 122/66 98.6 °F (37 °C) 70 20 95 % -   01/19/18 1030 128/51 - 72 23 94 % -   01/19/18 1000 126/54 - 74 23 94 % -   01/19/18 0946 123/50 - 78 15 96 % -   01/19/18 0940 109/52 - 79 21 97 % -   01/19/18 0935 115/53 - 77 17 95 % -   01/19/18 0930 120/68 - 73 20 97 % -   01/19/18 0925 138/67 - 74 22 100 % -   01/19/18 0920 137/72 - 68 22 98 % -   01/19/18 0902 138/62 99.1 °F (37.3 °C) 72 19 94 % -       REVIEW OF SYSTEMS:    Constitutional: negative fever, negative chills, negative weight loss  Eyes:   negative visual changes  ENT:   negative sore throat, tongue or lip swelling  Respiratory:  negative cough, negative dyspnea  Cards:  negative for chest pain, palpitations, lower extremity edema  GI:   negative for nausea, vomiting, diarrhea, and abdominal pain  Neuro:  negative for headaches, dizziness, vertigo  [x]                        Full ROS o/w normal/non contributor    Constitutional:  Patient looks  []        Sick  [x]        Frail  []        Better                                                 []        Depressed    HEENT:  [x]   NC                         []   AT               []    ALOPECIA           Eyes: [x]   Normal               []    Icteric  Oropharynx: [x]    Normal                  []  Thrush               []   Dry  Mucositis: [x]    None                 Grade: []        I  []        II  []        III  []        IV  Neck:   [x]   Supple                  []  Rigid               JVD:    [x]   ABSENT       []   PRESENT  Lymphadenopathy:   [x]   None Noted            []   PRESENT    Chest:  [x]   Clear               []    Rhonchi                      Dec'd @     []  Right Base           []   Left Base    CV:             [x]   Regular              []  Irregular               []   Tachy                []   Murmur  Abdominal:   [x]    Soft              []   NON-tender               []   Tender      BS:    []   ABSENT                   [x]   PRESENT  Liver:     [x]  NON-palp                  []   EDGE- palp  Spleen: [x]   NON-palp                   []  EDGE - palp  Mass:   [x]   ABSENT                          []  PRESENT  Extr:    [x]  Lymphedema             []   Cyanosis      []  Clubbing  Edema:     [x]   NONE       []   PRESENT  Skin:  Intact []           Purpura []        Rash: []   ABSENT       []  PRESENT  Neuro:  [x]        Normal  []        Confused      Available labs reviewed:  Labs:    Recent Results (from the past 24 hour(s))   GLUCOSE, POC    Collection Time: 01/19/18 11:21 AM   Result Value Ref Range    Glucose (POC) 203 (H) 65 - 100 mg/dL    Performed by Eleni Lizarraga, POC    Collection Time: 01/19/18  4:16 PM   Result Value Ref Range    Glucose (POC) 267 (H) 65 - 100 mg/dL    Performed by [address] [place] [address], POC    Collection Time: 01/19/18  9:30 PM   Result Value Ref Range    Glucose (POC) 173 (H) 65 - 100 mg/dL    [facility] by [facility]    CBC WITH AUTOMATED DIFF    Collection Time: 01/20/18  4:00 AM   Result Value Ref Range    WBC 0.9 (LL) 3.6 - 11.0 K/uL    RBC 3.27 (L) 3.80 - 5.20 M/uL    HGB 9.9 (L) 11.5 - 16.0 g/dL    HCT 28.9 (L) 35.0 - 47.0 %    MCV 88.4 80.0 - 99.0 FL    MCH 30.3 26.0 - 34.0 PG    MCHC 34.3 30.0 - 36.5 g/dL    RDW 17.0 (H) 11.5 - 14.5 %    PLATELET 81 (L) 560 - 400 K/uL    NEUTROPHILS 67 32 - 75 %    LYMPHOCYTES 10 (L) 12 - 49 %    MONOCYTES 0 (L) 5 - 13 %    EOSINOPHILS 23 (H) 0 - 7 %    BASOPHILS 0 0 - 1 %    ABS. NEUTROPHILS 0.6 (L) 1.8 - 8.0 K/UL    ABS. LYMPHOCYTES 0.1 (L) 0.8 - 3.5 K/UL    ABS. MONOCYTES 0.0 0.0 - 1.0 K/UL    ABS. EOSINOPHILS 0.2 0.0 - 0.4 K/UL    ABS. BASOPHILS 0.0 0.0 - 0.1 K/UL    DF MANUAL      RBC COMMENTS ANISOCYTOSIS  1+        WBC COMMENTS Differential performed on buffy coat smear. NUCLEATED RBC    Collection Time: 01/20/18  4:00 AM   Result Value Ref Range    NRBC 0.0 0  WBC    ABSOLUTE NRBC 0.00 0.00 - 5.72 K/uL   METABOLIC PANEL, COMPREHENSIVE    Collection Time: 01/20/18  4:22 AM   Result Value Ref Range    Sodium 131 (L) 136 - 145 mmol/L    Potassium 3.3 (L) 3.5 - 5.1 mmol/L    Chloride 98 97 - 108 mmol/L    CO2 26 21 - 32 mmol/L    Anion gap 7 5 - 15 mmol/L    Glucose 139 (H) 65 - 100 mg/dL    BUN 8 6 - 20 MG/DL    Creatinine 0.62 0.55 - 1.02 MG/DL    BUN/Creatinine ratio 13 12 - 20      GFR est AA >60 >60 ml/min/1.73m2    GFR est non-AA >60 >60 ml/min/1.73m2    Calcium 8.1 (L) 8.5 - 10.1 MG/DL    Bilirubin, total 0.5 0.2 - 1.0 MG/DL    ALT (SGPT) 20 12 - 78 U/L    AST (SGOT) 19 15 - 37 U/L    Alk.  phosphatase 56 45 - 117 U/L    Protein, total 5.8 (L) 6.4 - 8.2 g/dL    Albumin 2.0 (L) 3.5 - 5.0 g/dL    Globulin 3.8 2.0 - 4.0 g/dL    A-G Ratio 0.5 (L) 1.1 - 2.2     GLUCOSE, POC    Collection Time: 01/20/18  6:48 AM   Result Value Ref Range    Glucose (POC) 174 (H) 65 - 100 mg/dL    Performed by Kavno Haji reviewed:    Chemotherapy monitored and toxicities assessed:    Assessment and Plan   1. Ams. .. Much improved, ct head negative for bleed. ..she was started on abx to cover for possible infection. I wonder if she was hypoglycemic or possibly taking too much ativan at home  2. Pancytopenia. .. Bone marrow bx in December showed hypercellular marrow, normal c-somes and no sign of lymphoma. .. Cause for the worsining cytopenias unclear, suspect early mds Follow counts daily, will give granix this weekend given the low grade fever yesterday  3. Hx of AIL. .. No evidence of recurrence on December ct's . .. Her original presentation was with severe pruritis/fever  4. Port malfunction. ..port removed in IR, picc in place r arm  5. Will resume synthroid/glucophage/coreg today,  6.  Will begin pt/ot  Cora Gotti MD

## 2018-01-21 LAB
ALBUMIN SERPL-MCNC: 1.9 G/DL (ref 3.5–5)
ALBUMIN/GLOB SERPL: 0.5 {RATIO} (ref 1.1–2.2)
ALP SERPL-CCNC: 56 U/L (ref 45–117)
ALT SERPL-CCNC: 20 U/L (ref 12–78)
ANION GAP SERPL CALC-SCNC: 7 MMOL/L (ref 5–15)
AST SERPL-CCNC: 15 U/L (ref 15–37)
BACTERIA SPEC CULT: NORMAL
BASOPHILS # BLD: 0 K/UL (ref 0–0.1)
BASOPHILS NFR BLD: 0 % (ref 0–1)
BILIRUB SERPL-MCNC: 0.5 MG/DL (ref 0.2–1)
BUN SERPL-MCNC: 7 MG/DL (ref 6–20)
BUN/CREAT SERPL: 10 (ref 12–20)
CALCIUM SERPL-MCNC: 8.1 MG/DL (ref 8.5–10.1)
CHLORIDE SERPL-SCNC: 100 MMOL/L (ref 97–108)
CO2 SERPL-SCNC: 26 MMOL/L (ref 21–32)
CREAT SERPL-MCNC: 0.67 MG/DL (ref 0.55–1.02)
DIFFERENTIAL METHOD BLD: ABNORMAL
EOSINOPHIL # BLD: 0.2 K/UL (ref 0–0.4)
EOSINOPHIL NFR BLD: 8 % (ref 0–7)
ERYTHROCYTE [DISTWIDTH] IN BLOOD BY AUTOMATED COUNT: 16.7 % (ref 11.5–14.5)
GLOBULIN SER CALC-MCNC: 3.7 G/DL (ref 2–4)
GLUCOSE BLD STRIP.AUTO-MCNC: 143 MG/DL (ref 65–100)
GLUCOSE BLD STRIP.AUTO-MCNC: 213 MG/DL (ref 65–100)
GLUCOSE BLD STRIP.AUTO-MCNC: 219 MG/DL (ref 65–100)
GLUCOSE SERPL-MCNC: 167 MG/DL (ref 65–100)
HCT VFR BLD AUTO: 26.9 % (ref 35–47)
HGB BLD-MCNC: 9.2 G/DL (ref 11.5–16)
LYMPHOCYTES # BLD: 0.1 K/UL (ref 0.8–3.5)
LYMPHOCYTES NFR BLD: 4 % (ref 12–49)
MCH RBC QN AUTO: 29.9 PG (ref 26–34)
MCHC RBC AUTO-ENTMCNC: 34.2 G/DL (ref 30–36.5)
MCV RBC AUTO: 87.3 FL (ref 80–99)
METAMYELOCYTES NFR BLD MANUAL: 1 %
MONOCYTES # BLD: 0.1 K/UL (ref 0–1)
MONOCYTES NFR BLD: 3 % (ref 5–13)
NEUTS BAND NFR BLD MANUAL: 9 % (ref 0–6)
NEUTS SEG # BLD: 1.7 K/UL (ref 1.8–8)
NEUTS SEG NFR BLD: 75 % (ref 32–75)
PLATELET # BLD AUTO: 65 K/UL (ref 150–400)
POTASSIUM SERPL-SCNC: 3.3 MMOL/L (ref 3.5–5.1)
PROT SERPL-MCNC: 5.6 G/DL (ref 6.4–8.2)
RBC # BLD AUTO: 3.08 M/UL (ref 3.8–5.2)
RBC MORPH BLD: ABNORMAL
SERVICE CMNT-IMP: ABNORMAL
SERVICE CMNT-IMP: NORMAL
SODIUM SERPL-SCNC: 133 MMOL/L (ref 136–145)
WBC # BLD AUTO: 2 K/UL (ref 3.6–11)
WBC MORPH BLD: ABNORMAL

## 2018-01-21 PROCEDURE — 82962 GLUCOSE BLOOD TEST: CPT

## 2018-01-21 PROCEDURE — 74011000258 HC RX REV CODE- 258: Performed by: INTERNAL MEDICINE

## 2018-01-21 PROCEDURE — 74011250637 HC RX REV CODE- 250/637: Performed by: INTERNAL MEDICINE

## 2018-01-21 PROCEDURE — 36415 COLL VENOUS BLD VENIPUNCTURE: CPT | Performed by: INTERNAL MEDICINE

## 2018-01-21 PROCEDURE — 74011250636 HC RX REV CODE- 250/636: Performed by: INTERNAL MEDICINE

## 2018-01-21 PROCEDURE — 80053 COMPREHEN METABOLIC PANEL: CPT | Performed by: INTERNAL MEDICINE

## 2018-01-21 PROCEDURE — 65270000029 HC RM PRIVATE

## 2018-01-21 PROCEDURE — 97161 PT EVAL LOW COMPLEX 20 MIN: CPT

## 2018-01-21 PROCEDURE — 85025 COMPLETE CBC W/AUTO DIFF WBC: CPT | Performed by: INTERNAL MEDICINE

## 2018-01-21 RX ADMIN — CASTOR OIL AND BALSAM, PERU: 788; 87 OINTMENT TOPICAL at 17:14

## 2018-01-21 RX ADMIN — Medication 10 ML: at 17:03

## 2018-01-21 RX ADMIN — Medication 10 ML: at 22:35

## 2018-01-21 RX ADMIN — DEXTROSE MONOHYDRATE AND SODIUM CHLORIDE 50 ML/HR: 5; .9 INJECTION, SOLUTION INTRAVENOUS at 02:42

## 2018-01-21 RX ADMIN — FLUOXETINE 10 MG: 10 CAPSULE ORAL at 08:12

## 2018-01-21 RX ADMIN — METFORMIN HYDROCHLORIDE 500 MG: 500 TABLET, EXTENDED RELEASE ORAL at 08:13

## 2018-01-21 RX ADMIN — ENOXAPARIN SODIUM 40 MG: 40 INJECTION SUBCUTANEOUS at 17:03

## 2018-01-21 RX ADMIN — LEVOTHYROXINE SODIUM 88 MCG: 88 TABLET ORAL at 08:12

## 2018-01-21 RX ADMIN — CARVEDILOL 6.25 MG: 6.25 TABLET, FILM COATED ORAL at 08:13

## 2018-01-21 RX ADMIN — METFORMIN HYDROCHLORIDE 500 MG: 500 TABLET, EXTENDED RELEASE ORAL at 17:03

## 2018-01-21 RX ADMIN — TBO-FILGRASTIM 300 MCG: 300 INJECTION, SOLUTION SUBCUTANEOUS at 08:16

## 2018-01-21 RX ADMIN — Medication 10 ML: at 12:03

## 2018-01-21 RX ADMIN — Medication 10 ML: at 06:55

## 2018-01-21 RX ADMIN — DEXTROSE MONOHYDRATE AND SODIUM CHLORIDE 50 ML/HR: 5; .9 INJECTION, SOLUTION INTRAVENOUS at 17:14

## 2018-01-21 RX ADMIN — LEVOFLOXACIN 750 MG: 5 INJECTION, SOLUTION INTRAVENOUS at 17:03

## 2018-01-21 RX ADMIN — CARVEDILOL 6.25 MG: 6.25 TABLET, FILM COATED ORAL at 17:03

## 2018-01-21 RX ADMIN — CASTOR OIL AND BALSAM, PERU: 788; 87 OINTMENT TOPICAL at 08:13

## 2018-01-21 NOTE — PROGRESS NOTES
Bedside shift change report given to [place]ha [place] (oncoming nurse) by Isatu Bejarano RN (offgoing nurse). Report included the following information SBAR, Kardex, ED Summary, [facility] and Recent Results.

## 2018-01-21 NOTE — PROGRESS NOTES
physical Therapy EVALUATION/DISCHARGE  Patient: Phu Otoole (80 y.o. female)  Date: 1/21/2018  Primary Diagnosis: fever  Fever        Precautions: fall, neutropenic       ASSESSMENT :  Based on the objective data described below, the patient presents with baseline mobility demonstrating safe bed mobility, transfers with supervision and ambulates with RW. Managed well and feel safe to mobilize with staff during rest of hospitalilzation. Recommend up in chair for all meals and to ambulate with staff daily. Recommend resume home health PT at discharge. .    Skilled physical therapy is not indicated at this time. PLAN :  Discharge Recommendations: Home Health  Further Equipment Recommendations for Discharge: none     SUBJECTIVE:   Patient stated I haven't been home since I fell.     OBJECTIVE DATA SUMMARY:   HISTORY:    Past Medical History:   Diagnosis Date    CHF (congestive heart failure) ([facility] [address]     thought to be result of chemotherapy    Depression     Diabetes ([facility]     GERD (gastroesophageal reflux disease)     HTN (hypertension)     Non Hodgkin's lymphoma (HCC)     Rheumatoid arthritis(714.0)     Thyroid disease      Past Surgical History:   Procedure Laterality Date    HX APPENDECTOMY      HX CATARACT REMOVAL Bilateral     HX HYSTERECTOMY      HX UROLOGICAL      bladder tack x 2    VASCULAR SURGERY PROCEDURE UNLIST      varicose vein surgery x 2     Prior Level of Function/Home Situation: with family; rolling walker  Personal factors and/or comorbidities impacting plan of care:     Home Situation  Home Environment: Private residence  # Steps to Enter: 0  Wheelchair Ramp: Yes  One/Two Story Residence: One story  Living Alone: No  Support Systems: Family member(s)  Patient Expects to be Discharged to[de-identified] Private residence  Current DME Used/Available at Home: Rani Moreno, juanjo, Walker, rolling    EXAMINATION/PRESENTATION/DECISION MAKING:   Critical Behavior:  Neurologic State: Alert, Appropriate for age  Orientation Level: Oriented X4 (at this time )        Hearing: Auditory  Auditory Impairment: None  Range Of Motion:  AROM: Generally decreased, functional           PROM: Generally decreased, functional           Strength:    Strength: Generally decreased, functional            Tone & Sensation:   Tone: Normal              Sensation: Impaired               Coordination:  Coordination: Within functional limits  Vision:      Functional Mobility:  Bed Mobility:     Supine to Sit: Modified independent        Transfers:  Sit to Stand: Supervision  Stand to Sit: Supervision                       Balance:   Sitting: Intact  Standing: Intact; With support  Ambulation/Gait Training:  Distance (ft): 100 Feet (ft)  Assistive Device: Gait belt;Walker, rolling        Gait Description (WDL): Exceptions to WDL                 Speed/Daria: Slow  Step Length: Left shortened;Right shortened        Functional Measure:  Tinetti test:    Sitting Balance: 1  Arises: 1  Attempts to Rise: 1  Immediate Standing Balance: 1  Standing Balance: 1  Nudged: 2  Eyes Closed: 1  Turn 360 Degrees - Continuous/Discontinuous: 1  Turn 360 Degrees - Steady/Unsteady: 1  Sitting Down: 1  Balance Score: 11  Indication of Gait: 1  R Step Length/Height: 1  L Step Length/Height: 1  R Foot Clearance: 1  L Foot Clearance: 1  Step Symmetry: 1  Step Continuity: 1  Path: 1  Trunk: 0  Walking Time: 1  Gait Score: 9  Total Score: 20       Tinetti Test and G-code impairment scale:  Percentage of Impairment CH    0%   CI    1-19% CJ    20-39% CK    40-59% CL    60-79% CM    80-99% CN     100%   Tinetti  Score 0-28 28 23-27 17-22 12-16 6-11 1-5 0       Tinetti Tool Score Risk of Falls  <19 = High Fall Risk  19-24 = Moderate Fall Risk  25-28 = Low Fall Risk  Tinetti ME. Performance-Oriented Assessment of Mobility Problems in Elderly Patients. [address]; [id number].  (Scoring Description: PT Bulletin Feb. 10, 1993)    Older adults: Jenny Hung et al, 2009; n = [address] elderly evaluated with ABC, MISTI, ADL, and IADL)  · Mean MISTI score for males aged 69-68 years = 26.21(3.40)  · Mean MISTI score for females age 69-68 years = 25.16(4.30)  · Mean MISTI score for males over 80 years = 23.29(6.02)  · Mean MISTI score for females over 80 years = 17.20(8.32)         G codes: In compliance with CMSs Claims Based Outcome Reporting, the following G-code set was chosen for this patient based on their primary functional limitation being treated: The outcome measure chosen to determine the severity of the functional limitation was the Tinetti with a score of 20/28 which was correlated with the impairment scale. ? Mobility - Walking and Moving Around:     - CURRENT STATUS: CJ - 20%-39% impaired, limited or restricted    - GOAL STATUS: CJ - 20%-39% impaired, limited or restricted    - D/C STATUS:  CJ - 20%-39% impaired, limited or restricted        Physical Therapy Evaluation Charge Determination   History Examination Presentation Decision-Making   LOW Complexity : Zero comorbidities / personal factors that will impact the outcome / POC LOW Complexity : 1-2 Standardized tests and measures addressing body structure, function, activity limitation and / or participation in recreation  LOW Complexity : Stable, uncomplicated  LOW Complexity : FOTO score of       Based on the above components, the patient evaluation is determined to be of the following complexity level: LOW     Pain:  Pain Scale 1: Numeric (0 - 10)  Pain Intensity 1: 0 (pt denies any pain needs at this time )     Activity Tolerance:   VSS  Please refer to the flowsheet for vital signs taken during this treatment.   After treatment:   [x]   Patient left in no apparent distress sitting up in chair  []   Patient left in no apparent distress in bed  [x]   Call bell left within reach  [x]   Nursing notified  []   Caregiver present  [x]   Bed alarm activated    COMMUNICATION/EDUCATION: Communication/Collaboration:  []   Fall prevention education was provided and the patient/caregiver indicated understanding. [x]   Patient/family have participated as able and agree with findings and recommendations. []   Patient is unable to participate in plan of care at this time.   Findings and recommendations were discussed with: Registered Nurse    Thank you for this referral.  Mani Pierce, PT   Time Calculation: 24 mins

## 2018-01-21 NOTE — PROGRESS NOTES
Problem: Falls - Risk of  Goal: *Absence of Falls  Document Kristian Fall Risk and appropriate interventions in the flowsheet.    Outcome: Progressing Towards Goal  Fall Risk Interventions:  Mobility Interventions: Bed/chair exit alarm, Communicate number of staff needed for ambulation/transfer, Patient to call before getting OOB    Mentation Interventions: Bed/chair exit alarm, Adequate sleep, hydration, pain control, More frequent rounding    Medication Interventions: Bed/chair exit alarm, Patient to call before getting OOB, Teach patient to arise slowly    Elimination Interventions: Bed/chair exit alarm, Call light in reach, Patient to call for help with toileting needs    History of Falls Interventions: Bed/chair exit alarm, Door open when patient unattended

## 2018-01-22 VITALS
OXYGEN SATURATION: 94 % | SYSTOLIC BLOOD PRESSURE: 130 MMHG | HEART RATE: 68 BPM | TEMPERATURE: 98.1 F | DIASTOLIC BLOOD PRESSURE: 60 MMHG | HEIGHT: 71 IN | RESPIRATION RATE: 16 BRPM | WEIGHT: 156.9 LBS | BODY MASS INDEX: 21.97 KG/M2

## 2018-01-22 LAB
ALBUMIN SERPL-MCNC: 1.9 G/DL (ref 3.5–5)
ALBUMIN/GLOB SERPL: 0.5 {RATIO} (ref 1.1–2.2)
ALP SERPL-CCNC: 57 U/L (ref 45–117)
ALT SERPL-CCNC: 23 U/L (ref 12–78)
ANION GAP SERPL CALC-SCNC: 6 MMOL/L (ref 5–15)
AST SERPL-CCNC: 18 U/L (ref 15–37)
BASOPHILS # BLD: 0 K/UL (ref 0–0.1)
BASOPHILS NFR BLD: 0 % (ref 0–1)
BILIRUB SERPL-MCNC: 0.5 MG/DL (ref 0.2–1)
BUN SERPL-MCNC: 9 MG/DL (ref 6–20)
BUN/CREAT SERPL: 13 (ref 12–20)
CALCIUM SERPL-MCNC: 7.8 MG/DL (ref 8.5–10.1)
CHLORIDE SERPL-SCNC: 100 MMOL/L (ref 97–108)
CO2 SERPL-SCNC: 27 MMOL/L (ref 21–32)
CREAT SERPL-MCNC: 0.67 MG/DL (ref 0.55–1.02)
DIFFERENTIAL METHOD BLD: ABNORMAL
EOSINOPHIL # BLD: 0.1 K/UL (ref 0–0.4)
EOSINOPHIL NFR BLD: 7 % (ref 0–7)
ERYTHROCYTE [DISTWIDTH] IN BLOOD BY AUTOMATED COUNT: 16.6 % (ref 11.5–14.5)
GLOBULIN SER CALC-MCNC: 3.5 G/DL (ref 2–4)
GLUCOSE BLD STRIP.AUTO-MCNC: 196 MG/DL (ref 65–100)
GLUCOSE SERPL-MCNC: 175 MG/DL (ref 65–100)
HCT VFR BLD AUTO: 27.6 % (ref 35–47)
HGB BLD-MCNC: 9.2 G/DL (ref 11.5–16)
LYMPHOCYTES # BLD: 0.1 K/UL (ref 0.8–3.5)
LYMPHOCYTES NFR BLD: 6 % (ref 12–49)
MCH RBC QN AUTO: 29.1 PG (ref 26–34)
MCHC RBC AUTO-ENTMCNC: 33.3 G/DL (ref 30–36.5)
MCV RBC AUTO: 87.3 FL (ref 80–99)
MONOCYTES # BLD: 0.1 K/UL (ref 0–1)
MONOCYTES NFR BLD: 6 % (ref 5–13)
NEUTS SEG # BLD: 1.7 K/UL (ref 1.8–8)
NEUTS SEG NFR BLD: 81 % (ref 32–75)
PLATELET # BLD AUTO: 69 K/UL (ref 150–400)
POTASSIUM SERPL-SCNC: 3.3 MMOL/L (ref 3.5–5.1)
PROT SERPL-MCNC: 5.4 G/DL (ref 6.4–8.2)
RBC # BLD AUTO: 3.16 M/UL (ref 3.8–5.2)
RBC MORPH BLD: ABNORMAL
RBC MORPH BLD: ABNORMAL
SERVICE CMNT-IMP: ABNORMAL
SODIUM SERPL-SCNC: 133 MMOL/L (ref 136–145)
WBC # BLD AUTO: 2 K/UL (ref 3.6–11)

## 2018-01-22 PROCEDURE — 82962 GLUCOSE BLOOD TEST: CPT

## 2018-01-22 PROCEDURE — 36415 COLL VENOUS BLD VENIPUNCTURE: CPT | Performed by: INTERNAL MEDICINE

## 2018-01-22 PROCEDURE — 74011250636 HC RX REV CODE- 250/636: Performed by: INTERNAL MEDICINE

## 2018-01-22 PROCEDURE — 85027 COMPLETE CBC AUTOMATED: CPT | Performed by: INTERNAL MEDICINE

## 2018-01-22 PROCEDURE — 74011000250 HC RX REV CODE- 250: Performed by: INTERNAL MEDICINE

## 2018-01-22 PROCEDURE — 74011250637 HC RX REV CODE- 250/637: Performed by: INTERNAL MEDICINE

## 2018-01-22 PROCEDURE — 80053 COMPREHEN METABOLIC PANEL: CPT | Performed by: INTERNAL MEDICINE

## 2018-01-22 RX ADMIN — Medication 10 ML: at 06:53

## 2018-01-22 RX ADMIN — CARVEDILOL 6.25 MG: 6.25 TABLET, FILM COATED ORAL at 08:54

## 2018-01-22 RX ADMIN — BACITRACIN 1 PACKET: 500 OINTMENT TOPICAL at 10:30

## 2018-01-22 RX ADMIN — TBO-FILGRASTIM 300 MCG: 300 INJECTION, SOLUTION SUBCUTANEOUS at 08:54

## 2018-01-22 RX ADMIN — METFORMIN HYDROCHLORIDE 500 MG: 500 TABLET, EXTENDED RELEASE ORAL at 08:54

## 2018-01-22 RX ADMIN — CASTOR OIL AND BALSAM, PERU: 788; 87 OINTMENT TOPICAL at 08:56

## 2018-01-22 RX ADMIN — FLUOXETINE 10 MG: 10 CAPSULE ORAL at 08:54

## 2018-01-22 RX ADMIN — LEVOTHYROXINE SODIUM 88 MCG: 88 TABLET ORAL at 06:53

## 2018-01-22 NOTE — DISCHARGE INSTRUCTIONS
Patient Discharge Instructions    Eduardo Fang / [id number] : 1935    Admitted 1/15/2018 Discharged: 2018         DISCHARGE DIAGNOSIS:   Active Problems:    Fever (1/15/2018)           Take Home Medications          · It is important that you take the medication exactly as they are prescribed. · Keep your medication in the bottles provided by the pharmacist and keep a list of the medication names, dosages, and times to be taken in your wallet. · Do not take other medications without consulting your doctor. What to do at Home    1. Recommended diet: regular    2. Recommended activity: activity as tolerated          Follow-up with:   Dr. Honeycutt Bring office in 1 week. Please call for your own appointment [phone]. Information obtained by :  I understand that if any problems occur once I am at home I am to contact my physician. I understand and acknowledge receipt of the instructions indicated above. Physician's or R.N.'s Signature                                                                  Date/Time                                                                                                                                              Patient or Representative Signature                                                          Date/Time    PORTACATH REMOVAL DISCHARGE INSTRUCTIONS    Home Care Instructions: Your Portacath has been removed. Do not allow bra straps or any clothing to rub the skin over the removal site. Do not bathe or swim until the skin has healed. Once it is healed (usually within 7-10 days), it is okay to bathe and swim. Restrict yourself to light activity for the first 5 days, after that resume normal activity slowly. You may resume your normal diet and medications.     Follow-Up Instructions: Please see your oncologist, or the physician who ordered the port removal.  You may remove the dressing in three days and continue to keep the area dry until the incision is healed. Call If: You should call your Physician and/or the Radiology Nurse if you notice redness, pus, swelling, or pain from the area of your incision. Call if you should develop a fever. To Reach Us:   Should you experience any significant changes, please call [phone] between the hours of 7:30 am and 10 pm or [phone] after hours. After hours, ask the  to page the [address] Technologist, and describe the problem to the technologist.          Patient Signature:  Date: 1/19/2018  Discharging Nurse: Trevon Ochoa RN        Nutrition Recommendations for Discharge:  Continue Oral Nutrition Supplements at discharge:   Sugar Free [place] Instant Breakfast Twice daily   for 30 days unless otherwise directed by your Primary Care Physician. This product or similar product can be purchased at your local grocery and pharmacy.       Triston Rosa RD, MS, CDE

## 2018-01-22 NOTE — PROGRESS NOTES
Hematology-Oncology Progress Note    Ivette Milligan  1935  [id number]  1/22/2018    Follow-up for:  ams     [x]        Chart notes since last visit reviewed   []        Medications reviewed for allergies and interactions       Case discussed with the following:         []                            []        Nursing Staff                                                                         []        Pathologist                                                                        [x]        FAMILY      Subjective:     Fatigue but feeling better, no fever, no pain, anxious to go home    Objective:     Patient Vitals for the past 24 hrs:   BP Temp Pulse Resp SpO2 Weight   01/22/18 0852 130/60 98.1 °F (36.7 °C) 68 16 94 % -   01/22/18 0842 - - - - - 71.2 kg (156 lb 14.4 oz)   01/22/18 0655 115/62 98.6 °F (37 °C) 73 17 97 % -   01/21/18 2021 129/68 99.6 °F (37.6 °C) 73 18 94 % -   01/21/18 1458 150/67 99 °F (37.2 °C) 70 18 92 % -   01/21/18 1305 132/72 - - - - -   01/21/18 1258 138/71 - - - - -       REVIEW OF SYSTEMS:    Constitutional: negative fever, negative chills, negative weight loss    ENT:   negative sore throat, tongue or lip swelling  Respiratory:  negative cough, negative dyspnea  Cards:  negative for chest pain,  GI:   negative for nausea, vomiting, diarrhea, and abdominal pain  Neuro:  negative for headaches, dizziness, vertigo  [x]                        Full ROS o/w normal/non contributor    Constitutional:  Patient looks  []        Sick  []        Frail  [x]        Better                                                 []        Depressed    HEENT:  [x]   NC                         []   AT               []    ALOPECIA           Eyes: [x]   Normal               []    Icteric  Oropharynx: [x]    Normal                  []  Thrush               []   Dry  Mucositis: [x]    None                 Grade: []        I  []        II  []        III  []        IV  Neck:   [x]   Supple []  Rigid               JVD:    []   ABSENT       []   PRESENT  Lymphadenopathy:   [x]   None Noted            []   PRESENT    Chest:  [x]   Clear               []    Rhonchi                      Dec'd @     []  Right Base           []   Left Base    CV:             [x]   Regular              []  Irregular               []   Tachy                []   Murmur  Abdominal:   [x]    Soft              []   NON-tender               []   Tender      BS:    []   ABSENT                   []   PRESENT  Liver:     [x]  NON-palp                  []   EDGE- palp  Spleen: [x]   NON-palp                   []  EDGE - palp  Mass:   []   ABSENT                          []  PRESENT  Extr:    []  Lymphedema             []   Cyanosis      []  Clubbing  Edema:     [x]   NONE       []   PRESENT  Skin:  Intact [x]           Purpura []        Rash: [x]   ABSENT       []  PRESENT  Neuro:  [x]        Normal  []        Confused      Available labs reviewed:  Labs:    Recent Results (from the past 24 hour(s))   GLUCOSE, POC    Collection Time: 01/21/18 11:22 AM   Result Value Ref Range    Glucose (POC) 213 (H) 65 - 100 mg/dL    Performed by Paktor, POC    Collection Time: 01/21/18  4:31 PM   Result Value Ref Range    Glucose (POC) 219 (H) 65 - 100 mg/dL    Performed by Paktor, POC    Collection Time: 01/21/18  9:44 PM   Result Value Ref Range    Glucose (POC) 143 (H) 65 - 100 mg/dL    Performed by SAE BLANK    METABOLIC PANEL, COMPREHENSIVE    Collection Time: 01/22/18  7:06 AM   Result Value Ref Range    Sodium 133 (L) 136 - 145 mmol/L    Potassium 3.3 (L) 3.5 - 5.1 mmol/L    Chloride 100 97 - 108 mmol/L    CO2 27 21 - 32 mmol/L    Anion gap 6 5 - 15 mmol/L    Glucose 175 (H) 65 - 100 mg/dL    BUN 9 6 - 20 MG/DL    Creatinine 0.67 0.55 - 1.02 MG/DL    BUN/Creatinine ratio 13 12 - 20      GFR est AA >60 >60 ml/min/1.73m2    GFR est non-AA >60 >60 ml/min/1.73m2    Calcium 7.8 (L) 8.5 - 10.1 MG/DL Bilirubin, total 0.5 0.2 - 1.0 MG/DL    ALT (SGPT) 23 12 - 78 U/L    AST (SGOT) 18 15 - 37 U/L    Alk. phosphatase 57 45 - 117 U/L    Protein, total 5.4 (L) 6.4 - 8.2 g/dL    Albumin 1.9 (L) 3.5 - 5.0 g/dL    Globulin 3.5 2.0 - 4.0 g/dL    A-G Ratio 0.5 (L) 1.1 - 2.2     CBC W/O DIFF    Collection Time: 01/22/18  7:06 AM   Result Value Ref Range    WBC 2.0 (L) 3.6 - 11.0 K/uL    RBC 3.16 (L) 3.80 - 5.20 M/uL    HGB 9.2 (L) 11.5 - 16.0 g/dL    HCT 27.6 (L) 35.0 - 47.0 %    MCV 87.3 80.0 - 99.0 FL    MCH 29.1 26.0 - 34.0 PG    MCHC 33.3 30.0 - 36.5 g/dL    RDW 16.6 (H) 11.5 - 14.5 %    PLATELET 69 (L) 329 - 400 K/uL   DIFFERENTIAL, AUTO    Collection Time: 01/22/18  7:06 AM   Result Value Ref Range    NEUTROPHILS 81 (H) 32 - 75 %    LYMPHOCYTES 6 (L) 12 - 49 %    MONOCYTES 6 5 - 13 %    EOSINOPHILS 7 0 - 7 %    BASOPHILS 0 0 - 1 %    ABS. NEUTROPHILS 1.7 (L) 1.8 - 8.0 K/UL    ABS. LYMPHOCYTES 0.1 (L) 0.8 - 3.5 K/UL    ABS. MONOCYTES 0.1 0.0 - 1.0 K/UL    ABS. EOSINOPHILS 0.1 0.0 - 0.4 K/UL    ABS. BASOPHILS 0.0 0.0 - 0.1 K/UL    DF SMEAR SCANNED      RBC COMMENTS ANISOCYTOSIS  1+        RBC COMMENTS OVALOCYTES  PRESENT       GLUCOSE, POC    Collection Time: 01/22/18  7:40 AM   Result Value Ref Range    Glucose (POC) 196 (H) 65 - 100 mg/dL    Performed by Perry Galvan        Available Xrays reviewed:    Chemotherapy monitored and toxicities assessed:    Assessment and Plan   1. Ams. .. Resolved. CT head was negative    2. Pancytopenia. .. Wbc better, plt. Lower,   Bone marrow bx in December showed hypercellular marrow, normal c-somes and no sign of lymphoma       3... Hx of AIL. .. No evidence of recurrence on December ct's . .. Her original presentation was with severe pruritis/fever   Consider PT      3. Port malfunction. Port removed in IR,     4. Disposition. . Pt. Is doing better mostly. Concerned by her counts, but suspect this due to mds. .. Will d/c pt.  Home now , f/u on Monday in my office  Joycie Dakins, MD

## 2018-01-22 NOTE — PROGRESS NOTES
Problem: Falls - Risk of  Goal: *Absence of Falls  Document Kristian Fall Risk and appropriate interventions in the flowsheet.    Outcome: Progressing Towards Goal  Fall Risk Interventions:  Mobility Interventions: Bed/chair exit alarm, Communicate number of staff needed for ambulation/transfer, Patient to call before getting OOB    Mentation Interventions: Adequate sleep, hydration, pain control, Bed/chair exit alarm, Door open when patient unattended, Family/sitter at bedside, More frequent rounding    Medication Interventions: Bed/chair exit alarm, Patient to call before getting OOB, Teach patient to arise slowly    Elimination Interventions: Call light in reach, Patient to call for help with toileting needs    History of Falls Interventions: Bed/chair exit alarm, Door open when patient unattended, Room close to nurse's station

## 2018-01-22 NOTE — PROGRESS NOTES
CM met with patient and her daughter. Patient is very anxious to go home. Patient states that she has a walker and cane. She was driving before she was admitted into the hospital. She lives alone but, daughter has been staying at night with her. She has a grandson that lives in a trailer on her property. Family stops by often and goes to grocery store for her. Patient is not going to receive HH/PT/OT or Palliative care at this time. Daughter is driving her home to [address]. Patient is to follow-up with Dr. Danny Florence on Monday and her PCP in one week. Prescriptions are filled at [facility]. Insurance: VA/Medicare A&B and [facility] Supplement Medicare. Patient states she could use some personal care. When I explained that insurance does not pay for that. This would be an out-of-pocket expense she said \"just forget it I'm broke\". [facility] [id number]11[facility]15[facility]17. [address] [place] [address] 12/4/17-12/6/17. Patient states she has a AD however there is no documentation in the chart and patient stated she had to leave now.     Elizabeth Harmon RN CRM

## 2018-01-22 NOTE — PROGRESS NOTES
I have reviewed discharge instructions with the patient and caregiver. The patient and caregiver verbalized understanding. Patient taken to vehicle via wheelchair accompanied by daughter and volunteer staff. All belongings sent home with patient. No s/s of distress noted upon discharge.

## 2018-01-22 NOTE — PROGRESS NOTES
Bedside and Verbal shift change report given to Jermain Sanz RN (oncoming nurse) by CARLINE Garcia (offgoing nurse). Report included the following information SBAR, Kardex, MAR and Recent Results.

## 2018-01-22 NOTE — PROGRESS NOTES
Bedside shift change report given to Radha [place] (oncoming nurse) by Colletta Bell, RN (offgoing nurse). Report included the following information SBAR, Kardex, ED Summary, [facility] and Recent Results.

## 2018-01-22 NOTE — PROGRESS NOTES
Occupational Therapy Screening:  Services are not indicated at this time. [facility] based on results obtained during the nursing admission assessment. The patients chart was reviewed and the patient is not appropriate for a skilled therapy evaluation at this time. Please consult occupational therapy if any therapy needs arise. Thank you.     Kristal Hurtado        PLOF 11/2017  Prior Level of Function/Environment/Context: lives home alone, mod I with SPC and for ADLs  Occupations in which the patient is/was successful, what are the barriers preventing that success: anxiety  Performance Patterns (routines, roles, habits, and rituals): lives alone, driving  Personal Interests and/or values:   Expanded or extensive additional review of patient history:      Home Situation  Home Environment: Private residence  # Steps to Enter: 2  Rails to Enter: No  One/Two Story Residence: One story  Living Alone: Yes  Support Systems: Child(hunter), Family member(s)  Patient Expects to be Discharged to[de-identified] Rehabilitation facility (SNF)  Current DME Used/Available at Home: Cane, straight, Walker, rolling, Grab bars, Shower chair  [x]  Right hand dominant                []  Left hand do

## 2018-01-22 NOTE — PROGRESS NOTES
Spiritual Care Partner Volunteer visited patient in room 607/01 on 1.22.18. Documented by: : Rev. Samson Thomas.  Silvio Alonso; [facility], to contact [phone] call: [phone]

## 2018-01-22 NOTE — PROGRESS NOTES
Spoke with Dr. Anurag Izquierdo regarding patient's PICC line. MD states that he would like the line removed prior to discharge. Will remove line.

## 2018-01-25 NOTE — DISCHARGE SUMMARY
[address]    Hannah Camarena.  MR#: [id number]  : 1935  ACCOUNT #: [de-identified]   ADMIT DATE: 01/15/2018  DISCHARGE DATE: 2018    HISTORY OF PRESENT ILLNESS:  The patient is an 15-year-old woman with a history of lymphoma who was admitted on 01/15/2018 and discharged on 2018. She was admitted on 01/15/2018 with altered mental status and fever at home. For details of H&P, please see my dictated history and physical.     HOSPITAL COURSE:  Upon admission, the patient had cultures drawn, chest x-ray performed and CT scan of the head without contrast, which ruled out bleed. She was treated with IV fluids and IV antibiotics and over the next 24 hours had rapid improvement in her mental status back to baseline. We did identify a malfunctioning Port-A-Cath and she was sent down to interventional radiology to have this removed on 2018. She had a low albumin consistent with protein calorie malnutrition due to her underlying lymphoma in remission. Her pancytopenia had been evaluated in 2017, a bone marrow biopsy showed hypercellular cellularity, normal chromosomes, no sign of lymphoma. 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 Her white count improved after the addition of Granix on 2018 with a doubling. Her platelet count deteriorated slightly before stabilizing on 2018 at the time of discharge. At the time of discharge, she was afebrile, her cultures were negative and she is quite anxious to leave. We made arrangements for her to come back to see me in the office the week following discharge, she had no activity or dietary restrictions, though she is at risk for falling.     DISCHARGE MEDICATIONS:  Included metformin 1000 mg b.i.d. with meals, Protonix 40 mg daily, Prozac 20 mg daily, Synthroid 88 mcg daily, Coreg 6.25 mg b.i.d., prednisone 1 mg daily, Colace 1 tablet daily, hydrocodone 1 tablet every 8 hours as needed for pain. DISCHARGE CONDITION:  Stable.       MD KAREN Finch/KATRIN  D: 01/25/2018 11:03     T: 01/25/2018 14:03  JOB #: [id number]

## 2018-02-01 NOTE — PROGRESS NOTES
cdkeith oconnell. . Pt was not treated for prot.  Avinash malnutrition during this admit  Jw Andrew MD

## 2018-06-13 ENCOUNTER — HOSPITAL ENCOUNTER (INPATIENT)
Age: 83
LOS: 9 days | Discharge: ANOTHER HOSPITAL | DRG: 808 | End: 2018-06-22
Attending: FAMILY MEDICINE | Admitting: INTERNAL MEDICINE
Payer: MEDICARE

## 2018-06-13 PROBLEM — D61.818 PANCYTOPENIA (HCC): Status: ACTIVE | Noted: 2018-06-13

## 2018-06-13 LAB
GLUCOSE BLD STRIP.AUTO-MCNC: 392 MG/DL (ref 65–100)
SERVICE CMNT-IMP: ABNORMAL

## 2018-06-13 PROCEDURE — 65660000001 HC RM ICU INTERMED STEPDOWN

## 2018-06-13 PROCEDURE — 82962 GLUCOSE BLOOD TEST: CPT

## 2018-06-13 PROCEDURE — 74011250636 HC RX REV CODE- 250/636: Performed by: INTERNAL MEDICINE

## 2018-06-13 RX ORDER — SODIUM CHLORIDE 9 MG/ML
50 INJECTION, SOLUTION INTRAVENOUS CONTINUOUS
Status: DISCONTINUED | OUTPATIENT
Start: 2018-06-13 | End: 2018-06-22 | Stop reason: HOSPADM

## 2018-06-13 RX ORDER — INSULIN LISPRO 100 [IU]/ML
INJECTION, SOLUTION INTRAVENOUS; SUBCUTANEOUS
Status: DISCONTINUED | OUTPATIENT
Start: 2018-06-13 | End: 2018-06-22 | Stop reason: HOSPADM

## 2018-06-13 RX ORDER — MAGNESIUM SULFATE 100 %
4 CRYSTALS MISCELLANEOUS AS NEEDED
Status: DISCONTINUED | OUTPATIENT
Start: 2018-06-13 | End: 2018-06-22 | Stop reason: HOSPADM

## 2018-06-13 RX ORDER — DEXTROSE 50 % IN WATER (D50W) INTRAVENOUS SYRINGE
12.5-25 AS NEEDED
Status: DISCONTINUED | OUTPATIENT
Start: 2018-06-13 | End: 2018-06-22 | Stop reason: HOSPADM

## 2018-06-13 RX ADMIN — SODIUM CHLORIDE 50 ML/HR: 900 INJECTION, SOLUTION INTRAVENOUS at 22:00

## 2018-06-14 ENCOUNTER — APPOINTMENT (OUTPATIENT)
Dept: CT IMAGING | Age: 83
DRG: 808 | End: 2018-06-14
Attending: INTERNAL MEDICINE
Payer: MEDICARE

## 2018-06-14 ENCOUNTER — APPOINTMENT (OUTPATIENT)
Dept: GENERAL RADIOLOGY | Age: 83
DRG: 808 | End: 2018-06-14
Attending: INTERNAL MEDICINE
Payer: MEDICARE

## 2018-06-14 LAB
ABO + RH BLD: NORMAL
ALBUMIN SERPL-MCNC: 2.9 G/DL (ref 3.5–5)
ALBUMIN/GLOB SERPL: 1 {RATIO} (ref 1.1–2.2)
ALP SERPL-CCNC: 87 U/L (ref 45–117)
ALT SERPL-CCNC: 13 U/L (ref 12–78)
ANION GAP SERPL CALC-SCNC: 9 MMOL/L (ref 5–15)
ANTI-COMPLEMENT (C3B,C3D): NORMAL
APPEARANCE UR: CLEAR
APTT PPP: 21.5 SEC (ref 22.1–32)
AST SERPL-CCNC: 9 U/L (ref 15–37)
BACTERIA URNS QL MICRO: NEGATIVE /HPF
BILIRUB SERPL-MCNC: 1 MG/DL (ref 0.2–1)
BILIRUB UR QL: NEGATIVE
BLD PROD TYP BPU: NORMAL
BLD PROD TYP BPU: NORMAL
BLOOD GROUP ANTIBODIES SERPL: NORMAL
BPU ID: NORMAL
BPU ID: NORMAL
BUN SERPL-MCNC: 25 MG/DL (ref 6–20)
BUN/CREAT SERPL: 26 (ref 12–20)
CALCIUM SERPL-MCNC: 8.2 MG/DL (ref 8.5–10.1)
CHLORIDE SERPL-SCNC: 106 MMOL/L (ref 97–108)
CK MB CFR SERPL CALC: NORMAL % (ref 0–2.5)
CK MB CFR SERPL CALC: NORMAL % (ref 0–2.5)
CK MB SERPL-MCNC: <1 NG/ML (ref 5–25)
CK MB SERPL-MCNC: <1 NG/ML (ref 5–25)
CK SERPL-CCNC: 58 U/L (ref 26–192)
CK SERPL-CCNC: 63 U/L (ref 26–192)
CO2 SERPL-SCNC: 24 MMOL/L (ref 21–32)
COLOR UR: ABNORMAL
CREAT SERPL-MCNC: 0.98 MG/DL (ref 0.55–1.02)
CROSSMATCH RESULT,%XM: NORMAL
CROSSMATCH RESULT,%XM: NORMAL
CRP SERPL-MCNC: 2.06 MG/DL (ref 0–0.6)
DAT IGG-SP REAG RBC QL: NORMAL
EPITH CASTS URNS QL MICRO: ABNORMAL /LPF
ERYTHROCYTE [SEDIMENTATION RATE] IN BLOOD: 121 MM/HR (ref 0–30)
EST. AVERAGE GLUCOSE BLD GHB EST-MCNC: ABNORMAL MG/DL
FERRITIN SERPL-MCNC: 1554 NG/ML (ref 8–252)
FIBRINOGEN PPP-MCNC: 331 MG/DL (ref 200–475)
FOLATE SERPL-MCNC: 15.2 NG/ML (ref 5–21)
GLOBULIN SER CALC-MCNC: 2.8 G/DL (ref 2–4)
GLUCOSE BLD STRIP.AUTO-MCNC: 257 MG/DL (ref 65–100)
GLUCOSE BLD STRIP.AUTO-MCNC: 285 MG/DL (ref 65–100)
GLUCOSE BLD STRIP.AUTO-MCNC: 293 MG/DL (ref 65–100)
GLUCOSE BLD STRIP.AUTO-MCNC: 324 MG/DL (ref 65–100)
GLUCOSE SERPL-MCNC: 228 MG/DL (ref 65–100)
GLUCOSE UR STRIP.AUTO-MCNC: 250 MG/DL
HAPTOGLOB SERPL-MCNC: 21 MG/DL (ref 30–200)
HBA1C MFR BLD: <3.5 % (ref 4.2–6.3)
HGB UR QL STRIP: ABNORMAL
INR PPP: 1.1 (ref 0.9–1.1)
IRON SATN MFR SERPL: 99 % (ref 20–50)
IRON SERPL-MCNC: 197 UG/DL (ref 35–150)
KETONES UR QL STRIP.AUTO: NEGATIVE MG/DL
LACTATE SERPL-SCNC: 1.2 MMOL/L (ref 0.4–2)
LDH SERPL L TO P-CCNC: 191 U/L (ref 81–246)
LEUKOCYTE ESTERASE UR QL STRIP.AUTO: NEGATIVE
NITRITE UR QL STRIP.AUTO: NEGATIVE
PH UR STRIP: 6 [PH] (ref 5–8)
PHOSPHATE SERPL-MCNC: 3.4 MG/DL (ref 2.6–4.7)
POTASSIUM SERPL-SCNC: 4.1 MMOL/L (ref 3.5–5.1)
PROT SERPL-MCNC: 5.7 G/DL (ref 6.4–8.2)
PROT UR STRIP-MCNC: NEGATIVE MG/DL
PROTHROMBIN TIME: 10.8 SEC (ref 9–11.1)
RBC #/AREA URNS HPF: ABNORMAL /HPF (ref 0–5)
SERVICE CMNT-IMP: ABNORMAL
SODIUM SERPL-SCNC: 139 MMOL/L (ref 136–145)
SP GR UR REFRACTOMETRY: 1.02 (ref 1–1.03)
SPECIMEN EXP DATE BLD: NORMAL
STATUS OF UNIT,%ST: NORMAL
STATUS OF UNIT,%ST: NORMAL
THERAPEUTIC RANGE,PTTT: ABNORMAL SECS (ref 58–77)
TIBC SERPL-MCNC: 198 UG/DL (ref 250–450)
TROPONIN I SERPL-MCNC: <0.05 NG/ML
TROPONIN I SERPL-MCNC: <0.05 NG/ML
TSH SERPL DL<=0.05 MIU/L-ACNC: 3.49 UIU/ML (ref 0.36–3.74)
UA: UC IF INDICATED,UAUC: ABNORMAL
UNIT DIVISION, %UDIV: 0
UNIT DIVISION, %UDIV: 0
URATE SERPL-MCNC: 3.7 MG/DL (ref 2.6–6)
UROBILINOGEN UR QL STRIP.AUTO: 2 EU/DL (ref 0.2–1)
VIT B12 SERPL-MCNC: 225 PG/ML (ref 193–986)
WBC URNS QL MICRO: ABNORMAL /HPF (ref 0–4)

## 2018-06-14 PROCEDURE — 83605 ASSAY OF LACTIC ACID: CPT | Performed by: INTERNAL MEDICINE

## 2018-06-14 PROCEDURE — 82550 ASSAY OF CK (CPK): CPT | Performed by: INTERNAL MEDICINE

## 2018-06-14 PROCEDURE — 84484 ASSAY OF TROPONIN QUANT: CPT | Performed by: INTERNAL MEDICINE

## 2018-06-14 PROCEDURE — 82728 ASSAY OF FERRITIN: CPT | Performed by: INTERNAL MEDICINE

## 2018-06-14 PROCEDURE — 84550 ASSAY OF BLOOD/URIC ACID: CPT | Performed by: INTERNAL MEDICINE

## 2018-06-14 PROCEDURE — 74011250637 HC RX REV CODE- 250/637: Performed by: INTERNAL MEDICINE

## 2018-06-14 PROCEDURE — 85652 RBC SED RATE AUTOMATED: CPT | Performed by: INTERNAL MEDICINE

## 2018-06-14 PROCEDURE — 81001 URINALYSIS AUTO W/SCOPE: CPT | Performed by: INTERNAL MEDICINE

## 2018-06-14 PROCEDURE — 85730 THROMBOPLASTIN TIME PARTIAL: CPT | Performed by: INTERNAL MEDICINE

## 2018-06-14 PROCEDURE — 83036 HEMOGLOBIN GLYCOSYLATED A1C: CPT | Performed by: INTERNAL MEDICINE

## 2018-06-14 PROCEDURE — 71260 CT THORAX DX C+: CPT

## 2018-06-14 PROCEDURE — 74011636320 HC RX REV CODE- 636/320: Performed by: HOSPITALIST

## 2018-06-14 PROCEDURE — 82784 ASSAY IGA/IGD/IGG/IGM EACH: CPT | Performed by: INTERNAL MEDICINE

## 2018-06-14 PROCEDURE — 74177 CT ABD & PELVIS W/CONTRAST: CPT

## 2018-06-14 PROCEDURE — 30233N1 TRANSFUSION OF NONAUTOLOGOUS RED BLOOD CELLS INTO PERIPHERAL VEIN, PERCUTANEOUS APPROACH: ICD-10-PCS | Performed by: INTERNAL MEDICINE

## 2018-06-14 PROCEDURE — 74011250636 HC RX REV CODE- 250/636: Performed by: HOSPITALIST

## 2018-06-14 PROCEDURE — 83540 ASSAY OF IRON: CPT | Performed by: INTERNAL MEDICINE

## 2018-06-14 PROCEDURE — 70491 CT SOFT TISSUE NECK W/DYE: CPT

## 2018-06-14 PROCEDURE — 83010 ASSAY OF HAPTOGLOBIN QUANT: CPT | Performed by: INTERNAL MEDICINE

## 2018-06-14 PROCEDURE — 86880 COOMBS TEST DIRECT: CPT | Performed by: FAMILY MEDICINE

## 2018-06-14 PROCEDURE — 36415 COLL VENOUS BLD VENIPUNCTURE: CPT | Performed by: INTERNAL MEDICINE

## 2018-06-14 PROCEDURE — 82746 ASSAY OF FOLIC ACID SERUM: CPT | Performed by: INTERNAL MEDICINE

## 2018-06-14 PROCEDURE — P9040 RBC LEUKOREDUCED IRRADIATED: HCPCS | Performed by: FAMILY MEDICINE

## 2018-06-14 PROCEDURE — 86923 COMPATIBILITY TEST ELECTRIC: CPT | Performed by: FAMILY MEDICINE

## 2018-06-14 PROCEDURE — 74011000258 HC RX REV CODE- 258: Performed by: HOSPITALIST

## 2018-06-14 PROCEDURE — 87040 BLOOD CULTURE FOR BACTERIA: CPT | Performed by: INTERNAL MEDICINE

## 2018-06-14 PROCEDURE — 6A551Z2 PHERESIS OF PLATELETS, MULTIPLE: ICD-10-PCS | Performed by: INTERNAL MEDICINE

## 2018-06-14 PROCEDURE — 86860 RBC ANTIBODY ELUTION: CPT | Performed by: FAMILY MEDICINE

## 2018-06-14 PROCEDURE — 86140 C-REACTIVE PROTEIN: CPT | Performed by: INTERNAL MEDICINE

## 2018-06-14 PROCEDURE — 85384 FIBRINOGEN ACTIVITY: CPT | Performed by: INTERNAL MEDICINE

## 2018-06-14 PROCEDURE — 86900 BLOOD TYPING SEROLOGIC ABO: CPT | Performed by: FAMILY MEDICINE

## 2018-06-14 PROCEDURE — 83615 LACTATE (LD) (LDH) ENZYME: CPT | Performed by: INTERNAL MEDICINE

## 2018-06-14 PROCEDURE — 65660000001 HC RM ICU INTERMED STEPDOWN

## 2018-06-14 PROCEDURE — 82607 VITAMIN B-12: CPT | Performed by: INTERNAL MEDICINE

## 2018-06-14 PROCEDURE — 80053 COMPREHEN METABOLIC PANEL: CPT | Performed by: INTERNAL MEDICINE

## 2018-06-14 PROCEDURE — 70450 CT HEAD/BRAIN W/O DYE: CPT

## 2018-06-14 PROCEDURE — P9037 PLATE PHERES LEUKOREDU IRRAD: HCPCS | Performed by: INTERNAL MEDICINE

## 2018-06-14 PROCEDURE — 85610 PROTHROMBIN TIME: CPT | Performed by: INTERNAL MEDICINE

## 2018-06-14 PROCEDURE — 71045 X-RAY EXAM CHEST 1 VIEW: CPT

## 2018-06-14 PROCEDURE — 74011250637 HC RX REV CODE- 250/637: Performed by: HOSPITALIST

## 2018-06-14 PROCEDURE — 84100 ASSAY OF PHOSPHORUS: CPT | Performed by: INTERNAL MEDICINE

## 2018-06-14 PROCEDURE — 84165 PROTEIN E-PHORESIS SERUM: CPT | Performed by: INTERNAL MEDICINE

## 2018-06-14 PROCEDURE — 82962 GLUCOSE BLOOD TEST: CPT

## 2018-06-14 PROCEDURE — 85025 COMPLETE CBC W/AUTO DIFF WBC: CPT | Performed by: INTERNAL MEDICINE

## 2018-06-14 PROCEDURE — 74011636637 HC RX REV CODE- 636/637: Performed by: INTERNAL MEDICINE

## 2018-06-14 PROCEDURE — 86078 PHYS BLOOD BANK SERV REACTJ: CPT | Performed by: HOSPITALIST

## 2018-06-14 PROCEDURE — 84443 ASSAY THYROID STIM HORMONE: CPT | Performed by: INTERNAL MEDICINE

## 2018-06-14 PROCEDURE — 74011250636 HC RX REV CODE- 250/636: Performed by: INTERNAL MEDICINE

## 2018-06-14 PROCEDURE — 36430 TRANSFUSION BLD/BLD COMPNT: CPT

## 2018-06-14 RX ORDER — DIPHENHYDRAMINE HCL 50 MG
50 CAPSULE ORAL
Status: DISCONTINUED | OUTPATIENT
Start: 2018-06-14 | End: 2018-06-22 | Stop reason: HOSPADM

## 2018-06-14 RX ORDER — LEVOTHYROXINE SODIUM 88 UG/1
88 TABLET ORAL
Status: DISCONTINUED | OUTPATIENT
Start: 2018-06-14 | End: 2018-06-22 | Stop reason: HOSPADM

## 2018-06-14 RX ORDER — FUROSEMIDE 10 MG/ML
20 INJECTION INTRAMUSCULAR; INTRAVENOUS ONCE
Status: COMPLETED | OUTPATIENT
Start: 2018-06-14 | End: 2018-06-14

## 2018-06-14 RX ORDER — LEVOTHYROXINE SODIUM 88 UG/1
88 TABLET ORAL
COMMUNITY

## 2018-06-14 RX ORDER — LEVOFLOXACIN 5 MG/ML
750 INJECTION, SOLUTION INTRAVENOUS EVERY 24 HOURS
Status: DISCONTINUED | OUTPATIENT
Start: 2018-06-14 | End: 2018-06-15

## 2018-06-14 RX ORDER — SODIUM CHLORIDE 0.9 % (FLUSH) 0.9 %
10 SYRINGE (ML) INJECTION
Status: COMPLETED | OUTPATIENT
Start: 2018-06-14 | End: 2018-06-14

## 2018-06-14 RX ORDER — NITROGLYCERIN 0.4 MG/1
0.4 TABLET SUBLINGUAL
COMMUNITY

## 2018-06-14 RX ORDER — SODIUM CHLORIDE 0.9 % (FLUSH) 0.9 %
5-10 SYRINGE (ML) INJECTION EVERY 8 HOURS
Status: DISCONTINUED | OUTPATIENT
Start: 2018-06-14 | End: 2018-06-22 | Stop reason: HOSPADM

## 2018-06-14 RX ORDER — SODIUM CHLORIDE 0.9 % (FLUSH) 0.9 %
5-10 SYRINGE (ML) INJECTION AS NEEDED
Status: DISCONTINUED | OUTPATIENT
Start: 2018-06-14 | End: 2018-06-22 | Stop reason: HOSPADM

## 2018-06-14 RX ORDER — CARVEDILOL 6.25 MG/1
6.25 TABLET ORAL 2 TIMES DAILY WITH MEALS
Status: DISCONTINUED | OUTPATIENT
Start: 2018-06-14 | End: 2018-06-22 | Stop reason: HOSPADM

## 2018-06-14 RX ORDER — DIPHENHYDRAMINE HYDROCHLORIDE 50 MG/ML
25 INJECTION, SOLUTION INTRAMUSCULAR; INTRAVENOUS
Status: DISCONTINUED | OUTPATIENT
Start: 2018-06-14 | End: 2018-06-22 | Stop reason: HOSPADM

## 2018-06-14 RX ORDER — PANTOPRAZOLE SODIUM 40 MG/1
40 TABLET, DELAYED RELEASE ORAL DAILY
Status: DISCONTINUED | OUTPATIENT
Start: 2018-06-14 | End: 2018-06-22 | Stop reason: HOSPADM

## 2018-06-14 RX ORDER — ACETAMINOPHEN 325 MG/1
650 TABLET ORAL
Status: DISCONTINUED | OUTPATIENT
Start: 2018-06-14 | End: 2018-06-22 | Stop reason: HOSPADM

## 2018-06-14 RX ORDER — PREDNISONE 1 MG/1
1 TABLET ORAL
Status: DISCONTINUED | OUTPATIENT
Start: 2018-06-14 | End: 2018-06-14

## 2018-06-14 RX ORDER — LEVOFLOXACIN 5 MG/ML
750 INJECTION, SOLUTION INTRAVENOUS EVERY 24 HOURS
Status: DISCONTINUED | OUTPATIENT
Start: 2018-06-14 | End: 2018-06-14

## 2018-06-14 RX ORDER — ESCITALOPRAM OXALATE 10 MG/1
5 TABLET ORAL DAILY
Status: DISCONTINUED | OUTPATIENT
Start: 2018-06-15 | End: 2018-06-22 | Stop reason: HOSPADM

## 2018-06-14 RX ORDER — ESCITALOPRAM OXALATE 5 MG/1
5 TABLET ORAL DAILY
COMMUNITY

## 2018-06-14 RX ORDER — SODIUM CHLORIDE 9 MG/ML
250 INJECTION, SOLUTION INTRAVENOUS AS NEEDED
Status: DISCONTINUED | OUTPATIENT
Start: 2018-06-14 | End: 2018-06-22 | Stop reason: HOSPADM

## 2018-06-14 RX ORDER — GLIPIZIDE 5 MG/1
5 TABLET, FILM COATED, EXTENDED RELEASE ORAL DAILY
COMMUNITY

## 2018-06-14 RX ORDER — GABAPENTIN 100 MG/1
100 CAPSULE ORAL 3 TIMES DAILY
COMMUNITY

## 2018-06-14 RX ORDER — ONDANSETRON 2 MG/ML
4 INJECTION INTRAMUSCULAR; INTRAVENOUS
Status: DISCONTINUED | OUTPATIENT
Start: 2018-06-14 | End: 2018-06-22 | Stop reason: HOSPADM

## 2018-06-14 RX ORDER — METFORMIN HYDROCHLORIDE 500 MG/1
500 TABLET ORAL
COMMUNITY

## 2018-06-14 RX ORDER — AMOXICILLIN 250 MG
1 CAPSULE ORAL DAILY
Status: DISCONTINUED | OUTPATIENT
Start: 2018-06-14 | End: 2018-06-22 | Stop reason: HOSPADM

## 2018-06-14 RX ORDER — FLUOXETINE HYDROCHLORIDE 20 MG/1
20 CAPSULE ORAL DAILY
Status: DISCONTINUED | OUTPATIENT
Start: 2018-06-14 | End: 2018-06-14

## 2018-06-14 RX ORDER — HYDROCODONE BITARTRATE AND ACETAMINOPHEN 7.5; 325 MG/1; MG/1
1 TABLET ORAL
Status: DISCONTINUED | OUTPATIENT
Start: 2018-06-14 | End: 2018-06-22 | Stop reason: HOSPADM

## 2018-06-14 RX ORDER — GLIPIZIDE 2.5 MG/1
5 TABLET, EXTENDED RELEASE ORAL
Status: DISCONTINUED | OUTPATIENT
Start: 2018-06-15 | End: 2018-06-22 | Stop reason: HOSPADM

## 2018-06-14 RX ADMIN — CARVEDILOL 6.25 MG: 6.25 TABLET, FILM COATED ORAL at 08:50

## 2018-06-14 RX ADMIN — Medication 10 ML: at 15:41

## 2018-06-14 RX ADMIN — SODIUM CHLORIDE 100 ML: 900 INJECTION, SOLUTION INTRAVENOUS at 15:41

## 2018-06-14 RX ADMIN — SENNOSIDES AND DOCUSATE SODIUM 1 TABLET: 8.6; 5 TABLET ORAL at 08:50

## 2018-06-14 RX ADMIN — Medication 10 ML: at 06:22

## 2018-06-14 RX ADMIN — DIPHENHYDRAMINE HYDROCHLORIDE 25 MG: 25 CAPSULE ORAL at 16:51

## 2018-06-14 RX ADMIN — LEVOFLOXACIN 750 MG: 5 INJECTION, SOLUTION INTRAVENOUS at 13:27

## 2018-06-14 RX ADMIN — PANTOPRAZOLE SODIUM 40 MG: 40 TABLET, DELAYED RELEASE ORAL at 08:50

## 2018-06-14 RX ADMIN — FLUOXETINE 20 MG: 20 CAPSULE ORAL at 08:50

## 2018-06-14 RX ADMIN — IOPAMIDOL 100 ML: 755 INJECTION, SOLUTION INTRAVENOUS at 15:41

## 2018-06-14 RX ADMIN — CARVEDILOL 6.25 MG: 6.25 TABLET, FILM COATED ORAL at 18:49

## 2018-06-14 RX ADMIN — FUROSEMIDE 20 MG: 10 INJECTION, SOLUTION INTRAMUSCULAR; INTRAVENOUS at 18:47

## 2018-06-14 RX ADMIN — Medication 1 CAPSULE: at 08:50

## 2018-06-14 RX ADMIN — PREDNISONE 1 MG: 1 TABLET ORAL at 08:50

## 2018-06-14 RX ADMIN — Medication 10 ML: at 22:00

## 2018-06-14 RX ADMIN — LEVOTHYROXINE SODIUM 88 MCG: 88 TABLET ORAL at 08:01

## 2018-06-14 RX ADMIN — ACETAMINOPHEN 650 MG: 325 TABLET, FILM COATED ORAL at 16:51

## 2018-06-14 RX ADMIN — DIPHENHYDRAMINE HYDROCHLORIDE 25 MG: 50 INJECTION, SOLUTION INTRAMUSCULAR; INTRAVENOUS at 12:37

## 2018-06-14 NOTE — CONSULTS
Hematology Oncology Consultation    Reason for consult: Pancytopenia -  H/o Lymphoma    Admitting Diagnosis: Pancytopenia  Pancytopenia ([facility] [address]    HPI:   Greatly appreciate consult! H/o- angioimmunoblastic lymphoma, known to my colleague Dr Diana Cerda from [address] [place] [address], last chemo was 8/2017 - CVP- Lymphoma was in remission, then developed a pancytopenia( mild) - dec 2017 - BMBx showed no Lymphoma, no dysplastic changes, normal cytogenetics- susp of early MDS- but no definitive diagnosis- was being closely followed- last seen 4/23/2018 - plts -1435, hb was 11 and wbc -2. 6.  3 day h/o extreme fatigue, confusion and unable to do any ADLs, No bleeding sym, and worsening sob on minimal exertion, mild cold-like sym- daughter call EMS- taken to [address] showed profound pancytopenia- I spoke with Dr Alexander from ER there - urgent platelets and prBCs and transfer to [facility] was discussed. She arrived late last night, had 2u  pRBCs by then , no platelets and none ordered overnight. This am feels the same , no worse. Impression/Recommendations:   - Profound pancytopenia in elederly lady with above history- fairly acute in setting of mild pancytopenia- with neg BMB in dec 2017   d/d includes a acute leukemia( therapy related?) vs relapsed of angioimmunoblastic NHL with BM involvement, severe MDS , and other etiologies - infectious ( immunocompromised host- on low dose prednisone for PMR) Vs nutritional deficiciencies. -SOB - likely sec to severe anemia, but concern for PNA-(BB on lung exam on lll), vs other primary pulmonary pathology. -Mild confusion and extreme fatigue- sec severe anemia but need to r/o cns bleed in pt with severe thrombocytopenia.     - Check coags, fibrinogen, LDH, haptoblin, spep, esr, crp, retic ct  -Repeat cbc this afternoon  -2 units pharesed platelets stat  -2 more units pRBCs with iv lasix  -Pancultures and start iv antibiotics - given profound neutropenia and lung findings   -CXR - stat  - Bone marrow biopsy - IR consulted- d/w them- today. Have given a heads up to Dr. Lazaro Tang from pathology  -Non- con Head CT - urgent- r/o cns bleed  -When able, CT neck, chest, abdo and pelvis -with iv con- to assess for rec lymphoma and infections  -Neutropenic precautions and diet, No ASA, NSAIDs or anticoagulants!!  -Following closely with  You.  -Please call with any questions. D/W patient , RN, Riley Toussaint and pt's daughter    Imaging:  None    Labs:    Recent Results (from the past 24 hour(s))   GLUCOSE, POC    Collection Time: 06/13/18  9:47 PM   Result Value Ref Range    Glucose (POC) 392 (H) 65 - 100 mg/dL    Performed by Yu Sanchez    METABOLIC PANEL, COMPREHENSIVE    Collection Time: 06/14/18  3:12 AM   Result Value Ref Range    Sodium 139 136 - 145 mmol/L    Potassium 4.1 3.5 - 5.1 mmol/L    Chloride 106 97 - 108 mmol/L    CO2 24 21 - 32 mmol/L    Anion gap 9 5 - 15 mmol/L    Glucose 228 (H) 65 - 100 mg/dL    BUN 25 (H) 6 - 20 MG/DL    Creatinine 0.98 0.55 - 1.02 MG/DL    BUN/Creatinine ratio 26 (H) 12 - 20      GFR est AA >60 >60 ml/min/1.73m2    GFR est non-AA 54 (L) >60 ml/min/1.73m2    Calcium 8.2 (L) 8.5 - 10.1 MG/DL    Bilirubin, total 1.0 0.2 - 1.0 MG/DL    ALT (SGPT) 13 12 - 78 U/L    AST (SGOT) 9 (L) 15 - 37 U/L    Alk.  phosphatase 87 45 - 117 U/L    Protein, total 5.7 (L) 6.4 - 8.2 g/dL    Albumin 2.9 (L) 3.5 - 5.0 g/dL    Globulin 2.8 2.0 - 4.0 g/dL    A-G Ratio 1.0 (L) 1.1 - 2.2     CBC WITH AUTOMATED DIFF    Collection Time: 06/14/18  3:12 AM   Result Value Ref Range    WBC 0.4 (LL) 3.6 - 11.0 K/uL    RBC 1.45 (L) 3.80 - 5.20 M/uL    HGB 4.5 (LL) 11.5 - 16.0 g/dL    HCT 13.4 (LL) 35.0 - 47.0 %    MCV 92.4 80.0 - 99.0 FL    MCH 31.0 26.0 - 34.0 PG    MCHC 33.6 30.0 - 36.5 g/dL    RDW 14.7 (H) 11.5 - 14.5 %    PLATELET 5 (LL) 155 - 400 K/uL    MPV 10.5 8.9 - 12.9 FL    NRBC 0.0 0  WBC    ABSOLUTE NRBC 0.00 0.00 - 0.01 K/uL    NEUTROPHILS 25 (L) 32 - 75 %    BAND NEUTROPHILS 2 0 - 6 %    LYMPHOCYTES 70 (H) 12 - 49 %    MONOCYTES 3 (L) 5 - 13 %    EOSINOPHILS 0 0 - 7 %    BASOPHILS 0 0 - 1 %    IMMATURE GRANULOCYTES 0 %    ABS. NEUTROPHILS 0.1 (L) 1.8 - 8.0 K/UL    ABS. LYMPHOCYTES 0.3 (L) 0.8 - 3.5 K/UL    ABS. MONOCYTES 0.0 0.0 - 1.0 K/UL    ABS. EOSINOPHILS 0.0 0.0 - 0.4 K/UL    ABS. BASOPHILS 0.0 0.0 - 0.1 K/UL    ABS. IMM.  GRANS. 0.0 K/UL    DF MANUAL      RBC COMMENTS ANISOCYTOSIS  1+        WBC COMMENTS Pathology Review Requested     PHOSPHORUS    Collection Time: 06/14/18  3:12 AM   Result Value Ref Range    Phosphorus 3.4 2.6 - 4.7 MG/DL   CK W/ CKMB & INDEX    Collection Time: 06/14/18  3:12 AM   Result Value Ref Range    CK 58 26 - 192 U/L    CK - MB <1.0 <3.6 NG/ML    CK-MB Index Cannot be calculated 0 - 2.5     TSH 3RD GENERATION    Collection Time: 06/14/18  3:12 AM   Result Value Ref Range    TSH 3.49 0.36 - 3.74 uIU/mL   HEMOGLOBIN A1C WITH EAG    Collection Time: 06/14/18  3:12 AM   Result Value Ref Range    Hemoglobin A1c <3.5 (L) 4.2 - 6.3 %    Est. average glucose Cannot be calculated mg/dL   TROPONIN I    Collection Time: 06/14/18  3:12 AM   Result Value Ref Range    Troponin-I, Qt. <0.05 <0.05 ng/mL   C REACTIVE PROTEIN, QT    Collection Time: 06/14/18  3:12 AM   Result Value Ref Range    C-Reactive protein 2.06 (H) 0.00 - 0.60 mg/dL   SED RATE (ESR)    Collection Time: 06/14/18  3:12 AM   Result Value Ref Range    Sed rate, automated 121 (H) 0 - 30 mm/hr   LACTIC ACID    Collection Time: 06/14/18  3:12 AM   Result Value Ref Range    Lactic acid 1.2 0.4 - 2.0 MMOL/L   TYPE & SCREEN    Collection Time: 06/14/18  3:12 AM   Result Value Ref Range    Crossmatch Expiration 06/17/2018     ABO/Rh(D) A POSITIVE     Antibody screen NEG     Unit number [id number]     Blood component type  LRIR     Unit division 00     Status of unit ALLOCATED     Crossmatch result Compatible     Unit number [id number]     [facility]     Unit division 00     Status of unit ALLOCATED Crossmatch result Compatible    GLUCOSE, POC    Collection Time: 06/14/18  6:56 AM   Result Value Ref Range    Glucose (POC) 285 (H) 65 - 100 mg/dL    Performed by Constantino Lemus, ALLOCATE    Collection Time: 06/14/18  7:30 AM   Result Value Ref Range    Unit number [id number]     Blood component type NAYANA BEAR     Unit division 00     Status of unit ALLOCATED     Unit number [id number]     Blood component type NAYANA BEAR     Unit division 00     Status of unit ALLOCATED    TROPONIN I    Collection Time: 06/14/18  8:13 AM   Result Value Ref Range    Troponin-I, Qt. <0.05 <0.05 ng/mL   PTT    Collection Time: 06/14/18  8:13 AM   Result Value Ref Range    aPTT 21.5 (L) 22.1 - 32.0 sec    aPTT, therapeutic range     58.0 - 77.0 SECS   PROTHROMBIN TIME + INR    Collection Time: 06/14/18  8:13 AM   Result Value Ref Range    INR 1.1 0.9 - 1.1      Prothrombin time 10.8 9.0 - 11.1 sec   URIC ACID    Collection Time: 06/14/18  8:13 AM   Result Value Ref Range    Uric acid 3.7 2.6 - 6.0 MG/DL   FIBRINOGEN    Collection Time: 06/14/18  8:13 AM   Result Value Ref Range    Fibrinogen 331 200 - 475 mg/dL       History:  Past Medical History:   Diagnosis Date    CHF (congestive heart failure) (HCC)     thought to be result of chemotherapy    Depression     Diabetes ([facility] [address]     GERD (gastroesophageal reflux disease)     HTN (hypertension)     Non Hodgkin's lymphoma ([facility] [address]     Rheumatoid arthritis(714.0)     Thyroid disease       Past Surgical History:   Procedure Laterality Date    HX APPENDECTOMY      HX CATARACT REMOVAL Bilateral     HX HYSTERECTOMY      HX UROLOGICAL      bladder tack x 2    VASCULAR SURGERY PROCEDURE UNLIST      varicose vein surgery x 2      Prior to Admission medications    Medication Sig Start Date End Date Taking? Authorizing Provider   LDestiny acidoph & paracasei- S therm- Bifido (HERNANDO-Q/RISAQUAD) 8 billion cell cap cap Take 1 Cap by mouth daily.  11/15/17   [facility] MD Ally   HYDROcodone-acetaminophen (NORCO) 7.5-325 mg per tablet Take 1 Tab by mouth every six (6) hours as needed. Max Daily Amount: 4 Tabs. 11/15/17   Kimberlee Crawford NP   senna-docusate (PERICOLACE) 8.6-50 mg per tablet Take 1 Tab by mouth daily. 11/10/17   Kimberlee Crawford NP   carvedilol (COREG) 6.25 mg tablet Take 6.25 mg by mouth two (2) times daily (with meals). Historical Provider   predniSONE (DELTASONE) 1 mg tablet Take  by mouth daily (with breakfast). Historical Provider   metFORMIN (GLUCOPHAGE) 500 mg tablet Take 1,000 mg by mouth two (2) times daily (with meals). Historical Provider   pantoprazole (PROTONIX) 40 mg tablet Take 40 mg by mouth daily. Historical Provider   FLUoxetine (PROZAC) 20 mg capsule Take 20 mg by mouth daily. Historical Provider   levothyroxine (SYNTHROID) 100 mcg tablet Take 88 mcg by mouth Daily (before breakfast). Historical Provider     Allergies   Allergen Reactions    Cephalexin Myalgia     Painful joints    Lisinopril Hives    Celebrex [Celecoxib] Hives    Ibuprofen Hives      Social History   Substance Use Topics    Smoking status: Never Smoker    Smokeless tobacco: Never Used    Alcohol use Yes      Comment: rare      No family history on file.      Current Medications:  Current Facility-Administered Medications   Medication Dose Route Frequency    carvedilol (COREG) tablet 6.25 mg  6.25 mg Oral BID WITH MEALS    FLUoxetine (PROzac) capsule 20 mg  20 mg Oral DAILY    HYDROcodone-acetaminophen (NORCO) 7.5-325 mg per tablet 1 Tab  1 Tab Oral Q6H PRN    lactobac ac& pc-s.therm-b.anim (HERNANDO Q/RISAQUAD)  1 Cap Oral DAILY    levothyroxine (SYNTHROID) tablet 88 mcg  88 mcg Oral ACB    pantoprazole (PROTONIX) tablet 40 mg  40 mg Oral DAILY    predniSONE (DELTASONE) tablet 1 mg  1 mg Oral DAILY WITH BREAKFAST    senna-docusate (PERICOLACE) 8.6-50 mg per tablet 1 Tab  1 Tab Oral DAILY    sodium chloride (NS) flush 5-10 mL  5-10 mL IntraVENous Q8H    sodium chloride (NS) flush 5-10 mL  5-10 mL IntraVENous PRN    acetaminophen (TYLENOL) tablet 650 mg  650 mg Oral Q6H PRN    ondansetron (ZOFRAN) injection 4 mg  4 mg IntraVENous Q4H PRN    0.9% sodium chloride infusion 250 mL  250 mL IntraVENous PRN    furosemide (LASIX) injection 20 mg  20 mg IntraVENous ONCE    0.9% sodium chloride infusion  50 mL/hr IntraVENous CONTINUOUS    insulin lispro (HUMALOG) injection   SubCUTAneous AC&HS    glucose chewable tablet 16 g  4 Tab Oral PRN    dextrose (D50W) injection syrg 12.5-25 g  12.5-25 g IntraVENous PRN    glucagon (GLUCAGEN) injection 1 mg  1 mg IntraMUSCular PRN         Review of Systems:  12 point ROS done. Negative except for symptoms mentioned in HPI. Physical Exam:  MS-Alert, or X 3, very pale and fatigued+  Oral -   General -chronically ill appearing  Neck-Supple, No JVD  CVS-S1,S2 normal  RS-R. Lung is clear. Left - Bronchial breathing  Abdomen- Soft, NT,ND, BS+  Extre- No c/c/e  Neuro- No FND. gen weakness  Skin- ++ bruising in both arms

## 2018-06-14 NOTE — DIABETES MGMT
DTC Progress Note    Recommendations/ Comments: Chart reviewed due to hyperglycemia. Noted pt refused correction insulin last night. If appropriate, please consider:  1. Continuing correction scale  2. If blood sugar remain >180 consider adding Lantus 10 units    Current hospital DM medication: correction scale Humalog, normal sensitivity. Chart reviewed on Fel[facility] Bumpers. Patient is a 80 y.o. female with known diabetes on Metformin 1000 mg BID at home. A1c:   Lab Results   Component Value Date/Time    Hemoglobin A1c <3.5 (L) 06/14/2018 03:12 AM       Recent Glucose Results:   Lab Results   Component Value Date/Time     (H) 06/14/2018 03:12 AM    GLUCPOC 285 (H) 06/14/2018 06:56 AM    GLUCPOC 392 (H) 06/13/2018 09:47 PM        Lab Results   Component Value Date/Time    Creatinine 0.98 06/14/2018 03:12 AM     Estimated Creatinine Clearance: 47 mL/min (based on Cr of 0.98). Active Orders   Diet    DIET DIABETIC CONSISTENT CARB Regular; 2 GM NA (House Low NA)        PO intake: No data found. Will continue to follow as needed.     Thank you  Kenney Bonilla, RD, CDE

## 2018-06-14 NOTE — PROGRESS NOTES
2222 - Pt ; pt asymptomatic. Dr. Bernabe Heredia paged, awaiting callback. 1 - Dr. Bernabe Heredia returned call. Order received to give pt 6 units. Will continue to monitor. 2236 - Pt refused insulin r/t pt reporting that is causes her to sweat. Will continue to monitor. 2330 - Bedside and Verbal shift change report given to Carole (oncoming nurse) by Angy Chaney (offgoing nurse).  Report included the following information SBAR, Kardex, ED Summary, Procedure Summary, Intake/Output, MAR, Accordion, Recent Results, Med Rec Status and Cardiac Rhythm NSr.

## 2018-06-14 NOTE — H&P
[address]   HISTORY AND PHYSICAL      David Roman  MR#: [id number]  : 1935  ACCOUNT #: [de-identified]   ADMIT DATE: 2018    The patient was seen at about 2000 hours on IMCU, and the admission order was placed shortly after that. PRIMARY CARE PHYSICIAN:  Mina Mauricio MD.    SOURCE OF INFORMATION:  The patient and the patient's daughter, who was present at the bedside. CHIEF COMPLAINT:  Generalized weakness. 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 The weakness is generalized, it is progressive. The patient lives with her daughter. Normally, the patient is able to carry out her activities of daily living without any assistance. On the day of her presentation at the emergency room, the generalized weakness got worse to the extent that the patient was not able to get out of bed. The patient also complained of a decreased appetite and shortness of breath. Her daughter called 911 and the patient was taken to [facility] for further evaluation. When the patient arrived at the emergency room, her lab work shows significant abnormality, which is consistent with a pancytopenia. The emergency room physician consulted the patient's hematologist, and transfer to [address] was advised.   The hospitalist service was asked to directly admit the patient from [facility] to the hospitalist service at [address].  The hematologist also advised transfusion of 2 units of packed red blood cells, which was started at the emergency room, and by the time the patient arrived at [facility], the patient has completed 2 units of packed red blood cells. The patient stated that she is feeling better since she received the transfusion. She was last admitted to this hospital from 01/05/2018 to 01/22/2018 and the patient was admitted by the hematologist.  She was admitted with confusion. A CT scan of the head was negative. A bone marrow was also performed. 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 The patient received treatment and was discharged home in stable condition. She has been followed by Dr. Puneet Hernandez, the patient's hematologist, about every 6 weeks. Her next appointment is a couple of days from now. REVIEW OF SYSTEMS:  The patient denies fever, no rigors and no chills. PAST MEDICAL HISTORY:    1. Lymphoma. 2.  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 3.  Type 2 diabetes. 4.  Hypothyroidism. 5.  Anxiety/depression. 6.  Hypertension. 7.  Polymyalgia rheumatica. ALLERGIES:    1. LISINOPRIL. 2.  PENICILLIN. 3.  IBUPROFEN. MEDICATIONS PRIOR TO ADMISSION:    1. Synthroid 88 mcg daily. 2.  Prozac 20 mg daily. 3.  Protonix 40 mg daily. 4.  Glucophage 1000 mg twice daily. 5.  Prednisone 1 mg daily. 6.  Coreg 6.25 mg twice daily. 7.  Fariba-Colace 1 tablet daily. 8.  Norco 7.5/325 one tablet every 6 hours as needed. FAMILY HISTORY:  This was reviewed, not pertinent to present illness. No family history of lymphoma. PAST SURGICAL HISTORY:    Significant for:  1. Hysterectomy. 2.  Appendectomy. 3.  Cataract extraction. SOCIAL HISTORY:  No history of alcohol or tobacco abuse. REVIEW OF SYSTEMS:  HEENT:  No headache, no dizziness, no blurring of vision, no photophobia. RESPIRATORY:  Positive for shortness of breath. No cough, no hemoptysis.   CARDIOVASCULAR:  No chest pain, no orthopnea, no palpitation. GASTROINTESTINAL:  No nausea and vomiting, no diarrhea, no constipation. GENITOURINARY:  No dysuria, no urgency, no frequency. All other systems are reviewed and they are negative. PHYSICAL EXAMINATION:  GENERAL APPEARANCE:  The patient appeared ill, in moderate distress. VITAL SIGNS:  Temperature 98.8, pulse 79, blood pressure 141/63, respiratory rate 20, oxygen saturation 100% on room air. HEAD:  Normocephalic, atraumatic. EYES:  Normal eye movement. No redness, no drainage, no discharge. EARS:  Normal external ears with no obvious drainage. NOSE:  No deformity, no drainage. MOUTH AND THROAT:  No visible oral lesion. NECK:  Supple, no JVD, no thyromegaly. CHEST:  Clear breath sounds, no wheezing, no crackles. HEART:  Normal S1 and S2, regular. No clinically appreciable murmur. ABDOMEN:  Soft, nontender, normal bowel sounds. CENTRAL NERVOUS SYSTEM:  Alert, oriented x3, no gross focal neurological deficit. EXTREMITIES:  No edema. Pulses 2+ bilaterally. MUSCULOSKELETAL:  No obvious joint deformity or swelling. SKIN:  No active skin lesion seen in the exposed part of the body. PSYCHIATRIC:  Normal mood and affect. LYMPHATIC SYSTEM:  No cervical lymphadenopathy. DIAGNOSTIC DATA:  Chest x-ray done at [facility] showed no acute pathology, but shows old rib fractures. EKG shows sinus tachycardia, no ST or T-wave abnormalities. LABORATORY DATA:  Done at [facility]:  Urinalysis: This is significant for negative nitrite, negative leukocyte esterase, negative bacteria. Chemistry:  Sodium 134, potassium 4.7, chloride 101, CO2 of 25, BUN 26, creatinine 1.11, glucose 342, calcium 9.3, total protein 6.7, albumin level 3.6, globulin 3.1, alkaline phosphatase 87, AST 17, ALT 11, total bilirubin 0.9, magnesium level 2.0. Coagulation profile:  PT 10.8, INR 1.0.   Hematology:  WBC 0.5, hemoglobin 3.6, hematocrit 9.8, platelet count 11. Lactic acid level 2.34.    ASSESSMENT:  1. Pancytopenia. 2.  Lymphoma. 3.  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 4.  Type 2 diabetes with hyperglycemia. 5.  Elevated lactic acid level. 6.  Hypothyroidism. 7.  Anxiety/depression. 8.  Polymyalgia rheumatica. 9.  Hypertension. PLAN:  1. Pancytopenia: We will admit the patient for further evaluation and treatment. Hematology has been consulted. The patient has been transfused with 2 units of packed red blood cells. We will repeat the patient's lab work. We will await further recommendation from hematology. 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 2.  Lymphoma: It was stated during the last admission in January this year that the lymphoma is in remission. We will await further recommendation from the patient's hematologist.  3.  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 We will await further recommendation from the hematologist.  4.  Type 2 diabetes with hyperglycemia: We will place the patient on sliding scale with insulin coverage. We will check hemoglobin A1c level. We will hold oral agent until the time of discharge from the hospital.  5.  Elevated lactic acid level: The patient is not septic, afebrile, nontoxic. We will carry out hydration with normal saline. We will repeat lactic acid level. 6.  Hypothyroidism: We will continue with her Synthroid. We will check a TSH level. 7.  Anxiety/depression: We will continue with her home medication. 8.  Polymyalgia rheumatica: We will continue with her low-dose prednisone. 9.  Hypertension: We will resume her preadmission medication. We will monitor the patient's blood pressure closely. OTHER ISSUES:  CODE STATUS:  THE PATIENT IS FULL CODE.   We will request sequential compression devices (SCDs) for deep vein thrombosis prophylaxis.       Juanita Arango MD       RE/PN  D: 06/14/2018 00:29     T: 06/14/2018 05:39  JOB #: [id number]  CC: Hector Burgess MD

## 2018-06-14 NOTE — PROGRESS NOTES
Critical Result Notification    Received and verbally repeated the following test results H&H, 4.5/13.4, Platlets, 5 and WBC, 0.4 from Shoaib Burgess (NAME OF TECHNICIAN) on 06/14/18 (DATE), at [phone] (TIME). (NAME OF PROVIDER) was notified and provided a verbal readback of the results listed above on 06/14/18(DATE) at 7215(TIME). Orders were not received at this time. Additional comments:Dr. Day Shelter asked to contact Hematologist on call. Sade Ga RN    0824 - Called and waiting for contact back from Hematologist on call, Demario Hernadez. 1488- Bedside shift change report given to CARLINE Lake (oncoming nurse) by Paulina Burrell (offgoing nurse). Report included the following information SBAR, Kardex, ED Summary, Procedure Summary, Intake/Output, Recent Results and Cardiac Rhythm NSR.

## 2018-06-14 NOTE — PROGRESS NOTES
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 The weakness is generalized, it is progressive. The patient lives with her daughter. Normally, the patient is able to carry out her activities of daily living without any assistance. On the day of her presentation at the emergency room, the generalized weakness got worse to the extent that the patient was not able to get out of bed. The patient also complained of a decreased appetite and shortness of breath. The patient was found to be pancytopenic    Interval history / Subjective:     Feeling generalized weakness  Daughter in the room believes that the patient is still not at her baseline mental status  No chest pain, abd pain, nausea or vomiting     Assessment & Plan:     Severe anemia  -s/p 2 units PRBC at [facility] 9 discussion with Dr Franchesca Esquivel  -scheduled for another 2 units PRBC with IV lasix    Severe thrombocytopenia  -sec to ?  -scheduled for 2 units today  -bone biopsy later today or tomorrow     Pancytopenia  -neutropenic precautions  -CXR unremarkable  -on LVQ    History of lymphoma  -CT chest and abd, follow    AMS  -?metabolic encephalopathy ?anemia  -Follow CT head    Hypothyroidism  -On synthroid    DM type 2  -On glipizide.  Patient refusing SSI    Anxiety/Depression   -On Lexapro    Diabetic diet    Code status: FULL CODE  DVT prophylaxis: scd  PTA: home    Plan: CT scans follow    Care Plan discussed with: Patient/Family  Disposition: TBD     Hospital Problems  Date Reviewed: 6/14/2018          Codes Class Noted POA    * (Principal)Pancytopenia [facility]) ICD-10-CM: K48.651  ICD-9-CM: 284.19  6/13/2018 Yes                Review of Systems:   A comprehensive review of systems was negative except for that written in the HPI. Vital Signs:    Last 24hrs VS reviewed since prior progress note. Most recent are:  Visit Vitals    /48 (BP 1 Location: Left arm, BP Patient Position: At rest;Supine)    Pulse 74    Temp 98.4 °F (36.9 °C)    Resp 17    Ht 5' 10\" (1.778 m)    Wt 80.1 kg (176 lb 8 oz)    SpO2 100%    Breastfeeding No    BMI 25.33 kg/m2         Intake/Output Summary (Last 24 hours) at 06/14/18 1406  Last data filed at 06/14/18 1105   Gross per 24 hour   Intake            719.2 ml   Output              400 ml   Net            319.2 ml        Physical Examination:             Constitutional:  No acute distress, cooperative, pleasant    ENT:  Oral mucous moist, oropharynx benign. Neck supple,    Resp:  CTA bilaterally. No wheezing/rhonchi/rales. No accessory muscle use   CV:  Regular rhythm, normal rate, no murmurs, gallops, rubs    GI:  Soft, non distended, non tender. normoactive bowel sounds, no hepatosplenomegaly     Musculoskeletal:  No edema, warm, 2+ pulses throughout    Neurologic:  Moves all extremities.   AAOx3, CN II-XII reviewed     Skin:  Good turgor, no rashes or ulcers       Data Review:    Review and/or order of clinical lab test      Labs:     Recent Labs      06/14/18   0312   WBC  0.4*   HGB  4.5*   HCT  13.4*   PLT  5*     Recent Labs      06/14/18   0813  06/14/18   0312   NA   --   139   K   --   4.1   CL   --   106   CO2   --   24   BUN   --   25*   CREA   --   0.98   GLU   --   228*   CA   --   8.2*   PHOS   --   3.4   URICA  3.7   --      Recent Labs      06/14/18   0312   SGOT  9*   ALT  13   AP  87   TBILI  1.0   TP  5.7*   ALB  2.9* GLOB  2.8     Recent Labs      06/14/18 0813   INR  1.1   PTP  10.8   APTT  21.5*      Recent Labs      06/14/18 0312   TIBC  198*   PSAT  99*   FERR  1554*      Lab Results   Component Value Date/Time    Folate 15.2 06/14/2018 03:12 AM      No results for input(s): PH, PCO2, PO2 in the last 72 hours.   Recent Labs      06/14/18   1254  06/14/18   0813  06/14/18   0312   CPK  63   --   58   CKNDX  Cannot be calculated   --   Cannot be calculated   TROIQ   --   <0.05  <0.05     No results found for: CHOL, CHOLX, CHLST, CHOLV, HDL, LDL, LDLC, DLDLP, TGLX, TRIGL, TRIGP, CHHD, CHHDX  Lab Results   Component Value Date/Time    Glucose (POC) 324 (H) 06/14/2018 12:03 PM    Glucose (POC) 285 (H) 06/14/2018 06:56 AM    Glucose (POC) 392 (H) 06/13/2018 09:47 PM    Glucose (POC) 196 (H) 01/22/2018 07:40 AM    Glucose (POC) 143 (H) 01/21/2018 09:44 PM     Lab Results   Component Value Date/Time    Color YELLOW/STRAW 06/14/2018 12:54 PM    Appearance CLEAR 06/14/2018 12:54 PM    Specific gravity 1.020 06/14/2018 12:54 PM    pH (UA) 6.0 06/14/2018 12:54 PM    Protein NEGATIVE  06/14/2018 12:54 PM    Glucose 250 (A) 06/14/2018 12:54 PM    Ketone NEGATIVE  06/14/2018 12:54 PM    Bilirubin NEGATIVE  06/14/2018 12:54 PM    Urobilinogen 2.0 (H) 06/14/2018 12:54 PM    Nitrites NEGATIVE  06/14/2018 12:54 PM    Leukocyte Esterase NEGATIVE  06/14/2018 12:54 PM    Epithelial cells FEW 06/14/2018 12:54 PM    Bacteria NEGATIVE  06/14/2018 12:54 PM    WBC 0-4 06/14/2018 12:54 PM    RBC 0-5 06/14/2018 12:54 PM         Medications Reviewed:     Current Facility-Administered Medications   Medication Dose Route Frequency    carvedilol (COREG) tablet 6.25 mg  6.25 mg Oral BID WITH MEALS    HYDROcodone-acetaminophen (NORCO) 7.5-325 mg per tablet 1 Tab  1 Tab Oral Q6H PRN    lactobac ac& pc-s.therm-b.anim (HERNANDO Q/RISAQUAD)  1 Cap Oral DAILY    levothyroxine (SYNTHROID) tablet 88 mcg  88 mcg Oral ACB    pantoprazole (PROTONIX) tablet 40 mg 40 mg Oral DAILY    senna-docusate (PERICOLACE) 8.6-50 mg per tablet 1 Tab  1 Tab Oral DAILY    sodium chloride (NS) flush 5-10 mL  5-10 mL IntraVENous Q8H    sodium chloride (NS) flush 5-10 mL  5-10 mL IntraVENous PRN    acetaminophen (TYLENOL) tablet 650 mg  650 mg Oral Q6H PRN    ondansetron (ZOFRAN) injection 4 mg  4 mg IntraVENous Q4H PRN    0.9% sodium chloride infusion 250 mL  250 mL IntraVENous PRN    furosemide (LASIX) injection 20 mg  20 mg IntraVENous ONCE    levoFLOXacin (LEVAQUIN) 750 mg in D5W IVPB  750 mg IntraVENous Q24H    [START ON 6/15/2018] escitalopram oxalate (LEXAPRO) tablet 5 mg  5 mg Oral DAILY    diphenhydrAMINE (BENADRYL) injection 25 mg  25 mg IntraVENous Q6H PRN    diphenhydrAMINE (BENADRYL) capsule 50 mg  50 mg Oral Q6H PRN    0.9% sodium chloride infusion  50 mL/hr IntraVENous CONTINUOUS    insulin lispro (HUMALOG) injection   SubCUTAneous AC&HS    glucose chewable tablet 16 g  4 Tab Oral PRN    dextrose (D50W) injection syrg 12.5-25 g  12.5-25 g IntraVENous PRN    glucagon (GLUCAGEN) injection 1 mg  1 mg IntraMUSCular PRN     ______________________________________________________________________  EXPECTED LENGTH OF STAY: 2d 19h  ACTUAL LENGTH OF STAY:          Lopez Manrique MD

## 2018-06-14 NOTE — PROGRESS NOTES
Problem: Falls - Risk of  Goal: *Absence of Falls  Document Kristian Fall Risk and appropriate interventions in the flowsheet. Outcome: Progressing Towards Goal  Fall Risk Interventions:  Mobility Interventions: Communicate number of staff needed for ambulation/transfer, Patient to call before getting OOB, PT Consult for mobility concerns         Medication Interventions: Evaluate medications/consider consulting pharmacy, Patient to call before getting OOB, Teach patient to arise slowly    Elimination Interventions: Call light in reach, Patient to call for help with toileting needs, Toileting schedule/hourly rounds    History of Falls Interventions: Consult care management for discharge planning, Door open when patient unattended, Evaluate medications/consider consulting pharmacy, Investigate reason for fall, Room close to nurse's station    Pt has had no falls during admission. Call bell and belongings within reach. Pt to call before getting OOB. Problem: Anemia Care Plan (Adult and Pediatric)  Goal: *Labs within defined limits  Outcome: Progressing Towards Goal  Pt received 2 units PRBC's at [facility].

## 2018-06-14 NOTE — PROGRESS NOTES
6:15 PM Pt arrived from [facility] as direct admit. EMT stated pt had just received second unit PRBCs. Transfusion completed at [phone] and was tolerated well at 200 mL/hr per EMT. 6:55 PM Dr. Rosibel Perez contacted by RN to inform pt is still not in system. MD stated pt is very ill and needs orders   ASAP. RN instructed to notify him as soon as pt is in system. RN asked Charge RN, Elizabeth Ugalde, to f/u with bed board. 7:09 PM Pt in system and Dr. Rosibel Perez notified by [facility] Text. He stated Dr. Noe Smith has been assigned to pt and will be on IM to assess and give orders. 7:30 PM Bedside and Verbal shift change report given to Richard May RN (oncoming nurse) by Claude Ammons, RN (offgoing nurse). Report included the following information SBAR, Kardex, Procedure Summary, Intake/Output, MAR, Accordion, Recent Results and Cardiac Rhythm NSR. CARLINE Bustillo aware that pt is direct admit and is awaiting orders from Dr. Noe Smith after assessment. 8 PM Dr. Noe Smith on unit speaking to [facility]. Kemar Lubin gave MD report on pt's status including critical labs done at previous hospital and interventions received (2 units PRBCs, last completed at 1810 by EMT). Discussed need for orders, including repeat CBC.

## 2018-06-14 NOTE — WOUND CARE
Wound Consult:  New Patient Visit. Chart reviewed. Consulted for sacral area. Spoke with patients nurse,  Sofiya Jordan at bedside; patient being bathed and assessed and provided care at that time. Patient is resting on a Versacare bed with pressure prevention mattress. She is alert and oriented; her daughter is at bedside as well. They both report that she developed any ulcer between buttocks when she fractured her hip which was 6 months ago and this area has not healed completely. She does more sitting now that prior to injury. Assessment:  Distal gluteal cleft - 0.8 x 0.8 cm hard yellow callus / hyperkeratotic skin - no redness surrounding; not over bony prominence. However when she sits, is low and most likely is developing callus as protective skin reaction. No redness to heels. She has well healed left hip and knee incisions. Treatment:  Moisturized heavily with aloe vesta protective ointment and some of callus came off from top but remains attached to base. Wound Recommendations:  Moisturize gluteal cleft area where callus is noted BID with aloe vesta protective ointment. Skin Care Recommendations:  1. Minimize friction/shear: minimize layers of linen/pads under patient. 2. Off load pressure/reposition: continue to turn and reposition approximately every 2 hours; float heels ; waffle cushion for sitting; brought to bedside. 3. Manage Moisture - keep skin folds dry; incontinence skin care as needed; appropriate sized briefs if needed. 4. Continue to monitor nutrition, pain, and skin risk scale, and skin assessment. Plan: Will discuss with Dr. Candice Barragan. Discussed with patient and her daughter. We will continue to reassess routinely and as needed.   Salazar Junior, [address] [facility] Office [phone]  Pager [phone]

## 2018-06-14 NOTE — PROGRESS NOTES
8 AM Report received from CARLINE Rojas    9 AM Rounded on pt with hepatologist, Dr. Santo Mazariegos. MD expressed concern that pt had not received transfusions overnight. MD stated pt is to have transfusion of platelets before leaving floor for any tests and that these should be done rapidly. RN and Charge RN verified per policy these can be given in 30-60 mins. Dr. Santo Mazariegos instructed RN to give ordered lasix to pt after 2 units of platelets and one unit of blood given. 12:15 PM Pt c/o itching and hives during second unit or platelets. Hives observed by RN on abd, back and all peripheral extremities. Transfusion immediately stopped by RN. Pt denies any other s/s. VS taken and within normal limits. Dr. Julian Prost on unit and notified of reaction. 12:40 PM Pt given benadryl IV. Platelets returned to blood bank and tranfusion reaction lab ordered per protocol. 1:10 PM Blood culture, urine culture, and other labs drawn and sent to lab      1:25 PM Levaquin first dose given    1:36 PM Paged Dr. Santo Mazariegos, Hematologist, to inform of pt transfusion reaction and update on pt condition    1:44 PM Spoke with Dr. Santo Mazariegos. Telephone orders received for PO Tylenol 650 mg prior to transfusion and Benadryl 50 mg prior to transfusion. MD stated these should be given prior to all future transfusions. 2 PM spoke with Angio/CT to clarify timing of tests in order to time tranfusions. Bone marrow biopsy will be postponed until tomorrow. CT will send for pt now. Will resume transfusing after CT and results of transfusion reaction labs. 2:57 PM PC from Blood Bank. RN was asked questions RE pt's transfusion at [facility]. Transfusion reaction labs not yet complete. Blood Bank will call when these are done and pt may resume transfusing. 2:59 PM PC to CT. They stated pt will be transported within a half hour. 3:15 PM Pt left floor for CT. 4 PM Pt returned to floor.  Blood Bank called and transfusion reaction test complete and pt's two units PRBCs now ready. 4:50 PM First unit of PRBCs started. Pt given tylenol and benadryl PO (25 mg given instead of 50 mg due to 25 mg IV given four hours ago). Reviewed education RE transfusion again with pt and asked her to notify RN of any change in s/s.    7:30 PM Bedside and Verbal shift change report given to Tiffanie Coffey RN (oncoming nurse) by Lord Lincoln RN (offgoing nurse). Report included the following information SBAR, Kardex, Procedure Summary, Intake/Output, MAR, Accordion, Recent Results and Cardiac Rhythm NSR. First unit PRBCs now complete, Lasix given. Wilberto Casas RN informed of need to repeat CBC one hour after second unit is given. Discussed transfusion reaction and when pt last received tylenol and benadryl.

## 2018-06-14 NOTE — PROGRESS NOTES
Problem: Anemia Care Plan (Adult and Pediatric)  Goal: *Labs within defined limits  Outcome: Not Progressing Towards Goal  Variance: Patient slowly responding  Comments: Pt slowly responding. Began transfusing this morning. Goal: *Tolerates increased activity  Outcome: Not Progressing Towards Goal  Variance: Patient Condition  Comments: Pt on bedrest per MD until anemia is better controlled.

## 2018-06-14 NOTE — PROGRESS NOTES
Problem: Falls - Risk of  Goal: *Absence of Falls  Document Kristian Fall Risk and appropriate interventions in the flowsheet.    Outcome: Progressing Towards Goal  Fall Risk Interventions:  Mobility Interventions: Communicate number of staff needed for ambulation/transfer, Patient to call before getting OOB, PT Consult for mobility concerns    Medication Interventions: Evaluate medications/consider consulting pharmacy, Patient to call before getting OOB, Teach patient to arise slowly    Elimination Interventions: Call light in reach, Patient to call for help with toileting needs, Toileting schedule/hourly rounds    History of Falls Interventions: Room close to nurse's station, Door open when patient unattended, Evaluate medications/consider consulting pharmacy

## 2018-06-15 ENCOUNTER — APPOINTMENT (OUTPATIENT)
Dept: CT IMAGING | Age: 83
DRG: 808 | End: 2018-06-15
Attending: INTERNAL MEDICINE
Payer: MEDICARE

## 2018-06-15 LAB
ABO + RH BLD: NORMAL
ALBUMIN SERPL ELPH-MCNC: 3.1 G/DL (ref 2.9–4.4)
ALBUMIN SERPL-MCNC: 3 G/DL (ref 3.5–5)
ALBUMIN/GLOB SERPL: 0.9 {RATIO} (ref 1.1–2.2)
ALBUMIN/GLOB SERPL: 1.3 {RATIO} (ref 0.7–1.7)
ALP SERPL-CCNC: 101 U/L (ref 45–117)
ALPHA1 GLOB SERPL ELPH-MCNC: 0.2 G/DL (ref 0–0.4)
ALPHA2 GLOB SERPL ELPH-MCNC: 0.5 G/DL (ref 0.4–1)
ALT SERPL-CCNC: 14 U/L (ref 12–78)
ANION GAP SERPL CALC-SCNC: 9 MMOL/L (ref 5–15)
AST SERPL-CCNC: 11 U/L (ref 15–37)
B-GLOBULIN SERPL ELPH-MCNC: 1.1 G/DL (ref 0.7–1.3)
BASOPHILS # BLD: 0 K/UL (ref 0–0.1)
BASOPHILS # BLD: 0 K/UL (ref 0–0.1)
BASOPHILS NFR BLD: 0 % (ref 0–1)
BASOPHILS NFR BLD: 0 % (ref 0–1)
BILIRUB SERPL-MCNC: 2.4 MG/DL (ref 0.2–1)
BLD PROD TYP BPU: NORMAL
BLOOD BAG HEMOLYSIS,BLBAG: NORMAL
BLOOD BANK CMNT PATIENT-IMP: NORMAL
BPU ID: NORMAL
BPU ID: NORMAL
BUN SERPL-MCNC: 23 MG/DL (ref 6–20)
BUN/CREAT SERPL: 21 (ref 12–20)
CALCIUM SERPL-MCNC: 8.1 MG/DL (ref 8.5–10.1)
CHLORIDE SERPL-SCNC: 103 MMOL/L (ref 97–108)
CLERICAL ERRORS,CLERR: NORMAL
CO2 SERPL-SCNC: 27 MMOL/L (ref 21–32)
CREAT SERPL-MCNC: 1.08 MG/DL (ref 0.55–1.02)
DAT P TRANSF RBC QL: NORMAL
DAT RBC QL: NORMAL
DIFFERENTIAL METHOD BLD: ABNORMAL
DIFFERENTIAL METHOD BLD: ABNORMAL
EOSINOPHIL # BLD: 0 K/UL (ref 0–0.4)
EOSINOPHIL # BLD: 0 K/UL (ref 0–0.4)
EOSINOPHIL NFR BLD: 0 % (ref 0–7)
EOSINOPHIL NFR BLD: 1 % (ref 0–7)
ERYTHROCYTE [DISTWIDTH] IN BLOOD BY AUTOMATED COUNT: 14.3 % (ref 11.5–14.5)
ERYTHROCYTE [DISTWIDTH] IN BLOOD BY AUTOMATED COUNT: 14.7 % (ref 11.5–14.5)
GAMMA GLOB SERPL ELPH-MCNC: 0.5 G/DL (ref 0.4–1.8)
GLOBULIN SER CALC-MCNC: 2.3 G/DL (ref 2.2–3.9)
GLOBULIN SER CALC-MCNC: 3.4 G/DL (ref 2–4)
GLUCOSE BLD STRIP.AUTO-MCNC: 260 MG/DL (ref 65–100)
GLUCOSE BLD STRIP.AUTO-MCNC: 283 MG/DL (ref 65–100)
GLUCOSE BLD STRIP.AUTO-MCNC: 298 MG/DL (ref 65–100)
GLUCOSE BLD STRIP.AUTO-MCNC: 306 MG/DL (ref 65–100)
GLUCOSE SERPL-MCNC: 223 MG/DL (ref 65–100)
HAPTOGLOB SERPL-MCNC: 13 MG/DL (ref 30–200)
HCT VFR BLD AUTO: 13.4 % (ref 35–47)
HCT VFR BLD AUTO: 18.7 % (ref 35–47)
HEMOLYSIS, POST TXN,PSTHE: NORMAL
HEMOLYSIS,PRE TXN,PREHE: NORMAL
HGB BLD-MCNC: 4.5 G/DL (ref 11.5–16)
HGB BLD-MCNC: 6.4 G/DL (ref 11.5–16)
IMM GRANULOCYTES # BLD: 0 K/UL
IMM GRANULOCYTES # BLD: 0 K/UL
IMM GRANULOCYTES NFR BLD AUTO: 0 %
IMM GRANULOCYTES NFR BLD AUTO: 0 %
LYMPHOCYTES # BLD: 0.3 K/UL (ref 0.8–3.5)
LYMPHOCYTES # BLD: 0.3 K/UL (ref 0.8–3.5)
LYMPHOCYTES NFR BLD: 65 % (ref 12–49)
LYMPHOCYTES NFR BLD: 70 % (ref 12–49)
M PROTEIN SERPL ELPH-MCNC: ABNORMAL G/DL
MCH RBC QN AUTO: 30.8 PG (ref 26–34)
MCH RBC QN AUTO: 31 PG (ref 26–34)
MCHC RBC AUTO-ENTMCNC: 33.6 G/DL (ref 30–36.5)
MCHC RBC AUTO-ENTMCNC: 34.2 G/DL (ref 30–36.5)
MCV RBC AUTO: 89.9 FL (ref 80–99)
MCV RBC AUTO: 92.4 FL (ref 80–99)
MD INTERPRETATION: NORMAL
MONOCYTES # BLD: 0 K/UL (ref 0–1)
MONOCYTES # BLD: 0 K/UL (ref 0–1)
MONOCYTES NFR BLD: 3 % (ref 5–13)
MONOCYTES NFR BLD: 6 % (ref 5–13)
NEUTS BAND NFR BLD MANUAL: 2 % (ref 0–6)
NEUTS BAND NFR BLD MANUAL: 3 % (ref 0–6)
NEUTS SEG # BLD: 0.1 K/UL (ref 1.8–8)
NEUTS SEG # BLD: 0.1 K/UL (ref 1.8–8)
NEUTS SEG NFR BLD: 25 % (ref 32–75)
NEUTS SEG NFR BLD: 25 % (ref 32–75)
NRBC # BLD: 0 K/UL (ref 0–0.01)
NRBC # BLD: 0 K/UL (ref 0–0.01)
NRBC BLD-RTO: 0 PER 100 WBC
NRBC BLD-RTO: 0 PER 100 WBC
PATH REV BLD -IMP: ABNORMAL
PATH REV BLD -IMP: NORMAL
PLATELET # BLD AUTO: 20 K/UL (ref 150–400)
PLATELET # BLD AUTO: 5 K/UL (ref 150–400)
PMV BLD AUTO: 10.5 FL (ref 8.9–12.9)
PMV BLD AUTO: 11.3 FL (ref 8.9–12.9)
POTASSIUM SERPL-SCNC: 3.7 MMOL/L (ref 3.5–5.1)
PROT SERPL-MCNC: 5.4 G/DL (ref 6–8.5)
PROT SERPL-MCNC: 6.4 G/DL (ref 6.4–8.2)
RBC # BLD AUTO: 1.45 M/UL (ref 3.8–5.2)
RBC # BLD AUTO: 2.08 M/UL (ref 3.8–5.2)
RBC MORPH BLD: ABNORMAL
RETICS # AUTO: 0.02 M/UL (ref 0.02–0.08)
RETICS/RBC NFR AUTO: 0.8 % (ref 0.7–2.1)
SERVICE CMNT-IMP: ABNORMAL
SODIUM SERPL-SCNC: 139 MMOL/L (ref 136–145)
STATUS OF UNIT,%ST: NORMAL
STATUS OF UNIT,%ST: NORMAL
UNIT DIVISION, %UDIV: 0
UNIT DIVISION, %UDIV: 0
UNIT NUMBER,UN: NORMAL
WBC # BLD AUTO: 0.4 K/UL (ref 3.6–11)
WBC # BLD AUTO: 0.4 K/UL (ref 3.6–11)
WBC MORPH BLD: ABNORMAL
WBC MORPH BLD: ABNORMAL

## 2018-06-15 PROCEDURE — 80053 COMPREHEN METABOLIC PANEL: CPT | Performed by: INTERNAL MEDICINE

## 2018-06-15 PROCEDURE — 36415 COLL VENOUS BLD VENIPUNCTURE: CPT | Performed by: INTERNAL MEDICINE

## 2018-06-15 PROCEDURE — 74011250637 HC RX REV CODE- 250/637: Performed by: HOSPITALIST

## 2018-06-15 PROCEDURE — 65660000000 HC RM CCU STEPDOWN

## 2018-06-15 PROCEDURE — 85025 COMPLETE CBC W/AUTO DIFF WBC: CPT | Performed by: INTERNAL MEDICINE

## 2018-06-15 PROCEDURE — 74011636637 HC RX REV CODE- 636/637: Performed by: INTERNAL MEDICINE

## 2018-06-15 PROCEDURE — 85045 AUTOMATED RETICULOCYTE COUNT: CPT | Performed by: INTERNAL MEDICINE

## 2018-06-15 PROCEDURE — 83010 ASSAY OF HAPTOGLOBIN QUANT: CPT | Performed by: INTERNAL MEDICINE

## 2018-06-15 PROCEDURE — 82962 GLUCOSE BLOOD TEST: CPT

## 2018-06-15 PROCEDURE — 74011250636 HC RX REV CODE- 250/636: Performed by: INTERNAL MEDICINE

## 2018-06-15 PROCEDURE — 74011250636 HC RX REV CODE- 250/636

## 2018-06-15 PROCEDURE — 74011250637 HC RX REV CODE- 250/637: Performed by: INTERNAL MEDICINE

## 2018-06-15 RX ORDER — FENTANYL CITRATE 50 UG/ML
200 INJECTION, SOLUTION INTRAMUSCULAR; INTRAVENOUS
Status: DISCONTINUED | OUTPATIENT
Start: 2018-06-15 | End: 2018-06-15

## 2018-06-15 RX ORDER — SODIUM CHLORIDE 9 MG/ML
50 INJECTION, SOLUTION INTRAVENOUS CONTINUOUS
Status: DISCONTINUED | OUTPATIENT
Start: 2018-06-15 | End: 2018-06-15

## 2018-06-15 RX ORDER — LANOLIN ALCOHOL/MO/W.PET/CERES
0.4 CREAM (GRAM) TOPICAL DAILY
Status: DISCONTINUED | OUTPATIENT
Start: 2018-06-15 | End: 2018-06-22 | Stop reason: HOSPADM

## 2018-06-15 RX ORDER — HALOPERIDOL 5 MG/ML
INJECTION INTRAMUSCULAR
Status: COMPLETED
Start: 2018-06-15 | End: 2018-06-15

## 2018-06-15 RX ORDER — HALOPERIDOL 5 MG/ML
2 INJECTION INTRAMUSCULAR ONCE
Status: ACTIVE | OUTPATIENT
Start: 2018-06-15 | End: 2018-06-15

## 2018-06-15 RX ORDER — CYANOCOBALAMIN 1000 UG/ML
1000 INJECTION, SOLUTION INTRAMUSCULAR; SUBCUTANEOUS
Status: DISCONTINUED | OUTPATIENT
Start: 2018-08-13 | End: 2018-06-22 | Stop reason: HOSPADM

## 2018-06-15 RX ORDER — CYANOCOBALAMIN 1000 UG/ML
1000 INJECTION, SOLUTION INTRAMUSCULAR; SUBCUTANEOUS
Status: DISCONTINUED | OUTPATIENT
Start: 2018-06-22 | End: 2018-06-22 | Stop reason: HOSPADM

## 2018-06-15 RX ORDER — LIDOCAINE HYDROCHLORIDE 10 MG/ML
INJECTION, SOLUTION EPIDURAL; INFILTRATION; INTRACAUDAL; PERINEURAL
Status: DISCONTINUED
Start: 2018-06-15 | End: 2018-06-15

## 2018-06-15 RX ORDER — LEVOFLOXACIN 5 MG/ML
750 INJECTION, SOLUTION INTRAVENOUS
Status: DISCONTINUED | OUTPATIENT
Start: 2018-06-16 | End: 2018-06-19

## 2018-06-15 RX ORDER — MIDAZOLAM HYDROCHLORIDE 1 MG/ML
5 INJECTION, SOLUTION INTRAMUSCULAR; INTRAVENOUS
Status: DISCONTINUED | OUTPATIENT
Start: 2018-06-15 | End: 2018-06-15

## 2018-06-15 RX ORDER — CYANOCOBALAMIN 1000 UG/ML
1000 INJECTION, SOLUTION INTRAMUSCULAR; SUBCUTANEOUS DAILY
Status: DISPENSED | OUTPATIENT
Start: 2018-06-15 | End: 2018-06-22

## 2018-06-15 RX ADMIN — GLIPIZIDE 5 MG: 2.5 TABLET, FILM COATED, EXTENDED RELEASE ORAL at 12:40

## 2018-06-15 RX ADMIN — Medication 10 ML: at 05:47

## 2018-06-15 RX ADMIN — HALOPERIDOL LACTATE 2 MG: 5 INJECTION, SOLUTION INTRAMUSCULAR at 07:58

## 2018-06-15 RX ADMIN — SODIUM CHLORIDE 50 ML/HR: 900 INJECTION, SOLUTION INTRAVENOUS at 05:47

## 2018-06-15 RX ADMIN — Medication 10 ML: at 21:21

## 2018-06-15 RX ADMIN — Medication 5 ML: at 14:00

## 2018-06-15 RX ADMIN — CARVEDILOL 6.25 MG: 6.25 TABLET, FILM COATED ORAL at 17:31

## 2018-06-15 NOTE — PROGRESS NOTES
Call placed and message left to patient's daughter Nafisa Akins regarding patient's refusal and AMS.

## 2018-06-15 NOTE — PROGRESS NOTES
Problem: Pressure Injury - Risk of  Goal: *Prevention of pressure injury  Document Rubio Scale and appropriate interventions in the flowsheet. Outcome: Progressing Towards Goal  Pressure Injury Interventions:  Sensory Interventions: Assess changes in LOC, Check visual cues for pain, Keep linens dry and wrinkle-free, Maintain/enhance activity level, Minimize linen layers, Monitor skin under medical devices, Pad between skin to skin, Pressure redistribution bed/mattress (bed type), Turn and reposition approx. every two hours (pillows and wedges if needed)    Moisture Interventions: Absorbent underpads, Apply protective barrier, creams and emollients, Internal/External urinary devices, Maintain skin hydration (lotion/cream), Minimize layers, Moisture barrier    Activity Interventions: Pressure redistribution bed/mattress(bed type)    Mobility Interventions: HOB 30 degrees or less, Pressure redistribution bed/mattress (bed type), Turn and reposition approx. every two hours(pillow and wedges)    Nutrition Interventions: Document food/fluid/supplement intake    Friction and Shear Interventions: HOB 30 degrees or less, Lift sheet, Minimize layers, Apply protective barrier, creams and emollients    0825-Bedside shift change report given to CARLINE Prater (oncoming nurse) by Babatunde Noble (offgoing nurse). Report included the following information SBAR, Kardex, ED Summary, Procedure Summary, Intake/Output, Recent Results, Med Rec Status and Cardiac Rhythm NSR.

## 2018-06-15 NOTE — PROGRESS NOTES
Critical Result Notification    Received and verbally repeated the following test results Platlets, 20 ,WBC, 0.4 from Edith (NAME OF TECHNICIAN) on 06/15/18 (DATE), at [phone] (TIME). Dr. Magali Wang) was not notified because critical value covered by order set or previous orders. Additional comments: expected and trend up from previous result. Continues neutropenic precaution for low WBC    Carole Koenig RN    0600- Noted 4 lbs weight increased from yesterday. No signs of Fluid overload noted. No change in lungs sound nor edema. Will notify AM nurse.

## 2018-06-15 NOTE — PROGRESS NOTES
Renal Dosing/Monitoring  Medication: Levofloxacin   Current regimen:  750 mg IV every 24 hr  Recent Labs      06/15/18   0040  06/14/18   0312   CREA  1.08*  0.98   BUN  23*  25*     Estimated CrCl:  42.7 ml/min  Plan: Change to 750 mg IV Q 48 hours per [address] [place] [address] P&T Committee Protocol with respect to renal function. Pharmacy will continue to monitor patient daily and will make dosage adjustments based upon changing renal function.     Lizzy Navarro, PharmD, BCPS
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
## 2018-06-15 NOTE — ROUTINE PROCESS
Bedside shift change report given to jostin cabral rn (oncoming nurse) by Michelle Galeano (offgoing nurse). Report included the following information SBAR and Kardex.
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
## 2018-06-15 NOTE — PROGRESS NOTES
Patient transferred to Angio via stretcher for CT guided bone marrow biopsy. Agitated, uncooperative, refusing having VS taken and refusing procedure states \"I've had this done before and this time I am being forced\" Offered to call daughter and patient refusing. Dr. Lisseth Cifuentes spoke with patient regarding procedure and continues to be agitated and stating she does not want this done. Transferred patient back to unit via stretcher and patient refusing to slide onto bed requiring 4 RN's to physically transfer patient to bed.

## 2018-06-15 NOTE — PROGRESS NOTES
Patient's daughter called back and update given. Recommended that she contact the MD or RN on [address] for further questions.

## 2018-06-15 NOTE — DIABETES MGMT
DTC Progress Note    Recommendations/ Comments: Chart reviewed due to hyperglycemia. Noted pt refusing correction insulin as well as Glipizide. If appropriate, please consider:  - Adding Lantus 15 units daily     Current hospital DM medication: correction scale Humalog, normal sensitivity. Chart reviewed on Jaleel Barnard. Patient is a 80 y.o. female with known diabetes on Metformin 1000 mg BID at home. A1c:   Lab Results   Component Value Date/Time    Hemoglobin A1c <3.5 (L) 06/14/2018 03:12 AM       Recent Glucose Results:   Lab Results   Component Value Date/Time     (H) 06/15/2018 12:40 AM    GLUCPOC 283 (H) 06/15/2018 06:46 AM    GLUCPOC 293 (H) 06/14/2018 10:13 PM    GLUCPOC 257 (H) 06/14/2018 04:38 PM        Lab Results   Component Value Date/Time    Creatinine 1.08 (H) 06/15/2018 12:40 AM     Estimated Creatinine Clearance: 42.7 mL/min (based on Cr of 1.08). Active Orders   Diet    DIET DIABETIC CONSISTENT CARB Regular; 2 GM NA (House Low NA)        PO intake:   Patient Vitals for the past 72 hrs:   % Diet Eaten   06/14/18 0900 75 %       Will continue to follow as needed.     Thank you  Myriam Marcos RD

## 2018-06-15 NOTE — PROGRESS NOTES
Hematology-Oncology Progress Note      Ronnell Arnett  1935  [id number]      6/15/2018  8:28 AM    Follow-up for: h/o lymphoma with profound pancytopenia     [x] Chart notes since last visit reviewed   [x] Medications reviewed for allergies and interactions    Case discussed with following: RN, Dr Michelle Matute, patient's daughter    Patient complains of the following:   A bit agitated this morning and confused. Started after benadryl given yesterday for a platelet reaction.  None since then    Subjective:     12 point ROS negative except as in HPI and above    Spoke with patient who notes the following: \"I am upset, stop bothering me\"     [] Anorexia   [] Nausea   [] Vomiting   [x] Weakness   [] Dyspnea       [] Constipation   [] Diarrhea   [] Hemoptysis   [] Dysphagia       [] Cough Productive   [] Cough Non-productive     [x] Pain asses  none    Objective:     Patient Vitals for the past 24 hrs:   BP Temp Pulse Resp SpO2 Weight   06/15/18 0701 125/58 98.6 °F (37 °C) 67 20 99 % -   06/15/18 0417 147/59 98.4 °F (36.9 °C) 74 18 100 % 82 kg (180 lb 12.4 oz)   06/14/18 2344 (!) 117/36 98.3 °F (36.8 °C) 69 15 100 % -   06/14/18 2320 122/57 97.6 °F (36.4 °C) 71 18 96 % -   06/14/18 2120 134/60 98.1 °F (36.7 °C) 69 18 98 % -   06/14/18 2018 (!) 118/39 98.2 °F (36.8 °C) 70 16 97 % -   06/14/18 1943 136/66 98 °F (36.7 °C) 74 21 97 % -   06/14/18 1938 143/58 98.2 °F (36.8 °C) 70 21 97 % -   06/14/18 1917 151/84 98.1 °F (36.7 °C) 70 22 98 % -   06/14/18 1847 138/58 - 70 - - -   06/14/18 1816 122/52 97.9 °F (36.6 °C) 68 17 100 % -   06/14/18 1745 119/60 98.1 °F (36.7 °C) 77 20 100 % -   06/14/18 1709 118/59 98 °F (36.7 °C) 78 21 100 % -   06/14/18 1641 134/57 98 °F (36.7 °C) 74 18 98 % -   06/14/18 1327 151/48 98.4 °F (36.9 °C) 74 17 100 % -   06/14/18 1220 119/47 98.1 °F (36.7 °C) 78 18 100 % -   06/14/18 1158 119/47 98.5 °F (36.9 °C) 69 19 100 % -   06/14/18 1136 110/56 98.5 °F (36.9 °C) 66 16 100 % -   06/14/18 1100 116/50 98.1 °F (36.7 °C) 69 21 100 % -   06/14/18 1030 110/46 98.1 °F (36.7 °C) 70 20 98 % -   06/14/18 1015 107/48 98.1 °F (36.7 °C) 70 18 100 % -   06/14/18 0956 115/45 98.9 °F (37.2 °C) 76 18 100 % -   06/14/18 0845 125/50 98.4 °F (36.9 °C) 72 16 100 % -   06/14/18 0830 - - - - 100 % -     Elderly lady, +Pale, alert , disoriented  Multiple bruises on upper arms  CVS-S1,S2 normal  RS- CTA , except some BB at bases  Abdo-Soft, BS+,  Extre- No c/c/e- + bruising  Neuro- No fnd, but some confusion+      Available labs reviewed:  Labs:    Recent Results (from the past 24 hour(s))   GLUCOSE, POC    Collection Time: 06/14/18 12:03 PM   Result Value Ref Range    Glucose (POC) 324 (H) 65 - 100 mg/dL    Performed by Chio Perdomo    CK W/ CKMB & INDEX    Collection Time: 06/14/18 12:54 PM   Result Value Ref Range    CK 63 26 - 192 U/L    CK - MB <1.0 <3.6 NG/ML    CK-MB Index Cannot be calculated 0 - 2.5     CULTURE, BLOOD, PERIPHERAL    Collection Time: 06/14/18 12:54 PM   Result Value Ref Range    Special Requests: NO SPECIAL REQUESTS      Culture result: NO GROWTH AFTER 14 HOURS     URINALYSIS W/ REFLEX CULTURE    Collection Time: 06/14/18 12:54 PM   Result Value Ref Range    Color YELLOW/STRAW      Appearance CLEAR CLEAR      Specific gravity 1.020 1.003 - 1.030      pH (UA) 6.0 5.0 - 8.0      Protein NEGATIVE  NEG mg/dL    Glucose 250 (A) NEG mg/dL    Ketone NEGATIVE  NEG mg/dL    Bilirubin NEGATIVE  NEG      Blood LARGE (A) NEG      Urobilinogen 2.0 (H) 0.2 - 1.0 EU/dL    Nitrites NEGATIVE  NEG      Leukocyte Esterase NEGATIVE  NEG      WBC 0-4 0 - 4 /hpf    RBC 0-5 0 - 5 /hpf    Epithelial cells FEW FEW /lpf    Bacteria NEGATIVE  NEG /hpf    UA:UC IF INDICATED CULTURE NOT INDICATED BY UA RESULT CNI     TRANSFUSION REACTION    Collection Time: 06/14/18 12:54 PM   Result Value Ref Range    Unit Number [id number]     Clerical Errors None detected     Pre-txfusion hemolysis: NONE     Post-txfusion hemolysis: NONE     Blood bag hemolysis: None detected     Direct Sanjay,pre-txfusion POS     Direct Sanjay,post-txfusion POS     PATHOLOGIST INTERP:       Additional products approved for transfusion by Dr. Cheryl Barry MD [id number]    Component Type PLATELET GROUP     Blood type,post-txn A  POS       MD Interpretation null     Comment       See Jose Rangel [id number] and [id number] for monospecific MARK results   TYPE & SCREEN    Collection Time: 06/14/18 12:54 PM   Result Value Ref Range    Crossmatch Expiration 06/17/2018     ABO/Rh(D) A POSITIVE     Antibody screen NEG     MARK IgG NEG     MARK C3b/C3d POS     ANTIBODY ELUTED ELUATE-NEGATIVE     Unit number [id number]     [facility]     Unit division 00     Status of unit TRANSFUSED     Crossmatch result Compatible     Unit number [id number]     Blood component type [facility]     Unit division 00     Status of unit TRANSFUSED     Crossmatch result Compatible    GLUCOSE, POC    Collection Time: 06/14/18  4:38 PM   Result Value Ref Range    Glucose (POC) 257 (H) 65 - 100 mg/dL    Performed by Marlyn Reynaga    GLUCOSE, POC    Collection Time: 06/14/18 10:13 PM   Result Value Ref Range    Glucose (POC) 293 (H) 65 - 100 mg/dL    Performed by Alta Hernandez    CBC WITH AUTOMATED DIFF    Collection Time: 06/15/18 12:40 AM   Result Value Ref Range    WBC 0.4 (LL) 3.6 - 11.0 K/uL    RBC 2.08 (L) 3.80 - 5.20 M/uL    HGB 6.4 (L) 11.5 - 16.0 g/dL    HCT 18.7 (L) 35.0 - 47.0 %    MCV 89.9 80.0 - 99.0 FL    MCH 30.8 26.0 - 34.0 PG    MCHC 34.2 30.0 - 36.5 g/dL    RDW 14.3 11.5 - 14.5 %    PLATELET 20 (LL) 429 - 400 K/uL    MPV 11.3 8.9 - 12.9 FL    NRBC 0.0 0  WBC    ABSOLUTE NRBC 0.00 0.00 - 0.01 K/uL    NEUTROPHILS 25 (L) 32 - 75 %    BAND NEUTROPHILS 3 0 - 6 %    LYMPHOCYTES 65 (H) 12 - 49 %    MONOCYTES 6 5 - 13 %    EOSINOPHILS 1 0 - 7 %    BASOPHILS 0 0 - 1 %    IMMATURE GRANULOCYTES 0 %    ABS. NEUTROPHILS 0.1 (L) 1.8 - 8.0 K/UL    ABS.  LYMPHOCYTES 0.3 (L) 0.8 - 3.5 K/UL ABS. MONOCYTES 0.0 0.0 - 1.0 K/UL    ABS. EOSINOPHILS 0.0 0.0 - 0.4 K/UL    ABS. BASOPHILS 0.0 0.0 - 0.1 K/UL    ABS. IMM. GRANS. 0.0 K/UL    DF MANUAL      RBC COMMENTS ANISOCYTOSIS  1+        RBC COMMENTS OVALOCYTES  PRESENT        WBC COMMENTS Differential performed on buffy coat smear. HAPTOGLOBIN    Collection Time: 06/15/18 12:40 AM   Result Value Ref Range    Haptoglobin 13 (L) 30 - 234 mg/dL   METABOLIC PANEL, COMPREHENSIVE    Collection Time: 06/15/18 12:40 AM   Result Value Ref Range    Sodium 139 136 - 145 mmol/L    Potassium 3.7 3.5 - 5.1 mmol/L    Chloride 103 97 - 108 mmol/L    CO2 27 21 - 32 mmol/L    Anion gap 9 5 - 15 mmol/L    Glucose 223 (H) 65 - 100 mg/dL    BUN 23 (H) 6 - 20 MG/DL    Creatinine 1.08 (H) 0.55 - 1.02 MG/DL    BUN/Creatinine ratio 21 (H) 12 - 20      GFR est AA 59 (L) >60 ml/min/1.73m2    GFR est non-AA 48 (L) >60 ml/min/1.73m2    Calcium 8.1 (L) 8.5 - 10.1 MG/DL    Bilirubin, total 2.4 (H) 0.2 - 1.0 MG/DL    ALT (SGPT) 14 12 - 78 U/L    AST (SGOT) 11 (L) 15 - 37 U/L    Alk. phosphatase 101 45 - 117 U/L    Protein, total 6.4 6.4 - 8.2 g/dL    Albumin 3.0 (L) 3.5 - 5.0 g/dL    Globulin 3.4 2.0 - 4.0 g/dL    A-G Ratio 0.9 (L) 1.1 - 2.2     RETICULOCYTE COUNT    Collection Time: 06/15/18 12:40 AM   Result Value Ref Range    Reticulocyte count 0.8 0.7 - 2.1 %    Absolute Retic Cnt. 0.0168 0.0164 - 0.0776 M/ul   GLUCOSE, POC    Collection Time: 06/15/18  6:46 AM   Result Value Ref Range    Glucose (POC) 283 (H) 65 - 100 mg/dL    Performed by Macey Campbell        Imaging:  CXR Results  (Last 48 hours)               06/14/18 1045  XR CHEST PORT Final result    Impression:  IMPRESSION:   No acute finding       Narrative:  INDICATION: Shortness of breath. Left lung bronchial breathing. Low platelets. COMPARISON: 1/16/2018       A single frontal view was obtained. The time of this study is 1038 hours. No   acute finding is seen in the lungs.  Heart and mediastinal shadows are stable. Atherosclerotic change of the aorta is noted. .. CT Results  (Last 48 hours)               06/14/18 1557  CT HEAD WO CONT Final result    Impression:  IMPRESSION: Age-related volume loss. No acute intracranial abnormality. Narrative:  EXAM:  CT HEAD WITHOUT CONTRAST   INDICATION: Low platelets and confusion, evaluate for CNS bleed. COMPARISON: 6/15/2018. CONTRAST: None. TECHNIQUE: Unenhanced CT of the head was performed using 5 mm images. Brain and   bone windows were generated. Sagittal and coronal reformations were generated. CT dose reduction was achieved through use of a standardized protocol tailored   for this examination and automatic exposure control for dose modulation. Adaptive statistical iterative reconstruction (ASIR) was utilized for this   examination. FINDINGS:   The ventricles and sulci are enlarged in a generalized fashion consistent with   volume loss. There is an incidental cavum septum. . There is atherosclerotic   calcification of the vertebral and carotid arteries. There is no significant   white matter disease. There is no intracranial hemorrhage. There is no   extra-axial collection, mass, mass effect or midline shift. The basilar   cisterns are open. No acute infarct is identified. The bone windows demonstrate   no abnormalities. The visualized portions of the paranasal sinuses and mastoid   air cells are clear.           06/14/18 1557  CT NECK SOFT TISSUE W CONT Final result    Impression:  IMPRESSION: Normal CT examination of the neck. The small neck lymph nodes noted   on the prior examination several years ago are no longer present. Narrative:  EXAM:  CT NECK SOFT TISSUE W CONT    INDICATION: Enlarged lymph nodes of the neck with history of lymphoma. COMPARISON: 5/18/2015. CONTRAST: 100 mL of Isovue-300.        TECHNIQUE: Multislice helical CT was performed from the mid calvarium to the   aortic arch during uneventful rapid bolus intravenous contrast administration. Contiguous 2.5 mm axial images were reconstructed and lung and soft tissue   windows were generated. Coronal reformations were generated. CT dose reduction   was achieved through use of a standardized protocol tailored for this   examination and automatic exposure control for dose modulation. Adaptive   statistical iterative reconstruction (ASIR) was utilized for this examination. FINDINGS:   No mass or adenopathy is identified within the neck. The carotid and jugular vessels enhance normally. The thyroid gland, submandibular salivary glands and parotid glands are normal.   No nasopharyngeal, pharyngeal or laryngeal mass is identified. No abnormalities are identified in the visualized portions of the brain or   orbits. The visualized mastoid air cells and paranasal sinuses are clear. The visualized portions of the lung apices are clear.           06/14/18 1557  CT CHEST W CONT Final result    Impression:  IMPRESSION:    1. Focal right lower lobe airspace disease. Follow-up to resolution is   recommended. 2. No mass, adenopathy or other acute abnormality in the chest, abdomen or   pelvis. 3. Coronary artery calcification. 4. Small pleural effusions. 5. Right total hip replacement. Narrative:  EXAM: CT CHEST ABDOMEN PELVIS WITH CONTRAST   INDICATION: Lymphoma with severe shortness of breath and pancytopenia. COMPARISON: 12/6/2017. CONTRAST: 100 mL of Isovue-370. TECHNIQUE:    Multislice helical CT was performed from the thoracic inlet to the symphysis   pubis during uneventful rapid bolus intravenous contrast administration. Oral   contrast was not administered. Contiguous 5 mm axial images were reconstructed   and lung and soft tissue windows were generated. Coronal and sagittal   reformations were generated.  CT dose reduction was achieved through use of a   standardized protocol tailored for this examination and automatic exposure   control for dose modulation. Adaptive statistical iterative reconstruction   (ASIR) was utilized for this examination. FINDINGS:   LOWER NECK:The visualized portions of the thyroid and structures of the lower   neck are within normal limits. CHEST:   Lungs: There is a nodular airspace opacity in the right lower lobe. The lungs   are otherwise clear. Pleura: There are small pleural effusions. Aorta: The aorta enhances normally with no evidence of aneurysm or dissection. There is coronary artery calcification. Mediastinum: There is no mediastinal or hilar adenopathy or mass. There is a   small hiatal hernia. Bones and soft tissues: The bones and soft tissues of the chest wall are normal.   ABDOMEN:   Liver: The liver is normal in size and contour with no focal abnormality. Gallbladder and bile ducts: There is no calcified gallstone or biliary   dilatation. Spleen: No abnormality. Pancreas: No abnormality. Adrenal glands: No abnormality. Kidneys: No abnormality. PELVIS:   Reproductive organs: The uterus is absent. Bladder: No abnormality. BOWEL AND MESENTERY: The small bowel is normal.  There is no mesenteric mass or   adenopathy. The appendix is not identified. PERITONEUM: There is no ascites or free intraperitoneal air. RETROPERITONEUM: The aorta is atherosclerotic and tapers without aneurysm. There   is no retroperitoneal adenopathy or mass. There is no pelvic mass or adenopathy. BONES AND SOFT TISSUES: There is streak artifact through the pelvis from a right   total hip prosthesis and there is mild levoconvex lumbar scoliosis and   degenerative change. 06/14/18 1557  CT ABD PELV W CONT Final result    Impression:  IMPRESSION:    1. Focal right lower lobe airspace disease. Follow-up to resolution is   recommended. 2. No mass, adenopathy or other acute abnormality in the chest, abdomen or   pelvis.    3. Coronary artery calcification. 4. Small pleural effusions. 5. Right total hip replacement. Narrative:  EXAM: CT CHEST ABDOMEN PELVIS WITH CONTRAST   INDICATION: Lymphoma with severe shortness of breath and pancytopenia. COMPARISON: 12/6/2017. CONTRAST: 100 mL of Isovue-370. TECHNIQUE:    Multislice helical CT was performed from the thoracic inlet to the symphysis   pubis during uneventful rapid bolus intravenous contrast administration. Oral   contrast was not administered. Contiguous 5 mm axial images were reconstructed   and lung and soft tissue windows were generated. Coronal and sagittal   reformations were generated. CT dose reduction was achieved through use of a   standardized protocol tailored for this examination and automatic exposure   control for dose modulation. Adaptive statistical iterative reconstruction   (ASIR) was utilized for this examination. FINDINGS:   LOWER NECK:The visualized portions of the thyroid and structures of the lower   neck are within normal limits. CHEST:   Lungs: There is a nodular airspace opacity in the right lower lobe. The lungs   are otherwise clear. Pleura: There are small pleural effusions. Aorta: The aorta enhances normally with no evidence of aneurysm or dissection. There is coronary artery calcification. Mediastinum: There is no mediastinal or hilar adenopathy or mass. There is a   small hiatal hernia. Bones and soft tissues: The bones and soft tissues of the chest wall are normal.   ABDOMEN:   Liver: The liver is normal in size and contour with no focal abnormality. Gallbladder and bile ducts: There is no calcified gallstone or biliary   dilatation. Spleen: No abnormality. Pancreas: No abnormality. Adrenal glands: No abnormality. Kidneys: No abnormality. PELVIS:   Reproductive organs: The uterus is absent. Bladder: No abnormality.     BOWEL AND MESENTERY: The small bowel is normal.  There is no mesenteric mass or adenopathy. The appendix is not identified. PERITONEUM: There is no ascites or free intraperitoneal air. RETROPERITONEUM: The aorta is atherosclerotic and tapers without aneurysm. There   is no retroperitoneal adenopathy or mass. There is no pelvic mass or adenopathy. BONES AND SOFT TISSUES: There is streak artifact through the pelvis from a right   total hip prosthesis and there is mild levoconvex lumbar scoliosis and   degenerative change. \    Time spent with family    Assessment:   Dr Krysten Sims pt    1. Acute on chronic  Pancytopenia- precipitous drop in counts ( 4/2018 --> current)  2. 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 H/o PMR - on low dose prednisone, per Outside MD     Plan:   *)Acute on chronic  Pancytopenia- precipitous drop in counts ( 4/2018 --> current) with ESR of 121   -- Unclear etiology so far. Smear shows no blasts , in fact complete paucity of white cells and platelets , a few atypical lymphoid cells and dimorphic population of rbcs- some spherocytes, small and large RBCs, no schistocytes, occ tear drop cells. -- Has B12 deficiency - entire picture cannot be attributed to this- start b12 inj today  -- Retic low,LDH normal with low haptoglobin , large blood in urine with no RBCs - ? Hemolysis? Hemoglobinuria( ? PNH with asso bone marrow disorder) - per d/w blood bank this am -no current evidence of autoimmune hemolytic anemia- repeat MARK, fractionate Bili( up today to 2.4)     --Add folate daily, not starting high dose steroids - don't see an indication yet  -- Needs a bone marrow biopsy- to be done this am , if patient cooperates- she had consented yesterday am- ? Underlying severe MDS or acute leukemia  --- Given the High ESR, will also send JESSICA, RF, C3,C4 , CPK ( has h/o PMR but no symptoms currently)  -- Keep Hb >/= 7.0, plts>/=10k- prn platelet transfusions ( needs pre-meds for blood products -?  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 Bd Cultures - pending    *) Profound neutropenia - afebrile - cont precautions, broaden coverage if needed for fever spikes    *)Was on low dose prednisone - on admit for PMR - will need stress dose steroids if BP drops or worsens acutely    *)Encephalopathy - not present on admission- confusion and biligerence after iv benadryl.  Head CT negative for bleed or other pathology    Weekend [facility] on call MD will follow closely

## 2018-06-15 NOTE — PROGRESS NOTES
Pt getting ready to go down for procedure, still AMS, getting agitated and combative. Dr. Candice Barragan notified and verbal order received for 2mg IV haldol. I pulled via cabinet override. Dose given. Orders placed with Dr. Candice Barragan at bedside.

## 2018-06-15 NOTE — PROGRESS NOTES
Reason for Admission:   Generalized weakness. History of Lymphoma possible myelodysplasia. Presented with pancytopenia. RRAT Score:     39             Resources/supports as identified by patient/family:   Patient has Medicare. She lives with her daughter in the patient's home. Top Challenges facing patient (as identified by patient/family and CM): Finances/Medication cost?      See above              Transportation? Patient lives with daughter and she will provide transportation. Support system or lack thereof? Daughter Valeria Davalos is her primary caregiver. Living arrangements? Lives in her home with daughter. Has a walker to assist with ambulation. Self-care/ADLs/Cognition? Patient needs assistance with all ADLs. Patient is alert with periods of confusion. Current Advanced Directive/Advance Care Plan:                            Plan for utilizing home health: Will wait for completion of medical workup and PT/OT evaluations to better formulate discharge plan. Likelihood of readmission:  High risk/Red zone                 Transition of Care Plan:        Anticipate discharge home with family support but will follow and assist should other needs arise. Care Management Interventions  PCP Verified by CM:  Yes (Dr Dequan Devries)  Palliative Care Criteria Met (RRAT>21 & CHF Dx)?: No  Mode of Transport at Discharge:  (Family car)  Transition of Care Consult (CM Consult): Discharge Planning  Current Support Network: Own Home (Daughter lives with patient at this time)  Confirm Follow Up Transport: Family (Daughter Valeria Davalos)  Plan discussed with Pt/Family/Caregiver: Yes  Freedom of Choice Offered: Yes  [address] Provided?:  (NA)  Discharge Location  Discharge Placement:  (TBD)    Efrain Bishop RN CRM  Ext [phone]

## 2018-06-15 NOTE — PROGRESS NOTES
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 The weakness is generalized, it is progressive. The patient lives with her daughter. Normally, the patient is able to carry out her activities of daily living without any assistance. On the day of her presentation at the emergency room, the generalized weakness got worse to the extent that the patient was not able to get out of bed. The patient also complained of a decreased appetite and shortness of breath.   The patient was found to be pancytopenic    Interval history / Subjective:     Feeling generalized weakness  Daughter in the room believes that the patient is still not at her baseline mental status  No chest pain, abd pain, nausea or vomiting     Assessment & Plan:     Severe anemia  -s/p 2 units PRBC at [facility] and another 2 units   -Appreciate discussion with Dr Zoya Perla    Severe thrombocytopenia  -sec to ?  -s/p 2 units platelets 0  -bone biopsy was supposed to be 6/15 but the patient was agitated and the biopsy cancelled     Pancytopenia  -neutropenic precautions  -CXR unremarkable  -CT chest focal RLL airpace disease  -on LVQ    History of lymphoma  -CT chest and abd , No mass, adenopathy or other acute abnormality in the chest, abdomen or pelvis    AMS  -?metabolic encephalopathy ?anemia  -CT head age related volume loss    History of PMR  -Was on steroids, currently on hold    Vit B12 deficiency  -Replace    Hypothyroidism  -On synthroid    DM type 2  -On glipizide. Patient refusing SSI    Anxiety/Depression   -On Lexapro    Diabetic diet    Code status: FULL CODE  DVT prophylaxis: scd  PTA: home    Plan: CT scans follow    Care Plan discussed with: Patient/Family  Disposition: TBD     Hospital Problems  Date Reviewed: 6/14/2018          Codes Class Noted POA    * (Principal)Pancytopenia ([facility] [address] ICD-10-CM: K95.723  ICD-9-CM: 284.19  6/13/2018 Yes                Review of Systems:   A comprehensive review of systems was negative except for that written in the HPI. Vital Signs:    Last 24hrs VS reviewed since prior progress note. Most recent are:  Visit Vitals    /58 (BP 1 Location: Right arm, BP Patient Position: At rest;Supine)    Pulse 67    Temp 98.6 °F (37 °C)    Resp 20    Ht 5' 10\" (1.778 m)    Wt 82 kg (180 lb 12.4 oz)    SpO2 99%    Breastfeeding No    BMI 25.94 kg/m2         Intake/Output Summary (Last 24 hours) at 06/15/18 1145  Last data filed at 06/15/18 0900   Gross per 24 hour   Intake          1610.03 ml   Output             2750 ml   Net         -1139.97 ml        Physical Examination:             Constitutional:  No acute distress, cooperative, pleasant    ENT:  Oral mucous moist, oropharynx benign. Neck supple,    Resp:  CTA bilaterally. No wheezing/rhonchi/rales. No accessory muscle use   CV:  Regular rhythm, normal rate, no murmurs, gallops, rubs    GI:  Soft, non distended, non tender. normoactive bowel sounds, no hepatosplenomegaly     Musculoskeletal:  No edema, warm, 2+ pulses throughout    Neurologic:  Moves all extremities.   AAOx3, CN II-XII reviewed     Skin:  Good turgor, no rashes or ulcers       Data Review:    Review and/or order of clinical lab test      Labs:     Recent Labs      06/15/18   0040  06/14/18   0312   WBC  0.4*  0.4*   HGB  6.4*  4.5*   HCT  18.7*  13.4*   PLT  20*  5*     Recent Labs      06/15/18   0040 06/14/18   0813  06/14/18 0312   NA  139   --   139   K  3.7   --   4.1   CL  103   --   106   CO2  27   --   24   BUN  23*   --   25*   CREA  1.08*   --   0.98   GLU  223*   --   228*   CA  8.1*   --   8.2*   PHOS   --    --   3.4   URICA   --   3.7   --      Recent Labs      06/15/18   0040  06/14/18 0312   SGOT  11*  9*   ALT  14  13   AP  101  87   TBILI  2.4*  1.0   TP  6.4  5.7*   ALB  3.0*  2.9*   GLOB  3.4  2.8     Recent Labs      06/14/18 0813   INR  1.1   PTP  10.8   APTT  21.5*      Recent Labs      06/14/18 0312   TIBC  198*   PSAT  99*   FERR  1554*      Lab Results   Component Value Date/Time    Folate 15.2 06/14/2018 03:12 AM      No results for input(s): PH, PCO2, PO2 in the last 72 hours.   Recent Labs      06/14/18   1254  06/14/18   0813  06/14/18 0312   CPK  63   --   58   CKNDX  Cannot be calculated   --   Cannot be calculated   TROIQ   --   <0.05  <0.05     No results found for: CHOL, CHOLX, CHLST, CHOLV, HDL, LDL, LDLC, DLDLP, TGLX, TRIGL, TRIGP, CHHD, CHHDX  Lab Results   Component Value Date/Time    Glucose (POC) 283 (H) 06/15/2018 06:46 AM    Glucose (POC) 293 (H) 06/14/2018 10:13 PM    Glucose (POC) 257 (H) 06/14/2018 04:38 PM    Glucose (POC) 324 (H) 06/14/2018 12:03 PM    Glucose (POC) 285 (H) 06/14/2018 06:56 AM     Lab Results   Component Value Date/Time    Color YELLOW/STRAW 06/14/2018 12:54 PM    Appearance CLEAR 06/14/2018 12:54 PM    Specific gravity 1.020 06/14/2018 12:54 PM    pH (UA) 6.0 06/14/2018 12:54 PM    Protein NEGATIVE  06/14/2018 12:54 PM    Glucose 250 (A) 06/14/2018 12:54 PM    Ketone NEGATIVE  06/14/2018 12:54 PM    Bilirubin NEGATIVE  06/14/2018 12:54 PM    Urobilinogen 2.0 (H) 06/14/2018 12:54 PM    Nitrites NEGATIVE  06/14/2018 12:54 PM    Leukocyte Esterase NEGATIVE  06/14/2018 12:54 PM    Epithelial cells FEW 06/14/2018 12:54 PM    Bacteria NEGATIVE  06/14/2018 12:54 PM    WBC 0-4 06/14/2018 12:54 PM    RBC 0-5 06/14/2018 12:54 PM         Medications Reviewed:     Current Facility-Administered Medications   Medication Dose Route Frequency    haloperidol lactate (HALDOL) injection 2 mg  2 mg IntraVENous ONCE    cyanocobalamin (VITAMIN B12) injection 1,000 mcg  1,000 mcg SubCUTAneous DAILY    [START ON 6/22/2018] cyanocobalamin (VITAMIN B12) injection 1,000 mcg  1,000 mcg SubCUTAneous Q7D    Followed by   Penny Whaley ON 8/13/2018] cyanocobalamin (VITAMIN B12) injection 1,000 mcg  1,000 mcg SubCUTAneous V95N    folic acid tablet 0.4 mg  0.4 mg Oral DAILY    [START ON 6/16/2018] levoFLOXacin (LEVAQUIN) 750 mg in D5W IVPB  750 mg IntraVENous Q48H    carvedilol (COREG) tablet 6.25 mg  6.25 mg Oral BID WITH MEALS    HYDROcodone-acetaminophen (NORCO) 7.5-325 mg per tablet 1 Tab  1 Tab Oral Q6H PRN    lactobac ac& pc-s.therm-b.anim (HERNANDO Q/RISAQUAD)  1 Cap Oral DAILY    levothyroxine (SYNTHROID) tablet 88 mcg  88 mcg Oral ACB    pantoprazole (PROTONIX) tablet 40 mg  40 mg Oral DAILY    senna-docusate (PERICOLACE) 8.6-50 mg per tablet 1 Tab  1 Tab Oral DAILY    sodium chloride (NS) flush 5-10 mL  5-10 mL IntraVENous Q8H    sodium chloride (NS) flush 5-10 mL  5-10 mL IntraVENous PRN    acetaminophen (TYLENOL) tablet 650 mg  650 mg Oral Q6H PRN    ondansetron (ZOFRAN) injection 4 mg  4 mg IntraVENous Q4H PRN    0.9% sodium chloride infusion 250 mL  250 mL IntraVENous PRN    escitalopram oxalate (LEXAPRO) tablet 5 mg  5 mg Oral DAILY    diphenhydrAMINE (BENADRYL) injection 25 mg  25 mg IntraVENous Q6H PRN    diphenhydrAMINE (BENADRYL) capsule 50 mg  50 mg Oral Q6H PRN    glipiZIDE SR (GLUCOTROL XL) tablet 5 mg  5 mg Oral ACB    0.9% sodium chloride infusion  50 mL/hr IntraVENous CONTINUOUS    insulin lispro (HUMALOG) injection   SubCUTAneous AC&HS    glucose chewable tablet 16 g  4 Tab Oral PRN    dextrose (D50W) injection syrg 12.5-25 g  12.5-25 g IntraVENous PRN    glucagon (GLUCAGEN) injection 1 mg  1 mg IntraMUSCular PRN ______________________________________________________________________  EXPECTED LENGTH OF STAY: 2d 19h  ACTUAL LENGTH OF STAY:          2                 Irene Greenberg MD

## 2018-06-16 LAB
ALBUMIN SERPL-MCNC: 3 G/DL (ref 3.5–5)
ALBUMIN/GLOB SERPL: 0.9 {RATIO} (ref 1.1–2.2)
ALP SERPL-CCNC: 96 U/L (ref 45–117)
ALT SERPL-CCNC: 11 U/L (ref 12–78)
ANION GAP SERPL CALC-SCNC: 5 MMOL/L (ref 5–15)
AST SERPL-CCNC: 8 U/L (ref 15–37)
BASOPHILS # BLD: 0 K/UL (ref 0–0.1)
BASOPHILS NFR BLD: 0 % (ref 0–1)
BILIRUB SERPL-MCNC: 1 MG/DL (ref 0.2–1)
BUN SERPL-MCNC: 15 MG/DL (ref 6–20)
BUN/CREAT SERPL: 19 (ref 12–20)
CALCIUM SERPL-MCNC: 8.5 MG/DL (ref 8.5–10.1)
CHLORIDE SERPL-SCNC: 107 MMOL/L (ref 97–108)
CK SERPL-CCNC: 61 U/L (ref 26–192)
CO2 SERPL-SCNC: 28 MMOL/L (ref 21–32)
CREAT SERPL-MCNC: 0.8 MG/DL (ref 0.55–1.02)
DAT POLY-SP REAG RBC QL: NORMAL
DIFFERENTIAL METHOD BLD: ABNORMAL
EOSINOPHIL # BLD: 0 K/UL (ref 0–0.4)
EOSINOPHIL NFR BLD: 1 % (ref 0–7)
ERYTHROCYTE [DISTWIDTH] IN BLOOD BY AUTOMATED COUNT: 14 % (ref 11.5–14.5)
GLOBULIN SER CALC-MCNC: 3.3 G/DL (ref 2–4)
GLUCOSE BLD STRIP.AUTO-MCNC: 148 MG/DL (ref 65–100)
GLUCOSE BLD STRIP.AUTO-MCNC: 167 MG/DL (ref 65–100)
GLUCOSE BLD STRIP.AUTO-MCNC: 321 MG/DL (ref 65–100)
GLUCOSE BLD STRIP.AUTO-MCNC: 88 MG/DL (ref 65–100)
GLUCOSE SERPL-MCNC: 97 MG/DL (ref 65–100)
HAPTOGLOB SERPL-MCNC: 46 MG/DL (ref 30–200)
HCT VFR BLD AUTO: 19.8 % (ref 35–47)
HGB BLD-MCNC: 6.8 G/DL (ref 11.5–16)
IMM GRANULOCYTES # BLD: 0 K/UL
IMM GRANULOCYTES NFR BLD AUTO: 0 %
LDH SERPL L TO P-CCNC: 229 U/L (ref 81–246)
LYMPHOCYTES # BLD: 0.3 K/UL (ref 0.8–3.5)
LYMPHOCYTES NFR BLD: 68 % (ref 12–49)
MCH RBC QN AUTO: 30.6 PG (ref 26–34)
MCHC RBC AUTO-ENTMCNC: 34.3 G/DL (ref 30–36.5)
MCV RBC AUTO: 89.2 FL (ref 80–99)
MONOCYTES # BLD: 0 K/UL (ref 0–1)
MONOCYTES NFR BLD: 7 % (ref 5–13)
NEUTS BAND NFR BLD MANUAL: 2 % (ref 0–6)
NEUTS SEG # BLD: 0.1 K/UL (ref 1.8–8)
NEUTS SEG NFR BLD: 22 % (ref 32–75)
NRBC # BLD: 0 K/UL (ref 0–0.01)
NRBC BLD-RTO: 0 PER 100 WBC
PLATELET # BLD AUTO: 15 K/UL (ref 150–400)
PLATELET COMMENTS,PCOM: ABNORMAL
PMV BLD AUTO: 11.2 FL (ref 8.9–12.9)
POTASSIUM SERPL-SCNC: 3.8 MMOL/L (ref 3.5–5.1)
PROT SERPL-MCNC: 6.3 G/DL (ref 6.4–8.2)
RBC # BLD AUTO: 2.22 M/UL (ref 3.8–5.2)
RBC MORPH BLD: ABNORMAL
RBC MORPH BLD: ABNORMAL
RETICS # AUTO: 0.01 M/UL (ref 0.02–0.08)
RETICS/RBC NFR AUTO: 0.5 % (ref 0.7–2.1)
RHEUMATOID FACT SERPL-ACNC: <10 IU/ML
SERVICE CMNT-IMP: ABNORMAL
SERVICE CMNT-IMP: NORMAL
SODIUM SERPL-SCNC: 140 MMOL/L (ref 136–145)
WBC # BLD AUTO: 0.4 K/UL (ref 3.6–11)
WBC MORPH BLD: ABNORMAL

## 2018-06-16 PROCEDURE — 83051 HEMOGLOBIN PLASMA: CPT | Performed by: HOSPITALIST

## 2018-06-16 PROCEDURE — P9040 RBC LEUKOREDUCED IRRADIATED: HCPCS | Performed by: FAMILY MEDICINE

## 2018-06-16 PROCEDURE — 82962 GLUCOSE BLOOD TEST: CPT

## 2018-06-16 PROCEDURE — 85045 AUTOMATED RETICULOCYTE COUNT: CPT | Performed by: INTERNAL MEDICINE

## 2018-06-16 PROCEDURE — 80053 COMPREHEN METABOLIC PANEL: CPT | Performed by: INTERNAL MEDICINE

## 2018-06-16 PROCEDURE — 86431 RHEUMATOID FACTOR QUANT: CPT | Performed by: HOSPITALIST

## 2018-06-16 PROCEDURE — 74011250637 HC RX REV CODE- 250/637: Performed by: INTERNAL MEDICINE

## 2018-06-16 PROCEDURE — 74011250636 HC RX REV CODE- 250/636: Performed by: HOSPITALIST

## 2018-06-16 PROCEDURE — 36430 TRANSFUSION BLD/BLD COMPNT: CPT

## 2018-06-16 PROCEDURE — 74011250637 HC RX REV CODE- 250/637: Performed by: HOSPITALIST

## 2018-06-16 PROCEDURE — 83010 ASSAY OF HAPTOGLOBIN QUANT: CPT | Performed by: INTERNAL MEDICINE

## 2018-06-16 PROCEDURE — 86038 ANTINUCLEAR ANTIBODIES: CPT | Performed by: HOSPITALIST

## 2018-06-16 PROCEDURE — 85025 COMPLETE CBC W/AUTO DIFF WBC: CPT | Performed by: INTERNAL MEDICINE

## 2018-06-16 PROCEDURE — 65660000000 HC RM CCU STEPDOWN

## 2018-06-16 PROCEDURE — 74011636637 HC RX REV CODE- 636/637: Performed by: INTERNAL MEDICINE

## 2018-06-16 PROCEDURE — 86880 COOMBS TEST DIRECT: CPT | Performed by: HOSPITALIST

## 2018-06-16 PROCEDURE — 74011250636 HC RX REV CODE- 250/636: Performed by: INTERNAL MEDICINE

## 2018-06-16 PROCEDURE — 86160 COMPLEMENT ANTIGEN: CPT | Performed by: HOSPITALIST

## 2018-06-16 PROCEDURE — 82550 ASSAY OF CK (CPK): CPT | Performed by: HOSPITALIST

## 2018-06-16 PROCEDURE — 36415 COLL VENOUS BLD VENIPUNCTURE: CPT | Performed by: INTERNAL MEDICINE

## 2018-06-16 PROCEDURE — 83615 LACTATE (LD) (LDH) ENZYME: CPT | Performed by: INTERNAL MEDICINE

## 2018-06-16 RX ORDER — SODIUM CHLORIDE 9 MG/ML
250 INJECTION, SOLUTION INTRAVENOUS AS NEEDED
Status: DISCONTINUED | OUTPATIENT
Start: 2018-06-16 | End: 2018-06-22 | Stop reason: HOSPADM

## 2018-06-16 RX ADMIN — SENNOSIDES AND DOCUSATE SODIUM 1 TABLET: 8.6; 5 TABLET ORAL at 10:10

## 2018-06-16 RX ADMIN — CARVEDILOL 6.25 MG: 6.25 TABLET, FILM COATED ORAL at 18:10

## 2018-06-16 RX ADMIN — ESCITALOPRAM OXALATE 5 MG: 10 TABLET ORAL at 10:09

## 2018-06-16 RX ADMIN — GLIPIZIDE 5 MG: 2.5 TABLET, FILM COATED, EXTENDED RELEASE ORAL at 07:10

## 2018-06-16 RX ADMIN — INSULIN LISPRO 7 UNITS: 100 INJECTION, SOLUTION INTRAVENOUS; SUBCUTANEOUS at 12:09

## 2018-06-16 RX ADMIN — LEVOFLOXACIN 750 MG: 5 INJECTION, SOLUTION INTRAVENOUS at 19:34

## 2018-06-16 RX ADMIN — CYANOCOBALAMIN 1000 MCG: 1000 INJECTION, SOLUTION INTRAMUSCULAR at 10:10

## 2018-06-16 RX ADMIN — ACETAMINOPHEN 650 MG: 325 TABLET, FILM COATED ORAL at 12:09

## 2018-06-16 RX ADMIN — PANTOPRAZOLE SODIUM 40 MG: 40 TABLET, DELAYED RELEASE ORAL at 10:09

## 2018-06-16 RX ADMIN — CARVEDILOL 6.25 MG: 6.25 TABLET, FILM COATED ORAL at 07:10

## 2018-06-16 RX ADMIN — LEVOTHYROXINE SODIUM 88 MCG: 88 TABLET ORAL at 07:10

## 2018-06-16 RX ADMIN — FOLIC ACID TAB 400 MCG 0.4 MG: 400 TAB at 10:10

## 2018-06-16 RX ADMIN — SODIUM CHLORIDE 50 ML/HR: 900 INJECTION, SOLUTION INTRAVENOUS at 08:22

## 2018-06-16 RX ADMIN — Medication 10 ML: at 19:37

## 2018-06-16 RX ADMIN — Medication 1 CAPSULE: at 10:09

## 2018-06-16 RX ADMIN — Medication 10 ML: at 07:11

## 2018-06-16 NOTE — PROGRESS NOTES
Problem: Falls - Risk of  Goal: *Absence of Falls  Document Kristian Fall Risk and appropriate interventions in the flowsheet.    Outcome: Progressing Towards Goal  Fall Risk Interventions:  Mobility Interventions: Patient to call before getting OOB    Mentation Interventions: Door open when patient unattended, Bed/chair exit alarm, More frequent rounding, Room close to nurse's station    Medication Interventions: Patient to call before getting OOB    Elimination Interventions: Call light in reach    History of Falls Interventions: Door open when patient unattended

## 2018-06-16 NOTE — ROUTINE PROCESS
Primary Nurse Haylee Lentz, RN and oracio RN performed a dual skin assessment on this patient No impairment noted  Rubio score is 15

## 2018-06-16 NOTE — PROGRESS NOTES
Physical Therapy Screening:    An [facility] screening referral was triggered for physical therapy based on results obtained during the nursing admission assessment. The patients chart was reviewed and the patient is appropriate for a skilled therapy evaluation if there is a decline in functional mobility from baseline. Please order a consult for physical therapy if you are in agreement and would like an evaluation to be completed. Thank you.     Pilar Niño, PT

## 2018-06-16 NOTE — PROGRESS NOTES
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 The weakness is generalized, it is progressive. The patient lives with her daughter. Normally, the patient is able to carry out her activities of daily living without any assistance. On the day of her presentation at the emergency room, the generalized weakness got worse to the extent that the patient was not able to get out of bed. The patient also complained of a decreased appetite and shortness of breath. The patient was found to be pancytopenic    Interval history / Subjective:     The patient back to her baseline mental status  Feeling embarrassed about yesterday's agitation  No chest pain, abd pain, nausea or vomiting     Assessment & Plan:     Severe anemia  -s/p 2 units PRBC at [facility] and another 2 units   -Appreciate Hematology, needs another unit today    Severe thrombocytopenia  -sec to ?  -s/p 2 units platelets 3/56  -bone biopsy was supposed to be 6/15 but the patient was agitated and the biopsy cancelled  Now will get it done probably on Monday    Pancytopenia  -neutropenic precautions  -CXR unremarkable  -CT chest focal RLL airpace disease  -on LVQ, will broaden coverage if gets worse    History of lymphoma  -CT chest and abd , No mass, adenopathy or other acute abnormality in the chest, abdomen or pelvis    AMS  -?metabolic encephalopathy ?anemia  -CT head age related volume loss    History of PMR  -Was on steroids, currently on hold    Vit B12 deficiency  -Replace    Hypothyroidism  -On synthroid    DM type 2  -On glipizide. Patient refusing SSI    Anxiety/Depression   -On Lexapro    Diabetic diet    Code status: FULL CODE  DVT prophylaxis: scd  PTA: home    Plan: PRBC transfusion today, follow    Care Plan discussed with: Patient/Family  Disposition: TBD     Hospital Problems  Date Reviewed: 6/14/2018          Codes Class Noted POA    * (Principal)Pancytopenia ([facility] [address] ICD-10-CM: W04.464  ICD-9-CM: 284.19  6/13/2018 Yes                Review of Systems:   A comprehensive review of systems was negative except for that written in the HPI. Vital Signs:    Last 24hrs VS reviewed since prior progress note. Most recent are:  Visit Vitals    /64 (BP 1 Location: Left arm, BP Patient Position: At rest)    Pulse 83    Temp 98.2 °F (36.8 °C)    Resp 16    Ht 5' 10\" (1.778 m)    Wt 78.7 kg (173 lb 8 oz)    SpO2 96%    Breastfeeding No    BMI 24.89 kg/m2         Intake/Output Summary (Last 24 hours) at 06/16/18 1118  Last data filed at 06/16/18 0848   Gross per 24 hour   Intake              240 ml   Output             1300 ml   Net            -1060 ml        Physical Examination:             Constitutional:  No acute distress, cooperative, pleasant    ENT:  Oral mucous moist, oropharynx benign. Neck supple,    Resp:  CTA bilaterally. No wheezing/rhonchi/rales. No accessory muscle use   CV:  Regular rhythm, normal rate, no murmurs, gallops, rubs    GI:  Soft, non distended, non tender. normoactive bowel sounds, no hepatosplenomegaly     Musculoskeletal:  No edema, warm, 2+ pulses throughout    Neurologic:  Moves all extremities.   AAOx3, CN II-XII reviewed     Skin:  Good turgor, no rashes or ulcers       Data Review:    Review and/or order of clinical lab test      Labs:     Recent Labs      06/16/18   0306  06/15/18   0040   WBC  0.4*  0.4*   HGB  6.8*  6.4*   HCT 19.8*  18.7*   PLT  15*  20*     Recent Labs      06/16/18   0306  06/15/18   0040  06/14/18   0813  06/14/18   0312   NA  140  139   --   139   K  3.8  3.7   --   4.1   CL  107  103   --   106   CO2  28  27   --   24   BUN  15  23*   --   25*   CREA  0.80  1.08*   --   0.98   GLU  97  223*   --   228*   CA  8.5  8.1*   --   8.2*   PHOS   --    --    --   3.4   URICA   --    --   3.7   --      Recent Labs      06/16/18   0306  06/15/18   0040  06/14/18   0312   SGOT  8*  11*  9*   ALT  11*  14  13   AP  96  101  87   TBILI  1.0  2.4*  1.0   TP  6.3*  6.4  5.4*  5.7*   ALB  3.0*  3.0*  2.9*   GLOB  3.3  3.4  2.8     Recent Labs      06/14/18 0813   INR  1.1   PTP  10.8   APTT  21.5*      Recent Labs      06/14/18 0312   TIBC  198*   PSAT  99*   FERR  1554*      Lab Results   Component Value Date/Time    Folate 15.2 06/14/2018 03:12 AM      No results for input(s): PH, PCO2, PO2 in the last 72 hours.   Recent Labs      06/16/18   0306  06/14/18   1254  06/14/18   0813  06/14/18 0312   CPK  61  63   --   58   CKNDX   --   Cannot be calculated   --   Cannot be calculated   TROIQ   --    --   <0.05  <0.05     No results found for: CHOL, CHOLX, CHLST, CHOLV, HDL, LDL, LDLC, DLDLP, TGLX, TRIGL, TRIGP, CHHD, CHHDX  Lab Results   Component Value Date/Time    Glucose (POC) 321 (H) 06/16/2018 11:00 AM    Glucose (POC) 148 (H) 06/16/2018 06:24 AM    Glucose (POC) 260 (H) 06/15/2018 08:57 PM    Glucose (POC) 298 (H) 06/15/2018 04:12 PM    Glucose (POC) 306 (H) 06/15/2018 12:00 PM     Lab Results   Component Value Date/Time    Color YELLOW/STRAW 06/14/2018 12:54 PM    Appearance CLEAR 06/14/2018 12:54 PM    Specific gravity 1.020 06/14/2018 12:54 PM    pH (UA) 6.0 06/14/2018 12:54 PM    Protein NEGATIVE  06/14/2018 12:54 PM    Glucose 250 (A) 06/14/2018 12:54 PM    Ketone NEGATIVE  06/14/2018 12:54 PM    Bilirubin NEGATIVE  06/14/2018 12:54 PM    Urobilinogen 2.0 (H) 06/14/2018 12:54 PM    Nitrites NEGATIVE  06/14/2018 12:54 PM    Leukocyte Esterase NEGATIVE  06/14/2018 12:54 PM    Epithelial cells FEW 06/14/2018 12:54 PM    Bacteria NEGATIVE  06/14/2018 12:54 PM    WBC 0-4 06/14/2018 12:54 PM    RBC 0-5 06/14/2018 12:54 PM         Medications Reviewed:     Current Facility-Administered Medications   Medication Dose Route Frequency    0.9% sodium chloride infusion 250 mL  250 mL IntraVENous PRN    cyanocobalamin (VITAMIN B12) injection 1,000 mcg  1,000 mcg SubCUTAneous DAILY    [START ON 6/22/2018] cyanocobalamin (VITAMIN B12) injection 1,000 mcg  1,000 mcg SubCUTAneous Q7D    Followed by   Rivera Marcus ON 8/13/2018] cyanocobalamin (VITAMIN B12) injection 1,000 mcg  1,000 mcg SubCUTAneous I51I    folic acid tablet 0.4 mg  0.4 mg Oral DAILY    levoFLOXacin (LEVAQUIN) 750 mg in D5W IVPB  750 mg IntraVENous Q48H    carvedilol (COREG) tablet 6.25 mg  6.25 mg Oral BID WITH MEALS    HYDROcodone-acetaminophen (NORCO) 7.5-325 mg per tablet 1 Tab  1 Tab Oral Q6H PRN    lactobac ac& pc-s.therm-b.anim (HERNANDO Q/RISAQUAD)  1 Cap Oral DAILY    levothyroxine (SYNTHROID) tablet 88 mcg  88 mcg Oral ACB    pantoprazole (PROTONIX) tablet 40 mg  40 mg Oral DAILY    senna-docusate (PERICOLACE) 8.6-50 mg per tablet 1 Tab  1 Tab Oral DAILY    sodium chloride (NS) flush 5-10 mL  5-10 mL IntraVENous Q8H    sodium chloride (NS) flush 5-10 mL  5-10 mL IntraVENous PRN    acetaminophen (TYLENOL) tablet 650 mg  650 mg Oral Q6H PRN    ondansetron (ZOFRAN) injection 4 mg  4 mg IntraVENous Q4H PRN    0.9% sodium chloride infusion 250 mL  250 mL IntraVENous PRN    escitalopram oxalate (LEXAPRO) tablet 5 mg  5 mg Oral DAILY    diphenhydrAMINE (BENADRYL) injection 25 mg  25 mg IntraVENous Q6H PRN    diphenhydrAMINE (BENADRYL) capsule 50 mg  50 mg Oral Q6H PRN    glipiZIDE SR (GLUCOTROL XL) tablet 5 mg  5 mg Oral ACB    0.9% sodium chloride infusion  50 mL/hr IntraVENous CONTINUOUS    insulin lispro (HUMALOG) injection   SubCUTAneous AC&HS    glucose chewable tablet 16 g  4 Tab Oral PRN    dextrose (D50W) injection syrg 12.5-25 g  12.5-25 g IntraVENous PRN    glucagon (GLUCAGEN) injection 1 mg  1 mg IntraMUSCular PRN     ______________________________________________________________________  EXPECTED LENGTH OF STAY: 2d 19h  ACTUAL LENGTH OF STAY:          Farnaz Garcia MD

## 2018-06-16 NOTE — PROGRESS NOTES
-Hematology / Oncology ([facility]) -  -Primary Oncologist- Puneet Hernandez  -CC- lymphoma    -S-  No fevers, no new complaints    -O-    Patient Vitals for the past 24 hrs:   Temp Pulse Resp BP SpO2   06/16/18 0848 98.2 °F (36.8 °C) 83 16 117/64 96 %   06/16/18 0327 98.6 °F (37 °C) 67 16 112/62 100 %   06/15/18 2049 98.9 °F (37.2 °C) 68 16 129/67 99 %   06/15/18 1559 98.7 °F (37.1 °C) 72 16 156/74 100 %   06/15/18 1507 98.1 °F (36.7 °C) 70 16 130/78 100 %   06/15/18 1203 98.5 °F (36.9 °C) 68 16 141/55 100 %        Gen: nad  Chest: bilateral breath sounds present  Cardiac: rrr  Abd: s/nt    -Labs-    Recent Labs      06/16/18   0306  06/15/18   0040  06/14/18   0813  06/14/18   0312   WBC  0.4*  0.4*   --   0.4*   HGB  6.8*  6.4*   --   4.5*   PLT  15*  20*   --   5*   ANEU  0.1*  0.1*   --   0.1*   INR   --    --   1.1   --    APTT   --    --   21.5*   --    NA  140  139   --   139   K  3.8  3.7   --   4.1   GLU  97  223*   --   228*   BUN  15  23*   --   25*   CREA  0.80  1.08*   --   0.98   ALT  11*  14   --   13   SGOT  8*  11*   --   9*   TBILI  1.0  2.4*   --   1.0   AP  96  101   --   87   CA  8.5  8.1*   --   8.2*   PHOS   --    --    --   3.4       -Imaging-       -Assessment + Plan-     *) pancytopenia- BMBX pending, f/u lab workup  *) anemia- transfuse today for hgb <7  *) thrombocytopenia: stable, no bleeding, follow  *) neutropenia- continue precautions, afebrile.   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  Bd Cultures ng2d   *) hx steroids (PMR) - will need stress dose steroids if BP drops or worsens acutely  *)Encephalopathy - not present on admission- confusion and biligerence after iv benadryl.   ---- Head CT negative for bleed or other pathology

## 2018-06-16 NOTE — PROGRESS NOTES
Bedside shift change report given to Kiki Durán (oncoming nurse) by Lloyd Bartlett RN (offgoing nurse). Report included the following information SBAR, Kardex and MAR.

## 2018-06-17 LAB
ABO + RH BLD: NORMAL
ALBUMIN SERPL-MCNC: 3 G/DL (ref 3.5–5)
ALBUMIN/GLOB SERPL: 0.9 {RATIO} (ref 1.1–2.2)
ALP SERPL-CCNC: 101 U/L (ref 45–117)
ALT SERPL-CCNC: 12 U/L (ref 12–78)
ANION GAP SERPL CALC-SCNC: 8 MMOL/L (ref 5–15)
ANTI-COMPLEMENT (C3B,C3D): NORMAL
APPEARANCE UR: CLEAR
AST SERPL-CCNC: 10 U/L (ref 15–37)
BACTERIA URNS QL MICRO: NEGATIVE /HPF
BASOPHILS # BLD: 0 K/UL (ref 0–0.1)
BASOPHILS NFR BLD: 0 % (ref 0–1)
BILIRUB SERPL-MCNC: 0.8 MG/DL (ref 0.2–1)
BILIRUB UR QL: NEGATIVE
BLD GP AB SCN SERPL QL ELUTION: NORMAL
BLD PROD TYP BPU: NORMAL
BLOOD GROUP ANTIBODIES SERPL: NORMAL
BPU ID: NORMAL
BUN SERPL-MCNC: 20 MG/DL (ref 6–20)
BUN/CREAT SERPL: 19 (ref 12–20)
C3 SERPL-MCNC: 149 MG/DL (ref 82–167)
C4 SERPL-MCNC: 19 MG/DL (ref 14–44)
CALCIUM SERPL-MCNC: 8.4 MG/DL (ref 8.5–10.1)
CHLORIDE SERPL-SCNC: 108 MMOL/L (ref 97–108)
CO2 SERPL-SCNC: 25 MMOL/L (ref 21–32)
COLOR UR: ABNORMAL
CREAT SERPL-MCNC: 1.03 MG/DL (ref 0.55–1.02)
CROSSMATCH RESULT,%XM: NORMAL
DAT IGG-SP REAG RBC QL: NORMAL
DIFFERENTIAL METHOD BLD: ABNORMAL
EOSINOPHIL # BLD: 0 K/UL (ref 0–0.4)
EOSINOPHIL NFR BLD: 1 % (ref 0–7)
EPITH CASTS URNS QL MICRO: ABNORMAL /LPF
ERYTHROCYTE [DISTWIDTH] IN BLOOD BY AUTOMATED COUNT: 13.7 % (ref 11.5–14.5)
GLOBULIN SER CALC-MCNC: 3.5 G/DL (ref 2–4)
GLUCOSE BLD STRIP.AUTO-MCNC: 159 MG/DL (ref 65–100)
GLUCOSE BLD STRIP.AUTO-MCNC: 164 MG/DL (ref 65–100)
GLUCOSE BLD STRIP.AUTO-MCNC: 196 MG/DL (ref 65–100)
GLUCOSE BLD STRIP.AUTO-MCNC: 200 MG/DL (ref 65–100)
GLUCOSE SERPL-MCNC: 134 MG/DL (ref 65–100)
GLUCOSE UR STRIP.AUTO-MCNC: 100 MG/DL
HCT VFR BLD AUTO: 23.8 % (ref 35–47)
HGB BLD-MCNC: 8.3 G/DL (ref 11.5–16)
HGB UR QL STRIP: ABNORMAL
HYALINE CASTS URNS QL MICRO: ABNORMAL /LPF (ref 0–5)
IGA SERPL-MCNC: 430 MG/DL (ref 64–422)
IGG SERPL-MCNC: 615 MG/DL (ref 700–1600)
IGM SERPL-MCNC: 18 MG/DL (ref 26–217)
IMM GRANULOCYTES # BLD: 0 K/UL
IMM GRANULOCYTES NFR BLD AUTO: 0 %
KETONES UR QL STRIP.AUTO: NEGATIVE MG/DL
LEUKOCYTE ESTERASE UR QL STRIP.AUTO: NEGATIVE
LYMPHOCYTES # BLD: 0.3 K/UL (ref 0.8–3.5)
LYMPHOCYTES NFR BLD: 71 % (ref 12–49)
MCH RBC QN AUTO: 31 PG (ref 26–34)
MCHC RBC AUTO-ENTMCNC: 34.9 G/DL (ref 30–36.5)
MCV RBC AUTO: 88.8 FL (ref 80–99)
MONOCYTES # BLD: 0 K/UL (ref 0–1)
MONOCYTES NFR BLD: 5 % (ref 5–13)
NEUTS BAND NFR BLD MANUAL: 2 % (ref 0–6)
NEUTS SEG # BLD: 0.1 K/UL (ref 1.8–8)
NEUTS SEG NFR BLD: 21 % (ref 32–75)
NITRITE UR QL STRIP.AUTO: NEGATIVE
NRBC # BLD: 0 K/UL (ref 0–0.01)
NRBC BLD-RTO: 0 PER 100 WBC
PH UR STRIP: 6.5 [PH] (ref 5–8)
PLATELET # BLD AUTO: 11 K/UL (ref 150–400)
PLATELET COMMENTS,PCOM: ABNORMAL
PMV BLD AUTO: 11.6 FL (ref 8.9–12.9)
POTASSIUM SERPL-SCNC: 4.1 MMOL/L (ref 3.5–5.1)
PROT PATTERN SERPL IFE-IMP: ABNORMAL
PROT SERPL-MCNC: 6.5 G/DL (ref 6.4–8.2)
PROT UR STRIP-MCNC: NEGATIVE MG/DL
RBC # BLD AUTO: 2.68 M/UL (ref 3.8–5.2)
RBC #/AREA URNS HPF: ABNORMAL /HPF (ref 0–5)
RBC MORPH BLD: ABNORMAL
SERVICE CMNT-IMP: ABNORMAL
SODIUM SERPL-SCNC: 141 MMOL/L (ref 136–145)
SP GR UR REFRACTOMETRY: 1.01 (ref 1–1.03)
SPECIMEN EXP DATE BLD: NORMAL
STATUS OF UNIT,%ST: NORMAL
UA: UC IF INDICATED,UAUC: ABNORMAL
UNIT DIVISION, %UDIV: 0
UROBILINOGEN UR QL STRIP.AUTO: 1 EU/DL (ref 0.2–1)
WBC # BLD AUTO: 0.4 K/UL (ref 3.6–11)
WBC MORPH BLD: ABNORMAL
WBC URNS QL MICRO: ABNORMAL /HPF (ref 0–4)

## 2018-06-17 PROCEDURE — 74011250637 HC RX REV CODE- 250/637: Performed by: INTERNAL MEDICINE

## 2018-06-17 PROCEDURE — 65270000029 HC RM PRIVATE

## 2018-06-17 PROCEDURE — 81001 URINALYSIS AUTO W/SCOPE: CPT | Performed by: HOSPITALIST

## 2018-06-17 PROCEDURE — 74011250637 HC RX REV CODE- 250/637: Performed by: HOSPITALIST

## 2018-06-17 PROCEDURE — 80053 COMPREHEN METABOLIC PANEL: CPT | Performed by: INTERNAL MEDICINE

## 2018-06-17 PROCEDURE — 36415 COLL VENOUS BLD VENIPUNCTURE: CPT | Performed by: INTERNAL MEDICINE

## 2018-06-17 PROCEDURE — 74011250636 HC RX REV CODE- 250/636: Performed by: INTERNAL MEDICINE

## 2018-06-17 PROCEDURE — 82962 GLUCOSE BLOOD TEST: CPT

## 2018-06-17 PROCEDURE — 74011636637 HC RX REV CODE- 636/637: Performed by: INTERNAL MEDICINE

## 2018-06-17 PROCEDURE — 85025 COMPLETE CBC W/AUTO DIFF WBC: CPT | Performed by: INTERNAL MEDICINE

## 2018-06-17 RX ADMIN — CARVEDILOL 6.25 MG: 6.25 TABLET, FILM COATED ORAL at 07:07

## 2018-06-17 RX ADMIN — INSULIN LISPRO 2 UNITS: 100 INJECTION, SOLUTION INTRAVENOUS; SUBCUTANEOUS at 21:25

## 2018-06-17 RX ADMIN — CARVEDILOL 6.25 MG: 6.25 TABLET, FILM COATED ORAL at 17:15

## 2018-06-17 RX ADMIN — LEVOTHYROXINE SODIUM 88 MCG: 88 TABLET ORAL at 07:07

## 2018-06-17 RX ADMIN — Medication 1 CAPSULE: at 09:53

## 2018-06-17 RX ADMIN — INSULIN LISPRO 2 UNITS: 100 INJECTION, SOLUTION INTRAVENOUS; SUBCUTANEOUS at 07:06

## 2018-06-17 RX ADMIN — GLIPIZIDE 5 MG: 2.5 TABLET, FILM COATED, EXTENDED RELEASE ORAL at 07:07

## 2018-06-17 RX ADMIN — PANTOPRAZOLE SODIUM 40 MG: 40 TABLET, DELAYED RELEASE ORAL at 09:53

## 2018-06-17 RX ADMIN — SENNOSIDES AND DOCUSATE SODIUM 1 TABLET: 8.6; 5 TABLET ORAL at 09:53

## 2018-06-17 RX ADMIN — FOLIC ACID TAB 400 MCG 0.4 MG: 400 TAB at 09:53

## 2018-06-17 RX ADMIN — CYANOCOBALAMIN 1000 MCG: 1000 INJECTION, SOLUTION INTRAMUSCULAR at 09:54

## 2018-06-17 RX ADMIN — SODIUM CHLORIDE 50 ML/HR: 900 INJECTION, SOLUTION INTRAVENOUS at 23:36

## 2018-06-17 RX ADMIN — Medication 10 ML: at 18:38

## 2018-06-17 RX ADMIN — ESCITALOPRAM OXALATE 5 MG: 10 TABLET ORAL at 09:54

## 2018-06-17 RX ADMIN — INSULIN LISPRO 2 UNITS: 100 INJECTION, SOLUTION INTRAVENOUS; SUBCUTANEOUS at 12:12

## 2018-06-17 NOTE — PROGRESS NOTES
Spiritual Care Partner Volunteer visited patient in Rm 208 on 6/17/18. Documented by:   Chaplain Parkinson MDiv, MACE  [phone] PRAY ([id number])

## 2018-06-17 NOTE — PROGRESS NOTES
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 The weakness is generalized, it is progressive. The patient lives with her daughter. Normally, the patient is able to carry out her activities of daily living without any assistance. On the day of her presentation at the emergency room, the generalized weakness got worse to the extent that the patient was not able to get out of bed. The patient also complained of a decreased appetite and shortness of breath. The patient was found to be pancytopenic    Interval history / Subjective:     The patient back to her baseline mental status  Feeling embarrassed about yesterday's agitation  No chest pain, abd pain, nausea or vomiting     Assessment & Plan:     Severe anemia  -s/p 2 units PRBC at [address] [place] [address] and another 2 units , one unit   -Appreciate Hematology    Severe thrombocytopenia  -sec to ?  -s/p 2 units platelets   -bone biopsy was supposed to be 6/15 but the patient was agitated and the biopsy cancelled  Now will get it done Monday    Pancytopenia  -neutropenic precautions  -CXR unremarkable  -CT chest focal RLL airpace disease  -Blood culture no growth so far  -on LVQ, will broaden coverage if gets worse    History of lymphoma  -CT chest and abd , No mass, adenopathy or other acute abnormality in the chest, abdomen or pelvis    AMS  -?metabolic encephalopathy ?anemia  -CT head age related volume loss    History of PMR  -Was on steroids, currently on hold    Vit B12 deficiency  -Replace    Hypothyroidism  -On synthroid    DM type 2  -On glipizide. Patient refusing SSI    Anxiety/Depression   -On Lexapro    Diabetic diet    Code status: FULL CODE  DVT prophylaxis: scd  PTA: home    Plan: PRBC transfusion today, follow    Care Plan discussed with: Patient/Family  Disposition: TBD     Hospital Problems  Date Reviewed: 6/14/2018          Codes Class Noted POA    * (Principal)Pancytopenia ([facility] [address] ICD-10-CM: X01.364  ICD-9-CM: 284.19  6/13/2018 Yes                Review of Systems:   A comprehensive review of systems was negative except for that written in the HPI. Vital Signs:    Last 24hrs VS reviewed since prior progress note. Most recent are:  Visit Vitals    BP (!) 137/94 (BP 1 Location: Right arm, BP Patient Position: At rest)    Pulse 72    Temp 98.3 °F (36.8 °C)    Resp 16    Ht 5' 10\" (1.778 m)    Wt 78.7 kg (173 lb 8 oz)    SpO2 100%    Breastfeeding No    BMI 24.89 kg/m2         Intake/Output Summary (Last 24 hours) at 06/17/18 1506  Last data filed at 06/17/18 0836   Gross per 24 hour   Intake                0 ml   Output              850 ml   Net             -850 ml        Physical Examination:             Constitutional:  No acute distress, cooperative, pleasant    ENT:  Oral mucous moist, oropharynx benign. Neck supple,    Resp:  CTA bilaterally. No wheezing/rhonchi/rales. No accessory muscle use   CV:  Regular rhythm, normal rate, no murmurs, gallops, rubs    GI:  Soft, non distended, non tender. normoactive bowel sounds, no hepatosplenomegaly     Musculoskeletal:  No edema, warm, 2+ pulses throughout    Neurologic:  Moves all extremities.   AAOx3, CN II-XII reviewed     Skin:  Good turgor, no rashes or ulcers       Data Review:    Review and/or order of clinical lab test      Labs:     Recent Labs      06/17/18   0314  06/16/18   0306   WBC  0.4*  0.4*   HGB 8. 3*  6.8*   HCT  23.8*  19.8*   PLT  11*  15*     Recent Labs      06/17/18   0314  06/16/18   0306  06/15/18   0040   NA  141  140  139   K  4.1  3.8  3.7   CL  108  107  103   CO2  25  28  27   BUN  20  15  23*   CREA  1.03*  0.80  1.08*   GLU  134*  97  223*   CA  8.4*  8.5  8.1*     Recent Labs      06/17/18 0314 06/16/18   0306  06/15/18   0040   SGOT  10*  8*  11*   ALT  12  11*  14   AP  101  96  101   TBILI  0.8  1.0  2.4*   TP  6.5  6.3*  6.4   ALB  3.0*  3.0*  3.0*   GLOB  3.5  3.3  3.4     No results for input(s): INR, PTP, APTT in the last 72 hours. No lab exists for component: INREXT, INREXT   No results for input(s): FE, TIBC, PSAT, FERR in the last 72 hours. Lab Results   Component Value Date/Time    Folate 15.2 06/14/2018 03:12 AM      No results for input(s): PH, PCO2, PO2 in the last 72 hours.   Recent Labs      06/16/18   0306   CPK  61     No results found for: CHOL, CHOLX, CHLST, CHOLV, HDL, LDL, LDLC, DLDLP, TGLX, TRIGL, TRIGP, CHHD, CHHDX  Lab Results   Component Value Date/Time    Glucose (POC) 196 (H) 06/17/2018 11:07 AM    Glucose (POC) 164 (H) 06/17/2018 06:36 AM    Glucose (POC) 167 (H) 06/16/2018 09:34 PM    Glucose (POC) 88 06/16/2018 04:40 PM    Glucose (POC) 321 (H) 06/16/2018 11:00 AM     Lab Results   Component Value Date/Time    Color YELLOW/STRAW 06/14/2018 12:54 PM    Appearance CLEAR 06/14/2018 12:54 PM    Specific gravity 1.020 06/14/2018 12:54 PM    pH (UA) 6.0 06/14/2018 12:54 PM    Protein NEGATIVE  06/14/2018 12:54 PM    Glucose 250 (A) 06/14/2018 12:54 PM    Ketone NEGATIVE  06/14/2018 12:54 PM    Bilirubin NEGATIVE  06/14/2018 12:54 PM    Urobilinogen 2.0 (H) 06/14/2018 12:54 PM    Nitrites NEGATIVE  06/14/2018 12:54 PM    Leukocyte Esterase NEGATIVE  06/14/2018 12:54 PM    Epithelial cells FEW 06/14/2018 12:54 PM    Bacteria NEGATIVE  06/14/2018 12:54 PM    WBC 0-4 06/14/2018 12:54 PM    RBC 0-5 06/14/2018 12:54 PM         Medications Reviewed:     Current Facility-Administered Medications   Medication Dose Route Frequency    0.9% sodium chloride infusion 250 mL  250 mL IntraVENous PRN    cyanocobalamin (VITAMIN B12) injection 1,000 mcg  1,000 mcg SubCUTAneous DAILY    [START ON 6/22/2018] cyanocobalamin (VITAMIN B12) injection 1,000 mcg  1,000 mcg SubCUTAneous Q7D    Followed by   [facility] ON 8/13/2018] cyanocobalamin (VITAMIN B12) injection 1,000 mcg  1,000 mcg SubCUTAneous J21C    folic acid tablet 0.4 mg  0.4 mg Oral DAILY    levoFLOXacin (LEVAQUIN) 750 mg in D5W IVPB  750 mg IntraVENous Q48H    carvedilol (COREG) tablet 6.25 mg  6.25 mg Oral BID WITH MEALS    HYDROcodone-acetaminophen (NORCO) 7.5-325 mg per tablet 1 Tab  1 Tab Oral Q6H PRN    lactobac ac& pc-s.therm-b.anim (HERNANDO Q/RISAQUAD)  1 Cap Oral DAILY    levothyroxine (SYNTHROID) tablet 88 mcg  88 mcg Oral ACB    pantoprazole (PROTONIX) tablet 40 mg  40 mg Oral DAILY    senna-docusate (PERICOLACE) 8.6-50 mg per tablet 1 Tab  1 Tab Oral DAILY    sodium chloride (NS) flush 5-10 mL  5-10 mL IntraVENous Q8H    sodium chloride (NS) flush 5-10 mL  5-10 mL IntraVENous PRN    acetaminophen (TYLENOL) tablet 650 mg  650 mg Oral Q6H PRN    ondansetron (ZOFRAN) injection 4 mg  4 mg IntraVENous Q4H PRN    0.9% sodium chloride infusion 250 mL  250 mL IntraVENous PRN    escitalopram oxalate (LEXAPRO) tablet 5 mg  5 mg Oral DAILY    diphenhydrAMINE (BENADRYL) injection 25 mg  25 mg IntraVENous Q6H PRN    diphenhydrAMINE (BENADRYL) capsule 50 mg  50 mg Oral Q6H PRN    glipiZIDE SR (GLUCOTROL XL) tablet 5 mg  5 mg Oral ACB    0.9% sodium chloride infusion  50 mL/hr IntraVENous CONTINUOUS    insulin lispro (HUMALOG) injection   SubCUTAneous AC&HS    glucose chewable tablet 16 g  4 Tab Oral PRN    dextrose (D50W) injection syrg 12.5-25 g  12.5-25 g IntraVENous PRN    glucagon (GLUCAGEN) injection 1 mg  1 mg IntraMUSCular PRN ______________________________________________________________________  EXPECTED LENGTH OF STAY: 2d 19h  ACTUAL LENGTH OF STAY:          Margarita Cole MD

## 2018-06-17 NOTE — PROGRESS NOTES
-Hematology / Oncology ([facility]) -  -Primary Oncologist- Eloisa Askew  -CC- lymphoma    -S- denies new issues, no fever or bleeding    -O-      Patient Vitals for the past 24 hrs:   Temp Pulse Resp BP SpO2   06/17/18 0833 97.8 °F (36.6 °C) 73 16 138/70 99 %   06/17/18 0707 - 70 - 150/70 -   06/17/18 0228 98.8 °F (37.1 °C) 63 16 115/50 99 %   06/16/18 2031 98.3 °F (36.8 °C) 71 16 120/60 97 %   06/16/18 1914 98.2 °F (36.8 °C) 67 16 109/62 97 %   06/16/18 1725 98.1 °F (36.7 °C) 71 16 122/67 98 %   06/16/18 1649 98.1 °F (36.7 °C) 71 16 144/76 99 %   06/16/18 1637 98.4 °F (36.9 °C) 78 16 133/75 99 %   06/16/18 1613 98.1 °F (36.7 °C) 72 16 131/73 99 %   06/16/18 1413 98.3 °F (36.8 °C) 73 16 95/54 97 %     06/17 0701 - 06/17 1900  In: -   Out: 850 [Urine:850]  Gen: nad  Chest: bilateral breath sounds present  Cardiac: rrr  Abd: s/nt    -Labs-    Recent Labs      06/17/18   0314  06/16/18   0306  06/15/18   0040   WBC  0.4*  0.4*  0.4*   HGB  8.3*  6.8*  6.4*   PLT  11*  15*  20*   ANEU  0.1*  0.1*  0.1*   NA  141  140  139   K  4.1  3.8  3.7   GLU  134*  97  223*   BUN  20  15  23*   CREA  1.03*  0.80  1.08*   ALT  12  11*  14   SGOT  10*  8*  11*   TBILI  0.8  1.0  2.4*   AP  101  96  101   CA  8.4*  8.5  8.1*       -Imaging-       -Assessment + Plan-     *) pancytopenia- BMBX pending, f/u lab workup  *) anemia- transfuse prn hgb <7, good response 6/16  *) thrombocytopenia: stable, no bleeding, follow, transfuse prn <10  *) neutropenia- continue precautions, afebrile.   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  Bd Cultures ng2d   *) hx steroids (PMR) - will need stress dose steroids if BP drops or worsens acutely  *)Encephalopathy - not present on admission- confusion and biligerence after iv benadryl.   ---- Head CT negative for bleed or other pathology

## 2018-06-17 NOTE — PROGRESS NOTES
Problem: Falls - Risk of  Goal: *Absence of Falls  Document Kristian Fall Risk and appropriate interventions in the flowsheet.    Outcome: Progressing Towards Goal  Fall Risk Interventions:  Mobility Interventions: Patient to call before getting OOB    Mentation Interventions: Door open when patient unattended    Medication Interventions: Patient to call before getting OOB    Elimination Interventions: Call light in reach    History of Falls Interventions: Door open when patient unattended

## 2018-06-18 ENCOUNTER — APPOINTMENT (OUTPATIENT)
Dept: CT IMAGING | Age: 83
DRG: 808 | End: 2018-06-18
Attending: INTERNAL MEDICINE
Payer: MEDICARE

## 2018-06-18 LAB
ANA SER QL: NEGATIVE
ANION GAP SERPL CALC-SCNC: 8 MMOL/L (ref 5–15)
BASOPHILS # BLD: 0 K/UL (ref 0–0.1)
BASOPHILS NFR BLD: 0 % (ref 0–1)
BUN SERPL-MCNC: 19 MG/DL (ref 6–20)
BUN/CREAT SERPL: 22 (ref 12–20)
CALCIUM SERPL-MCNC: 8.5 MG/DL (ref 8.5–10.1)
CHLORIDE SERPL-SCNC: 107 MMOL/L (ref 97–108)
CO2 SERPL-SCNC: 26 MMOL/L (ref 21–32)
CREAT SERPL-MCNC: 0.87 MG/DL (ref 0.55–1.02)
DIFFERENTIAL METHOD BLD: ABNORMAL
EOSINOPHIL # BLD: 0 K/UL (ref 0–0.4)
EOSINOPHIL NFR BLD: 2 % (ref 0–7)
ERYTHROCYTE [DISTWIDTH] IN BLOOD BY AUTOMATED COUNT: 13.4 % (ref 11.5–14.5)
GLUCOSE BLD STRIP.AUTO-MCNC: 109 MG/DL (ref 65–100)
GLUCOSE BLD STRIP.AUTO-MCNC: 161 MG/DL (ref 65–100)
GLUCOSE BLD STRIP.AUTO-MCNC: 243 MG/DL (ref 65–100)
GLUCOSE SERPL-MCNC: 145 MG/DL (ref 65–100)
HCT VFR BLD AUTO: 23.8 % (ref 35–47)
HGB BLD-MCNC: 8.2 G/DL (ref 11.5–16)
HGB FREE PLAS-MCNC: 5.6 MG/DL (ref 0–4.9)
IMM GRANULOCYTES # BLD: 0 K/UL
IMM GRANULOCYTES NFR BLD AUTO: 0 %
LYMPHOCYTES # BLD: 0.3 K/UL (ref 0.8–3.5)
LYMPHOCYTES NFR BLD: 84 % (ref 12–49)
MCH RBC QN AUTO: 31.2 PG (ref 26–34)
MCHC RBC AUTO-ENTMCNC: 34.5 G/DL (ref 30–36.5)
MCV RBC AUTO: 90.5 FL (ref 80–99)
MONOCYTES # BLD: 0 K/UL (ref 0–1)
MONOCYTES NFR BLD: 3 % (ref 5–13)
NEUTS SEG # BLD: 0 K/UL (ref 1.8–8)
NEUTS SEG NFR BLD: 11 % (ref 32–75)
NRBC # BLD: 0 K/UL (ref 0–0.01)
NRBC BLD-RTO: 0 PER 100 WBC
PLATELET # BLD AUTO: 7 K/UL (ref 150–400)
PLATELET COMMENTS,PCOM: ABNORMAL
POTASSIUM SERPL-SCNC: 4.1 MMOL/L (ref 3.5–5.1)
RBC # BLD AUTO: 2.63 M/UL (ref 3.8–5.2)
RBC MORPH BLD: ABNORMAL
RBC MORPH BLD: ABNORMAL
SEE BELOW:, 164879: NORMAL
SERVICE CMNT-IMP: ABNORMAL
SODIUM SERPL-SCNC: 141 MMOL/L (ref 136–145)
WBC # BLD AUTO: 0.3 K/UL (ref 3.6–11)
WBC MORPH BLD: ABNORMAL

## 2018-06-18 PROCEDURE — 88342 IMHCHEM/IMCYTCHM 1ST ANTB: CPT | Performed by: FAMILY MEDICINE

## 2018-06-18 PROCEDURE — 74011250636 HC RX REV CODE- 250/636: Performed by: INTERNAL MEDICINE

## 2018-06-18 PROCEDURE — 77012 CT SCAN FOR NEEDLE BIOPSY: CPT

## 2018-06-18 PROCEDURE — 74011250637 HC RX REV CODE- 250/637: Performed by: INTERNAL MEDICINE

## 2018-06-18 PROCEDURE — 88184 FLOWCYTOMETRY/ TC 1 MARKER: CPT | Performed by: HOSPITALIST

## 2018-06-18 PROCEDURE — 85097 BONE MARROW INTERPRETATION: CPT | Performed by: FAMILY MEDICINE

## 2018-06-18 PROCEDURE — 07DR3ZX EXTRACTION OF ILIAC BONE MARROW, PERCUTANEOUS APPROACH, DIAGNOSTIC: ICD-10-PCS | Performed by: RADIOLOGY

## 2018-06-18 PROCEDURE — 65270000029 HC RM PRIVATE

## 2018-06-18 PROCEDURE — 85025 COMPLETE CBC W/AUTO DIFF WBC: CPT | Performed by: RADIOLOGY

## 2018-06-18 PROCEDURE — 80048 BASIC METABOLIC PNL TOTAL CA: CPT | Performed by: INTERNAL MEDICINE

## 2018-06-18 PROCEDURE — 74011250636 HC RX REV CODE- 250/636: Performed by: RADIOLOGY

## 2018-06-18 PROCEDURE — 74011636637 HC RX REV CODE- 636/637: Performed by: INTERNAL MEDICINE

## 2018-06-18 PROCEDURE — 88305 TISSUE EXAM BY PATHOLOGIST: CPT | Performed by: FAMILY MEDICINE

## 2018-06-18 PROCEDURE — 74011250637 HC RX REV CODE- 250/637: Performed by: HOSPITALIST

## 2018-06-18 PROCEDURE — 88311 DECALCIFY TISSUE: CPT | Performed by: FAMILY MEDICINE

## 2018-06-18 PROCEDURE — 88365 INSITU HYBRIDIZATION (FISH): CPT | Performed by: FAMILY MEDICINE

## 2018-06-18 PROCEDURE — 82962 GLUCOSE BLOOD TEST: CPT

## 2018-06-18 PROCEDURE — 88313 SPECIAL STAINS GROUP 2: CPT | Performed by: FAMILY MEDICINE

## 2018-06-18 PROCEDURE — 36415 COLL VENOUS BLD VENIPUNCTURE: CPT | Performed by: HOSPITALIST

## 2018-06-18 PROCEDURE — 74011250636 HC RX REV CODE- 250/636: Performed by: HOSPITALIST

## 2018-06-18 RX ORDER — SODIUM CHLORIDE 0.9 % (FLUSH) 0.9 %
10 SYRINGE (ML) INJECTION
Status: ACTIVE | OUTPATIENT
Start: 2018-06-18 | End: 2018-06-19

## 2018-06-18 RX ORDER — LIDOCAINE HYDROCHLORIDE 10 MG/ML
10 INJECTION INFILTRATION; PERINEURAL
Status: ACTIVE | OUTPATIENT
Start: 2018-06-18 | End: 2018-06-19

## 2018-06-18 RX ORDER — SODIUM CHLORIDE 9 MG/ML
50 INJECTION, SOLUTION INTRAVENOUS CONTINUOUS
Status: DISCONTINUED | OUTPATIENT
Start: 2018-06-18 | End: 2018-06-18 | Stop reason: ALTCHOICE

## 2018-06-18 RX ORDER — FENTANYL CITRATE 50 UG/ML
100 INJECTION, SOLUTION INTRAMUSCULAR; INTRAVENOUS
Status: DISCONTINUED | OUTPATIENT
Start: 2018-06-18 | End: 2018-06-18 | Stop reason: ALTCHOICE

## 2018-06-18 RX ORDER — MIDAZOLAM HYDROCHLORIDE 1 MG/ML
5 INJECTION, SOLUTION INTRAMUSCULAR; INTRAVENOUS
Status: DISCONTINUED | OUTPATIENT
Start: 2018-06-18 | End: 2018-06-18 | Stop reason: ALTCHOICE

## 2018-06-18 RX ADMIN — INSULIN LISPRO 2 UNITS: 100 INJECTION, SOLUTION INTRAVENOUS; SUBCUTANEOUS at 21:43

## 2018-06-18 RX ADMIN — CARVEDILOL 6.25 MG: 6.25 TABLET, FILM COATED ORAL at 06:47

## 2018-06-18 RX ADMIN — ACETAMINOPHEN 650 MG: 325 TABLET, FILM COATED ORAL at 20:56

## 2018-06-18 RX ADMIN — FENTANYL CITRATE 25 MCG: 50 INJECTION, SOLUTION INTRAMUSCULAR; INTRAVENOUS at 15:27

## 2018-06-18 RX ADMIN — GLIPIZIDE 5 MG: 2.5 TABLET, FILM COATED, EXTENDED RELEASE ORAL at 06:47

## 2018-06-18 RX ADMIN — CARVEDILOL 6.25 MG: 6.25 TABLET, FILM COATED ORAL at 18:44

## 2018-06-18 RX ADMIN — LEVOFLOXACIN 750 MG: 5 INJECTION, SOLUTION INTRAVENOUS at 18:45

## 2018-06-18 RX ADMIN — CYANOCOBALAMIN 1000 MCG: 1000 INJECTION, SOLUTION INTRAMUSCULAR at 11:18

## 2018-06-18 RX ADMIN — INSULIN LISPRO 2 UNITS: 100 INJECTION, SOLUTION INTRAVENOUS; SUBCUTANEOUS at 06:50

## 2018-06-18 RX ADMIN — LEVOTHYROXINE SODIUM 88 MCG: 88 TABLET ORAL at 06:47

## 2018-06-18 RX ADMIN — MIDAZOLAM HYDROCHLORIDE 0.5 MG: 1 INJECTION, SOLUTION INTRAMUSCULAR; INTRAVENOUS at 15:27

## 2018-06-18 NOTE — PROGRESS NOTES
Problem: Falls - Risk of  Goal: *Absence of Falls  Document Kristian Fall Risk and appropriate interventions in the flowsheet.    Outcome: Progressing Towards Goal  Fall Risk Interventions:  Mobility Interventions: Assess mobility with egress test, Bed/chair exit alarm, Communicate number of staff needed for ambulation/transfer, Mechanical lift    Mentation Interventions: Door open when patient unattended    Medication Interventions: Patient to call before getting OOB, Teach patient to arise slowly    Elimination Interventions: Call light in reach    History of Falls Interventions: Door open when patient unattended, Room close to nurse's station

## 2018-06-18 NOTE — PROGRESS NOTES
Problem: Pressure Injury - Risk of  Goal: *Prevention of pressure injury  Document Rubio Scale and appropriate interventions in the flowsheet.    Outcome: Progressing Towards Goal  Pressure Injury Interventions:  Sensory Interventions: Assess changes in LOC    Moisture Interventions: Absorbent underpads    Activity Interventions: Increase time out of bed    Mobility Interventions: PT/OT evaluation    Nutrition Interventions: Document food/fluid/supplement intake    Friction and Shear Interventions: HOB 30 degrees or less

## 2018-06-18 NOTE — PROGRESS NOTES
CM reviewed chart and noted transfer to [facility]. Pt lives in a 1-level private residence. Pt's daughter has been staying with her so that she is not alone, and pt's grandson and his family live in a trailer on the same property. Pt's family provides transportation and does her grocery shopping and errands. Pt has a cane and a walker at home. Pt is usually independent with her ADLs. Pt has used [facility] in the past.  CM will continue to follow. CHRIS Delgado, ACM    Care Management Interventions  PCP Verified by CM: Yes (Dr Kathe Marcelino)  Palliative Care Criteria Met (RRAT>21 & CHF Dx)?: No  Mode of Transport at Discharge:  Other (see comment) (Family car)  Transition of Care Consult (CM Consult): Discharge Planning  Discharge Durable Medical Equipment: No (has a cnae and a walker at home)  Physical Therapy Consult: No  Occupational Therapy Consult: No  Speech Therapy Consult: No  Current Support Network: Own Home, Family Lives Nearby (Daughter lives with patient at this time)  Confirm Follow Up Transport: Family (Daughter Jorge Shepherd)  Plan discussed with Pt/Family/Caregiver: Yes  Freedom of Choice Offered: Yes  [address] Provided?: No (NA)  Discharge Location  Discharge Placement: Home (TBD)

## 2018-06-18 NOTE — PROGRESS NOTES
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 The weakness is generalized, it is progressive. The patient lives with her daughter. Normally, the patient is able to carry out her activities of daily living without any assistance. On the day of her presentation at the emergency room, the generalized weakness got worse to the extent that the patient was not able to get out of bed. The patient also complained of a decreased appetite and shortness of breath. The patient was found to be pancytopenic    Interval history / Subjective:     The patient back to her baseline mental status  No chest pain, abd pain, nausea or vomiting  Bone marrow biopsy today     Assessment & Plan:     Severe anemia  -s/p 2 units PRBC at [address] and another 2 units , one unit   -Appreciate Hematology  -Follow CBC, transfuse for Hb<7    Severe thrombocytopenia  -sec to ?  -s/p 2 units platelets   -bone biopsy was supposed to be 6/15 but the patient was agitated and the biopsy cancelled  -Now waiting for biopsy today    Pancytopenia  -neutropenic precautions  -CXR unremarkable  -CT chest focal RLL airpace disease  -Blood culture no growth so far  -on LVQ, will broaden coverage if gets worse, but continues to remain stable    History of lymphoma  -CT chest and abd , No mass, adenopathy or other acute abnormality in the chest, abdomen or pelvis    AMS  -?metabolic encephalopathy ?anemia  -CT head age related volume loss    History of PMR  -Was on steroids, currently on hold    Vit B12 deficiency  -Replace    Hypothyroidism  -On synthroid    DM type 2  -On glipizide. Patient refusing SSI    Anxiety/Depression   -On Lexapro    Diabetic diet    Code status: FULL CODE  DVT prophylaxis: scd  PTA: home    Plan: Bone marrow biopsy today     Care Plan discussed with: Patient/Family  Disposition: TBD     Hospital Problems  Date Reviewed: 6/14/2018          Codes Class Noted POA    * (Principal)Pancytopenia ([facility] [address] ICD-10-CM: S67.558  ICD-9-CM: 284.19  6/13/2018 Yes                Review of Systems:   A comprehensive review of systems was negative except for that written in the HPI. Vital Signs:    Last 24hrs VS reviewed since prior progress note. Most recent are:  Visit Vitals    /66 (BP 1 Location: Left arm, BP Patient Position: At rest)    Pulse 74    Temp 98.7 °F (37.1 °C)    Resp 16    Ht 5' 10\" (1.778 m)    Wt 79.1 kg (174 lb 6.1 oz)    SpO2 100%    Breastfeeding No    BMI 25.02 kg/m2         Intake/Output Summary (Last 24 hours) at 06/18/18 1102  Last data filed at 06/18/18 0845   Gross per 24 hour   Intake              240 ml   Output             2400 ml   Net            -2160 ml        Physical Examination:             Constitutional:  No acute distress, cooperative, pleasant    ENT:  Oral mucous moist, oropharynx benign. Neck supple,    Resp:  CTA bilaterally. No wheezing/rhonchi/rales. No accessory muscle use   CV:  Regular rhythm, normal rate, no murmurs, gallops, rubs    GI:  Soft, non distended, non tender. normoactive bowel sounds, no hepatosplenomegaly     Musculoskeletal:  No edema, warm, 2+ pulses throughout    Neurologic:  Moves all extremities.   AAOx3, CN II-XII reviewed     Skin:  Good turgor, no rashes or ulcers       Data Review:    Review and/or order of clinical lab test      Labs:     Recent Labs      06/17/18 4303  06/16/18   0306   WBC  0.4*  0.4*   HGB  8.3*  6.8*   HCT  23.8*  19.8*   PLT  11*  15*     Recent Labs      06/18/18   0655  06/17/18   0314  06/16/18   0306   NA  141  141  140   K  4.1  4.1  3.8   CL  107  108  107   CO2  26  25  28   BUN  19  20  15   CREA  0.87  1.03*  0.80   GLU  145*  134*  97   CA  8.5  8.4*  8.5     Recent Labs      06/17/18   0314  06/16/18   0306   SGOT  10*  8*   ALT  12  11*   AP  101  96   TBILI  0.8  1.0   TP  6.5  6.3*   ALB  3.0*  3.0*   GLOB  3.5  3.3     No results for input(s): INR, PTP, APTT in the last 72 hours. No lab exists for component: INREXT, INREXT   No results for input(s): FE, TIBC, PSAT, FERR in the last 72 hours. Lab Results   Component Value Date/Time    Folate 15.2 06/14/2018 03:12 AM      No results for input(s): PH, PCO2, PO2 in the last 72 hours.   Recent Labs      06/16/18   0306   CPK  61     No results found for: CHOL, CHOLX, CHLST, CHOLV, HDL, LDL, LDLC, DLDLP, TGLX, TRIGL, TRIGP, CHHD, CHHDX  Lab Results   Component Value Date/Time    Glucose (POC) 161 (H) 06/18/2018 06:38 AM    Glucose (POC) 200 (H) 06/17/2018 09:11 PM    Glucose (POC) 159 (H) 06/17/2018 04:36 PM    Glucose (POC) 196 (H) 06/17/2018 11:07 AM    Glucose (POC) 164 (H) 06/17/2018 06:36 AM     Lab Results   Component Value Date/Time    Color YELLOW/STRAW 06/17/2018 06:43 PM    Appearance CLEAR 06/17/2018 06:43 PM    Specific gravity 1.015 06/17/2018 06:43 PM    pH (UA) 6.5 06/17/2018 06:43 PM    Protein NEGATIVE  06/17/2018 06:43 PM    Glucose 100 (A) 06/17/2018 06:43 PM    Ketone NEGATIVE  06/17/2018 06:43 PM    Bilirubin NEGATIVE  06/17/2018 06:43 PM    Urobilinogen 1.0 06/17/2018 06:43 PM    Nitrites NEGATIVE  06/17/2018 06:43 PM    Leukocyte Esterase NEGATIVE  06/17/2018 06:43 PM    Epithelial cells FEW 06/17/2018 06:43 PM    Bacteria NEGATIVE  06/17/2018 06:43 PM    WBC 0-4 06/17/2018 06:43 PM    RBC 5-10 06/17/2018 06:43 PM         Medications Reviewed:     Current Facility-Administered Medications   Medication Dose Route Frequency    0.9% sodium chloride infusion 250 mL  250 mL IntraVENous PRN    cyanocobalamin (VITAMIN B12) injection 1,000 mcg  1,000 mcg SubCUTAneous DAILY    [START ON 6/22/2018] cyanocobalamin (VITAMIN B12) injection 1,000 mcg  1,000 mcg SubCUTAneous Q7D    Followed by   [address] ON 8/13/2018] cyanocobalamin (VITAMIN B12) injection 1,000 mcg  1,000 mcg SubCUTAneous S70Z    folic acid tablet 0.4 mg  0.4 mg Oral DAILY    levoFLOXacin (LEVAQUIN) 750 mg in D5W IVPB  750 mg IntraVENous Q48H    carvedilol (COREG) tablet 6.25 mg  6.25 mg Oral BID WITH MEALS    HYDROcodone-acetaminophen (NORCO) 7.5-325 mg per tablet 1 Tab  1 Tab Oral Q6H PRN    lactobac ac& pc-s.therm-b.anim (HERNANDO Q/RISAQUAD)  1 Cap Oral DAILY    levothyroxine (SYNTHROID) tablet 88 mcg  88 mcg Oral ACB    pantoprazole (PROTONIX) tablet 40 mg  40 mg Oral DAILY    senna-docusate (PERICOLACE) 8.6-50 mg per tablet 1 Tab  1 Tab Oral DAILY    sodium chloride (NS) flush 5-10 mL  5-10 mL IntraVENous Q8H    sodium chloride (NS) flush 5-10 mL  5-10 mL IntraVENous PRN    acetaminophen (TYLENOL) tablet 650 mg  650 mg Oral Q6H PRN    ondansetron (ZOFRAN) injection 4 mg  4 mg IntraVENous Q4H PRN    0.9% sodium chloride infusion 250 mL  250 mL IntraVENous PRN    escitalopram oxalate (LEXAPRO) tablet 5 mg  5 mg Oral DAILY    diphenhydrAMINE (BENADRYL) injection 25 mg  25 mg IntraVENous Q6H PRN    diphenhydrAMINE (BENADRYL) capsule 50 mg  50 mg Oral Q6H PRN    glipiZIDE SR (GLUCOTROL XL) tablet 5 mg  5 mg Oral ACB    0.9% sodium chloride infusion  50 mL/hr IntraVENous CONTINUOUS    insulin lispro (HUMALOG) injection   SubCUTAneous AC&HS    glucose chewable tablet 16 g  4 Tab Oral PRN    dextrose (D50W) injection syrg 12.5-25 g  12.5-25 g IntraVENous PRN    glucagon (GLUCAGEN) injection 1 mg  1 mg IntraMUSCular PRN ______________________________________________________________________  EXPECTED LENGTH OF STAY: 2d 19h  ACTUAL LENGTH OF STAY:          Yoav Gallardo MD

## 2018-06-18 NOTE — PROGRESS NOTES
TRANSFER - IN REPORT:    Verbal report received from Kamala RN(name) on Ulises Navarro  being received from [facility] (unit) for routine progression of care, s/p bone bx     Report consisted of patients Situation, Background, Assessment and   Recommendations(SBAR). Information from the following report(s) SBAR, Kardex, [facility] and Recent Results was reviewed with the receiving nurse. Opportunity for questions and clarification was provided. Assessment completed upon patients arrival to unit and care assumed.

## 2018-06-18 NOTE — PROGRESS NOTES
Bedside and Verbal shift change report given to [address] [place] [address] 20 (oncoming nurse) by Daija Kelly RN (offgoing nurse). Report included the following information SBAR, Kardex, ED Summary, Intake/Output, MAR and Recent Results.

## 2018-06-18 NOTE — H&P
Interventional Radiology History and Physical (Inpatient)    Patient: Tenzin Fay 80 y.o. female     Referring Physician:  No ref. provider found    Chief Complaint: No chief complaint on file. History of Present Illness: conscious sedation (Versed and fentanyl) for bone marrow bx    History:  Past Medical History:   Diagnosis Date    CHF (congestive heart failure) (HCC)     thought to be result of chemotherapy    Depression     Diabetes (HCC)     GERD (gastroesophageal reflux disease)     HTN (hypertension)     Non Hodgkin's lymphoma ([address]     Rheumatoid arthritis(714.0)     Thyroid disease      No family history on file. Social History     Social History    Marital status:      Spouse name: N/A    Number of children: N/A    Years of education: N/A     Occupational History    Not on file. Social History Main Topics    Smoking status: Never Smoker    Smokeless tobacco: Never Used    Alcohol use Yes      Comment: rare    Drug use: Yes     Special: Prescription, OTC    Sexual activity: Not on file     Other Topics Concern    Not on file     Social History Narrative       Allergies:    Allergies   Allergen Reactions    Cephalexin Myalgia     Painful joints    Lisinopril Hives    Celebrex [Celecoxib] Hives    Ibuprofen Hives       Current Medications:  Current Facility-Administered Medications   Medication Dose Route Frequency    lidocaine (XYLOCAINE) 10 mg/mL (1 %) injection 1 mL  10 mg IntraDERMal RAD ONCE    sodium chloride (NS) flush 10 mL  10 mL IntraVENous RAD ONCE    fentaNYL citrate (PF) injection 100 mcg  100 mcg IntraVENous RAD PRN    midazolam (VERSED) injection 5 mg  5 mg IntraVENous RAD PRN    0.9% sodium chloride infusion  50 mL/hr IntraVENous CONTINUOUS    0.9% sodium chloride infusion 250 mL  250 mL IntraVENous PRN    cyanocobalamin (VITAMIN B12) injection 1,000 mcg  1,000 mcg SubCUTAneous DAILY    [START ON 6/22/2018] cyanocobalamin (VITAMIN B12) injection 1,000 mcg  1,000 mcg SubCUTAneous Q7D    Followed by   Raúl Juarez ON 8/13/2018] cyanocobalamin (VITAMIN B12) injection 1,000 mcg  1,000 mcg SubCUTAneous U35M    folic acid tablet 0.4 mg  0.4 mg Oral DAILY    levoFLOXacin (LEVAQUIN) 750 mg in D5W IVPB  750 mg IntraVENous Q48H    carvedilol (COREG) tablet 6.25 mg  6.25 mg Oral BID WITH MEALS    HYDROcodone-acetaminophen (NORCO) 7.5-325 mg per tablet 1 Tab  1 Tab Oral Q6H PRN    lactobac ac& pc-s.therm-b.anim (HERNANDO Q/RISAQUAD)  1 Cap Oral DAILY    levothyroxine (SYNTHROID) tablet 88 mcg  88 mcg Oral ACB    pantoprazole (PROTONIX) tablet 40 mg  40 mg Oral DAILY    senna-docusate (PERICOLACE) 8.6-50 mg per tablet 1 Tab  1 Tab Oral DAILY    sodium chloride (NS) flush 5-10 mL  5-10 mL IntraVENous Q8H    sodium chloride (NS) flush 5-10 mL  5-10 mL IntraVENous PRN    acetaminophen (TYLENOL) tablet 650 mg  650 mg Oral Q6H PRN    ondansetron (ZOFRAN) injection 4 mg  4 mg IntraVENous Q4H PRN    0.9% sodium chloride infusion 250 mL  250 mL IntraVENous PRN    escitalopram oxalate (LEXAPRO) tablet 5 mg  5 mg Oral DAILY    diphenhydrAMINE (BENADRYL) injection 25 mg  25 mg IntraVENous Q6H PRN    diphenhydrAMINE (BENADRYL) capsule 50 mg  50 mg Oral Q6H PRN    glipiZIDE SR (GLUCOTROL XL) tablet 5 mg  5 mg Oral ACB    0.9% sodium chloride infusion  50 mL/hr IntraVENous CONTINUOUS    insulin lispro (HUMALOG) injection   SubCUTAneous AC&HS    glucose chewable tablet 16 g  4 Tab Oral PRN    dextrose (D50W) injection syrg 12.5-25 g  12.5-25 g IntraVENous PRN    glucagon (GLUCAGEN) injection 1 mg  1 mg IntraMUSCular PRN        Physical Exam:  Blood pressure 131/69, pulse 66, temperature 98.8 °F (37.1 °C), resp. rate 16, height 5' 10\" (1.778 m), weight 79.1 kg (174 lb 6.1 oz), SpO2 98 %, not currently breastfeeding.   GENERAL: alert, cooperative, no distress, appears stated age, LUNG: clear to auscultation bilaterally, minimal crackles in bases HEART: regular rate and rhythm    Findings/Diagnosis: conscious sedation (Versed and fentanyl) for bone marrow bx    Alerts:    Hospital Problems  Date Reviewed: 6/14/2018          Codes Class Noted POA    * (Principal)Pancytopenia ([facility] [address] ICD-10-CM: C45.248  ICD-9-CM: 284.19  6/13/2018 Yes              Laboratory:      Recent Labs      06/18/18   0655  06/17/18   0314   HGB   --   8.3*   HCT   --   23.8*   WBC   --   0.4*   PLT   --   11*   BUN  19  20   CREA  0.87  1.03*   K  4.1  4.1         Plan of Care/Planned Procedure:  Risks, benefits, and alternatives reviewed with patient and she agrees to proceed with the procedure. Full dictated report to follow.

## 2018-06-18 NOTE — PROGRESS NOTES
-Hematology / Oncology ([facility]) -  -Primary Oncologist- Feroz Seen  -CC- 80 y.o patient Dr. Redman Seen with remote hx of lymphoma admitted for evaluation of pancytopenia. -S- No new issues  She is willing to have bone marrow exam with anesthesia    -O-      Patient Vitals for the past 24 hrs:   Temp Pulse Resp BP SpO2   06/18/18 0842 98.7 °F (37.1 °C) 74 16 133/66 100 %   06/18/18 0647 - 75 - 153/73 -   06/18/18 0152 98.6 °F (37 °C) 70 16 128/64 98 %   06/17/18 2113 98.3 °F (36.8 °C) 78 16 131/79 99 %   06/17/18 1425 98.3 °F (36.8 °C) 72 16 (!) 137/94 100 %     06/18 0701 - 06/18 1900  In: 240 [P.O.:240]  Out: -   Gen: nad  Chest: bilateral breath sounds present  Cardiac: rrr  Abd: s/nt  Neuro:AAO x 4    -Labs-    Recent Labs      06/18/18   0655  06/17/18   0314  06/16/18   0306   WBC   --   0.4*  0.4*   HGB   --   8.3*  6.8*   PLT   --   11*  15*   ANEU   --   0.1*  0.1*   NA  141  141  140   K  4.1  4.1  3.8   GLU  145*  134*  97   BUN  19  20  15   CREA  0.87  1.03*  0.80   ALT   --   12  11*   SGOT   --   10*  8*   TBILI   --   0.8  1.0   AP   --   101  96   CA  8.5  8.4*  8.5       -Imaging-       -Assessment + Plan-     *) pancytopenia- BMBX rescheduled for 6/18/2018 f/u lab workup  *) anemia- transfuse prn hgb <7, good response 6/16  *) thrombocytopenia: stable, no bleeding, follow, transfuse prn <10  *) neutropenia- continue precautions, afebrile.   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 Bd Cultures ng2d   *) hx steroids (PMR) - will need stress dose steroids if BP drops or worsens acutely  *)Encephalopathy - not present on admission- confusion and biligerence after iv benadryl.  Resolved 6/18/2018.   ---- Head CT negative

## 2018-06-19 LAB
ABO + RH BLD: NORMAL
ALBUMIN SERPL-MCNC: 3 G/DL (ref 3.5–5)
ALBUMIN/GLOB SERPL: 0.8 {RATIO} (ref 1.1–2.2)
ALP SERPL-CCNC: 96 U/L (ref 45–117)
ALT SERPL-CCNC: 12 U/L (ref 12–78)
ANION GAP SERPL CALC-SCNC: 10 MMOL/L (ref 5–15)
AST SERPL-CCNC: 9 U/L (ref 15–37)
BASOPHILS # BLD: 0 K/UL (ref 0–0.1)
BASOPHILS NFR BLD: 0 % (ref 0–1)
BILIRUB SERPL-MCNC: 0.7 MG/DL (ref 0.2–1)
BLD PROD TYP BPU: NORMAL
BLOOD BAG HEMOLYSIS,BLBAG: NORMAL
BLOOD GROUP ANTIBODIES SERPL: NORMAL
BLOOD GROUP ANTIBODIES SERPL: NORMAL
BUN SERPL-MCNC: 19 MG/DL (ref 6–20)
BUN/CREAT SERPL: 23 (ref 12–20)
CALCIUM SERPL-MCNC: 8.9 MG/DL (ref 8.5–10.1)
CHLORIDE SERPL-SCNC: 107 MMOL/L (ref 97–108)
CLERICAL ERRORS,CLERR: NORMAL
CO2 SERPL-SCNC: 24 MMOL/L (ref 21–32)
CREAT SERPL-MCNC: 0.83 MG/DL (ref 0.55–1.02)
DAT P TRANSF RBC QL: NORMAL
DAT RBC QL: NORMAL
DIFFERENTIAL METHOD BLD: ABNORMAL
EOSINOPHIL # BLD: 0 K/UL (ref 0–0.4)
EOSINOPHIL NFR BLD: 1 % (ref 0–7)
ERYTHROCYTE [DISTWIDTH] IN BLOOD BY AUTOMATED COUNT: 13.1 % (ref 11.5–14.5)
GLOBULIN SER CALC-MCNC: 3.7 G/DL (ref 2–4)
GLUCOSE BLD STRIP.AUTO-MCNC: 117 MG/DL (ref 65–100)
GLUCOSE BLD STRIP.AUTO-MCNC: 120 MG/DL (ref 65–100)
GLUCOSE BLD STRIP.AUTO-MCNC: 180 MG/DL (ref 65–100)
GLUCOSE BLD STRIP.AUTO-MCNC: 237 MG/DL (ref 65–100)
GLUCOSE SERPL-MCNC: 104 MG/DL (ref 65–100)
HCT VFR BLD AUTO: 23.9 % (ref 35–47)
HEMOLYSIS, POST TXN,PSTHE: NORMAL
HEMOLYSIS,PRE TXN,PREHE: NORMAL
HGB BLD-MCNC: 8.2 G/DL (ref 11.5–16)
IMM GRANULOCYTES # BLD: 0 K/UL
IMM GRANULOCYTES NFR BLD AUTO: 0 %
LYMPHOCYTES # BLD: 0.3 K/UL (ref 0.8–3.5)
LYMPHOCYTES NFR BLD: 79 % (ref 12–49)
MCH RBC QN AUTO: 30.8 PG (ref 26–34)
MCHC RBC AUTO-ENTMCNC: 34.3 G/DL (ref 30–36.5)
MCV RBC AUTO: 89.8 FL (ref 80–99)
MD INTERPRETATION: NORMAL
MONOCYTES # BLD: 0 K/UL (ref 0–1)
MONOCYTES NFR BLD: 5 % (ref 5–13)
NEUTS BAND NFR BLD MANUAL: 1 % (ref 0–6)
NEUTS SEG # BLD: 0 K/UL (ref 1.8–8)
NEUTS SEG NFR BLD: 14 % (ref 32–75)
NRBC # BLD: 0 K/UL (ref 0–0.01)
NRBC BLD-RTO: 0 PER 100 WBC
PATH REV BLD -IMP: NORMAL
PLATELET # BLD AUTO: 6 K/UL (ref 150–400)
PLATELET COMMENTS,PCOM: ABNORMAL
PMV BLD AUTO: 11.2 FL (ref 8.9–12.9)
POTASSIUM SERPL-SCNC: 4.2 MMOL/L (ref 3.5–5.1)
PROT SERPL-MCNC: 6.7 G/DL (ref 6.4–8.2)
RBC # BLD AUTO: 2.66 M/UL (ref 3.8–5.2)
RBC MORPH BLD: ABNORMAL
RBC MORPH BLD: ABNORMAL
SERVICE CMNT-IMP: ABNORMAL
SODIUM SERPL-SCNC: 141 MMOL/L (ref 136–145)
SPECIMEN EXP DATE BLD: NORMAL
SPECIMEN EXP DATE BLD: NORMAL
UNIT NUMBER,UN: NORMAL
WBC # BLD AUTO: 0.3 K/UL (ref 3.6–11)
WBC MORPH BLD: ABNORMAL

## 2018-06-19 PROCEDURE — 74011250637 HC RX REV CODE- 250/637: Performed by: INTERNAL MEDICINE

## 2018-06-19 PROCEDURE — 97116 GAIT TRAINING THERAPY: CPT

## 2018-06-19 PROCEDURE — P9037 PLATE PHERES LEUKOREDU IRRAD: HCPCS | Performed by: INTERNAL MEDICINE

## 2018-06-19 PROCEDURE — 36415 COLL VENOUS BLD VENIPUNCTURE: CPT | Performed by: INTERNAL MEDICINE

## 2018-06-19 PROCEDURE — 86078 PHYS BLOOD BANK SERV REACTJ: CPT | Performed by: INTERNAL MEDICINE

## 2018-06-19 PROCEDURE — 74011636637 HC RX REV CODE- 636/637: Performed by: INTERNAL MEDICINE

## 2018-06-19 PROCEDURE — 85025 COMPLETE CBC W/AUTO DIFF WBC: CPT | Performed by: INTERNAL MEDICINE

## 2018-06-19 PROCEDURE — 97161 PT EVAL LOW COMPLEX 20 MIN: CPT

## 2018-06-19 PROCEDURE — 82962 GLUCOSE BLOOD TEST: CPT

## 2018-06-19 PROCEDURE — 36430 TRANSFUSION BLD/BLD COMPNT: CPT

## 2018-06-19 PROCEDURE — 86900 BLOOD TYPING SEROLOGIC ABO: CPT | Performed by: INTERNAL MEDICINE

## 2018-06-19 PROCEDURE — 74011250637 HC RX REV CODE- 250/637: Performed by: HOSPITALIST

## 2018-06-19 PROCEDURE — 74011250636 HC RX REV CODE- 250/636: Performed by: INTERNAL MEDICINE

## 2018-06-19 PROCEDURE — 65270000029 HC RM PRIVATE

## 2018-06-19 PROCEDURE — 80053 COMPREHEN METABOLIC PANEL: CPT | Performed by: INTERNAL MEDICINE

## 2018-06-19 RX ORDER — LEVOFLOXACIN 5 MG/ML
750 INJECTION, SOLUTION INTRAVENOUS EVERY 24 HOURS
Status: DISCONTINUED | OUTPATIENT
Start: 2018-06-19 | End: 2018-06-21

## 2018-06-19 RX ORDER — HYDROXYZINE 50 MG/1
50 TABLET, FILM COATED ORAL ONCE
Status: COMPLETED | OUTPATIENT
Start: 2018-06-19 | End: 2018-06-19

## 2018-06-19 RX ORDER — SODIUM CHLORIDE 9 MG/ML
250 INJECTION, SOLUTION INTRAVENOUS AS NEEDED
Status: DISCONTINUED | OUTPATIENT
Start: 2018-06-19 | End: 2018-06-22 | Stop reason: HOSPADM

## 2018-06-19 RX ADMIN — Medication 10 ML: at 17:37

## 2018-06-19 RX ADMIN — INSULIN LISPRO 2 UNITS: 100 INJECTION, SOLUTION INTRAVENOUS; SUBCUTANEOUS at 12:15

## 2018-06-19 RX ADMIN — ESCITALOPRAM OXALATE 5 MG: 10 TABLET ORAL at 08:57

## 2018-06-19 RX ADMIN — CARVEDILOL 6.25 MG: 6.25 TABLET, FILM COATED ORAL at 17:37

## 2018-06-19 RX ADMIN — PANTOPRAZOLE SODIUM 40 MG: 40 TABLET, DELAYED RELEASE ORAL at 08:57

## 2018-06-19 RX ADMIN — CARVEDILOL 6.25 MG: 6.25 TABLET, FILM COATED ORAL at 07:16

## 2018-06-19 RX ADMIN — HYDROCODONE BITARTRATE AND ACETAMINOPHEN 1 TABLET: 7.5; 325 TABLET ORAL at 22:53

## 2018-06-19 RX ADMIN — HYDROXYZINE HYDROCHLORIDE 50 MG: 50 TABLET, FILM COATED ORAL at 12:15

## 2018-06-19 RX ADMIN — GLIPIZIDE 5 MG: 2.5 TABLET, FILM COATED, EXTENDED RELEASE ORAL at 07:16

## 2018-06-19 RX ADMIN — FOLIC ACID TAB 400 MCG 0.4 MG: 400 TAB at 08:57

## 2018-06-19 RX ADMIN — INSULIN LISPRO 2 UNITS: 100 INJECTION, SOLUTION INTRAVENOUS; SUBCUTANEOUS at 21:42

## 2018-06-19 RX ADMIN — LEVOFLOXACIN 750 MG: 5 INJECTION, SOLUTION INTRAVENOUS at 19:40

## 2018-06-19 RX ADMIN — LEVOTHYROXINE SODIUM 88 MCG: 88 TABLET ORAL at 07:16

## 2018-06-19 RX ADMIN — Medication 1 CAPSULE: at 08:57

## 2018-06-19 RX ADMIN — SODIUM CHLORIDE 50 ML/HR: 900 INJECTION, SOLUTION INTRAVENOUS at 02:59

## 2018-06-19 RX ADMIN — Medication 10 ML: at 05:46

## 2018-06-19 RX ADMIN — Medication 10 ML: at 21:42

## 2018-06-19 RX ADMIN — SENNOSIDES AND DOCUSATE SODIUM 1 TABLET: 8.6; 5 TABLET ORAL at 08:56

## 2018-06-19 NOTE — PROGRESS NOTES
Dr Cristopher Rodríguez returned page and OK to transfuse platelets, he does not want to premedicate her especially with benadryl. Will continue to monitor and transfuse when Platelets are allotted.

## 2018-06-19 NOTE — PROGRESS NOTES
-Hematology / Oncology ([facility]) -  -Primary Oncologist- Dimple Herrera  -CC- 80 y.o patient Dr. Dimple Herrera with remote hx of lymphoma admitted for evaluation of pancytopenia.     -S- No new issues  She had no problem with bone marrow with conscious sedation.     -O-       Patient Vitals for the past 24 hrs:    Temp Pulse Resp BP SpO2   06/18/18 0842 98.7 °F (37.1 °C) 74 16 133/66 100 %   06/18/18 0647 - 75 - 153/73 -   06/18/18 0152 98.6 °F (37 °C) 70 16 128/64 98 %   06/17/18 2113 98.3 °F (36.8 °C) 78 16 131/79 99 %   06/17/18 1425 98.3 °F (36.8 °C) 72 16 (!) 137/94 100 %      06/18 0701 - 06/18 1900  In: 240 [P.O.:240]  Out: -   Gen: nad  Chest: bilateral breath sounds present  Cardiac: rrr  Abd: s/nt  Neuro:AAO x 4     -Labs-          Recent Labs       06/18/18   0655  06/17/18   0314  06/16/18   0306   WBC   --   0.4*  0.4*   HGB   --   8.3*  6.8*   PLT   --   11*  15*   ANEU   --   0.1*  0.1*   NA  141  141  140   K  4.1  4.1  3.8   GLU  145*  134*  97   BUN  19  20  15   CREA  0.87  1.03*  0.80   ALT   --   12  11*   SGOT   --   10*  8*   TBILI   --   0.8  1.0   AP   --   101  96   CA  8.5  8.4*  8.5         -Imaging-         -Assessment + Plan-     *) pancytopenia- BMBX performed 6/18/2018  *) anemia- transfuse prn hgb <7  *) thrombocytopenia: stable, no bleeding, transfuse prn <10  *) neutropenia- continue precautions, afebrile.   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 Bd Cultures neg   *) hx steroids (PMR) - will need stress dose steroids if BP drops or worsens acutely  *)Encephalopathy - not present on admission- confusion and biligerence after iv benadryl.  Resolved 6/18/2018.   ---- Head CT negative    Awaiting bone marrow results      PLT = 6K  No bleeding noted either spontaneously or folowing bone marrow biopsy   She had a rash following previous PLT transfusion. She then had confusion following diphenhydramine for previous PLT reaction. We recommend transfusion of PLT without premeds but with close monitoring .

## 2018-06-19 NOTE — PROGRESS NOTES
Problem: Falls - Risk of  Goal: *Absence of Falls  Document Kristian Fall Risk and appropriate interventions in the flowsheet.    Outcome: Progressing Towards Goal  Fall Risk Interventions:  Mobility Interventions: Assess mobility with egress test, Communicate number of staff needed for ambulation/transfer, Mechanical lift, Patient to call before getting OOB    Mentation Interventions: Door open when patient unattended    Medication Interventions: Patient to call before getting OOB, Teach patient to arise slowly    Elimination Interventions: Call light in reach, Patient to call for help with toileting needs    History of Falls Interventions: Door open when patient unattended

## 2018-06-19 NOTE — PROGRESS NOTES
Spoke with Dr Elena Heredia about pts previous reaction to platelets, order to hold on transfusion until we confirm with Hemoc MD and they were paged. A few minutes later Blood bank called to inform me [facility] was looking for platelets cause there is a shortage. Will continue to monitor.

## 2018-06-19 NOTE — PROGRESS NOTES
Pt started having a few itchy red bumps, notified Dr Idris Gasca, order to monitor and stop infusion if it gets worse, right after I hung up pt complained of more itchng and bumps. .the patient has bumps on left arm, bilat thighs, on back, infusin stopped at this time, will page Dr Anoop Bliss and continue to monitor.

## 2018-06-19 NOTE — PROGRESS NOTES
Bedside shift change report given to Gagandeep Miller (oncoming nurse) by [address] [place] [address] 20 (offgoing nurse). Report included the following information SBAR, Kardex and MAR.

## 2018-06-19 NOTE — PROGRESS NOTES
Day #4 of Levaquin  Indication:  HAP  Current regimen:  750 mg IV Q 48 hours    Recent Labs      18   0603  18   1600  18   0655  18   0314   WBC  0.3*  0.3*   --   0.4*   CREA  0.83   --   0.87  1.03*   BUN  19   --   19  20     Est CrCl: 55 ml/min  Temp (24hrs), Av.2 °F (36.8 °C), Min:97.6 °F (36.4 °C), Max:98.8 °F (37.1 °C)    Cultures:  blood-NGTD, preliminary     Plan: Change to q24h dosing renal fxn improving     Mayda Perdomo, PharmD

## 2018-06-19 NOTE — PROGRESS NOTES
Problem: Falls - Risk of  Goal: *Absence of Falls  Document Kristian Fall Risk and appropriate interventions in the flowsheet.    Outcome: Progressing Towards Goal  Fall Risk Interventions:  Mobility Interventions: Patient to call before getting OOB, Communicate number of staff needed for ambulation/transfer    Mentation Interventions: Door open when patient unattended    Medication Interventions: Patient to call before getting OOB    Elimination Interventions: Patient to call for help with toileting needs    History of Falls Interventions: Bed/chair exit alarm, Room close to nurse's station

## 2018-06-19 NOTE — PROGRESS NOTES
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 The weakness is generalized, it is progressive. The patient lives with her daughter. Normally, the patient is able to carry out her activities of daily living without any assistance. On the day of her presentation at the emergency room, the generalized weakness got worse to the extent that the patient was not able to get out of bed. The patient also complained of a decreased appetite and shortness of breath. The patient was found to be pancytopenic    Interval history / Subjective:     Bone marrow bx yesterday. Worsening thrombocytopenia today. Platelet transfusion initiated. Reaction so had to be stopped.      Assessment & Plan:     Severe anemia  -s/p 2 units PRBC at [address] and another 2 units , one unit   -Appreciate Hematology  -Follow CBC, transfuse for Hb<7    Severe thrombocytopenia  -sec to ?  -s/p 2 units platelets   -attempted again today, but reaction, as above    Pancytopenia  -neutropenic precautions  -CXR unremarkable  -CT chest focal RLL airpace disease  -Blood culture no growth so far  -on LVQ, will broaden coverage if gets worse, but continues to remain stable    History of lymphoma  -CT chest and abd , No mass, adenopathy or other acute abnormality in the chest, abdomen or pelvis    AMS  -?metabolic encephalopathy ?anemia  -CT head age related volume loss    History of PMR  -Was on steroids, currently on hold    Vit B12 deficiency  -Replace    Hypothyroidism  -On synthroid    DM type 2  -On glipizide. Patient refusing SSI    Anxiety/Depression   -On Lexapro    Diabetic diet    Code status: FULL CODE  DVT prophylaxis: scd  PTA: home    Plan: Discharge tomorrow if platelets stable    Care Plan discussed with: Patient/Family  Disposition: TBD     Hospital Problems  Date Reviewed: 6/14/2018          Codes Class Noted POA    * (Principal)Pancytopenia ([facility] [address] ICD-10-CM: D66.561  ICD-9-CM: 284.19  6/13/2018 Yes                Review of Systems:   A comprehensive review of systems was negative except for that written in the HPI. Vital Signs:    Last 24hrs VS reviewed since prior progress note. Most recent are:  Visit Vitals    /56 (BP 1 Location: Left arm, BP Patient Position: At rest)    Pulse 71    Temp 98.6 °F (37 °C)    Resp 16    Ht 5' 10\" (1.778 m)    Wt 80.5 kg (177 lb 7.5 oz)    SpO2 100%    Breastfeeding No    BMI 25.46 kg/m2         Intake/Output Summary (Last 24 hours) at 06/19/18 0806  Last data filed at 06/19/18 0358   Gross per 24 hour   Intake              240 ml   Output             1000 ml   Net             -760 ml        Physical Examination:             Constitutional:  No acute distress, cooperative, pleasant    ENT:  Neck supple   Resp:  No accessory muscle use   CV:  Regular rhythm, normal rate, no murmurs    GI:  Soft, non distended, non tender    Musculoskeletal:  No edema, warm, 2+ pulses throughout    Neurologic:  Moves all extremities.   AAOx3,     Skin:  Good turgor, no rashes or ulcers       Data Review:    Review and/or order of clinical lab test      Labs:     Recent Labs      06/19/18   0603  06/18/18   1600   WBC  0.3*  0.3*   HGB  8.2*  8.2*   HCT  23.9*  23.8*   PLT  6*  7*     Recent Labs      06/19/18   0603  06/18/18   0655  06/17/18   0314   NA  141  141  141   K  4.2  4.1  4.1   CL  107  107  108   CO2  24 26  25   BUN  19  19  20   CREA  0.83  0.87  1.03*   GLU  104*  145*  134*   CA  8.9  8.5  8.4*     Recent Labs      06/19/18   0603  06/17/18   0314   SGOT  9*  10*   ALT  12  12   AP  96  101   TBILI  0.7  0.8   TP  6.7  6.5   ALB  3.0*  3.0*   GLOB  3.7  3.5     No results for input(s): INR, PTP, APTT in the last 72 hours. No lab exists for component: INREXT, INREXT   No results for input(s): FE, TIBC, PSAT, FERR in the last 72 hours. Lab Results   Component Value Date/Time    Folate 15.2 06/14/2018 03:12 AM      No results for input(s): PH, PCO2, PO2 in the last 72 hours. No results for input(s): CPK, CKNDX, TROIQ in the last 72 hours.     No lab exists for component: CPKMB  No results found for: CHOL, CHOLX, CHLST, CHOLV, HDL, LDL, LDLC, DLDLP, TGLX, TRIGL, TRIGP, CHHD, CHHDX  Lab Results   Component Value Date/Time    Glucose (POC) 120 (H) 06/19/2018 06:26 AM    Glucose (POC) 243 (H) 06/18/2018 09:38 PM    Glucose (POC) 109 (H) 06/18/2018 11:51 AM    Glucose (POC) 161 (H) 06/18/2018 06:38 AM    Glucose (POC) 200 (H) 06/17/2018 09:11 PM     Lab Results   Component Value Date/Time    Color YELLOW/STRAW 06/17/2018 06:43 PM    Appearance CLEAR 06/17/2018 06:43 PM    Specific gravity 1.015 06/17/2018 06:43 PM    pH (UA) 6.5 06/17/2018 06:43 PM    Protein NEGATIVE  06/17/2018 06:43 PM    Glucose 100 (A) 06/17/2018 06:43 PM    Ketone NEGATIVE  06/17/2018 06:43 PM    Bilirubin NEGATIVE  06/17/2018 06:43 PM    Urobilinogen 1.0 06/17/2018 06:43 PM    Nitrites NEGATIVE  06/17/2018 06:43 PM    Leukocyte Esterase NEGATIVE  06/17/2018 06:43 PM    Epithelial cells FEW 06/17/2018 06:43 PM    Bacteria NEGATIVE  06/17/2018 06:43 PM    WBC 0-4 06/17/2018 06:43 PM    RBC 5-10 06/17/2018 06:43 PM         Medications Reviewed:     Current Facility-Administered Medications   Medication Dose Route Frequency    0.9% sodium chloride infusion 250 mL  250 mL IntraVENous PRN    0.9% sodium chloride infusion 250 mL  250 mL IntraVENous PRN    cyanocobalamin (VITAMIN B12) injection 1,000 mcg  1,000 mcg SubCUTAneous DAILY    [START ON 6/22/2018] cyanocobalamin (VITAMIN B12) injection 1,000 mcg  1,000 mcg SubCUTAneous Q7D    Followed by   Kell Ramirez ON 8/13/2018] cyanocobalamin (VITAMIN B12) injection 1,000 mcg  1,000 mcg SubCUTAneous N98Z    folic acid tablet 0.4 mg  0.4 mg Oral DAILY    levoFLOXacin (LEVAQUIN) 750 mg in D5W IVPB  750 mg IntraVENous Q48H    carvedilol (COREG) tablet 6.25 mg  6.25 mg Oral BID WITH MEALS    HYDROcodone-acetaminophen (NORCO) 7.5-325 mg per tablet 1 Tab  1 Tab Oral Q6H PRN    lactobac ac& pc-s.therm-b.anim (HERNANDO Q/RISAQUAD)  1 Cap Oral DAILY    levothyroxine (SYNTHROID) tablet 88 mcg  88 mcg Oral ACB    pantoprazole (PROTONIX) tablet 40 mg  40 mg Oral DAILY    senna-docusate (PERICOLACE) 8.6-50 mg per tablet 1 Tab  1 Tab Oral DAILY    sodium chloride (NS) flush 5-10 mL  5-10 mL IntraVENous Q8H    sodium chloride (NS) flush 5-10 mL  5-10 mL IntraVENous PRN    acetaminophen (TYLENOL) tablet 650 mg  650 mg Oral Q6H PRN    ondansetron (ZOFRAN) injection 4 mg  4 mg IntraVENous Q4H PRN    0.9% sodium chloride infusion 250 mL  250 mL IntraVENous PRN    escitalopram oxalate (LEXAPRO) tablet 5 mg  5 mg Oral DAILY    diphenhydrAMINE (BENADRYL) injection 25 mg  25 mg IntraVENous Q6H PRN    diphenhydrAMINE (BENADRYL) capsule 50 mg  50 mg Oral Q6H PRN    glipiZIDE SR (GLUCOTROL XL) tablet 5 mg  5 mg Oral ACB    0.9% sodium chloride infusion  50 mL/hr IntraVENous CONTINUOUS    insulin lispro (HUMALOG) injection   SubCUTAneous AC&HS    glucose chewable tablet 16 g  4 Tab Oral PRN    dextrose (D50W) injection syrg 12.5-25 g  12.5-25 g IntraVENous PRN    glucagon (GLUCAGEN) injection 1 mg  1 mg IntraMUSCular PRN     ______________________________________________________________________  EXPECTED LENGTH OF STAY: 2d 19h  ACTUAL LENGTH OF STAY:          Christie Nayak MD

## 2018-06-19 NOTE — PROGRESS NOTES
Bedside and Verbal shift change report given to [address] [place] [address] 20 (oncoming nurse) by Dionisio Selby RN (offgoing nurse). Report included the following information SBAR, Kardex, Intake/Output, MAR and Recent Results.

## 2018-06-19 NOTE — PROGRESS NOTES
Problem: Mobility Impaired (Adult and Pediatric)  Goal: *Acute Goals and Plan of Care (Insert Text)  Physical Therapy Goals  Initiated 6/19/2018  1. Patient will move from supine to sit and sit to supine  in bed with independence within 7 day(s). 2.  Patient will transfer from bed to chair and chair to bed with modified independence using the least restrictive device within 7 day(s). 3.  Patient will perform sit to stand with modified independence within 7 day(s). 4.  Patient will ambulate with modified independence for 200 feet with the least restrictive device within 7 day(s). physical Therapy EVALUATION  Patient: Andie Donovan (46 y.o. female)  Date: 6/19/2018  Primary Diagnosis: Pancytopenia  Pancytopenia ([address]        Precautions:        ASSESSMENT :  Based on the objective data described below, the patient presents with impaired balance and limited safety awareness following admission for pancytopenia. Prior to admission this patient was living with her daughter and  modified independent using a rolling walker for functional mobility. The patient was asleep upon arrival and somewhat disoriented during introduction. Agreeable to walk but reported need to use the bathroom but she deferred discussion of options. . Gait training initiated once she reached the bathroom, the patient turned without regard for her IV line and utilized the door knob on a moving door to attempt transferring to the commode. This patient is acutely aware of her fall risk and of bleeding but showed little regard for safety entering the bathroom. Assisted her in front of the commode and had a female staff member help her to address her ADL needs. Further gait training completed after with CGA required for safe RW management. Patient performance impaired by many factors including a male PT and need to use commode, disorientation upon awakening, and the patient has a history of falls.  Would recommend home with her daughter and HHPT based on performance observed during initial evaluation. Patient will benefit from skilled intervention to address the above impairments. Patients rehabilitation potential is considered to be Good  Factors which may influence rehabilitation potential include:   [x]         None noted  []         Mental ability/status  []         Medical condition  []         Home/family situation and support systems  []         Safety awareness  []         Pain tolerance/management  []         Other:      PLAN :  Recommendations and Planned Interventions:  [x]           Bed Mobility Training             [x]    Neuromuscular Re-Education  [x]           Transfer Training                   []    Orthotic/Prosthetic Training  [x]           Gait Training                         []    Modalities  [x]           Therapeutic Exercises           []    Edema Management/Control  [x]           Therapeutic Activities            []    Patient and Family Training/Education  []           Other (comment):    Frequency/Duration: Patient will be followed by physical therapy  5 times a week to address goals. Discharge Recommendations: Home Health and To Be Determined  Further Equipment Recommendations for Discharge: None     SUBJECTIVE:   Patient stated I need to go to the bathroom.     OBJECTIVE DATA SUMMARY:   HISTORY:    Past Medical History:   Diagnosis Date    CHF (congestive heart failure) ([facility] [address]     thought to be result of chemotherapy    Depression     Diabetes ([facility] [address]     GERD (gastroesophageal reflux disease)     HTN (hypertension)     Non Hodgkin's lymphoma (HCC)     Rheumatoid arthritis(714.0)     Thyroid disease      Past Surgical History:   Procedure Laterality Date    HX APPENDECTOMY      HX CATARACT REMOVAL Bilateral     HX HYSTERECTOMY      HX UROLOGICAL      bladder tack x 2    VASCULAR SURGERY PROCEDURE UNLIST      varicose vein surgery x 2     Prior Level of Function/Home Situation: modified independent with rolling walker  Personal factors and/or comorbidities impacting plan of care: periods of confusion    Home Situation  Home Environment: Private residence  # Steps to Enter: 0  One/Two Story Residence: One story  Living Alone: No  Support Systems: Shinto / lory community, Friends \ neighbors  Patient Expects to be Discharged to[de-identified] Private residence  Current DME Used/Available at Home: None, Shower chair    EXAMINATION/PRESENTATION/DECISION MAKING:   Critical Behavior:  Neurologic State: Alert, Appropriate for age  Orientation Level: Oriented X4  Cognition: Decreased attention/concentration, Poor safety awareness     Hearing:   Auditory  Auditory Impairment: None  Skin:  Multiple bruises thorughout forearms  Edema:   Range Of Motion:  AROM: Generally decreased, functional                       Strength:    Strength: Generally decreased, functional                    Tone & Sensation:   Tone: Normal              Sensation: Intact               Coordination:  Coordination: Generally decreased, functional    Functional Mobility:  Bed Mobility:  Rolling: Supervision  Supine to Sit: Supervision        Transfers:  Sit to Stand: Contact guard assistance  Stand to Sit: Contact guard assistance                       Balance:   Sitting: Intact  Standing: Impaired  Standing - Static: Good  Standing - Dynamic : Fair  Ambulation/Gait Training:  Distance (ft): 120 Feet (ft)  Assistive Device: Gait belt;Walker, rolling  Ambulation - Level of Assistance: Contact guard assistance     Gait Description (WDL): Exceptions to WDL  Gait Abnormalities: Decreased step clearance;Trunk sway increased        Base of Support: Widened  Stance: Left decreased;Right decreased  Speed/Daria: Slow;Shuffled  Step Length: Left shortened;Right shortened      Cued to keep RW within FELTON and for erect postrue  Therapeutic Exercises:       Functional Measure:  Tinetti test:    Sitting Balance: 1  Arises: 1  Attempts to Rise: 2  Immediate Standing Balance: 1  Standing Balance: 2  Nudged: 1  Eyes Closed: 1  Turn 360 Degrees - Continuous/Discontinuous: 1  Turn 360 Degrees - Steady/Unsteady: 1  Sitting Down: 1  Balance Score: 12  Indication of Gait: 1  R Step Length/Height: 1  L Step Length/Height: 1  R Foot Clearance: 1  L Foot Clearance: 1  Step Symmetry: 1  Step Continuity: 1  Path: 1  Trunk: 0  Walking Time: 0  Gait Score: 8  Total Score: 20       Tinetti Test and G-code impairment scale:  Percentage of Impairment CH    0%   CI    1-19% CJ    20-39% CK    40-59% CL    60-79% CM    80-99% CN     100%   Tinetti  Score 0-28 28 23-27 17-22 12-16 6-11 1-5 0       Tinetti Tool Score Risk of Falls  <19 = High Fall Risk  19-24 = Moderate Fall Risk  25-28 = Low Fall Risk  Tinetti ME. Performance-Oriented Assessment of Mobility Problems in Elderly Patients. [facility]; [id number]. (Scoring Description: PT Bulletin Feb. 10, 1993)    Older adults: Scarlet Heart et al, 2009; n = 1000 [facility] elderly evaluated with ABC, MISTI, ADL, and IADL)  · Mean MISTI score for males aged 69-68 years = 26.21(3.40)  · Mean MISTI score for females age 69-68 years = 25.16(4.30)  · Mean MISTI score for males over 80 years = 23.29(6.02)  · Mean MISTI score for females over 80 years = 17.20(8.32)         G codes: In compliance with CMSs Claims Based Outcome Reporting, the following G-code set was chosen for this patient based on their primary functional limitation being treated: The outcome measure chosen to determine the severity of the functional limitation was the Tinetti with a score of 20/28 which was correlated with the impairment scale.     ? Mobility - Walking and Moving Around:     - CURRENT STATUS: CJ - 20%-39% impaired, limited or restricted    - GOAL STATUS: CI - 1%-19% impaired, limited or restricted    - D/C STATUS:  ---------------To be determined---------------      Physical Therapy Evaluation Charge Determination   History Examination Presentation Decision-Making   MEDIUM Complexity : 1-2 comorbidities / personal factors will impact the outcome/ POC  LOW Complexity : 1-2 Standardized tests and measures addressing body structure, function, activity limitation and / or participation in recreation  LOW Complexity : Stable, uncomplicated  LOW Complexity : FOTO score of       Based on the above components, the patient evaluation is determined to be of the following complexity level: LOW     Pain:  Pain Scale 1: Numeric (0 - 10)  Pain Intensity 1: 0          After treatment:   []         Patient left in no apparent distress sitting up in chair  [x]         Patient left in no apparent distress in bed  [x]         Call bell left within reach  [x]         Nursing notified  []         Caregiver present  []         Bed alarm activated    COMMUNICATION/EDUCATION:   The patients plan of care was discussed with: Registered Nurse. [x]         Fall prevention education was provided and the patient/caregiver indicated understanding. [x]         Patient/family have participated as able in goal setting and plan of care. [x]         Patient/family agree to work toward stated goals and plan of care. []         Patient understands intent and goals of therapy, but is neutral about his/her participation. []         Patient is unable to participate in goal setting and plan of care.     Thank you for this referral.  Arturo Oliveros, PT, DPT   Time Calculation: 27 mins

## 2018-06-20 LAB
ALBUMIN SERPL-MCNC: 3.1 G/DL (ref 3.5–5)
ALBUMIN/GLOB SERPL: 0.9 {RATIO} (ref 1.1–2.2)
ALP SERPL-CCNC: 99 U/L (ref 45–117)
ALT SERPL-CCNC: 12 U/L (ref 12–78)
ANION GAP SERPL CALC-SCNC: 8 MMOL/L (ref 5–15)
AST SERPL-CCNC: 13 U/L (ref 15–37)
BACTERIA SPEC CULT: NORMAL
BASOPHILS # BLD: 0 K/UL (ref 0–0.1)
BASOPHILS NFR BLD: 0 % (ref 0–1)
BILIRUB SERPL-MCNC: 0.5 MG/DL (ref 0.2–1)
BLD PROD TYP BPU: NORMAL
BPU ID: NORMAL
BUN SERPL-MCNC: 21 MG/DL (ref 6–20)
BUN/CREAT SERPL: 24 (ref 12–20)
CALCIUM SERPL-MCNC: 8.4 MG/DL (ref 8.5–10.1)
CHLORIDE SERPL-SCNC: 107 MMOL/L (ref 97–108)
CO2 SERPL-SCNC: 25 MMOL/L (ref 21–32)
CREAT SERPL-MCNC: 0.89 MG/DL (ref 0.55–1.02)
DIFFERENTIAL METHOD BLD: ABNORMAL
EOSINOPHIL # BLD: 0 K/UL (ref 0–0.4)
EOSINOPHIL NFR BLD: 1 % (ref 0–7)
ERYTHROCYTE [DISTWIDTH] IN BLOOD BY AUTOMATED COUNT: 13 % (ref 11.5–14.5)
GLOBULIN SER CALC-MCNC: 3.6 G/DL (ref 2–4)
GLUCOSE BLD STRIP.AUTO-MCNC: 137 MG/DL (ref 65–100)
GLUCOSE BLD STRIP.AUTO-MCNC: 204 MG/DL (ref 65–100)
GLUCOSE BLD STRIP.AUTO-MCNC: 213 MG/DL (ref 65–100)
GLUCOSE BLD STRIP.AUTO-MCNC: 98 MG/DL (ref 65–100)
GLUCOSE SERPL-MCNC: 118 MG/DL (ref 65–100)
HCT VFR BLD AUTO: 18.6 % (ref 35–47)
HGB BLD-MCNC: 6.3 G/DL (ref 11.5–16)
IMM GRANULOCYTES # BLD: 0 K/UL
IMM GRANULOCYTES NFR BLD AUTO: 0 %
LYMPHOCYTES # BLD: 0.3 K/UL (ref 0.8–3.5)
LYMPHOCYTES NFR BLD: 84 % (ref 12–49)
MCH RBC QN AUTO: 30.6 PG (ref 26–34)
MCHC RBC AUTO-ENTMCNC: 33.9 G/DL (ref 30–36.5)
MCV RBC AUTO: 90.3 FL (ref 80–99)
MONOCYTES # BLD: 0 K/UL (ref 0–1)
MONOCYTES NFR BLD: 6 % (ref 5–13)
NEUTS BAND NFR BLD MANUAL: 2 % (ref 0–6)
NEUTS SEG # BLD: 0 K/UL (ref 1.8–8)
NEUTS SEG NFR BLD: 7 % (ref 32–75)
NRBC # BLD: 0 K/UL (ref 0–0.01)
NRBC BLD-RTO: 0 PER 100 WBC
PLATELET # BLD AUTO: 11 K/UL (ref 150–400)
PLATELET COMMENTS,PCOM: ABNORMAL
PMV BLD AUTO: 11.3 FL (ref 8.9–12.9)
POTASSIUM SERPL-SCNC: 4.2 MMOL/L (ref 3.5–5.1)
PROT SERPL-MCNC: 6.7 G/DL (ref 6.4–8.2)
RBC # BLD AUTO: 2.06 M/UL (ref 3.8–5.2)
RBC MORPH BLD: ABNORMAL
SERVICE CMNT-IMP: ABNORMAL
SERVICE CMNT-IMP: NORMAL
SERVICE CMNT-IMP: NORMAL
SODIUM SERPL-SCNC: 140 MMOL/L (ref 136–145)
STATUS OF UNIT,%ST: NORMAL
UNIT DIVISION, %UDIV: 0
WBC # BLD AUTO: 0.3 K/UL (ref 3.6–11)
WBC MORPH BLD: ABNORMAL

## 2018-06-20 PROCEDURE — 85025 COMPLETE CBC W/AUTO DIFF WBC: CPT | Performed by: INTERNAL MEDICINE

## 2018-06-20 PROCEDURE — 88184 FLOWCYTOMETRY/ TC 1 MARKER: CPT | Performed by: INTERNAL MEDICINE

## 2018-06-20 PROCEDURE — 74011636637 HC RX REV CODE- 636/637: Performed by: INTERNAL MEDICINE

## 2018-06-20 PROCEDURE — 65270000029 HC RM PRIVATE

## 2018-06-20 PROCEDURE — 80053 COMPREHEN METABOLIC PANEL: CPT | Performed by: INTERNAL MEDICINE

## 2018-06-20 PROCEDURE — 82525 ASSAY OF COPPER: CPT | Performed by: INTERNAL MEDICINE

## 2018-06-20 PROCEDURE — 74011250636 HC RX REV CODE- 250/636: Performed by: INTERNAL MEDICINE

## 2018-06-20 PROCEDURE — 97116 GAIT TRAINING THERAPY: CPT

## 2018-06-20 PROCEDURE — 36415 COLL VENOUS BLD VENIPUNCTURE: CPT | Performed by: INTERNAL MEDICINE

## 2018-06-20 PROCEDURE — 86038 ANTINUCLEAR ANTIBODIES: CPT | Performed by: INTERNAL MEDICINE

## 2018-06-20 PROCEDURE — 74011250637 HC RX REV CODE- 250/637: Performed by: INTERNAL MEDICINE

## 2018-06-20 PROCEDURE — 82962 GLUCOSE BLOOD TEST: CPT

## 2018-06-20 PROCEDURE — 74011250637 HC RX REV CODE- 250/637: Performed by: HOSPITALIST

## 2018-06-20 RX ADMIN — LEVOFLOXACIN 750 MG: 5 INJECTION, SOLUTION INTRAVENOUS at 18:00

## 2018-06-20 RX ADMIN — CARVEDILOL 6.25 MG: 6.25 TABLET, FILM COATED ORAL at 07:30

## 2018-06-20 RX ADMIN — Medication 1 CAPSULE: at 09:05

## 2018-06-20 RX ADMIN — GLIPIZIDE 5 MG: 2.5 TABLET, FILM COATED, EXTENDED RELEASE ORAL at 07:30

## 2018-06-20 RX ADMIN — CYANOCOBALAMIN 1000 MCG: 1000 INJECTION, SOLUTION INTRAMUSCULAR at 09:05

## 2018-06-20 RX ADMIN — SENNOSIDES AND DOCUSATE SODIUM 1 TABLET: 8.6; 5 TABLET ORAL at 09:05

## 2018-06-20 RX ADMIN — Medication 10 ML: at 22:01

## 2018-06-20 RX ADMIN — PANTOPRAZOLE SODIUM 40 MG: 40 TABLET, DELAYED RELEASE ORAL at 09:06

## 2018-06-20 RX ADMIN — FOLIC ACID TAB 400 MCG 0.4 MG: 400 TAB at 09:06

## 2018-06-20 RX ADMIN — Medication 10 ML: at 14:00

## 2018-06-20 RX ADMIN — Medication 10 ML: at 05:18

## 2018-06-20 RX ADMIN — LEVOTHYROXINE SODIUM 88 MCG: 88 TABLET ORAL at 07:30

## 2018-06-20 RX ADMIN — INSULIN LISPRO 2 UNITS: 100 INJECTION, SOLUTION INTRAVENOUS; SUBCUTANEOUS at 22:00

## 2018-06-20 RX ADMIN — INSULIN LISPRO 3 UNITS: 100 INJECTION, SOLUTION INTRAVENOUS; SUBCUTANEOUS at 11:17

## 2018-06-20 RX ADMIN — CARVEDILOL 6.25 MG: 6.25 TABLET, FILM COATED ORAL at 17:59

## 2018-06-20 RX ADMIN — SODIUM CHLORIDE 50 ML/HR: 900 INJECTION, SOLUTION INTRAVENOUS at 02:35

## 2018-06-20 RX ADMIN — ESCITALOPRAM OXALATE 5 MG: 10 TABLET ORAL at 09:06

## 2018-06-20 NOTE — PROGRESS NOTES
Hospitalist Progress Note  John Paul Drew MD  Answering service: [phone] from in house phone  Cell: [phone]      Date of Service:  2018  NAME:  Preet Wells  :  1935  MRN:  [id number]    Spoke with [facility] and Hematology fellow at [facility] regarding patient transfer, per [facility] request. They have tentatively accepted the patient, but their service is \"capped\" and they anticipate a bed tomorrow or Friday.                John Paul Drew MD

## 2018-06-20 NOTE — PROGRESS NOTES
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 The weakness is generalized, it is progressive. The patient lives with her daughter. Normally, the patient is able to carry out her activities of daily living without any assistance. On the day of her presentation at the emergency room, the generalized weakness got worse to the extent that the patient was not able to get out of bed. The patient also complained of a decreased appetite and shortness of breath. The patient was found to be pancytopenic    Interval history / Subjective:     Bone marrow bx with hypocellular marrow. Heme discussing transfer to [facility] for BMT with Dr. Raymundo Kate. Assessment & Plan:     Severe anemia  -s/p 2 units PRBC at [address] and another 2 units , one unit   -Appreciate Hematology  -Follow CBC, transfuse for Hb<7    Severe thrombocytopenia  -sec to ?  -s/p 2 units platelets   -at 11 this AM    Pancytopenia  -neutropenic precautions  -CXR unremarkable  -CT chest focal RLL airpace disease  -Blood culture no growth so far  -on LVQ since .  Today is last day of abx    History of lymphoma  -CT chest and abd , No mass, adenopathy or other acute abnormality in the chest, abdomen or pelvis    AMS, resolved  -?metabolic encephalopathy   -CT head age related volume loss    History of PMR  -Was on steroids, currently on hold    Vit B12 deficiency  -Replace    Hypothyroidism  -On synthroid    DM type 2  -On glipizide. Patient refusing SSI    Anxiety/Depression   -On Lexapro    Diabetic diet    Code status: FULL CODE  DVT prophylaxis: SCDs  PTA: home    Plan: Discharge tomorrow if platelets stable    Care Plan discussed with: Patient/Family  Disposition: TBD, ?transfer to [facility] Problems  Date Reviewed: 6/14/2018          Codes Class Noted POA    * (Principal)Pancytopenia ([facility] [address] ICD-10-CM: U61.904  ICD-9-CM: 284.19  6/13/2018 Yes                Review of Systems:   A comprehensive review of systems was negative except for that written in the HPI. Vital Signs:    Last 24hrs VS reviewed since prior progress note. Most recent are:  Visit Vitals    /66 (BP 1 Location: Left arm, BP Patient Position: At rest)    Pulse 71    Temp 97.7 °F (36.5 °C)    Resp 16    Ht 5' 10\" (1.778 m)    Wt 80.1 kg (176 lb 9.4 oz)    SpO2 100%    Breastfeeding No    BMI 25.34 kg/m2         Intake/Output Summary (Last 24 hours) at 06/20/18 1203  Last data filed at 06/20/18 0840   Gross per 24 hour   Intake              480 ml   Output             1450 ml   Net             -970 ml        Physical Examination:           Constitutional:  No acute distress, cooperative, pleasant    ENT:  Neck supple   Resp:  No accessory muscle use   CV:  Regular rhythm, normal rate, no murmurs    GI:  Soft, non distended, non tender    Musculoskeletal:  No edema, warm, 2+ pulses throughout    Neurologic:  Moves all extremities.   AAOx3,     Skin:  Good turgor, no rashes or ulcers       Data Review:    Review and/or order of clinical lab test      Labs:     Recent Labs      06/20/18   0536  06/19/18   0603   WBC  0.3*  0.3*   HGB  6.3*  8.2*   HCT  18.6*  23.9*   PLT  11*  6*     Recent Labs      06/20/18   0536  06/19/18   0603  06/18/18   0655   NA  140  141  141   K  4.2  4.2  4.1   CL  107  107  107   CO2  25  24  26   BUN  21*  19  19   CREA  0.89  0.83 0. 87   GLU  118*  104*  145*   CA  8.4*  8.9  8.5     Recent Labs      06/20/18   0536  06/19/18   0603   SGOT  13*  9*   ALT  12  12   AP  99  96   TBILI  0.5  0.7   TP  6.7  6.7   ALB  3.1*  3.0*   GLOB  3.6  3.7     No results for input(s): INR, PTP, APTT in the last 72 hours. No lab exists for component: INREXT, INREXT   No results for input(s): FE, TIBC, PSAT, FERR in the last 72 hours. Lab Results   Component Value Date/Time    Folate 15.2 06/14/2018 03:12 AM      No results for input(s): PH, PCO2, PO2 in the last 72 hours. No results for input(s): CPK, CKNDX, TROIQ in the last 72 hours.     No lab exists for component: CPKMB  No results found for: CHOL, CHOLX, CHLST, CHOLV, HDL, LDL, LDLC, DLDLP, TGLX, TRIGL, TRIGP, CHHD, CHHDX  Lab Results   Component Value Date/Time    Glucose (POC) 204 (H) 06/20/2018 11:11 AM    Glucose (POC) 137 (H) 06/20/2018 07:07 AM    Glucose (POC) 237 (H) 06/19/2018 09:07 PM    Glucose (POC) 117 (H) 06/19/2018 04:26 PM    Glucose (POC) 180 (H) 06/19/2018 12:09 PM     Lab Results   Component Value Date/Time    Color YELLOW/STRAW 06/17/2018 06:43 PM    Appearance CLEAR 06/17/2018 06:43 PM    Specific gravity 1.015 06/17/2018 06:43 PM    pH (UA) 6.5 06/17/2018 06:43 PM    Protein NEGATIVE  06/17/2018 06:43 PM    Glucose 100 (A) 06/17/2018 06:43 PM    Ketone NEGATIVE  06/17/2018 06:43 PM    Bilirubin NEGATIVE  06/17/2018 06:43 PM    Urobilinogen 1.0 06/17/2018 06:43 PM    Nitrites NEGATIVE  06/17/2018 06:43 PM    Leukocyte Esterase NEGATIVE  06/17/2018 06:43 PM    Epithelial cells FEW 06/17/2018 06:43 PM    Bacteria NEGATIVE  06/17/2018 06:43 PM    WBC 0-4 06/17/2018 06:43 PM    RBC 5-10 06/17/2018 06:43 PM         Medications Reviewed:     Current Facility-Administered Medications   Medication Dose Route Frequency    0.9% sodium chloride infusion 250 mL  250 mL IntraVENous PRN    levoFLOXacin (LEVAQUIN) 750 mg in D5W IVPB  750 mg IntraVENous Q24H    0.9% sodium chloride infusion 250 mL  250 mL IntraVENous PRN    cyanocobalamin (VITAMIN B12) injection 1,000 mcg  1,000 mcg SubCUTAneous DAILY    [START ON 6/22/2018] cyanocobalamin (VITAMIN B12) injection 1,000 mcg  1,000 mcg SubCUTAneous Q7D    Followed by   Kell Ramirez ON 8/13/2018] cyanocobalamin (VITAMIN B12) injection 1,000 mcg  1,000 mcg SubCUTAneous U59J    folic acid tablet 0.4 mg  0.4 mg Oral DAILY    carvedilol (COREG) tablet 6.25 mg  6.25 mg Oral BID WITH MEALS    HYDROcodone-acetaminophen (NORCO) 7.5-325 mg per tablet 1 Tab  1 Tab Oral Q6H PRN    lactobac ac& pc-s.therm-b.anim (HERNANDO Q/RISAQUAD)  1 Cap Oral DAILY    levothyroxine (SYNTHROID) tablet 88 mcg  88 mcg Oral ACB    pantoprazole (PROTONIX) tablet 40 mg  40 mg Oral DAILY    senna-docusate (PERICOLACE) 8.6-50 mg per tablet 1 Tab  1 Tab Oral DAILY    sodium chloride (NS) flush 5-10 mL  5-10 mL IntraVENous Q8H    sodium chloride (NS) flush 5-10 mL  5-10 mL IntraVENous PRN    acetaminophen (TYLENOL) tablet 650 mg  650 mg Oral Q6H PRN    ondansetron (ZOFRAN) injection 4 mg  4 mg IntraVENous Q4H PRN    0.9% sodium chloride infusion 250 mL  250 mL IntraVENous PRN    escitalopram oxalate (LEXAPRO) tablet 5 mg  5 mg Oral DAILY    diphenhydrAMINE (BENADRYL) injection 25 mg  25 mg IntraVENous Q6H PRN    diphenhydrAMINE (BENADRYL) capsule 50 mg  50 mg Oral Q6H PRN    glipiZIDE SR (GLUCOTROL XL) tablet 5 mg  5 mg Oral ACB    0.9% sodium chloride infusion  50 mL/hr IntraVENous CONTINUOUS    insulin lispro (HUMALOG) injection   SubCUTAneous AC&HS    glucose chewable tablet 16 g  4 Tab Oral PRN    dextrose (D50W) injection syrg 12.5-25 g  12.5-25 g IntraVENous PRN    glucagon (GLUCAGEN) injection 1 mg  1 mg IntraMUSCular PRN     ______________________________________________________________________  EXPECTED LENGTH OF STAY: 2d 19h  ACTUAL LENGTH OF STAY:          [address] Anastasia Nayak MD

## 2018-06-20 NOTE — PROGRESS NOTES
NUTRITION- DIETETIC tECHnICIAN    Pt seen for:       [x]                  Rescreen  []                  Food preferences/tolerances  []                  Food Allergies  []                  PO intake check  []                  Supplements  []                  Diet order clarification  []                  Education  []                  Other     Rescreen:    [x]                  Not at Nutrition Risk, rescreen per screening protocol  []                  At Nutrition Risk- RD referral         SUBJECTIVE/OBJECTIVE:     Information obtained from:  patient      Diet:  Regular Consistent Carbohydrate 1800 Kcal, 2 gm Na    Intake: Very good    Patient Vitals for the past 100 hrs:   % Diet Eaten   06/20/18 0840 90 %   06/19/18 1730 100 %   06/19/18 0843 90 %   06/18/18 0845 95 %   06/16/18 1733 75 %   06/16/18 1413 100 %   06/16/18 0848 50 %       Weight Changes: Wt Readings from Last 5 Encounters:   06/20/18 80.1 kg (176 lb 9.4 oz)   01/22/18 71.2 kg (156 lb 14.4 oz)   12/06/17 72.7 kg (160 lb 4.4 oz)   11/07/17 76.7 kg (169 lb)   11/25/14 76.7 kg (169 lb)       Problems Identified:      [x]                  Eating and tolerating diet without difficulty.   No problems identified    []                  Specified food preferences   []                  Dislikes supplements              []                  Allergies:   []                  Difficulty chewing      []                  Dentition    []                  Nausea/Vomiting   []                  Constipation   []                  Diarrhea    PLAN:     [x]                   Continue current diet and encourage intake  []                   Obtained/adjusted food preferences/tolerances and/or snacks options   []                   Dislikes supplements will try a substitution  []                   Modify diet for food allergies  []                   Adjust texture due to difficulty chewing   []                   Educated patient  []                   RD Referral  [x] Rescreen per screening protocol          DONAL AndersR

## 2018-06-20 NOTE — PROGRESS NOTES
Bedside and Verbal shift change report given to Deonte Andersen RN (oncoming nurse) by Oscar Salinas RN (offgoing nurse). Report included the following information SBAR, Kardex, Intake/Output, MAR and Recent Results.

## 2018-06-20 NOTE — PROGRESS NOTES
-Hematology / Oncology ([facility]) -  -Primary Oncologist- Reji Kumar  -CC- 80 y.o patient Dr. Reji Kumar with  hx of T-cell lymphoma s/p chemotherapy in 2017   admitted for evaluation of pancytopenia.     -S- No new issues. No bleeding/thrombosis reported     -O-       Patient Vitals for the past 24 hrs:    Temp Pulse Resp BP SpO2   06/18/18 0842 98.7 °F (37.1 °C) 74 16 133/66 100 %   06/18/18 0647 - 75 - 153/73 -   06/18/18 0152 98.6 °F (37 °C) 70 16 128/64 98 %   06/17/18 2113 98.3 °F (36.8 °C) 78 16 131/79 99 %   06/17/18 1425 98.3 °F (36.8 °C) 72 16 (!) 137/94 100 %      06/18 0701 - 06/18 1900  In: 240 [P.O.:240]  Out: -   Gen: nad  Chest: bilateral breath sounds present  Cardiac: rrr  Abd: s/nt  Neuro:AAO x 4     -Labs-          Recent Labs       06/18/18   0655  06/17/18   0314  06/16/18   0306   WBC   --   0.4*  0.4*   HGB   --   8.3*  6.8*   PLT   --   11*  15*   ANEU   --   0.1*  0.1*   NA  141  141  140   K  4.1  4.1  3.8   GLU  145*  134*  97   BUN  19  20  15   CREA  0.87  1.03*  0.80   ALT   --   12  11*   SGOT   --   10*  8*   TBILI   --   0.8  1.0   AP   --   101  96   CA  8.5  8.4*  8.5         -Imaging-         -Assessment + Plan-     *) pancytopenia- BMBX performed 6/18/2018 shows marked aplasia. Presumed secondary to chemotherapy but other causes not excluded  *) anemia- transfuse prn hgb <7  *) thrombocytopenia: severe. Refractory to transfusion. Presumed alloimmunization Transfuse only for active bleeding  *) neutropenia- continue precautions, afebrile.   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 Bd Cultures neg   *) hx steroids (PMR) - will need stress dose steroids if BP drops or worsens acutely  *)Encephalopathy - not present on admission- confusion and biligerence after iv benadryl. Resolved 6/18/2018.   ---- Head CT negative      Disc with pt/daughter  6/20/2018:    Severe pancytopenia with aplasia in bone marrow. We are awaiting final bone marrow studies to assess for MDS/PNH  We recommend transfer to [facility] hematology  for evaluation and management of aplastic anemia  Both patient and daughter are in agreement for transfer    Will discuss with Dr. Dylan Pryor but we are hopeful that the hospitalist team can coordinate transfer to [facility] hematology inpatient    Thanks to hospitalist team for their help.

## 2018-06-20 NOTE — PROGRESS NOTES
Problem: Falls - Risk of  Goal: *Absence of Falls  Document Kristian Fall Risk and appropriate interventions in the flowsheet.    Outcome: Progressing Towards Goal  Fall Risk Interventions:  Mobility Interventions: Patient to call before getting OOB, Communicate number of staff needed for ambulation/transfer    Mentation Interventions: Adequate sleep, hydration, pain control, Room close to nurse's station    Medication Interventions: Patient to call before getting OOB    Elimination Interventions: Patient to call for help with toileting needs    History of Falls Interventions: Room close to nurse's station

## 2018-06-20 NOTE — PROGRESS NOTES
CM reviewed chart and spoke with Hospitalist who would like for pt to be transferred to [facility]. She called and got an accepting physician. CM faxed facesheet to bed placement at [facility]. They are unable to say when to expect bed to be available. CM printed out H&P, consults, and progress notes to put in envelope. Discharge summary and AVS will need to be added as well as Emtalas, Kardex, and MARs. Nurse will need to call report. And ambulance will need to be arranged.     CHRIS Arrigaa, ACM

## 2018-06-20 NOTE — PROGRESS NOTES
Problem: Mobility Impaired (Adult and Pediatric)  Goal: *Acute Goals and Plan of Care (Insert Text)  Physical Therapy Goals  Initiated 6/19/2018  1. Patient will move from supine to sit and sit to supine  in bed with independence within 7 day(s). 2.  Patient will transfer from bed to chair and chair to bed with modified independence using the least restrictive device within 7 day(s). 3.  Patient will perform sit to stand with modified independence within 7 day(s). 4.  Patient will ambulate with modified independence for 200 feet with the least restrictive device within 7 day(s). physical Therapy TREATMENT  Patient: Guillermo Cooper (74 y.o. female)  Date: 6/20/2018  Diagnosis: Pancytopenia  Pancytopenia (HCC) Pancytopenia (HCC)       Precautions:    Chart, physical therapy assessment, plan of care and goals were reviewed. ASSESSMENT:  Pt received in bed agreeable to PT. Reports overall weakness this session, especially in LE's (R>L). Reported having hip and knee (repair to knee cap) surgery between 2-3 years ago approx; also reported several falls over this time span. Pt Supervision for bed mobility and CGA for sit<>stand transfers. Pt amb shorter distance this day compared to previous session (fatigue/weakness). Required Mod verbal/tactile cues to keep RW close to FELTON. Pt returned to bed upon request and d/t not having suitable chair for pt to sit up in. Did instruct pt to sit up in chair at least 3x/day for all meals to increase overall strength and endurance for activity. Also encouraged and instructed pt to exercises described below. Pt w/o additional questions for PT at this time. Will continue to follow for progression of functional mobility as appropriate.      Progression toward goals:  [x]    Improving appropriately and progressing toward goals  []    Improving slowly and progressing toward goals  []    Not making progress toward goals and plan of care will be adjusted     PLAN:  Patient continues to benefit from skilled intervention to address the above impairments. Continue treatment per established plan of care. Discharge Recommendations:  Home Health w/ daughter support/assist  Further Equipment Recommendations for Discharge: Owns RW     SUBJECTIVE:   Patient stated Big marcelly Demi Luke.     OBJECTIVE DATA SUMMARY:   Critical Behavior:  Neurologic State: Alert  Orientation Level: Oriented X4  Cognition: Appropriate for age attention/concentration, Follows commands     Functional Mobility Training:  Bed Mobility:  Rolling: Supervision  Supine to Sit: Supervision              Transfers:  Sit to Stand: Contact guard assistance  Stand to Sit: Contact guard assistance                             Balance:  Sitting: Intact  Standing: Intact; With support (RW)  Standing - Static: Constant support;Good  Standing - Dynamic : Fair  Ambulation/Gait Training:  Distance (ft): 70 Feet (ft)  Assistive Device: Gait belt;Walker, rolling  Ambulation - Level of Assistance: Contact guard assistance (Verbal/tactile cues to keep RW close to FELTON)                 Base of Support: Widened  Stance: Left decreased;Right decreased  Speed/Daria: Pace decreased (<100 feet/min); Shuffled  Step Length: Left shortened;Right shortened                              Therapeutic Exercises:   Instructed pt to : heel slides, SLR (as able), Hip ABD/ADD, Quad sets 3x/day x10 reps each. Pain:  Pain Scale 1: Numeric (0 - 10)  Pain Intensity 1: 0              Activity Tolerance:   Limited d/t general weakness this session. Please refer to the flowsheet for vital signs taken during this treatment.   After treatment:   []    Patient left in no apparent distress sitting up in chair  [x]    Patient left in no apparent distress in bed  [x]    Call bell left within reach  [x]    Nursing notified  []    Caregiver present  []    Bed alarm activated    COMMUNICATION/COLLABORATION:   The patients plan of care was discussed with: Ahmet Nurse    Dearl Edinns A Means,PTA   Time Calculation: 21 mins

## 2018-06-20 NOTE — PROGRESS NOTES
Problem: Falls - Risk of  Goal: *Absence of Falls  Document Kristian Fall Risk and appropriate interventions in the flowsheet.    Outcome: Progressing Towards Goal  Fall Risk Interventions:  Mobility Interventions: Patient to call before getting OOB, Communicate number of staff needed for ambulation/transfer    Mentation Interventions: Adequate sleep, hydration, pain control, Room close to nurse's station    Medication Interventions: Patient to call before getting OOB, Teach patient to arise slowly    Elimination Interventions: Call light in reach, Patient to call for help with toileting needs    History of Falls Interventions: Door open when patient unattended, Room close to nurse's station

## 2018-06-20 NOTE — PROGRESS NOTES
Bedside shift change report given to Gagandeep Miller (oncoming nurse) by Nas Luna (offgoing nurse). Report included the following information SBAR, Kardex and MAR.

## 2018-06-21 LAB
ANA SER QL: NEGATIVE
ANTIBODIES PERFORMED, 502264: NORMAL
APTT PPP: 26 SEC (ref 22.1–32)
BASOPHILS # BLD: 0 K/UL (ref 0–0.1)
BASOPHILS NFR BLD: 0 % (ref 0–1)
CD59 CELLS BLD QL: NORMAL
CD59 DEFICIENT GRANULOCYTES NFR BLD: NORMAL %
CD59 RBC/RBC NFR BLD: NORMAL %
CELL POPULATION, PNH12T: NORMAL
COMMENT, 502274: NORMAL
COMMENT, PNH8T: NORMAL
DIFFERENTIAL METHOD BLD: ABNORMAL
DIRECTOR REVIEW, PNHL: NORMAL
EOSINOPHIL # BLD: 0 K/UL (ref 0–0.4)
EOSINOPHIL NFR BLD: 2 % (ref 0–7)
ERYTHROCYTE [DISTWIDTH] IN BLOOD BY AUTOMATED COUNT: 12.8 % (ref 11.5–14.5)
GLUCOSE BLD STRIP.AUTO-MCNC: 124 MG/DL (ref 65–100)
GLUCOSE BLD STRIP.AUTO-MCNC: 138 MG/DL (ref 65–100)
GLUCOSE BLD STRIP.AUTO-MCNC: 164 MG/DL (ref 65–100)
GLUCOSE BLD STRIP.AUTO-MCNC: 218 MG/DL (ref 65–100)
HCT VFR BLD AUTO: 23.4 % (ref 35–47)
HGB BLD-MCNC: 7.9 G/DL (ref 11.5–16)
IMM GRANULOCYTES # BLD: 0 K/UL
IMM GRANULOCYTES NFR BLD AUTO: 0 %
INR PPP: 1.1 (ref 0.9–1.1)
LYMPHOCYTES # BLD: 0.3 K/UL (ref 0.8–3.5)
LYMPHOCYTES NFR BLD: 68 % (ref 12–49)
MCH RBC QN AUTO: 30.4 PG (ref 26–34)
MCHC RBC AUTO-ENTMCNC: 33.8 G/DL (ref 30–36.5)
MCV RBC AUTO: 90 FL (ref 80–99)
MONOCYTES # BLD: 0 K/UL (ref 0–1)
MONOCYTES NFR BLD: 9 % (ref 5–13)
MONOCYTES, 502262: NORMAL
NEUTS BAND NFR BLD MANUAL: 2 % (ref 0–6)
NEUTS SEG # BLD: 0.1 K/UL (ref 1.8–8)
NEUTS SEG NFR BLD: 19 % (ref 32–75)
NRBC # BLD: 0 K/UL (ref 0–0.01)
NRBC BLD-RTO: 0 PER 100 WBC
PLATELET # BLD AUTO: 6 K/UL (ref 150–400)
PMV BLD AUTO: 12.2 FL (ref 8.9–12.9)
PROTHROMBIN TIME: 11.1 SEC (ref 9–11.1)
RBC # BLD AUTO: 2.6 M/UL (ref 3.8–5.2)
RBC MORPH BLD: ABNORMAL
RBC MORPH BLD: ABNORMAL
SEE BELOW:, 164879: NORMAL
SERVICE CMNT-IMP: ABNORMAL
SPECIMEN SOURCE: NORMAL
SUBMITTED DX, PNH10T: NORMAL
THERAPEUTIC RANGE,PTTT: NORMAL SECS (ref 58–77)
VIABLE CELLS NFR SPEC: NORMAL %
WBC # BLD AUTO: 0.4 K/UL (ref 3.6–11)

## 2018-06-21 PROCEDURE — 85610 PROTHROMBIN TIME: CPT | Performed by: INTERNAL MEDICINE

## 2018-06-21 PROCEDURE — 85730 THROMBOPLASTIN TIME PARTIAL: CPT | Performed by: INTERNAL MEDICINE

## 2018-06-21 PROCEDURE — 74011636637 HC RX REV CODE- 636/637: Performed by: INTERNAL MEDICINE

## 2018-06-21 PROCEDURE — 74011250636 HC RX REV CODE- 250/636: Performed by: INTERNAL MEDICINE

## 2018-06-21 PROCEDURE — 74011250637 HC RX REV CODE- 250/637: Performed by: INTERNAL MEDICINE

## 2018-06-21 PROCEDURE — 82962 GLUCOSE BLOOD TEST: CPT

## 2018-06-21 PROCEDURE — 36415 COLL VENOUS BLD VENIPUNCTURE: CPT | Performed by: INTERNAL MEDICINE

## 2018-06-21 PROCEDURE — 74011250637 HC RX REV CODE- 250/637: Performed by: HOSPITALIST

## 2018-06-21 PROCEDURE — 65270000029 HC RM PRIVATE

## 2018-06-21 PROCEDURE — 85025 COMPLETE CBC W/AUTO DIFF WBC: CPT | Performed by: INTERNAL MEDICINE

## 2018-06-21 RX ADMIN — Medication 10 ML: at 22:53

## 2018-06-21 RX ADMIN — INSULIN LISPRO 2 UNITS: 100 INJECTION, SOLUTION INTRAVENOUS; SUBCUTANEOUS at 17:18

## 2018-06-21 RX ADMIN — Medication 1 CAPSULE: at 09:05

## 2018-06-21 RX ADMIN — LEVOTHYROXINE SODIUM 88 MCG: 88 TABLET ORAL at 07:04

## 2018-06-21 RX ADMIN — Medication 10 ML: at 14:00

## 2018-06-21 RX ADMIN — CARVEDILOL 6.25 MG: 6.25 TABLET, FILM COATED ORAL at 17:18

## 2018-06-21 RX ADMIN — GLIPIZIDE 5 MG: 2.5 TABLET, FILM COATED, EXTENDED RELEASE ORAL at 07:04

## 2018-06-21 RX ADMIN — INSULIN LISPRO 2 UNITS: 100 INJECTION, SOLUTION INTRAVENOUS; SUBCUTANEOUS at 22:53

## 2018-06-21 RX ADMIN — SENNOSIDES AND DOCUSATE SODIUM 1 TABLET: 8.6; 5 TABLET ORAL at 09:05

## 2018-06-21 RX ADMIN — ESCITALOPRAM OXALATE 5 MG: 10 TABLET ORAL at 09:05

## 2018-06-21 RX ADMIN — PANTOPRAZOLE SODIUM 40 MG: 40 TABLET, DELAYED RELEASE ORAL at 09:05

## 2018-06-21 RX ADMIN — Medication 10 ML: at 07:04

## 2018-06-21 RX ADMIN — FOLIC ACID TAB 400 MCG 0.4 MG: 400 TAB at 09:05

## 2018-06-21 RX ADMIN — CYANOCOBALAMIN 1000 MCG: 1000 INJECTION, SOLUTION INTRAMUSCULAR at 09:04

## 2018-06-21 RX ADMIN — CARVEDILOL 6.25 MG: 6.25 TABLET, FILM COATED ORAL at 07:04

## 2018-06-21 NOTE — PROGRESS NOTES
CM received a call from [facility] this morning, they are currently not able to accept any inpatient to inpatient transfers. They stated that pt would stay in their work que and once their ban is lifted they will continue working on getting her a bed there. But they do not expect pt to have a bed there for a few days.   CM informed hospitalist.  Heather Marc, YAQUELINW, ACM

## 2018-06-21 NOTE — PROGRESS NOTES
-Hematology / Oncology ([facility]) -  -Primary Oncologist- Raman Thorne  -[facility]- 80 y.o patient of  Alberta Send with  hx of T-cell lymphoma s/p chemotherapy in 2017   admitted for evaluation of pancytopenia.     -S- No bleeding/thrombosis reported     -O-       Patient Vitals for the past 24 hrs:    Temp Pulse Resp BP SpO2   06/18/18 0842 98.7 °F (37.1 °C) 74 16 133/66 100 %   06/18/18 0647 - 75 - 153/73 -   06/18/18 0152 98.6 °F (37 °C) 70 16 128/64 98 %   06/17/18 2113 98.3 °F (36.8 °C) 78 16 131/79 99 %   06/17/18 1425 98.3 °F (36.8 °C) 72 16 (!) 137/94 100 %      06/18 0701 - 06/18 1900  In: 240 [P.O.:240]  Out: -   Gen: nad  Chest: bilateral breath sounds present  Cardiac: rrr  Abd: s/nt  Neuro:AAO x 4     -Labs-          Recent Labs       06/18/18   0655  06/17/18   0314  06/16/18   0306   WBC   --   0.4*  0.4*   HGB   --   8.3*  6.8*   PLT   --   11*  15*   ANEU   --   0.1*  0.1*   NA  141  141  140   K  4.1  4.1  3.8   GLU  145*  134*  97   BUN  19  20  15   CREA  0.87  1.03*  0.80   ALT   --   12  11*   SGOT   --   10*  8*   TBILI   --   0.8  1.0   AP   --   101  96   CA  8.5  8.4*  8.5         -Imaging-         -Assessment + Plan-     *) pancytopenia- BMBX performed 6/18/2018 shows marked aplasia. Presumed secondary to chemotherapy but other causes not excluded  *) anemia- transfuse prn hgb <7  *) thrombocytopenia: severe. Refractory to transfusion. Presumed alloimmunization Transfuse only for active bleeding  *) neutropenia- continue precautions, afebrile.   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 Bd Cultures neg   *) hx steroids (PMR) - will need stress dose steroids if BP drops or worsens acutely  *)Encephalopathy - not present on admission- confusion and biligerence after iv benadryl.  Resolved 6/18/2018.   ---- Head CT negative      Disc with pt  6/21/2018: She has severe pancytopenia with aplasia in bone marrow. We are awaiting final bone marrow studies to assess for MDS/PNH  We recommend transfer to [facility] hematology  for evaluation and management of aplastic anemia  Both patient and daughter agreed to transfer to [facility] 6/20/2018. Dr. Abram Smiley concurred with [facility] consult/transfer. She will need her bone marrow slides and final pathology report to accompany to [facility]    I have appended office noted from Dr. Abram Smiley at [facility] from earlier this year for review with [facility]     Thanks to hospitalist team for their help. Patient Name: Audra Parks Date: 12/11/2017  Patient Number: [id number] YOB: 1935  FOLLOW UP VISIT  Chief Complaint  Betty Umanzor was seen on 12/11/17 for f/u regarding her angioimmunoblastic lymphoma. HPI  Betty Umanzor is looking well. She is still having weakness and fatigue. Her hip pain is much better. She states that she is going home in 3 days. She had a recent admission to the hospital for transfusion for symptomatic anemia. During that admission we did a BMBX which was  normal, and we had CTs of the chest/abd/pelvis performed which other than a 2 x 1 cm L supraclavicular node, were normal.  ROS  Except as noted above or in the interim history, a 10 point ROS is otherwise noncontributory. Vital Signs  Vitals on 12/11/2017 3:23:00 PM: Height=68.5in, Weight=160.2lb, Temp=98.0f, ESTNM=70, BOVW=78, LSCNURURUU=92, YKDBVITVLCM=54,  Pulse WZ=08%  Exam  ECOG Scale 2: Ambulatory and capable of all self care but unable to carry out any work activities. Up and about more than 50% of waking  hours  Constitutional: Pleasant, well developed, well nourished, in no apparent distress? vitals are stable, afebrile. Head: Normocephalic? atraumatic. Eyes: EOMI, PERRLA. ENMT: Oropharynx w/o lesions. Neck: Neck is supple without masses/thyromegaly.   Lymphatic: No palpable peripheral lymphadenopathy. Respiratory: Lungs are clear to auscultation/percussion. Cardiovascular: Irregular heart rate? I/VI systolic murmur. Chest/Line Site: Chest is symmetric with no chest wall deformities. Abdomen: Soft? nontender? no hepatosplenomegaly noted. Back/Spine: No kyphosis, scoliosis? nontender to palpation. Musculoskeletal: No tenderness or swelling, normal ROM? without obvious weakness. Extremities: No clubbing, cyanosis or edema. Skin: No evidence of blistering, bruising, dry skin, erythema, nail changes, altered pigmentation, rash or urticaria. Neurologic: Alert/oriented x 3? nonfocal.  Psychiatric: Coherent speech? verbalizes understanding of our discussions today. Family History: Mother had pancreas cancer. Brother had esophageal cancer. Sister had breast cancer. H/o sarcoid in the family. States  one of her brothers had Alzheimer's. Social History: Lives in the [facility]. Does not drink or smoke. Has 4 living children. Used to work at [employer].   Allergies  Manpreet Pascual : 1935 ([id number]) Page 1 of 4  CeleBREX, Ibuprofen  Medications  Inside Drug Script Date Qty Rfls Instructions  FLUoxetine HCl 20 mg Tablet 2000 0 1 (40 mg) Tablet Oral  daily  Lovastatin 20 mg Tablet 2015 0 1 (20 mg) Tablet Oral  daily  Metformin  mg Tablet 2000 0 1 (500 mg) Tablet  Oral four times a day  Ondansetron HCl 8 mg Tablet 10/12/2015 30 3 1 (8 mg) Tablet Oral q  8 hours PRN  Pantoprazole Sodium 40 mg Tablet, enteric coated 2000 0 1 (40 mg) Tablet,  enteric coated Oral  daily  Y prednisone 20 mg tablet 2016 15 0 take three tabs  (60mg) oral daily for  five days  Y Reclining Lift chair 2017 1 0 disp #1 CPT # [id number]  Labs  Lab results on 2017: WBC=2.2 K/uL, RBC=3.8 M/uL, HGB=11.2 g/dL, HCT=33.4 %, MCV=87.1 fL, MCH=29.3 pg, MCHC=33.7  g/dL, Lzqc=050 K/uL, ANC=1.5 K/uL, Lymph#=0.3 K/uL, MONO#=0.3 K/uL, BASO#=0.0 K/uL, EOS#=0.1 K/uL, Neut%=70.5 %,  Lymph%=13.7 %, MONO%=12.0 %, BASO%=0.9 %, EOS%=2.9 %, RDW=20.4 %, Sodium=137 mmol/L, Potassium=4.2 mmol/L,  Chloride=94 mmol/L, CO2=24 mmol/L, Glucose=152 mg/dL, BUN=19 mg/dL, BUN Creat Ratio=22 , Creat=0.87 mg/dL, Calcium=9.3  mg/dL, Total Protein=6.8 g/dL, Albumin=4.0 g/dL, A/G=1.4 , Globulin=2.8 g/dL, Total Bili=0.3 mg/dL, Alk Phos=84 IU/L, AST=12 IU/L,  ALT=10 IU/L, eGFR, NonAfrican=  62 mL/min/1.73, eGFR, AfricanAmerican=  72 mL/min/1.73  CEA7/27/15  7/27/15 CEA=0.7  Imaging Results  Primary Diagnosis  Date Type ICD9  ICD10  Description Disease  Status  Status  Date  7/3/2015 Primary 238.8 D48. 7 Neoplasm of uncertain behavior of other  specified sites  Active 2015 Primary 785.6 C86.5 Lymphoma: T/NKcell  lymphoma Stage  III JACEK 2017  Disease Features  Tcell  angioimmunoblastic lymphoma, stage IV  Treatment History  Francisco Orr : 1935 ([id number]) Page 2 of 4  CVP x 6 cycles, 10/22/15 2/  2/16  CVP q4 week interval.  Current Treatment  Observation  Disease Status: JACEK  Active Problems  Tcell  angioimmunoblastic lymphoma, stage IV. Past Medical History  Allergies / hayfever  Diabetes  GERD  Hypercholesterolemia  Hypothyroidism  Neuropathy. CHF in . Arthritis. Polymyalgia rheumatica, treated with steroids. Past Surgical History  Appendectomy  Blood transfusion  Cataract removal  Colonoscopy  R submandibular lymph node resected 2 years ago and was told this was involved with toxoplasmosis. R total knee replacement. Ankle surgery. ISAIAS. Two separate breast biopsies, which were benign. R hip fracture, 2017? hip replacement surgery at [address]. Assessment and Plan  Angioimmunoblastic nonHodgkin's  lymphoma: Almita Elizabeth has been off chemotherapy since 2017. She had been receiving CVP maintenance until this was stopped by Dr. Jeff Colin in  [facility]. Her persistent cytopenias are probably due to marrow hypercellularity from this chemotherapy, but there is no evidence of  myelodysplasia or active lymphoma. Shania Yanes is relieved by the results of the staging. We are going to continue to see her on a monthly  basis. No other changes were made. Other Physicians:  Mike Cabrales C.B.[phone]? MAURO [phone]? NEHA [phone]? Gopal Gibson MD~  [phone]? [address] [facility], [phone]? Provider Name Contact Information  Physician Demar Lucero MD  General Surgeon [facility] W Amie Sorensen MD Fax: [phone],  Work: [phone]  Work: [address], [address]  Orthopedic  Surgeon  Mike Cabrales M.D.   Fax: [phone],  Work: [phone]  Work: Mike Cabrales MD, [address], [address]  Primary Care  Provider  Sarthak Mireles MD  Fax: [phone],  Work: [phone],  Fax: [phone],  Work:  [phone]  Work: [facility], [address]  Mckenna Carey : 1935 ([id number]) Page 3 of 4  Referred to  Provider  Ciera Arreola MD  Fax: [phone],  Work: [phone]  Work: [facility]  [address] [place], [address]  Referring Provider Mars W Amie Sorensen MD Fax: [phone],  Work: [phone]  Work: [address], [address]  Referring Provider Gopal Gibson MD Fax: [phone],  Work: [phone]  Work: [employer]  [address], [address]  Transcribed ByJuli Jasmine, 2017 9:50:10 AM  Signed By: ___________________________  Eveline Turner MD on 2017 at 12:09 PM  Delfino Brittle,

## 2018-06-21 NOTE — PROGRESS NOTES
Bedside shift change report given to Mel Candelario (oncoming nurse) by Hermila Weber (offgoing nurse). Report included the following information SBAR, Kardex and MAR.

## 2018-06-21 NOTE — PROGRESS NOTES
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 The weakness is generalized, it is progressive. The patient lives with her daughter. Normally, the patient is able to carry out her activities of daily living without any assistance. On the day of her presentation at the emergency room, the generalized weakness got worse to the extent that the patient was not able to get out of bed. The patient also complained of a decreased appetite and shortness of breath. The patient was found to be pancytopenic. Bone marrow bx with hypocellular marrow. Awaiting transfer to [facility]. Interval history / Subjective:     Patient without complaints this AM. No bleeding. Awaiting transfer. AM CBC pending. Assessment & Plan:     Pancytopenia/Hypocellular marrow  -Unclear etiology at this time, ? Secondary to prior chemo  -awaiting transfer to [facility] for possible BMT/further work-up  -s/p 2 units PRBC at [facility] and another 2 units , one unit   -Appreciate Hematology  -Follow CBC, transfuse for Hb<7; follow repeat CBC this AM and potentially will need transfusion today    Severe thrombocytopenia  -s/p 2 units platelets 8/50  -AM value pending    Neutropenia  -neutropenic precautions  -CXR unremarkable  -CT chest focal RLL airpace disease  -Blood culture no growth so far  -on LVQ since .  Will discontinue today    History of lymphoma  -CT chest and abd 6/14, No mass, adenopathy or other acute abnormality in the chest, abdomen or pelvis    AMS, resolved  -?metabolic encephalopathy   -CT head age related volume loss    History of PMR  -Was on steroids, currently on hold    Vit B12 deficiency  -Replace    Hypothyroidism  -On synthroid    DM type 2  -On glipizide. Patient refusing SSI    Anxiety/Depression   -On Lexapro    Diabetic diet    Code status: FULL CODE  DVT prophylaxis: SCDs  PTA: home    Plan: Discharge tomorrow if platelets stable    Care Plan discussed with: Patient/Family  Disposition: TBD, ?[facility] to [facility] Problems  Date Reviewed: 6/14/2018          Codes Class Noted POA    * (Principal)Pancytopenia ([facility] [address] ICD-10-CM: V23.027  ICD-9-CM: 284.19  6/13/2018 Yes                Review of Systems:   A comprehensive review of systems was negative except for that written in the HPI. Vital Signs:    Last 24hrs VS reviewed since prior progress note. Most recent are:  Visit Vitals    /68 (BP 1 Location: Left arm, BP Patient Position: At rest)    Pulse 80    Temp 98.1 °F (36.7 °C)    Resp 16    Ht 5' 10\" (1.778 m)    Wt 81.7 kg (180 lb 1.9 oz)    SpO2 98%    Breastfeeding No    BMI 25.84 kg/m2         Intake/Output Summary (Last 24 hours) at 06/21/18 1058  Last data filed at 06/21/18 7090   Gross per 24 hour   Intake                0 ml   Output             2015 ml   Net            -2015 ml        Physical Examination:           Constitutional:  No acute distress, cooperative, pleasant    ENT:  Neck supple   Resp:  No accessory muscle use   CV:  Regular rhythm, normal rate, no murmurs    GI:  Soft, non distended, non tender    Musculoskeletal:  No edema, warm, 2+ pulses throughout    Neurologic:  Moves all extremities.   AAOx3     Skin:  Good turgor, no rashes or ulcers       Data Review:    Review and/or order of clinical lab test      Labs:     Recent Labs      06/20/18   0536  06/19/18   0603   WBC  0.3*  0.3* HGB  6.3*  8.2*   HCT  18.6*  23.9*   PLT  11*  6*     Recent Labs      06/20/18   0536  06/19/18   0603   NA  140  141   K  4.2  4.2   CL  107  107   CO2  25  24   BUN  21*  19   CREA  0.89  0.83   GLU  118*  104*   CA  8.4*  8.9     Recent Labs      06/20/18   0536  06/19/18   0603   SGOT  13*  9*   ALT  12  12   AP  99  96   TBILI  0.5  0.7   TP  6.7  6.7   ALB  3.1*  3.0*   GLOB  3.6  3.7     No results for input(s): INR, PTP, APTT in the last 72 hours. No lab exists for component: INREXT, INREXT   No results for input(s): FE, TIBC, PSAT, FERR in the last 72 hours. Lab Results   Component Value Date/Time    Folate 15.2 06/14/2018 03:12 AM      No results for input(s): PH, PCO2, PO2 in the last 72 hours. No results for input(s): CPK, CKNDX, TROIQ in the last 72 hours.     No lab exists for component: CPKMB  No results found for: CHOL, CHOLX, CHLST, CHOLV, HDL, LDL, LDLC, DLDLP, TGLX, TRIGL, TRIGP, CHHD, CHHDX  Lab Results   Component Value Date/Time    Glucose (POC) 124 (H) 06/21/2018 06:16 AM    Glucose (POC) 213 (H) 06/20/2018 09:40 PM    Glucose (POC) 98 06/20/2018 04:23 PM    Glucose (POC) 204 (H) 06/20/2018 11:11 AM    Glucose (POC) 137 (H) 06/20/2018 07:07 AM     Lab Results   Component Value Date/Time    Color YELLOW/STRAW 06/17/2018 06:43 PM    Appearance CLEAR 06/17/2018 06:43 PM    Specific gravity 1.015 06/17/2018 06:43 PM    pH (UA) 6.5 06/17/2018 06:43 PM    Protein NEGATIVE  06/17/2018 06:43 PM    Glucose 100 (A) 06/17/2018 06:43 PM    Ketone NEGATIVE  06/17/2018 06:43 PM    Bilirubin NEGATIVE  06/17/2018 06:43 PM    Urobilinogen 1.0 06/17/2018 06:43 PM    Nitrites NEGATIVE  06/17/2018 06:43 PM    Leukocyte Esterase NEGATIVE  06/17/2018 06:43 PM    Epithelial cells FEW 06/17/2018 06:43 PM    Bacteria NEGATIVE  06/17/2018 06:43 PM    WBC 0-4 06/17/2018 06:43 PM    RBC 5-10 06/17/2018 06:43 PM         Medications Reviewed:     Current Facility-Administered Medications   Medication Dose Route Frequency    0.9% sodium chloride infusion 250 mL  250 mL IntraVENous PRN    levoFLOXacin (LEVAQUIN) 750 mg in D5W IVPB  750 mg IntraVENous Q24H    0.9% sodium chloride infusion 250 mL  250 mL IntraVENous PRN    cyanocobalamin (VITAMIN B12) injection 1,000 mcg  1,000 mcg SubCUTAneous DAILY    [START ON 6/22/2018] cyanocobalamin (VITAMIN B12) injection 1,000 mcg  1,000 mcg SubCUTAneous Q7D    Followed by   Governor [place] ON 8/13/2018] cyanocobalamin (VITAMIN B12) injection 1,000 mcg  1,000 mcg SubCUTAneous U94C    folic acid tablet 0.4 mg  0.4 mg Oral DAILY    carvedilol (COREG) tablet 6.25 mg  6.25 mg Oral BID WITH MEALS    HYDROcodone-acetaminophen (NORCO) 7.5-325 mg per tablet 1 Tab  1 Tab Oral Q6H PRN    lactobac ac& pc-s.therm-b.anim (HERNANDO Q/RISAQUAD)  1 Cap Oral DAILY    levothyroxine (SYNTHROID) tablet 88 mcg  88 mcg Oral ACB    pantoprazole (PROTONIX) tablet 40 mg  40 mg Oral DAILY    senna-docusate (PERICOLACE) 8.6-50 mg per tablet 1 Tab  1 Tab Oral DAILY    sodium chloride (NS) flush 5-10 mL  5-10 mL IntraVENous Q8H    sodium chloride (NS) flush 5-10 mL  5-10 mL IntraVENous PRN    acetaminophen (TYLENOL) tablet 650 mg  650 mg Oral Q6H PRN    ondansetron (ZOFRAN) injection 4 mg  4 mg IntraVENous Q4H PRN    0.9% sodium chloride infusion 250 mL  250 mL IntraVENous PRN    escitalopram oxalate (LEXAPRO) tablet 5 mg  5 mg Oral DAILY    diphenhydrAMINE (BENADRYL) injection 25 mg  25 mg IntraVENous Q6H PRN    diphenhydrAMINE (BENADRYL) capsule 50 mg  50 mg Oral Q6H PRN    glipiZIDE SR (GLUCOTROL XL) tablet 5 mg  5 mg Oral ACB    0.9% sodium chloride infusion  50 mL/hr IntraVENous CONTINUOUS    insulin lispro (HUMALOG) injection   SubCUTAneous AC&HS    glucose chewable tablet 16 g  4 Tab Oral PRN    dextrose (D50W) injection syrg 12.5-25 g  12.5-25 g IntraVENous PRN    glucagon (GLUCAGEN) injection 1 mg  1 mg IntraMUSCular PRN ______________________________________________________________________  EXPECTED LENGTH OF STAY: 2d 19h  ACTUAL LENGTH OF STAY:          Tito Sy MD

## 2018-06-21 NOTE — WOUND CARE
Wound Consult:  New Patient Visit. Chart reviewed. Consulted for sacral area; report that she developed an ulcer between buttocks when she fractured her hip which was 6 months ago and this area has not healed completely. She does more sitting now that prior to injury. .  Patient is resting on a hebert bed. No pain reported; she turns independently to side. Most pleasant; awaiting bed at [facility] for further workup. Assessment:  Distal gluteal cleft - hard callus patient arrived with is now soft and at skin level; can still feel the area as being rougher than surrounding skin but has responded well to aloe vesta moisturizer. No redness to surrounding skin or bony areas. Treatment:  Moisturized with aloe vesta ointment. Wound Recommendations:  Moisturize gluteal cleft area where callus is noted BID with aloe vesta protective ointment. Skin Care Recommendations:  1. Minimize friction/shear: minimize layers of linen/pads under patient. 2. Off load pressure/reposition: continue to turn and reposition approximately every 2 hours; float heels ; waffle cushion for sitting. 3. Manage Moisture - keep skin folds dry; incontinence skin care as needed; appropriate sized briefs if needed. 4. Continue to monitor nutrition, pain, and skin risk scale, and skin assessment. Plan:  Please re-consult if any woc needs arise.   Lavonia Schilder, RN,[facility]    Wound Healing Office [phone]  Pager [phone]

## 2018-06-21 NOTE — PROGRESS NOTES
Bedside and Verbal shift change report given to Seema Norris RN (oncoming nurse) by Rozina Nagel RN (offgoing nurse). Report included the following information SBAR, Kardex, Intake/Output, MAR and Recent Results.

## 2018-06-21 NOTE — PROGRESS NOTES
Problem: Falls - Risk of  Goal: *Absence of Falls  Document Kristian Fall Risk and appropriate interventions in the flowsheet.    Outcome: Progressing Towards Goal  Fall Risk Interventions:  Mobility Interventions: Patient to call before getting OOB, PT Consult for mobility concerns, Communicate number of staff needed for ambulation/transfer    Mentation Interventions: Adequate sleep, hydration, pain control, Bed/chair exit alarm, Door open when patient unattended, Room close to nurse's station    Medication Interventions: Patient to call before getting OOB, Teach patient to arise slowly    Elimination Interventions: Call light in reach, Patient to call for help with toileting needs    History of Falls Interventions: Door open when patient unattended, Room close to nurse's station

## 2018-06-22 VITALS
BODY MASS INDEX: 25.88 KG/M2 | OXYGEN SATURATION: 97 % | DIASTOLIC BLOOD PRESSURE: 61 MMHG | TEMPERATURE: 98.7 F | SYSTOLIC BLOOD PRESSURE: 107 MMHG | HEIGHT: 70 IN | WEIGHT: 180.78 LBS | HEART RATE: 78 BPM | RESPIRATION RATE: 16 BRPM

## 2018-06-22 LAB
ANION GAP SERPL CALC-SCNC: 7 MMOL/L (ref 5–15)
BASOPHILS # BLD: 0 K/UL (ref 0–0.1)
BASOPHILS NFR BLD: 0 % (ref 0–1)
BUN SERPL-MCNC: 17 MG/DL (ref 6–20)
BUN/CREAT SERPL: 19 (ref 12–20)
CALCIUM SERPL-MCNC: 8.5 MG/DL (ref 8.5–10.1)
CHLORIDE SERPL-SCNC: 108 MMOL/L (ref 97–108)
CO2 SERPL-SCNC: 26 MMOL/L (ref 21–32)
COPPER SERPL-MCNC: 166 UG/DL (ref 72–166)
CREAT SERPL-MCNC: 0.91 MG/DL (ref 0.55–1.02)
DIFFERENTIAL METHOD BLD: ABNORMAL
EOSINOPHIL # BLD: 0 K/UL (ref 0–0.4)
EOSINOPHIL NFR BLD: 3 % (ref 0–7)
ERYTHROCYTE [DISTWIDTH] IN BLOOD BY AUTOMATED COUNT: 12.7 % (ref 11.5–14.5)
GLUCOSE BLD STRIP.AUTO-MCNC: 158 MG/DL (ref 65–100)
GLUCOSE BLD STRIP.AUTO-MCNC: 172 MG/DL (ref 65–100)
GLUCOSE BLD STRIP.AUTO-MCNC: 178 MG/DL (ref 65–100)
GLUCOSE SERPL-MCNC: 125 MG/DL (ref 65–100)
HCT VFR BLD AUTO: 22.5 % (ref 35–47)
HGB BLD-MCNC: 7.6 G/DL (ref 11.5–16)
IMM GRANULOCYTES # BLD: 0 K/UL
IMM GRANULOCYTES NFR BLD AUTO: 0 %
LYMPHOCYTES # BLD: 0.3 K/UL (ref 0.8–3.5)
LYMPHOCYTES NFR BLD: 72 % (ref 12–49)
MCH RBC QN AUTO: 30.2 PG (ref 26–34)
MCHC RBC AUTO-ENTMCNC: 33.8 G/DL (ref 30–36.5)
MCV RBC AUTO: 89.3 FL (ref 80–99)
MONOCYTES # BLD: 0 K/UL (ref 0–1)
MONOCYTES NFR BLD: 4 % (ref 5–13)
NEUTS BAND NFR BLD MANUAL: 4 % (ref 0–6)
NEUTS SEG # BLD: 0.1 K/UL (ref 1.8–8)
NEUTS SEG NFR BLD: 17 % (ref 32–75)
NRBC # BLD: 0 K/UL (ref 0–0.01)
NRBC BLD-RTO: 0 PER 100 WBC
PLATELET # BLD AUTO: 5 K/UL (ref 150–400)
PMV BLD AUTO: 9.1 FL (ref 8.9–12.9)
POTASSIUM SERPL-SCNC: 4.1 MMOL/L (ref 3.5–5.1)
RBC # BLD AUTO: 2.52 M/UL (ref 3.8–5.2)
RBC MORPH BLD: ABNORMAL
RBC MORPH BLD: ABNORMAL
SERVICE CMNT-IMP: ABNORMAL
SODIUM SERPL-SCNC: 141 MMOL/L (ref 136–145)
WBC # BLD AUTO: 0.4 K/UL (ref 3.6–11)

## 2018-06-22 PROCEDURE — 74011250637 HC RX REV CODE- 250/637: Performed by: INTERNAL MEDICINE

## 2018-06-22 PROCEDURE — 74011250637 HC RX REV CODE- 250/637: Performed by: HOSPITALIST

## 2018-06-22 PROCEDURE — 74011636637 HC RX REV CODE- 636/637: Performed by: INTERNAL MEDICINE

## 2018-06-22 PROCEDURE — 36415 COLL VENOUS BLD VENIPUNCTURE: CPT | Performed by: INTERNAL MEDICINE

## 2018-06-22 PROCEDURE — 85025 COMPLETE CBC W/AUTO DIFF WBC: CPT | Performed by: INTERNAL MEDICINE

## 2018-06-22 PROCEDURE — 82962 GLUCOSE BLOOD TEST: CPT

## 2018-06-22 PROCEDURE — 74011250636 HC RX REV CODE- 250/636: Performed by: INTERNAL MEDICINE

## 2018-06-22 PROCEDURE — 80048 BASIC METABOLIC PNL TOTAL CA: CPT | Performed by: INTERNAL MEDICINE

## 2018-06-22 RX ORDER — LANOLIN ALCOHOL/MO/W.PET/CERES
0.4 CREAM (GRAM) TOPICAL DAILY
Qty: 30 TAB | Refills: 0 | Status: SHIPPED | OUTPATIENT
Start: 2018-06-23

## 2018-06-22 RX ORDER — CYANOCOBALAMIN 1000 UG/ML
1000 INJECTION, SOLUTION INTRAMUSCULAR; SUBCUTANEOUS
Qty: 1 VIAL | Refills: 0 | Status: SHIPPED | OUTPATIENT
Start: 2018-06-22

## 2018-06-22 RX ADMIN — PANTOPRAZOLE SODIUM 40 MG: 40 TABLET, DELAYED RELEASE ORAL at 11:25

## 2018-06-22 RX ADMIN — Medication 10 ML: at 17:54

## 2018-06-22 RX ADMIN — CYANOCOBALAMIN 1000 MCG: 1000 INJECTION, SOLUTION INTRAMUSCULAR at 17:54

## 2018-06-22 RX ADMIN — Medication 10 ML: at 06:21

## 2018-06-22 RX ADMIN — INSULIN LISPRO 2 UNITS: 100 INJECTION, SOLUTION INTRAVENOUS; SUBCUTANEOUS at 06:31

## 2018-06-22 RX ADMIN — GLIPIZIDE 5 MG: 2.5 TABLET, FILM COATED, EXTENDED RELEASE ORAL at 06:21

## 2018-06-22 RX ADMIN — CARVEDILOL 6.25 MG: 6.25 TABLET, FILM COATED ORAL at 17:55

## 2018-06-22 RX ADMIN — ESCITALOPRAM OXALATE 5 MG: 10 TABLET ORAL at 11:24

## 2018-06-22 RX ADMIN — LEVOTHYROXINE SODIUM 88 MCG: 88 TABLET ORAL at 06:20

## 2018-06-22 RX ADMIN — Medication 1 CAPSULE: at 11:24

## 2018-06-22 RX ADMIN — SENNOSIDES AND DOCUSATE SODIUM 1 TABLET: 8.6; 5 TABLET ORAL at 11:25

## 2018-06-22 RX ADMIN — INSULIN LISPRO 2 UNITS: 100 INJECTION, SOLUTION INTRAVENOUS; SUBCUTANEOUS at 17:53

## 2018-06-22 RX ADMIN — FOLIC ACID TAB 400 MCG 0.4 MG: 400 TAB at 11:25

## 2018-06-22 RX ADMIN — CARVEDILOL 6.25 MG: 6.25 TABLET, FILM COATED ORAL at 06:21

## 2018-06-22 NOTE — DISCHARGE INSTRUCTIONS
Discharge Instructions       PATIENT ID: Bartolo Bond  MRN: [id number]   YOB: 1935    DATE OF ADMISSION: 6/13/2018  7:03 PM    DATE OF DISCHARGE: 6/22/2018    PRIMARY CARE PROVIDER: Massiel Rowan MD     ATTENDING PHYSICIAN: Ismael Monge MD  DISCHARGING PROVIDER: Ismael Monge MD    To contact this individual call [phone] and ask the  to page. If unavailable ask to be transferred the Adult Hospitalist Department. DISCHARGE DIAGNOSES pancytopenia    CONSULTATIONS: IP CONSULT TO HEMATOLOGY  IP CONSULT TO INTERVENTIONAL RADIOLOGY    PROCEDURES/SURGERIES: * No surgery found *    PENDING TEST RESULTS:   At the time of discharge the following test results are still pending: None    FOLLOW UP APPOINTMENTS:   Follow-up Information     Follow up With Details Comments Contact Info    Massiel Rowan MD Schedule an appointment as soon as possible for a visit  [address] [place] [address]  [phone]             ADDITIONAL CARE RECOMMENDATIONS:     You came in with low blood counts in all of your cell lines called pancytopenia. The Hematology doctors saw you and a bone marrow biopsy was done that showed less cells in your bone marrow than normal. For this reason, they want you to go to [facility] for further evaluation and work-up. You are now being transferred there. DIET: Diabetic Diet    ACTIVITY: Activity as tolerated    DISCHARGE MEDICATIONS:   See Medication Reconciliation Form    · It is important that you take the medication exactly as they are prescribed. · Keep your medication in the bottles provided by the pharmacist and keep a list of the medication names, dosages, and times to be taken in your wallet. · Do not take other medications without consulting your doctor. NOTIFY YOUR PHYSICIAN FOR ANY OF THE FOLLOWING:   Fever over 101 degrees for 24 hours.    Chest pain, shortness of breath, fever, chills, nausea, vomiting, diarrhea, change in mentation, falling, weakness, bleeding. Severe pain or pain not relieved by medications. Or, any other signs or symptoms that you may have questions about.       DISPOSITION:    Home With:   OT  PT  HH  RN       SNF/Inpatient Rehab/LTAC    Independent/assisted living    Hospice    Other: [facility]     Signed:   Jovita Dowling MD  6/22/2018  1:50 PM

## 2018-06-22 NOTE — PROGRESS NOTES
Bedside shift change report given to Nahum Hdez (oncoming nurse) by Luz Marina Dc (offgoing nurse). Report included the following information SBAR, Kardex and MAR.

## 2018-06-22 NOTE — PROGRESS NOTES
[facility] has a bed available for pt in their [address] ([address] [place] [address], 2nd floor, room 148). Accepting Doctor is Elvia Rangel. Call report is is [phone]. Envelope containing MARs, Kardex, AVS, EMTALAs, consults, progress notes, etc will be sent with pt. Called Pathology to send Pathology reports to [facility], got pt to sign release, they plan to overnight pathology to [facility].   [facility] will provide transport at [address], BSW, ACM

## 2018-06-22 NOTE — DISCHARGE SUMMARY
Discharge Summary       PATIENT ID: Amberly Nichols  MRN: [id number]   YOB: 1935    DATE OF ADMISSION: 6/13/2018  7:03 PM    DATE OF DISCHARGE: 6/22/2018  PRIMARY CARE PROVIDER: Travis Gallagher MD     DISCHARGING PROVIDER: Buzz Riggins MD    To contact this individual call [phone] and ask the  to page. If unavailable ask to be transferred the Adult Hospitalist Department. CONSULTATIONS: IP CONSULT TO HEMATOLOGY  IP CONSULT TO INTERVENTIONAL RADIOLOGY    PROCEDURES/SURGERIES: * No surgery found *    ADMITTING [address] [place] [address] COURSE:     Per admission note, Ms. 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       The patient was found to be pancytopenic (with anemia and severe thrombocytopenia) and Hematology was consulted. Bone marrow bx with hypocellular marrow. Also, treated with course of Levaquin for presumed PNA seen on CT. Now to transfer to [facility], per Hematology recommendation. Awaiting final bone marrow studies to assess for MDS/PNH. Discussed case with Hematology fellow at receiving facility. DISCHARGE DIAGNOSES / PLAN:      Pancytopenia/Hypocellular marrow  -Unclear etiology at this time, ? Secondary to prior chemo vs. Some other cause  -s/p 2 units PRBC at [facility] and another 2 units 6/14, one unit 6/16  -transfer to [facility] for possible BMT/further work-up     Severe thrombocytopenia  -s/p 2 units platelets 7/93.  Another attempted transfusion had to be stopped due to transfusion reaction (hives and itching)  -has been refractory     Neutropenia  -neutropenic precautions  -CXR unremarkable  -CT chest focal RLL airpace disease  -Blood cultured NGTD  -completed course of Ferdinand, as above     History of lymphoma  -CT chest and abd 6/14, No mass, adenopathy or other acute abnormality in the chest, abdomen or pelvis     AMS, resolved  -?metabolic encephalopathy   -CT head age related volume loss     History of PMR  -Was on steroids prior     Vit B12 deficiency  -Replace     Hypothyroidism  -On synthroid     DM type 2  -On glipizide and metformin as an outpatient     Anxiety/Depression   -On Lexapro       PENDING TEST RESULTS:   At the time of discharge the following test results are still pending: Bone Marrow Studies    FOLLOW UP APPOINTMENTS:  TRANSFER TO [facility]  Follow-up Information     Follow up With Details Comments Contact Info    Jorge Novoa MD Schedule an appointment as soon as possible for a visit  [address]  [phone]             ADDITIONAL CARE RECOMMENDATIONS:     DIET: Diabetic Diet    ACTIVITY: Activity as tolerated      DISCHARGE MEDICATIONS:  Current Discharge Medication List      START taking these medications    Details   cyanocobalamin (VITAMIN B12) 1,000 mcg/mL injection 1 mL by SubCUTAneous route every seven (7) days. Qty: 1 Vial, Refills: 0      folic acid 311 mcg tablet Take 1 Tab by mouth daily. Qty: 30 Tab, Refills: 0         CONTINUE these medications which have NOT CHANGED    Details   levothyroxine (SYNTHROID) 88 mcg tablet Take 88 mcg by mouth Daily (before breakfast). metFORMIN (GLUCOPHAGE) 500 mg tablet Take 500 mg by mouth daily (with breakfast). gabapentin (NEURONTIN) 100 mg capsule Take 100 mg by mouth three (3) times daily. nitroglycerin (NITROSTAT) 0.4 mg SL tablet 0.4 mg by SubLINGual route every five (5) minutes as needed for Chest Pain. Up to 3 doses. glipiZIDE SR (GLUCOTROL XL) 5 mg CR tablet Take 5 mg by mouth daily. escitalopram oxalate (LEXAPRO) 5 mg tablet Take 5 mg by mouth daily. senna-docusate (PERICOLACE) 8.6-50 mg per tablet Take 1 Tab by mouth daily.   Qty: 30 Tab, Refills: 0      carvedilol (COREG) 6.25 mg tablet Take 6.25 mg by mouth two (2) times daily (with meals). pantoprazole (PROTONIX) 40 mg tablet Take 40 mg by mouth daily. STOP taking these medications       L. acidoph & paracasei- S therm- Bifido (HERNANDO-Q/RISAQUAD) 8 billion cell cap cap Comments:   Reason for Stopping:         HYDROcodone-acetaminophen (NORCO) 7.5-325 mg per tablet Comments:   Reason for Stopping:               NOTIFY YOUR PHYSICIAN FOR ANY OF THE FOLLOWING:   Fever over 101 degrees for 24 hours. Chest pain, shortness of breath, fever, chills, nausea, vomiting, diarrhea, change in mentation, falling, weakness, bleeding. Severe pain or pain not relieved by medications. Or, any other signs or symptoms that you may have questions about. DISPOSITION:    Home With:   OT  PT  HH  RN       Long term SNF/Inpatient Rehab    Independent/assisted living    Hospice    Other: [facility]       PATIENT CONDITION AT DISCHARGE:     Functional status    Poor     Deconditioned     Independent      Cognition     Lucid     Forgetful     Dementia      Catheters/lines (plus indication)    Larios     PICC     PEG     None      Code status     Full code     DNR      PHYSICAL EXAMINATION AT DISCHARGE:     Constitutional:  No acute distress, cooperative, pleasant    ENT:  Neck supple   Resp:  No accessory muscle use   CV:  Regular rhythm, normal rate, no murmurs    GI:  Soft, non distended, non tender    Musculoskeletal:  No edema, warm, 2+ pulses throughout    Neurologic:  Moves all extremities.   AAOx3                                             Skin:  Good turgor, scattered ecchymoses diffusely and petechia on L wrist/hand    CHRONIC MEDICAL DIAGNOSES:  Problem List as of 6/22/2018  Date Reviewed: 6/14/2018          Codes Class Noted - Resolved    * (Principal)Pancytopenia ([facility] ICD-10-CM: L27.870  ICD-9-CM: 284.19  6/13/2018 - Present        Fever ICD-10-CM: R50.9  ICD-9-CM: 780.60  1/15/2018 - Present Anemia ICD-10-CM: D64.9  ICD-9-CM: 285.9  12/6/2017 - Present        Closed right hip fracture ([facility] ICD-10-CM: S72.001A  ICD-9-CM: 820.8  11/6/2017 - Present        Hip fracture ([facility] ICD-10-CM: S72.009A  ICD-9-CM: 820.8  11/6/2017 - Present        Osteoarthritis of CMC joint of thumb(s) ICD-10-CM: M18.9  ICD-9-CM: 715.34  10/7/2014 - Present        Diabetic neuropathy ([facility] ICD-10-CM: E11.40  ICD-9-CM: 250.60, 357.2  9/15/2014 - Present        S/P lymph node biopsy ICD-10-CM: Z98.890  ICD-9-CM: V45.89  9/15/2014 - Present        Osteoarthritis of both knees ICD-10-CM: M17.0  ICD-9-CM: 715.96  9/15/2014 - Present        Osteoarthritis, shoulder ICD-10-CM: M19.019  ICD-9-CM: 715.91  9/15/2014 - Present        Rotator cuff arthropathy ICD-10-CM: M12.819  ICD-9-CM: 716.81  9/15/2014 - Present        PMR (polymyalgia rheumatica) (recurrent) ICD-10-CM: M35.3  ICD-9-CM: 892  7/14/2014 - Present        H/O polymyalgia rheumatica ICD-10-CM: Z87.39  ICD-9-CM: V13.59  6/3/2014 - Present        OA (osteoarthritis) ICD-10-CM: M19.90  ICD-9-CM: 715.90  6/3/2014 - Present        Anemia, unspecified ICD-10-CM: D64.9  ICD-9-CM: 285.9  6/3/2014 - Present        DM2 (diabetes mellitus, type 2) ([facility] ICD-10-CM: E11.9  ICD-9-CM: 250.00  6/3/2014 - Present        Hypovitaminosis D ICD-10-CM: E55.9  ICD-9-CM: 268.9  6/3/2014 - Present        Gastroesophageal reflux disease with hiatal hernia ICD-10-CM: K21.9, K44.9  ICD-9-CM: 530.81, 553.3  6/3/2014 - Present        Depression ICD-10-CM: F32.9  ICD-9-CM: 117  6/3/2014 - Present        RESOLVED: Toxoplasmosis, unspecified ICD-10-CM: B58.9  ICD-9-CM: 130.9  6/3/2014 - 7/14/2014        RESOLVED: Olecranon bursitis of right elbow ICD-10-CM: M70.21  ICD-9-CM: 726.33  6/3/2014 - 9/15/2014              Greater than 30 minutes were spent with the patient on counseling and coordination of care    Signed:   Fahad Garcia MD  6/22/2018  1:45 PM

## 2018-06-22 NOTE — PROGRESS NOTES
-Hematology / Oncology ([facility]) -  -Primary Oncologist- Lynette Benson  -CC- 80 y.o patient of  Miriam Mecklenburg with  hx of T-cell lymphoma s/p chemotherapy in 2017   admitted for evaluation of pancytopenia.     -S- No bleeding/thrombosis reported     -O-      Visit Vitals    BP 96/60 (BP 1 Location: Left arm)    Pulse 76    Temp 98.1 °F (36.7 °C)    Resp 16    Ht 5' 10\" (1.778 m)    Wt 82 kg (180 lb 12.4 oz)    SpO2 99%    Breastfeeding No    BMI 25.94 kg/m2        06/18 0701 - 06/18 1900  In: 240 [P.O.:240]  Out: -   Gen: nad  Chest: bilateral breath sounds present  Cardiac: rrr  Abd: s/nt  Neuro:AAO x 4     Recent Results (from the past 24 hour(s))   CBC WITH AUTOMATED DIFF    Collection Time: 06/21/18 11:06 AM   Result Value Ref Range    WBC 0.4 (LL) 3.6 - 11.0 K/uL    RBC 2.60 (L) 3.80 - 5.20 M/uL    HGB 7.9 (L) 11.5 - 16.0 g/dL    HCT 23.4 (L) 35.0 - 47.0 %    MCV 90.0 80.0 - 99.0 FL    MCH 30.4 26.0 - 34.0 PG    MCHC 33.8 30.0 - 36.5 g/dL    RDW 12.8 11.5 - 14.5 %    PLATELET 6 (LL) 090 - 400 K/uL    MPV 12.2 8.9 - 12.9 FL    NRBC 0.0 0  WBC    ABSOLUTE NRBC 0.00 0.00 - 0.01 K/uL    NEUTROPHILS 19 (L) 32 - 75 %    BAND NEUTROPHILS 2 0 - 6 %    LYMPHOCYTES 68 (H) 12 - 49 %    MONOCYTES 9 5 - 13 %    EOSINOPHILS 2 0 - 7 %    BASOPHILS 0 0 - 1 %    IMMATURE GRANULOCYTES 0 %    ABS. NEUTROPHILS 0.1 (L) 1.8 - 8.0 K/UL    ABS. LYMPHOCYTES 0.3 (L) 0.8 - 3.5 K/UL    ABS. MONOCYTES 0.0 0.0 - 1.0 K/UL    ABS. EOSINOPHILS 0.0 0.0 - 0.4 K/UL    ABS. BASOPHILS 0.0 0.0 - 0.1 K/UL    ABS. IMM.  GRANS. 0.0 K/UL    DF MANUAL      RBC COMMENTS OVALOCYTES  PRESENT        RBC COMMENTS SPHEROCYTES  PRESENT       PROTHROMBIN TIME + INR    Collection Time: 06/21/18 11:06 AM   Result Value Ref Range    INR 1.1 0.9 - 1.1      Prothrombin time 11.1 9.0 - 11.1 sec   PTT    Collection Time: 06/21/18 11:06 AM   Result Value Ref Range    aPTT 26.0 22.1 - 32.0 sec    aPTT, therapeutic range     58.0 - 77.0 SECS   GLUCOSE, POC Collection Time: 06/21/18 11:27 AM   Result Value Ref Range    Glucose (POC) 138 (H) 65 - 100 mg/dL    Performed by Manjinder Khan    GLUCOSE, POC    Collection Time: 06/21/18  4:51 PM   Result Value Ref Range    Glucose (POC) 164 (H) 65 - 100 mg/dL    Performed by Ramu Turpin    GLUCOSE, POC    Collection Time: 06/21/18 10:46 PM   Result Value Ref Range    Glucose (POC) 218 (H) 65 - 100 mg/dL    Performed by Marta Castaneda    CBC WITH AUTOMATED DIFF    Collection Time: 06/22/18  5:24 AM   Result Value Ref Range    WBC 0.4 (LL) 3.6 - 11.0 K/uL    RBC 2.52 (L) 3.80 - 5.20 M/uL    HGB 7.6 (L) 11.5 - 16.0 g/dL    HCT 22.5 (L) 35.0 - 47.0 %    MCV 89.3 80.0 - 99.0 FL    MCH 30.2 26.0 - 34.0 PG    MCHC 33.8 30.0 - 36.5 g/dL    RDW 12.7 11.5 - 14.5 %    PLATELET 5 (LL) 753 - 400 K/uL    MPV 9.1 8.9 - 12.9 FL    NRBC 0.0 0  WBC    ABSOLUTE NRBC 0.00 0.00 - 0.01 K/uL    NEUTROPHILS PENDING %    LYMPHOCYTES PENDING %    MONOCYTES PENDING %    EOSINOPHILS PENDING %    BASOPHILS PENDING %    IMMATURE GRANULOCYTES PENDING %    ABS. NEUTROPHILS PENDING K/UL    ABS. LYMPHOCYTES PENDING K/UL    ABS. MONOCYTES PENDING K/UL    ABS. EOSINOPHILS PENDING K/UL    ABS. BASOPHILS PENDING K/UL    ABS. IMM. GRANS.  PENDING K/UL    DF PENDING    METABOLIC PANEL, BASIC    Collection Time: 06/22/18  5:24 AM   Result Value Ref Range    Sodium 141 136 - 145 mmol/L    Potassium 4.1 3.5 - 5.1 mmol/L    Chloride 108 97 - 108 mmol/L    CO2 26 21 - 32 mmol/L    Anion gap 7 5 - 15 mmol/L    Glucose 125 (H) 65 - 100 mg/dL    BUN 17 6 - 20 MG/DL    Creatinine 0.91 0.55 - 1.02 MG/DL    BUN/Creatinine ratio 19 12 - 20      GFR est AA >60 >60 ml/min/1.73m2    GFR est non-AA 59 (L) >60 ml/min/1.73m2    Calcium 8.5 8.5 - 10.1 MG/DL   GLUCOSE, POC    Collection Time: 06/22/18  6:27 AM   Result Value Ref Range    Glucose (POC) 158 (H) 65 - 100 mg/dL    Performed by Margarita Hilliard          -Assessment + Plan-     *) pancytopenia- BMBX performed 6/18/2018 shows marked aplasia. Presumed secondary to chemotherapy but other causes not excluded  *) anemia- transfuse prn hgb <7  *) thrombocytopenia: severe. Refractory to transfusion. Presumed alloimmunization Transfuse only for active bleeding  *) neutropenia- continue precautions, afebrile.   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 Bd Cultures neg   *) hx steroids (PMR) - will need stress dose steroids if BP drops or worsens acutely  *)Encephalopathy - not present on admission- confusion and biligerence after iv benadryl. Resolved 6/18/2018.   ---- Head CT negative      Disc with pt  6/21/2018: She has severe pancytopenia with aplasia in bone marrow. We are awaiting final bone marrow studies to assess for MDS/PNH  We recommend transfer to [address] [place] [address] hematology  for evaluation and management of aplastic anemia  Both patient and daughter agreed to transfer to [address] [place] [address] 6/20/2018. Dr. Merly Manzanares concurred with [facility] consult/transfer. Pt still in agreement 6/22/2018      She will need her bone marrow slides and final pathology report to accompany to [facility]    I have appended office noted from Dr. Merly Manzanares at [facility] from earlier this year for review with [facility]     Thanks to hospitalist team for their help. Patient Name: Kalen Coleman Date: 12/11/2017  Patient Number: [id number] YOB: 1935  FOLLOW UP VISIT  Chief Complaint  Diane Joaquin was seen on 12/11/17 for f/u regarding her angioimmunoblastic lymphoma. HPI  Diane Joaquin is looking well. She is still having weakness and fatigue. Her hip pain is much better. She states that she is going home in 3 days. She had a recent admission to the hospital for transfusion for symptomatic anemia.  During that admission we did a BMBX which was  normal, and we had CTs of the chest/abd/pelvis performed which other than a 2 x 1 cm L supraclavicular node, were normal.  ROS  Except as noted above or in the interim history, a 10 point ROS is otherwise noncontributory. Vital Signs  Vitals on 2017 3:23:00 PM: Height=68.5in, Weight=160.2lb, Temp=98.0f, IHOOQ=42, ZLWD=83, DDOIYZRDJN=32, SXHKBQGJFYD=10,  Pulse RL=00%  Exam  ECOG Scale 2: Ambulatory and capable of all self care but unable to carry out any work activities. Up and about more than 50% of waking  hours  Constitutional: Pleasant, well developed, well nourished, in no apparent distress? vitals are stable, afebrile. Head: Normocephalic? atraumatic. Eyes: EOMI, PERRLA. ENMT: Oropharynx w/o lesions. Neck: Neck is supple without masses/thyromegaly. Lymphatic: No palpable peripheral lymphadenopathy. Respiratory: Lungs are clear to auscultation/percussion. Cardiovascular: Irregular heart rate? I/VI systolic murmur. Chest/Line Site: Chest is symmetric with no chest wall deformities. Abdomen: Soft? nontender? no hepatosplenomegaly noted. Back/Spine: No kyphosis, scoliosis? nontender to palpation. Musculoskeletal: No tenderness or swelling, normal ROM? without obvious weakness. Extremities: No clubbing, cyanosis or edema. Skin: No evidence of blistering, bruising, dry skin, erythema, nail changes, altered pigmentation, rash or urticaria. Neurologic: Alert/oriented x 3? nonfocal.  Psychiatric: Coherent speech? verbalizes understanding of our discussions today. Family History: Mother had pancreas cancer. Brother had esophageal cancer. Sister had breast cancer. H/o sarcoid in the family. States  one of her brothers had Alzheimer's. Social History: Lives in the [facility]. Does not drink or smoke. Has 4 living children. Used to work at [employer].   Allergies  Elodia Flores : 1935 ([id number]) Page 1 of 4  CeleBREX, Ibuprofen  Medications  Inside Drug Script Date Qty Rfls Instructions  FLUoxetine HCl 20 mg Tablet 2000 0 1 (40 mg) Tablet Oral  daily  Lovastatin 20 mg Tablet 2015 0 1 (20 mg) Tablet Oral  daily  Metformin  mg Tablet 2000 0 1 (500 mg) Tablet  Oral four times a day  Ondansetron HCl 8 mg Tablet 10/12/2015 30 3 1 (8 mg) Tablet Oral q  8 hours PRN  Pantoprazole Sodium 40 mg Tablet, enteric coated 2000 0 1 (40 mg) Tablet,  enteric coated Oral  daily  Y prednisone 20 mg tablet 2016 15 0 take three tabs  (60mg) oral daily for  five days  Y Reclining Lift chair 2017 1 0 disp #1 CPT # [id number]  Labs  Lab results on 2017: WBC=2.2 K/uL, RBC=3.8 M/uL, HGB=11.2 g/dL, HCT=33.4 %, MCV=87.1 fL, MCH=29.3 pg, MCHC=33.7  g/dL, Snbn=909 K/uL, ANC=1.5 K/uL, Lymph#=0.3 K/uL, MONO#=0.3 K/uL, BASO#=0.0 K/uL, EOS#=0.1 K/uL, Neut%=70.5 %,  Lymph%=13.7 %, MONO%=12.0 %, BASO%=0.9 %, EOS%=2.9 %, RDW=20.4 %, Sodium=137 mmol/L, Potassium=4.2 mmol/L,  Chloride=94 mmol/L, CO2=24 mmol/L, Glucose=152 mg/dL, BUN=19 mg/dL, BUN Creat Ratio=22 , Creat=0.87 mg/dL, Calcium=9.3  mg/dL, Total Protein=6.8 g/dL, Albumin=4.0 g/dL, A/G=1.4 , Globulin=2.8 g/dL, Total Bili=0.3 mg/dL, Alk Phos=84 IU/L, AST=12 IU/L,  ALT=10 IU/L, eGFR, NonAfrican=  62 mL/min/1.73, eGFR, AfricanAmerican=  72 mL/min/1.73  CEA7/27/15  7/27/15 CEA=0.7  Imaging Results  Primary Diagnosis  Date Type ICD9  ICD10  Description Disease  Status  Status  Date  7/3/2015 Primary 238.8 D48. 7 Neoplasm of uncertain behavior of other  specified sites  Active 2015 Primary 785.6 C86.5 Lymphoma: T/NKcell  lymphoma Stage  III JACEK 2017  Disease Features  Tcell  angioimmunoblastic lymphoma, stage IV  Treatment History  Jamaal Ee : 1935 ([id number]) Page 2 of 4  CVP x 6 cycles, 10/22/15 2/  2/16  CVP q4 week interval.  Current Treatment  Observation  Disease Status: JACEK  Active Problems  Tcell  angioimmunoblastic lymphoma, stage IV. Past Medical History  Allergies / hayfever  Diabetes  GERD  Hypercholesterolemia  Hypothyroidism  Neuropathy.   CHF in .  Arthritis. Polymyalgia rheumatica, treated with steroids. Past Surgical History  Appendectomy  Blood transfusion  Cataract removal  Colonoscopy  R submandibular lymph node resected 2 years ago and was told this was involved with toxoplasmosis. R total knee replacement. Ankle surgery. ISAIAS. Two separate breast biopsies, which were benign. R hip fracture, 2017? hip replacement surgery at [facility]. Assessment and Plan  Angioimmunoblastic nonHodgkin's  lymphoma: [facility] has been off chemotherapy since 2017. She had been receiving CVP maintenance until this was stopped by Dr. Ger Lorenzo in  [facility]. Her persistent cytopenias are probably due to marrow hypercellularity from this chemotherapy, but there is no evidence of  myelodysplasia or active lymphoma. [facility] is relieved by the results of the staging. We are going to continue to see her on a monthly  basis. No other changes were made. Other Physicians:  WILBER [phone]? Lavinia Angulo [phone]? Anny Rhodes [phone]? Kristin Rivera MD~  [phone]? [address], [phone]? Provider Name Contact Information  Physician Robbie Pineda MD  General Surgeon Mars Sorensen MD Fax: [phone],  Work: [phone]  Work: [address] [facility], [address]  Orthopedic  Surgeon  Orlando Hyman M.D.   Fax: [phone],  Work: [phone]  Work: Orlando Hyman MD, [address] [place] [address], [address]  Primary Care  Provider  Lavinia nAgulo MD  Fax: [phone],  Work: [phone],  Fax: [phone],  Work:  [phone]  Work: [facility], [address]  Vicente Higgins : 1935 ([id number]) Page 3 of 4  Referred to  Provider  Anny Rhodes MD  Fax: [phone],  Work: [phone]  Work: [address] , [address]  Referring Provider Mars Sorensen MD Fax: [phone],  Work: [phone]  Work: [facility]  [place]iscila [place], [address]  Referring Provider Rupert Johnston MD Fax: [phone],  Work: [phone]  Work: [facility]  [address], [address]  Transcribed By: BROOKLYN Medeiros Dr, 12/13/2017 9:50:10 AM  Signed By: ___________________________  Renee David MD on 12/14/2017 at 12:09 PM  Anuj Ga,

## 2018-06-23 NOTE — PROGRESS NOTES
Care Management    After hours page received . Hailey/RN received call from [facility] Center[facility] inquiring about patient. [facility] left with patient at [phone], call made to [facility] to confirm. Spoke with Donte Jordan she has recorded time of 1920 at [facility]. Called floor at [facility] where patient was being admitted, spoke with RN Daniela. Confirmed patient is at [facility], asked her to call bed board/nursing supervisor as they don't have patient in system. Call made back to [facility], she will call Desmond Pozo to inform her of above.     Juve SNIDER

## 2018-06-23 NOTE — PROGRESS NOTES
I have reviewed discharge instructions with Kirsten CROWLEY at . Ivory Kunz 29 verbALized understanding.  IV left in

## 2018-06-26 LAB
ANTIBODIES PERFORMED, 502264: NORMAL
CD59 CELLS BLD QL: NORMAL
CD59 DEFICIENT GRANULOCYTES NFR BLD: NORMAL %
CD59 RBC/RBC NFR BLD: NORMAL %
CELL POPULATION, PNH12T: NORMAL
COMMENT, 502274: NORMAL
COMMENT, PNH8T: NORMAL
DIRECTOR REVIEW, PNHL: NORMAL
MONOCYTES, 502262: NORMAL
SPECIMEN SOURCE: NORMAL
SUBMITTED DX, PNH10T: NORMAL
VIABLE CELLS NFR SPEC: NORMAL %

## 2021-04-16 NOTE — PROGRESS NOTES
Bedside shift change report given to Michael Parekh RN (oncoming nurse) by Sebastian Hardy RN (offgoing nurse). Report included the following information SBAR, Kardex, MAR and Recent Results. (1) Female

## 2021-08-03 PROBLEM — D64.9 ANEMIA: Status: RESOLVED | Noted: 2017-12-06 | Resolved: 2021-08-03

## 2023-06-01 NOTE — DISCHARGE INSTRUCTIONS
Follow up appointments  Call Jorge Heredia at [phone] to schedule follow up appt with Dr. Josse Lopez in  10 - 14 days. When to call your Orthopaedic Surgeon:  -unrelieved pain  -Signs of infection-if your incision is red; continues to have drainage; drainage has a foul odor or if you have a persistent fever over 101 degrees  -Signs of a blood clot in your leg-calf pain, tenderness, redness, swelling of lower leg  When to call your Primary Care Physician:  -Concerns about medical conditions such as diabetes, high blood pressure, asthma, congestive heart failure  -Call if blood sugars are elevated, persistent headache or dizziness, coughing or congestion, constipation or diarrhea, burning with urination, abnormal heart rate  When to call [phone] go to the nearest emergency room  -acute onset of chest pain, shortness of breath, difficulty breathing    Activity - weight bear as tolerated right lower extremity, maintain hip precautions. Incision Care- may remove dressing and shower in  3 days if there is no drainage, change dressing daily until then    Preventing blood clots - Lovenox as directed. Pain management  -take pain medication as prescribed; decrease the amount you use as your pain lessens  -avoid alcoholic beverages while taking pain medication  -Please be aware that many medications contain Tylenol. We do not want you to over medicate so please read the information below as a guide. Do not take more than 4 Grams of Tylenol in a 24 hour period. (There are 1000 milligrams in one Gram)  Percocet contains 325 mg of Tylenol per tablet (do not take more than 12 tablets in 24 hours)  Lortab contains 500 mg of Tylenol per tablet (do not take more than 8 tablets in 24 hours)  Norco contains 325 mg of Tylenol per tablet (do not take more than 12 tablets in 24 hours).   -You may place an ice bag on your affected extremity     Diet  -resume usual diet; drink plenty of fluids; eat foods high in fiber  -you may want to take a stool softener (such as Senokot-S or Colace) to prevent constipation while you are taking pain medication. 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 Pain Management: per above medications    Recommended diet: Diabetic Diet    Recommended activity: Activity as tolerated and PT/OT Eval and Treat    Wound care: See surgical/procedure care instructions   Remove staples from back of the head     Indwelling devices: Larios catheter, voiding trial per SNF with care per routine in 1 week    Supplemental Oxygen: None    Required Lab work: Weekly BMP and Weekly CBC    Glucose management:  Accucheck ACHS with sliding scale per SNF protocol    Code status: Full        Outside physician follow up: Follow-up Information     Follow up With Details Comments [address], MD In 5 days  [address],  In 2 weeks  Shakeel Mercer [address] [address]  [phone]      Brandt Euceda MD In 1 week  [facility]  [address] [address]  [phone]      [address] [address]  [address] [phone]                 Skilled nursing facility/ SNF MD responsible for above on discharge. Information obtained by :  I understand that if any problems occur once I am at home I am to contact my physician. I understand and acknowledge receipt of the instructions indicated above. Physician's or R.N.'s Signature                                                                  Date/Time                                                                                                                                              Patient or Repres None

## 2025-01-28 NOTE — PROGRESS NOTES
Pt into angio for portacathagram, needle re accessed, portagram done, hole in tubing noted, report called to floor show

## 2025-03-10 NOTE — PROGRESS NOTES
Bedside shift change report given to Radha RN (oncoming nurse) by Norberto Sams RN (offgoing nurse). Report included the following information SBAR, Kardex, ED Summary, [facility] and Recent Results. Plan: Excision scheduled with NR. Outside path reviewed, patient amenable to excision in our office. Photos taken, records scanned into patient’s chart. All questions answered. Detail Level: Zone

## (undated) DEVICE — STERILE POLYISOPRENE POWDER-FREE SURGICAL GLOVES WITH EMOLLIENT COATING: Brand: PROTEXIS

## (undated) DEVICE — HANDPIECE SET WITH COAXIAL HIGH FLOW TIP AND SUCTION TUBE: Brand: INTERPULSE

## (undated) DEVICE — ABDOMINAL PAD: Brand: DERMACEA

## (undated) DEVICE — NEEDLE HYPO 18GA L1.5IN PNK S STL HUB POLYPR SHLD REG BVL

## (undated) DEVICE — SUTURE STRATAFIX SYMMETRIC SZ 1 L18IN ABSRB VLT CT1 L36CM SXPP1A404

## (undated) DEVICE — SOLUTION IRRIG 1000ML H2O STRL BLT

## (undated) DEVICE — 3M(TM) MEDIPORE(TM) H SOFT CLOTH TAPE 2866: Brand: 3M™ MEDIPORE™

## (undated) DEVICE — (D)PREP SKN CHLRAPRP APPL 26ML -- CONVERT TO ITEM 371833

## (undated) DEVICE — SOLUTION IV 1000ML 0.9% SOD CHL

## (undated) DEVICE — Z DISCONTINUEDSOLUTION PREP 2OZ 10% POVIDONE IOD SCR CAP BTL

## (undated) DEVICE — KENDALL SCD EXPRESS SLEEVES, KNEE LENGTH, MEDIUM: Brand: KENDALL SCD

## (undated) DEVICE — HANDLE LT SNAP ON ULT DURABLE LENS FOR TRUMPF ALC DISPOSABLE

## (undated) DEVICE — 3M™ IOBAN™ 2 ANTIMICROBIAL INCISE DRAPE 6651EZ: Brand: IOBAN™ 2

## (undated) DEVICE — REM POLYHESIVE ADULT PATIENT RETURN ELECTRODE: Brand: VALLEYLAB

## (undated) DEVICE — NON-ADHERENT STRIPS,OIL EMULSION: Brand: CURITY

## (undated) DEVICE — DEVON™ KNEE AND BODY STRAP 60" X 3" (1.5 M X 7.6 CM): Brand: DEVON

## (undated) DEVICE — 3000CC GUARDIAN II: Brand: GUARDIAN

## (undated) DEVICE — MIXER BNE CEM VAC BRKOFF NOZ PRISM II

## (undated) DEVICE — INFECTION CONTROL KIT SYS

## (undated) DEVICE — SOLUTION IRRIG 3000ML 0.9% SOD CHL FLX CONT 0797208] ICU MEDICAL INC]

## (undated) DEVICE — SUTURE VCRL SZ 1 L36IN ABSRB VLT L36MM CT-1 1/2 CIR J347H

## (undated) DEVICE — Device

## (undated) DEVICE — SUTURE ETHBND EXCEL SZ 5 L30IN NONABSORBABLE GRN L40MM V-37 MB66G

## (undated) DEVICE — STERILE POLYISOPRENE POWDER-FREE SURGICAL GLOVES: Brand: PROTEXIS

## (undated) DEVICE — SLIM BODY SKIN STAPLER: Brand: APPOSE ULC

## (undated) DEVICE — 2108 SERIES SAGITTAL BLADE (24.8 X 0.88 X 73.8MM)

## (undated) DEVICE — SUTURE VCRL SZ 2-0 L36IN ABSRB VLT L36MM CT-1 1/2 CIR J345H